# Patient Record
Sex: MALE | Race: WHITE | Employment: OTHER | URBAN - METROPOLITAN AREA
[De-identification: names, ages, dates, MRNs, and addresses within clinical notes are randomized per-mention and may not be internally consistent; named-entity substitution may affect disease eponyms.]

---

## 2017-01-13 RX ORDER — TEMAZEPAM 15 MG/1
CAPSULE ORAL
Qty: 60 CAPSULE | Refills: 0 | Status: SHIPPED | OUTPATIENT
Start: 2017-01-13 | End: 2017-02-07 | Stop reason: SDUPTHER

## 2017-01-31 ENCOUNTER — HOSPITAL ENCOUNTER (OUTPATIENT)
Dept: LAB | Age: 77
Discharge: HOME | End: 2017-01-31
Payer: MEDICARE

## 2017-01-31 DIAGNOSIS — R73.09 ELEVATED GLUCOSE: ICD-10-CM

## 2017-01-31 DIAGNOSIS — I10 ESSENTIAL HYPERTENSION: ICD-10-CM

## 2017-01-31 DIAGNOSIS — R73.03 BORDERLINE DIABETES MELLITUS: ICD-10-CM

## 2017-01-31 DIAGNOSIS — N40.0 BENIGN PROSTATIC HYPERPLASIA, PRESENCE OF LOWER URINARY TRACT SYMPTOMS UNSPECIFIED, UNSPECIFIED MORPHOLOGY: ICD-10-CM

## 2017-01-31 DIAGNOSIS — E78.2 MIXED HYPERLIPIDEMIA: ICD-10-CM

## 2017-01-31 LAB
ALBUMIN SERPL-MCNC: 4.2 G/DL (ref 3.9–4.9)
ALP BLD-CCNC: 69 U/L (ref 35–104)
ALT SERPL-CCNC: 19 U/L (ref 0–41)
ANION GAP SERPL CALCULATED.3IONS-SCNC: 6 MEQ/L (ref 7–13)
AST SERPL-CCNC: 18 U/L (ref 0–40)
BASOPHILS ABSOLUTE: 0 K/UL (ref 0–0.2)
BASOPHILS RELATIVE PERCENT: 0.3 %
BILIRUB SERPL-MCNC: 0.8 MG/DL (ref 0–1.2)
BUN BLDV-MCNC: 15 MG/DL (ref 8–23)
CALCIUM SERPL-MCNC: 9.2 MG/DL (ref 8.6–10.2)
CHLORIDE BLD-SCNC: 98 MEQ/L (ref 98–107)
CHOLESTEROL, TOTAL: 143 MG/DL (ref 0–199)
CO2: 35 MEQ/L (ref 22–29)
CREAT SERPL-MCNC: 0.76 MG/DL (ref 0.7–1.2)
EOSINOPHILS ABSOLUTE: 0.2 K/UL (ref 0–0.7)
EOSINOPHILS RELATIVE PERCENT: 1.9 %
GFR AFRICAN AMERICAN: >60
GFR NON-AFRICAN AMERICAN: >60
GLOBULIN: 2.3 G/DL (ref 2.3–3.5)
GLUCOSE BLD-MCNC: 146 MG/DL (ref 74–109)
HBA1C MFR BLD: 5.7 % (ref 4.8–5.9)
HCT VFR BLD CALC: 52 % (ref 42–52)
HDLC SERPL-MCNC: 40 MG/DL (ref 40–59)
HEMOGLOBIN: 16.1 G/DL (ref 14–18)
LDL CHOLESTEROL CALCULATED: 76 MG/DL (ref 0–129)
LYMPHOCYTES ABSOLUTE: 1.5 K/UL (ref 1–4.8)
LYMPHOCYTES RELATIVE PERCENT: 18.5 %
MCH RBC QN AUTO: 27.5 PG (ref 27–31.3)
MCHC RBC AUTO-ENTMCNC: 31 % (ref 33–37)
MCV RBC AUTO: 88.7 FL (ref 80–100)
MONOCYTES ABSOLUTE: 0.7 K/UL (ref 0.2–0.8)
MONOCYTES RELATIVE PERCENT: 7.9 %
NEUTROPHILS ABSOLUTE: 6 K/UL (ref 1.4–6.5)
NEUTROPHILS RELATIVE PERCENT: 71.4 %
PDW BLD-RTO: 14.1 % (ref 11.5–14.5)
PLATELET # BLD: 176 K/UL (ref 130–400)
POTASSIUM SERPL-SCNC: 4.3 MEQ/L (ref 3.5–5.1)
PROSTATE SPECIFIC ANTIGEN: 4.31 NG/ML (ref 0–6.22)
RBC # BLD: 5.86 M/UL (ref 4.7–6.1)
SODIUM BLD-SCNC: 139 MEQ/L (ref 132–144)
TOTAL PROTEIN: 6.5 G/DL (ref 6.4–8.1)
TRIGL SERPL-MCNC: 134 MG/DL (ref 0–200)
WBC # BLD: 8.3 K/UL (ref 4.8–10.8)

## 2017-01-31 PROCEDURE — 36415 COLL VENOUS BLD VENIPUNCTURE: CPT

## 2017-01-31 PROCEDURE — 85025 COMPLETE CBC W/AUTO DIFF WBC: CPT

## 2017-01-31 PROCEDURE — 84153 ASSAY OF PSA TOTAL: CPT

## 2017-01-31 PROCEDURE — 83036 HEMOGLOBIN GLYCOSYLATED A1C: CPT

## 2017-01-31 PROCEDURE — 80061 LIPID PANEL: CPT

## 2017-01-31 PROCEDURE — 80053 COMPREHEN METABOLIC PANEL: CPT

## 2017-02-07 ENCOUNTER — OFFICE VISIT (OUTPATIENT)
Dept: FAMILY MEDICINE CLINIC | Age: 77
End: 2017-02-07

## 2017-02-07 VITALS
HEART RATE: 72 BPM | RESPIRATION RATE: 22 BRPM | HEIGHT: 72 IN | WEIGHT: 208 LBS | BODY MASS INDEX: 28.17 KG/M2 | OXYGEN SATURATION: 95 % | TEMPERATURE: 97.8 F | DIASTOLIC BLOOD PRESSURE: 80 MMHG | SYSTOLIC BLOOD PRESSURE: 138 MMHG

## 2017-02-07 DIAGNOSIS — G47.00 INSOMNIA, UNSPECIFIED TYPE: ICD-10-CM

## 2017-02-07 DIAGNOSIS — R97.20 ELEVATED PSA: ICD-10-CM

## 2017-02-07 DIAGNOSIS — I48.0 PAROXYSMAL ATRIAL FIBRILLATION (HCC): ICD-10-CM

## 2017-02-07 DIAGNOSIS — I10 ESSENTIAL HYPERTENSION: Primary | ICD-10-CM

## 2017-02-07 DIAGNOSIS — E78.2 MIXED HYPERLIPIDEMIA: ICD-10-CM

## 2017-02-07 DIAGNOSIS — R73.03 BORDERLINE DIABETES MELLITUS: ICD-10-CM

## 2017-02-07 DIAGNOSIS — N40.0 BENIGN PROSTATIC HYPERPLASIA, PRESENCE OF LOWER URINARY TRACT SYMPTOMS UNSPECIFIED, UNSPECIFIED MORPHOLOGY: ICD-10-CM

## 2017-02-07 DIAGNOSIS — I25.10 CORONARY ARTERY DISEASE INVOLVING NATIVE CORONARY ARTERY OF NATIVE HEART WITHOUT ANGINA PECTORIS: ICD-10-CM

## 2017-02-07 DIAGNOSIS — J44.9 CHRONIC OBSTRUCTIVE PULMONARY DISEASE, UNSPECIFIED COPD TYPE (HCC): ICD-10-CM

## 2017-02-07 DIAGNOSIS — J43.9 PULMONARY EMPHYSEMA, UNSPECIFIED EMPHYSEMA TYPE (HCC): ICD-10-CM

## 2017-02-07 DIAGNOSIS — R73.09 ELEVATED GLUCOSE: ICD-10-CM

## 2017-02-07 PROCEDURE — G8420 CALC BMI NORM PARAMETERS: HCPCS | Performed by: NURSE PRACTITIONER

## 2017-02-07 PROCEDURE — 99214 OFFICE O/P EST MOD 30 MIN: CPT | Performed by: NURSE PRACTITIONER

## 2017-02-07 PROCEDURE — 4040F PNEUMOC VAC/ADMIN/RCVD: CPT | Performed by: NURSE PRACTITIONER

## 2017-02-07 PROCEDURE — 1036F TOBACCO NON-USER: CPT | Performed by: NURSE PRACTITIONER

## 2017-02-07 PROCEDURE — G8427 DOCREV CUR MEDS BY ELIG CLIN: HCPCS | Performed by: NURSE PRACTITIONER

## 2017-02-07 PROCEDURE — G8484 FLU IMMUNIZE NO ADMIN: HCPCS | Performed by: NURSE PRACTITIONER

## 2017-02-07 PROCEDURE — G8926 SPIRO NO PERF OR DOC: HCPCS | Performed by: NURSE PRACTITIONER

## 2017-02-07 PROCEDURE — 1123F ACP DISCUSS/DSCN MKR DOCD: CPT | Performed by: NURSE PRACTITIONER

## 2017-02-07 PROCEDURE — 3023F SPIROM DOC REV: CPT | Performed by: NURSE PRACTITIONER

## 2017-02-07 PROCEDURE — G8598 ASA/ANTIPLAT THER USED: HCPCS | Performed by: NURSE PRACTITIONER

## 2017-02-07 RX ORDER — TEMAZEPAM 15 MG/1
CAPSULE ORAL
Qty: 60 CAPSULE | Refills: 2 | Status: SHIPPED | OUTPATIENT
Start: 2017-02-07 | End: 2017-05-09 | Stop reason: SDUPTHER

## 2017-02-07 ASSESSMENT — PATIENT HEALTH QUESTIONNAIRE - PHQ9
SUM OF ALL RESPONSES TO PHQ QUESTIONS 1-9: 0
1. LITTLE INTEREST OR PLEASURE IN DOING THINGS: 0
2. FEELING DOWN, DEPRESSED OR HOPELESS: 0
SUM OF ALL RESPONSES TO PHQ9 QUESTIONS 1 & 2: 0

## 2017-02-12 RX ORDER — ROSUVASTATIN CALCIUM 40 MG/1
TABLET, COATED ORAL
Qty: 90 TABLET | Refills: 0 | OUTPATIENT
Start: 2017-02-12

## 2017-02-13 ENCOUNTER — TELEPHONE (OUTPATIENT)
Dept: FAMILY MEDICINE CLINIC | Age: 77
End: 2017-02-13

## 2017-02-13 RX ORDER — ROSUVASTATIN CALCIUM 40 MG/1
TABLET, COATED ORAL
Qty: 90 TABLET | Refills: 1 | Status: SHIPPED | OUTPATIENT
Start: 2017-02-13 | End: 2017-08-13 | Stop reason: SDUPTHER

## 2017-02-14 ENCOUNTER — OFFICE VISIT (OUTPATIENT)
Dept: FAMILY MEDICINE CLINIC | Age: 77
End: 2017-02-14

## 2017-02-14 VITALS
HEIGHT: 72 IN | HEART RATE: 87 BPM | RESPIRATION RATE: 22 BRPM | DIASTOLIC BLOOD PRESSURE: 80 MMHG | OXYGEN SATURATION: 95 % | TEMPERATURE: 97.7 F | WEIGHT: 206 LBS | BODY MASS INDEX: 27.9 KG/M2 | SYSTOLIC BLOOD PRESSURE: 150 MMHG

## 2017-02-14 DIAGNOSIS — J44.1 COPD EXACERBATION (HCC): Primary | ICD-10-CM

## 2017-02-14 PROCEDURE — 1036F TOBACCO NON-USER: CPT | Performed by: NURSE PRACTITIONER

## 2017-02-14 PROCEDURE — 4040F PNEUMOC VAC/ADMIN/RCVD: CPT | Performed by: NURSE PRACTITIONER

## 2017-02-14 PROCEDURE — 3023F SPIROM DOC REV: CPT | Performed by: NURSE PRACTITIONER

## 2017-02-14 PROCEDURE — G8428 CUR MEDS NOT DOCUMENT: HCPCS | Performed by: NURSE PRACTITIONER

## 2017-02-14 PROCEDURE — 1123F ACP DISCUSS/DSCN MKR DOCD: CPT | Performed by: NURSE PRACTITIONER

## 2017-02-14 PROCEDURE — G8926 SPIRO NO PERF OR DOC: HCPCS | Performed by: NURSE PRACTITIONER

## 2017-02-14 PROCEDURE — G8598 ASA/ANTIPLAT THER USED: HCPCS | Performed by: NURSE PRACTITIONER

## 2017-02-14 PROCEDURE — 99213 OFFICE O/P EST LOW 20 MIN: CPT | Performed by: NURSE PRACTITIONER

## 2017-02-14 PROCEDURE — G8484 FLU IMMUNIZE NO ADMIN: HCPCS | Performed by: NURSE PRACTITIONER

## 2017-02-14 PROCEDURE — G8420 CALC BMI NORM PARAMETERS: HCPCS | Performed by: NURSE PRACTITIONER

## 2017-02-14 RX ORDER — DOXYCYCLINE HYCLATE 100 MG/1
100 CAPSULE ORAL 2 TIMES DAILY
COMMUNITY
End: 2017-07-24

## 2017-02-14 RX ORDER — DOXYCYCLINE HYCLATE 100 MG
100 TABLET ORAL 2 TIMES DAILY
Qty: 20 TABLET | Refills: 0 | Status: SHIPPED | OUTPATIENT
Start: 2017-02-14 | End: 2017-05-09 | Stop reason: CLARIF

## 2017-04-06 ENCOUNTER — HOSPITAL ENCOUNTER (OUTPATIENT)
Age: 77
Discharge: HOME | End: 2017-04-06
Payer: MEDICARE

## 2017-04-06 ENCOUNTER — HOSPITAL ENCOUNTER (OUTPATIENT)
Dept: GENERAL RADIOLOGY | Age: 77
Discharge: HOME | End: 2017-04-06
Payer: MEDICARE

## 2017-04-06 DIAGNOSIS — J44.1 COPD EXACERBATION (HCC): ICD-10-CM

## 2017-04-06 PROCEDURE — 71020 XR CHEST STANDARD TWO VW: CPT

## 2017-05-08 ENCOUNTER — HOSPITAL ENCOUNTER (OUTPATIENT)
Dept: LAB | Age: 77
Discharge: HOME | End: 2017-05-08
Payer: MEDICARE

## 2017-05-08 DIAGNOSIS — R73.03 BORDERLINE DIABETES MELLITUS: ICD-10-CM

## 2017-05-08 DIAGNOSIS — R73.09 ELEVATED GLUCOSE: ICD-10-CM

## 2017-05-08 DIAGNOSIS — I10 ESSENTIAL HYPERTENSION: ICD-10-CM

## 2017-05-08 DIAGNOSIS — E78.2 MIXED HYPERLIPIDEMIA: ICD-10-CM

## 2017-05-08 LAB
ALBUMIN SERPL-MCNC: 4.2 G/DL (ref 3.9–4.9)
ALP BLD-CCNC: 64 U/L (ref 35–104)
ALT SERPL-CCNC: 18 U/L (ref 0–41)
ANION GAP SERPL CALCULATED.3IONS-SCNC: 6 MEQ/L (ref 7–13)
AST SERPL-CCNC: 19 U/L (ref 0–40)
BASOPHILS ABSOLUTE: 0 K/UL (ref 0–0.2)
BASOPHILS RELATIVE PERCENT: 0.4 %
BILIRUB SERPL-MCNC: 0.8 MG/DL (ref 0–1.2)
BUN BLDV-MCNC: 20 MG/DL (ref 8–23)
CALCIUM SERPL-MCNC: 9.2 MG/DL (ref 8.6–10.2)
CHLORIDE BLD-SCNC: 97 MEQ/L (ref 98–107)
CHOLESTEROL, TOTAL: 146 MG/DL (ref 0–199)
CO2: 38 MEQ/L (ref 22–29)
CREAT SERPL-MCNC: 0.62 MG/DL (ref 0.7–1.2)
EOSINOPHILS ABSOLUTE: 0.1 K/UL (ref 0–0.7)
EOSINOPHILS RELATIVE PERCENT: 1.9 %
GFR AFRICAN AMERICAN: >60
GFR NON-AFRICAN AMERICAN: >60
GLOBULIN: 2.2 G/DL (ref 2.3–3.5)
GLUCOSE BLD-MCNC: 131 MG/DL (ref 74–109)
HBA1C MFR BLD: 5.5 % (ref 4.8–5.9)
HCT VFR BLD CALC: 49.5 % (ref 42–52)
HDLC SERPL-MCNC: 43 MG/DL (ref 40–59)
HEMOGLOBIN: 16.3 G/DL (ref 14–18)
LDL CHOLESTEROL CALCULATED: 75 MG/DL (ref 0–129)
LYMPHOCYTES ABSOLUTE: 1.4 K/UL (ref 1–4.8)
LYMPHOCYTES RELATIVE PERCENT: 20.1 %
MCH RBC QN AUTO: 29.8 PG (ref 27–31.3)
MCHC RBC AUTO-ENTMCNC: 33 % (ref 33–37)
MCV RBC AUTO: 90.2 FL (ref 80–100)
MONOCYTES ABSOLUTE: 0.6 K/UL (ref 0.2–0.8)
MONOCYTES RELATIVE PERCENT: 9.1 %
NEUTROPHILS ABSOLUTE: 4.7 K/UL (ref 1.4–6.5)
NEUTROPHILS RELATIVE PERCENT: 68.5 %
PDW BLD-RTO: 14.1 % (ref 11.5–14.5)
PLATELET # BLD: 168 K/UL (ref 130–400)
POTASSIUM SERPL-SCNC: 4.6 MEQ/L (ref 3.5–5.1)
RBC # BLD: 5.49 M/UL (ref 4.7–6.1)
SODIUM BLD-SCNC: 141 MEQ/L (ref 132–144)
TOTAL PROTEIN: 6.4 G/DL (ref 6.4–8.1)
TRIGL SERPL-MCNC: 142 MG/DL (ref 0–200)
WBC # BLD: 6.9 K/UL (ref 4.8–10.8)

## 2017-05-08 PROCEDURE — 85025 COMPLETE CBC W/AUTO DIFF WBC: CPT

## 2017-05-08 PROCEDURE — 83036 HEMOGLOBIN GLYCOSYLATED A1C: CPT

## 2017-05-08 PROCEDURE — 36415 COLL VENOUS BLD VENIPUNCTURE: CPT

## 2017-05-08 PROCEDURE — 80061 LIPID PANEL: CPT

## 2017-05-08 PROCEDURE — 80053 COMPREHEN METABOLIC PANEL: CPT

## 2017-05-09 ENCOUNTER — OFFICE VISIT (OUTPATIENT)
Dept: FAMILY MEDICINE CLINIC | Age: 77
End: 2017-05-09

## 2017-05-09 VITALS
BODY MASS INDEX: 28.85 KG/M2 | OXYGEN SATURATION: 93 % | SYSTOLIC BLOOD PRESSURE: 128 MMHG | HEIGHT: 72 IN | WEIGHT: 213 LBS | DIASTOLIC BLOOD PRESSURE: 70 MMHG | RESPIRATION RATE: 18 BRPM | HEART RATE: 67 BPM | TEMPERATURE: 97.7 F

## 2017-05-09 DIAGNOSIS — G47.00 INSOMNIA, UNSPECIFIED TYPE: ICD-10-CM

## 2017-05-09 DIAGNOSIS — I25.10 CORONARY ARTERY DISEASE INVOLVING NATIVE CORONARY ARTERY OF NATIVE HEART WITHOUT ANGINA PECTORIS: ICD-10-CM

## 2017-05-09 DIAGNOSIS — R97.20 ELEVATED PSA: ICD-10-CM

## 2017-05-09 DIAGNOSIS — C44.91 SKIN CANCER, BASAL CELL: ICD-10-CM

## 2017-05-09 DIAGNOSIS — I10 ESSENTIAL HYPERTENSION: Primary | ICD-10-CM

## 2017-05-09 DIAGNOSIS — R73.09 ELEVATED GLUCOSE: ICD-10-CM

## 2017-05-09 DIAGNOSIS — R73.03 BORDERLINE DIABETES MELLITUS: ICD-10-CM

## 2017-05-09 DIAGNOSIS — J44.9 CHRONIC OBSTRUCTIVE PULMONARY DISEASE, UNSPECIFIED COPD TYPE (HCC): ICD-10-CM

## 2017-05-09 DIAGNOSIS — E78.2 MIXED HYPERLIPIDEMIA: ICD-10-CM

## 2017-05-09 DIAGNOSIS — I48.0 PAROXYSMAL ATRIAL FIBRILLATION (HCC): ICD-10-CM

## 2017-05-09 PROCEDURE — G8420 CALC BMI NORM PARAMETERS: HCPCS | Performed by: NURSE PRACTITIONER

## 2017-05-09 PROCEDURE — 99214 OFFICE O/P EST MOD 30 MIN: CPT | Performed by: NURSE PRACTITIONER

## 2017-05-09 PROCEDURE — 1123F ACP DISCUSS/DSCN MKR DOCD: CPT | Performed by: NURSE PRACTITIONER

## 2017-05-09 PROCEDURE — G8926 SPIRO NO PERF OR DOC: HCPCS | Performed by: NURSE PRACTITIONER

## 2017-05-09 PROCEDURE — G8427 DOCREV CUR MEDS BY ELIG CLIN: HCPCS | Performed by: NURSE PRACTITIONER

## 2017-05-09 PROCEDURE — 1036F TOBACCO NON-USER: CPT | Performed by: NURSE PRACTITIONER

## 2017-05-09 PROCEDURE — G8598 ASA/ANTIPLAT THER USED: HCPCS | Performed by: NURSE PRACTITIONER

## 2017-05-09 PROCEDURE — 3023F SPIROM DOC REV: CPT | Performed by: NURSE PRACTITIONER

## 2017-05-09 PROCEDURE — 4040F PNEUMOC VAC/ADMIN/RCVD: CPT | Performed by: NURSE PRACTITIONER

## 2017-05-09 RX ORDER — TEMAZEPAM 15 MG/1
CAPSULE ORAL
Qty: 60 CAPSULE | Refills: 2 | Status: SHIPPED | OUTPATIENT
Start: 2017-05-09 | End: 2017-08-07 | Stop reason: SDUPTHER

## 2017-05-09 RX ORDER — THIAMINE HCL 100 MG
TABLET ORAL 2 TIMES DAILY
COMMUNITY

## 2017-05-09 ASSESSMENT — PATIENT HEALTH QUESTIONNAIRE - PHQ9
1. LITTLE INTEREST OR PLEASURE IN DOING THINGS: 0
SUM OF ALL RESPONSES TO PHQ9 QUESTIONS 1 & 2: 0
SUM OF ALL RESPONSES TO PHQ QUESTIONS 1-9: 0
2. FEELING DOWN, DEPRESSED OR HOPELESS: 0

## 2017-07-24 ENCOUNTER — OFFICE VISIT (OUTPATIENT)
Dept: PULMONOLOGY | Age: 77
End: 2017-07-24

## 2017-07-24 VITALS
HEIGHT: 72 IN | SYSTOLIC BLOOD PRESSURE: 138 MMHG | BODY MASS INDEX: 28.44 KG/M2 | DIASTOLIC BLOOD PRESSURE: 88 MMHG | RESPIRATION RATE: 16 BRPM | OXYGEN SATURATION: 93 % | HEART RATE: 83 BPM | TEMPERATURE: 96.7 F | WEIGHT: 210 LBS

## 2017-07-24 DIAGNOSIS — J44.9 CHRONIC OBSTRUCTIVE PULMONARY DISEASE, UNSPECIFIED COPD TYPE (HCC): Primary | ICD-10-CM

## 2017-07-24 DIAGNOSIS — R06.02 SHORTNESS OF BREATH: ICD-10-CM

## 2017-07-24 DIAGNOSIS — I48.91 ATRIAL FIBRILLATION, UNSPECIFIED TYPE (HCC): ICD-10-CM

## 2017-07-24 DIAGNOSIS — I27.20 PULMONARY HTN (HCC): ICD-10-CM

## 2017-07-24 PROCEDURE — G8926 SPIRO NO PERF OR DOC: HCPCS | Performed by: INTERNAL MEDICINE

## 2017-07-24 PROCEDURE — 1123F ACP DISCUSS/DSCN MKR DOCD: CPT | Performed by: INTERNAL MEDICINE

## 2017-07-24 PROCEDURE — 1036F TOBACCO NON-USER: CPT | Performed by: INTERNAL MEDICINE

## 2017-07-24 PROCEDURE — 99204 OFFICE O/P NEW MOD 45 MIN: CPT | Performed by: INTERNAL MEDICINE

## 2017-07-24 PROCEDURE — 4040F PNEUMOC VAC/ADMIN/RCVD: CPT | Performed by: INTERNAL MEDICINE

## 2017-07-24 PROCEDURE — 3023F SPIROM DOC REV: CPT | Performed by: INTERNAL MEDICINE

## 2017-07-24 PROCEDURE — G8598 ASA/ANTIPLAT THER USED: HCPCS | Performed by: INTERNAL MEDICINE

## 2017-07-24 PROCEDURE — G8419 CALC BMI OUT NRM PARAM NOF/U: HCPCS | Performed by: INTERNAL MEDICINE

## 2017-07-24 PROCEDURE — G8427 DOCREV CUR MEDS BY ELIG CLIN: HCPCS | Performed by: INTERNAL MEDICINE

## 2017-07-24 ASSESSMENT — ENCOUNTER SYMPTOMS
WHEEZING: 1
SORE THROAT: 0
ABDOMINAL PAIN: 1
CHEST TIGHTNESS: 1
NAUSEA: 0
SINUS PRESSURE: 0
COUGH: 1
SHORTNESS OF BREATH: 1
RHINORRHEA: 0
VOMITING: 0
DIARRHEA: 0

## 2017-08-01 ENCOUNTER — TELEPHONE (OUTPATIENT)
Dept: GASTROENTEROLOGY | Age: 77
End: 2017-08-01

## 2017-08-07 ENCOUNTER — HOSPITAL ENCOUNTER (OUTPATIENT)
Dept: NON INVASIVE DIAGNOSTICS | Age: 77
Discharge: HOME | End: 2017-08-07
Payer: MEDICARE

## 2017-08-07 ENCOUNTER — HOSPITAL ENCOUNTER (OUTPATIENT)
Dept: PULMONOLOGY | Age: 77
Discharge: HOME | End: 2017-08-07
Payer: MEDICARE

## 2017-08-07 ENCOUNTER — HOSPITAL ENCOUNTER (OUTPATIENT)
Dept: CT IMAGING | Age: 77
Discharge: HOME | End: 2017-08-07
Payer: MEDICARE

## 2017-08-07 VITALS
BODY MASS INDEX: 27.77 KG/M2 | HEART RATE: 66 BPM | HEIGHT: 72 IN | DIASTOLIC BLOOD PRESSURE: 71 MMHG | SYSTOLIC BLOOD PRESSURE: 128 MMHG | WEIGHT: 205 LBS | RESPIRATION RATE: 18 BRPM

## 2017-08-07 DIAGNOSIS — I27.20 PULMONARY HTN (HCC): ICD-10-CM

## 2017-08-07 DIAGNOSIS — R06.02 SHORTNESS OF BREATH: ICD-10-CM

## 2017-08-07 DIAGNOSIS — J44.9 CHRONIC OBSTRUCTIVE PULMONARY DISEASE, UNSPECIFIED COPD TYPE (HCC): ICD-10-CM

## 2017-08-07 DIAGNOSIS — G47.00 INSOMNIA, UNSPECIFIED TYPE: ICD-10-CM

## 2017-08-07 LAB
BASE EXCESS ARTERIAL: 10 (ref -3–3)
HCO3 ARTERIAL: 36.2 MMOL/L (ref 21–29)
LACTATE: 0.88 MMOL/L (ref 0.4–2)
LV EF: 68 %
LVEF MODALITY: NORMAL
O2 SAT, ARTERIAL: 92 % (ref 93–100)
PCO2 ARTERIAL: 69 MM HG (ref 35–45)
PERFORMED ON: ABNORMAL
PH ARTERIAL: 7.33 (ref 7.35–7.45)
PO2 ARTERIAL: 70 MM HG (ref 75–108)
POC FIO2: 3
POC SAMPLE TYPE: ABNORMAL
TCO2 ARTERIAL: 38 (ref 22–29)

## 2017-08-07 PROCEDURE — 93306 TTE W/DOPPLER COMPLETE: CPT

## 2017-08-07 PROCEDURE — 94060 EVALUATION OF WHEEZING: CPT

## 2017-08-07 PROCEDURE — 82803 BLOOD GASES ANY COMBINATION: CPT

## 2017-08-07 PROCEDURE — 94726 PLETHYSMOGRAPHY LUNG VOLUMES: CPT

## 2017-08-07 PROCEDURE — 94729 DIFFUSING CAPACITY: CPT

## 2017-08-07 PROCEDURE — 83605 ASSAY OF LACTIC ACID: CPT

## 2017-08-07 PROCEDURE — 71250 CT THORAX DX C-: CPT

## 2017-08-07 RX ORDER — ALBUTEROL SULFATE 2.5 MG/3ML
2.5 SOLUTION RESPIRATORY (INHALATION) ONCE
Status: DISCONTINUED | OUTPATIENT
Start: 2017-08-07 | End: 2017-08-08 | Stop reason: HOSPADM

## 2017-08-08 ENCOUNTER — HOSPITAL ENCOUNTER (OUTPATIENT)
Dept: LAB | Age: 77
Discharge: HOME | End: 2017-08-08
Payer: MEDICARE

## 2017-08-08 DIAGNOSIS — I10 ESSENTIAL HYPERTENSION: ICD-10-CM

## 2017-08-08 DIAGNOSIS — R97.20 ELEVATED PSA: ICD-10-CM

## 2017-08-08 DIAGNOSIS — E78.2 MIXED HYPERLIPIDEMIA: ICD-10-CM

## 2017-08-08 DIAGNOSIS — R73.09 ELEVATED GLUCOSE: ICD-10-CM

## 2017-08-08 LAB
ALBUMIN SERPL-MCNC: 4.1 G/DL (ref 3.9–4.9)
ALP BLD-CCNC: 72 U/L (ref 35–104)
ALT SERPL-CCNC: 19 U/L (ref 0–41)
ANION GAP SERPL CALCULATED.3IONS-SCNC: 9 MEQ/L (ref 7–13)
AST SERPL-CCNC: 20 U/L (ref 0–40)
BASOPHILS ABSOLUTE: 0 K/UL (ref 0–0.2)
BASOPHILS RELATIVE PERCENT: 0.6 %
BILIRUB SERPL-MCNC: 0.8 MG/DL (ref 0–1.2)
BUN BLDV-MCNC: 16 MG/DL (ref 8–23)
CALCIUM SERPL-MCNC: 9.1 MG/DL (ref 8.6–10.2)
CHLORIDE BLD-SCNC: 100 MEQ/L (ref 98–107)
CHOLESTEROL, TOTAL: 128 MG/DL (ref 0–199)
CO2: 35 MEQ/L (ref 22–29)
CREAT SERPL-MCNC: 0.66 MG/DL (ref 0.7–1.2)
EOSINOPHILS ABSOLUTE: 0.2 K/UL (ref 0–0.7)
EOSINOPHILS RELATIVE PERCENT: 2.8 %
GFR AFRICAN AMERICAN: >60
GFR NON-AFRICAN AMERICAN: >60
GLOBULIN: 2.4 G/DL (ref 2.3–3.5)
GLUCOSE BLD-MCNC: 126 MG/DL (ref 74–109)
HBA1C MFR BLD: 5.7 % (ref 4.8–5.9)
HCT VFR BLD CALC: 49.2 % (ref 42–52)
HDLC SERPL-MCNC: 40 MG/DL (ref 40–59)
HEMOGLOBIN: 16.1 G/DL (ref 14–18)
LDL CHOLESTEROL CALCULATED: 63 MG/DL (ref 0–129)
LYMPHOCYTES ABSOLUTE: 1.7 K/UL (ref 1–4.8)
LYMPHOCYTES RELATIVE PERCENT: 21.6 %
MCH RBC QN AUTO: 29.1 PG (ref 27–31.3)
MCHC RBC AUTO-ENTMCNC: 32.7 % (ref 33–37)
MCV RBC AUTO: 89 FL (ref 80–100)
MONOCYTES ABSOLUTE: 0.7 K/UL (ref 0.2–0.8)
MONOCYTES RELATIVE PERCENT: 8.9 %
NEUTROPHILS ABSOLUTE: 5.1 K/UL (ref 1.4–6.5)
NEUTROPHILS RELATIVE PERCENT: 66.1 %
PDW BLD-RTO: 13.8 % (ref 11.5–14.5)
PLATELET # BLD: 160 K/UL (ref 130–400)
POTASSIUM SERPL-SCNC: 4.8 MEQ/L (ref 3.5–5.1)
PROSTATE SPECIFIC ANTIGEN: 5.23 NG/ML (ref 0–6.22)
RBC # BLD: 5.53 M/UL (ref 4.7–6.1)
SODIUM BLD-SCNC: 144 MEQ/L (ref 132–144)
TOTAL PROTEIN: 6.5 G/DL (ref 6.4–8.1)
TRIGL SERPL-MCNC: 125 MG/DL (ref 0–200)
WBC # BLD: 7.6 K/UL (ref 4.8–10.8)

## 2017-08-08 PROCEDURE — 84153 ASSAY OF PSA TOTAL: CPT

## 2017-08-08 PROCEDURE — 85025 COMPLETE CBC W/AUTO DIFF WBC: CPT

## 2017-08-08 PROCEDURE — 80061 LIPID PANEL: CPT

## 2017-08-08 PROCEDURE — 83036 HEMOGLOBIN GLYCOSYLATED A1C: CPT

## 2017-08-08 PROCEDURE — 80053 COMPREHEN METABOLIC PANEL: CPT

## 2017-08-08 PROCEDURE — 36415 COLL VENOUS BLD VENIPUNCTURE: CPT

## 2017-08-10 ENCOUNTER — OFFICE VISIT (OUTPATIENT)
Dept: FAMILY MEDICINE CLINIC | Age: 77
End: 2017-08-10

## 2017-08-10 VITALS
TEMPERATURE: 97.3 F | RESPIRATION RATE: 18 BRPM | HEART RATE: 67 BPM | DIASTOLIC BLOOD PRESSURE: 80 MMHG | BODY MASS INDEX: 28.53 KG/M2 | SYSTOLIC BLOOD PRESSURE: 130 MMHG | WEIGHT: 210.6 LBS | OXYGEN SATURATION: 90 % | HEIGHT: 72 IN

## 2017-08-10 DIAGNOSIS — E78.2 MIXED HYPERLIPIDEMIA: ICD-10-CM

## 2017-08-10 DIAGNOSIS — I48.0 PAROXYSMAL ATRIAL FIBRILLATION (HCC): ICD-10-CM

## 2017-08-10 DIAGNOSIS — I10 ESSENTIAL HYPERTENSION: Primary | ICD-10-CM

## 2017-08-10 DIAGNOSIS — R97.20 ELEVATED PSA: ICD-10-CM

## 2017-08-10 DIAGNOSIS — I25.10 CORONARY ARTERY DISEASE INVOLVING NATIVE CORONARY ARTERY OF NATIVE HEART WITHOUT ANGINA PECTORIS: ICD-10-CM

## 2017-08-10 DIAGNOSIS — J44.9 CHRONIC OBSTRUCTIVE PULMONARY DISEASE, UNSPECIFIED COPD TYPE (HCC): ICD-10-CM

## 2017-08-10 DIAGNOSIS — R73.03 BORDERLINE DIABETES MELLITUS: ICD-10-CM

## 2017-08-10 PROCEDURE — 3023F SPIROM DOC REV: CPT | Performed by: NURSE PRACTITIONER

## 2017-08-10 PROCEDURE — G8926 SPIRO NO PERF OR DOC: HCPCS | Performed by: NURSE PRACTITIONER

## 2017-08-10 PROCEDURE — 1036F TOBACCO NON-USER: CPT | Performed by: NURSE PRACTITIONER

## 2017-08-10 PROCEDURE — 1123F ACP DISCUSS/DSCN MKR DOCD: CPT | Performed by: NURSE PRACTITIONER

## 2017-08-10 PROCEDURE — 99214 OFFICE O/P EST MOD 30 MIN: CPT | Performed by: NURSE PRACTITIONER

## 2017-08-10 PROCEDURE — 4040F PNEUMOC VAC/ADMIN/RCVD: CPT | Performed by: NURSE PRACTITIONER

## 2017-08-10 PROCEDURE — G8427 DOCREV CUR MEDS BY ELIG CLIN: HCPCS | Performed by: NURSE PRACTITIONER

## 2017-08-10 PROCEDURE — G8419 CALC BMI OUT NRM PARAM NOF/U: HCPCS | Performed by: NURSE PRACTITIONER

## 2017-08-10 PROCEDURE — G8598 ASA/ANTIPLAT THER USED: HCPCS | Performed by: NURSE PRACTITIONER

## 2017-08-10 RX ORDER — TEMAZEPAM 15 MG/1
CAPSULE ORAL
Qty: 60 CAPSULE | Refills: 2 | Status: SHIPPED | OUTPATIENT
Start: 2017-08-10 | End: 2017-11-08 | Stop reason: SDUPTHER

## 2017-08-14 RX ORDER — ROSUVASTATIN CALCIUM 40 MG/1
TABLET, COATED ORAL
Qty: 90 TABLET | Refills: 0 | Status: SHIPPED | OUTPATIENT
Start: 2017-08-14 | End: 2017-11-12 | Stop reason: SDUPTHER

## 2017-08-31 ENCOUNTER — HOSPITAL ENCOUNTER (OUTPATIENT)
Dept: PULMONOLOGY | Age: 77
Setting detail: THERAPIES SERIES
Discharge: HOME | End: 2017-08-31
Payer: MEDICARE

## 2017-08-31 PROCEDURE — G0424 PULMONARY REHAB W EXER: HCPCS

## 2017-09-08 ENCOUNTER — HOSPITAL ENCOUNTER (OUTPATIENT)
Dept: PULMONOLOGY | Age: 77
Setting detail: THERAPIES SERIES
Discharge: HOME | End: 2017-09-08
Payer: MEDICARE

## 2017-09-08 PROCEDURE — G0424 PULMONARY REHAB W EXER: HCPCS

## 2017-09-12 ENCOUNTER — HOSPITAL ENCOUNTER (OUTPATIENT)
Dept: PULMONOLOGY | Age: 77
Setting detail: THERAPIES SERIES
Discharge: HOME | End: 2017-09-12
Payer: MEDICARE

## 2017-09-12 PROCEDURE — G0424 PULMONARY REHAB W EXER: HCPCS

## 2017-09-14 ENCOUNTER — HOSPITAL ENCOUNTER (OUTPATIENT)
Dept: PULMONOLOGY | Age: 77
Setting detail: THERAPIES SERIES
Discharge: HOME | End: 2017-09-14
Payer: MEDICARE

## 2017-09-14 PROCEDURE — G0424 PULMONARY REHAB W EXER: HCPCS

## 2017-09-19 ENCOUNTER — HOSPITAL ENCOUNTER (OUTPATIENT)
Dept: PULMONOLOGY | Age: 77
Setting detail: THERAPIES SERIES
Discharge: HOME | End: 2017-09-19
Payer: MEDICARE

## 2017-09-19 PROCEDURE — G0424 PULMONARY REHAB W EXER: HCPCS

## 2017-09-21 ENCOUNTER — HOSPITAL ENCOUNTER (OUTPATIENT)
Dept: PULMONOLOGY | Age: 77
Setting detail: THERAPIES SERIES
Discharge: HOME | End: 2017-09-21
Payer: MEDICARE

## 2017-09-21 PROCEDURE — G0424 PULMONARY REHAB W EXER: HCPCS

## 2017-09-25 ENCOUNTER — OFFICE VISIT (OUTPATIENT)
Dept: PULMONOLOGY | Age: 77
End: 2017-09-25

## 2017-09-25 VITALS
TEMPERATURE: 96.9 F | BODY MASS INDEX: 28.99 KG/M2 | WEIGHT: 214 LBS | HEART RATE: 67 BPM | OXYGEN SATURATION: 91 % | HEIGHT: 72 IN | DIASTOLIC BLOOD PRESSURE: 80 MMHG | SYSTOLIC BLOOD PRESSURE: 140 MMHG

## 2017-09-25 DIAGNOSIS — G47.33 OBSTRUCTIVE SLEEP APNEA: ICD-10-CM

## 2017-09-25 DIAGNOSIS — J96.11 CHRONIC RESPIRATORY FAILURE WITH HYPOXIA AND HYPERCAPNIA (HCC): ICD-10-CM

## 2017-09-25 DIAGNOSIS — J96.12 CHRONIC RESPIRATORY FAILURE WITH HYPOXIA AND HYPERCAPNIA (HCC): ICD-10-CM

## 2017-09-25 DIAGNOSIS — J44.9 CHRONIC OBSTRUCTIVE PULMONARY DISEASE, UNSPECIFIED COPD TYPE (HCC): Primary | ICD-10-CM

## 2017-09-25 DIAGNOSIS — I27.20 PULMONARY HTN (HCC): ICD-10-CM

## 2017-09-25 PROCEDURE — G8427 DOCREV CUR MEDS BY ELIG CLIN: HCPCS | Performed by: INTERNAL MEDICINE

## 2017-09-25 PROCEDURE — 99214 OFFICE O/P EST MOD 30 MIN: CPT | Performed by: INTERNAL MEDICINE

## 2017-09-25 PROCEDURE — 1036F TOBACCO NON-USER: CPT | Performed by: INTERNAL MEDICINE

## 2017-09-25 PROCEDURE — 3023F SPIROM DOC REV: CPT | Performed by: INTERNAL MEDICINE

## 2017-09-25 PROCEDURE — 4040F PNEUMOC VAC/ADMIN/RCVD: CPT | Performed by: INTERNAL MEDICINE

## 2017-09-25 PROCEDURE — G8926 SPIRO NO PERF OR DOC: HCPCS | Performed by: INTERNAL MEDICINE

## 2017-09-25 PROCEDURE — G8598 ASA/ANTIPLAT THER USED: HCPCS | Performed by: INTERNAL MEDICINE

## 2017-09-25 PROCEDURE — 1123F ACP DISCUSS/DSCN MKR DOCD: CPT | Performed by: INTERNAL MEDICINE

## 2017-09-25 PROCEDURE — G8417 CALC BMI ABV UP PARAM F/U: HCPCS | Performed by: INTERNAL MEDICINE

## 2017-09-25 RX ORDER — INFLUENZA A VIRUS A/MICHIGAN/45/2015 X-275 (H1N1) ANTIGEN (FORMALDEHYDE INACTIVATED), INFLUENZA A VIRUS A/SINGAPORE/INFIMH-16-0019/2016 IVR-186 (H3N2) ANTIGEN (FORMALDEHYDE INACTIVATED), AND INFLUENZA B VIRUS B/MARYLAND/15/2016 BX-69A (A B/COLORADO/6/2017-LIKE VIRUS) ANTIGEN (FORMALDEHYDE INACTIVATED) 60; 60; 60 UG/.5ML; UG/.5ML; UG/.5ML
INJECTION, SUSPENSION INTRAMUSCULAR
Refills: 0 | COMMUNITY
Start: 2017-09-15 | End: 2017-11-13 | Stop reason: ALTCHOICE

## 2017-09-25 ASSESSMENT — ENCOUNTER SYMPTOMS
RHINORRHEA: 1
DIARRHEA: 0
COUGH: 1
WHEEZING: 1
CHEST TIGHTNESS: 1
SORE THROAT: 0
ABDOMINAL PAIN: 0
SHORTNESS OF BREATH: 1
NAUSEA: 0
SINUS PRESSURE: 0
VOMITING: 0

## 2017-09-27 ENCOUNTER — HOSPITAL ENCOUNTER (OUTPATIENT)
Dept: SLEEP CENTER | Age: 77
Discharge: HOME | End: 2017-09-27
Payer: MEDICARE

## 2017-09-27 PROCEDURE — 95810 POLYSOM 6/> YRS 4/> PARAM: CPT

## 2017-09-28 ENCOUNTER — HOSPITAL ENCOUNTER (OUTPATIENT)
Dept: PULMONOLOGY | Age: 77
Setting detail: THERAPIES SERIES
Discharge: HOME | End: 2017-09-28
Payer: MEDICARE

## 2017-09-28 PROCEDURE — G0424 PULMONARY REHAB W EXER: HCPCS

## 2017-10-03 ENCOUNTER — HOSPITAL ENCOUNTER (OUTPATIENT)
Dept: PULMONOLOGY | Age: 77
Setting detail: THERAPIES SERIES
Discharge: HOME | End: 2017-10-03
Payer: MEDICARE

## 2017-10-03 PROCEDURE — G0424 PULMONARY REHAB W EXER: HCPCS

## 2017-10-05 ENCOUNTER — HOSPITAL ENCOUNTER (OUTPATIENT)
Dept: PULMONOLOGY | Age: 77
Setting detail: THERAPIES SERIES
Discharge: HOME | End: 2017-10-05
Payer: MEDICARE

## 2017-10-05 ENCOUNTER — TELEPHONE (OUTPATIENT)
Dept: PULMONOLOGY | Age: 77
End: 2017-10-05

## 2017-10-05 PROCEDURE — G0424 PULMONARY REHAB W EXER: HCPCS

## 2017-10-10 ENCOUNTER — HOSPITAL ENCOUNTER (OUTPATIENT)
Dept: PULMONOLOGY | Age: 77
Setting detail: THERAPIES SERIES
Discharge: HOME | End: 2017-10-10
Payer: MEDICARE

## 2017-10-10 PROCEDURE — G0424 PULMONARY REHAB W EXER: HCPCS

## 2017-10-12 ENCOUNTER — HOSPITAL ENCOUNTER (OUTPATIENT)
Dept: SLEEP CENTER | Age: 77
Discharge: HOME | End: 2017-10-12
Payer: MEDICARE

## 2017-10-12 ENCOUNTER — HOSPITAL ENCOUNTER (OUTPATIENT)
Dept: PULMONOLOGY | Age: 77
Setting detail: THERAPIES SERIES
Discharge: HOME | End: 2017-10-12
Payer: MEDICARE

## 2017-10-12 PROCEDURE — 95811 POLYSOM 6/>YRS CPAP 4/> PARM: CPT

## 2017-10-12 PROCEDURE — G0424 PULMONARY REHAB W EXER: HCPCS

## 2017-10-17 ENCOUNTER — HOSPITAL ENCOUNTER (OUTPATIENT)
Dept: PULMONOLOGY | Age: 77
Setting detail: THERAPIES SERIES
Discharge: HOME | End: 2017-10-17
Payer: MEDICARE

## 2017-10-17 PROCEDURE — G0424 PULMONARY REHAB W EXER: HCPCS

## 2017-10-19 ENCOUNTER — HOSPITAL ENCOUNTER (OUTPATIENT)
Dept: PULMONOLOGY | Age: 77
Setting detail: THERAPIES SERIES
Discharge: HOME | End: 2017-10-19
Payer: MEDICARE

## 2017-10-19 PROCEDURE — G0424 PULMONARY REHAB W EXER: HCPCS

## 2017-10-23 ENCOUNTER — TELEPHONE (OUTPATIENT)
Dept: PULMONOLOGY | Age: 77
End: 2017-10-23

## 2017-10-23 RX ORDER — BUDESONIDE AND FORMOTEROL FUMARATE DIHYDRATE 80; 4.5 UG/1; UG/1
2 AEROSOL RESPIRATORY (INHALATION) 2 TIMES DAILY
Qty: 3 INHALER | Refills: 2 | Status: SHIPPED | OUTPATIENT
Start: 2017-10-23 | End: 2017-11-07 | Stop reason: SDUPTHER

## 2017-10-23 NOTE — TELEPHONE ENCOUNTER
Patient calling to find out if Dr. Romaine Kim is going to send the Symbicort Rx to [facility]. He was here on 09/25/2017 and was given 2 samples of the Symbicort. Next appt is on 11/27/2017. Does he needs to wait until his appointment in November or can he get the Rx before then. Please advice.  Thanks

## 2017-10-24 ENCOUNTER — HOSPITAL ENCOUNTER (OUTPATIENT)
Dept: PULMONOLOGY | Age: 77
Setting detail: THERAPIES SERIES
Discharge: HOME | End: 2017-10-24
Payer: MEDICARE

## 2017-10-24 PROCEDURE — G0424 PULMONARY REHAB W EXER: HCPCS

## 2017-10-26 ENCOUNTER — HOSPITAL ENCOUNTER (OUTPATIENT)
Dept: PULMONOLOGY | Age: 77
Setting detail: THERAPIES SERIES
Discharge: HOME | End: 2017-10-26
Payer: MEDICARE

## 2017-10-26 PROCEDURE — G0424 PULMONARY REHAB W EXER: HCPCS

## 2017-10-31 ENCOUNTER — HOSPITAL ENCOUNTER (OUTPATIENT)
Dept: PULMONOLOGY | Age: 77
Setting detail: THERAPIES SERIES
Discharge: HOME | End: 2017-10-31
Payer: MEDICARE

## 2017-10-31 PROCEDURE — G0424 PULMONARY REHAB W EXER: HCPCS

## 2017-11-02 ENCOUNTER — HOSPITAL ENCOUNTER (OUTPATIENT)
Dept: PULMONOLOGY | Age: 77
Setting detail: THERAPIES SERIES
Discharge: HOME | End: 2017-11-02
Payer: MEDICARE

## 2017-11-02 PROCEDURE — G0424 PULMONARY REHAB W EXER: HCPCS

## 2017-11-07 ENCOUNTER — HOSPITAL ENCOUNTER (OUTPATIENT)
Dept: PULMONOLOGY | Age: 77
Setting detail: THERAPIES SERIES
Discharge: HOME | End: 2017-11-07
Payer: MEDICARE

## 2017-11-07 PROCEDURE — G0424 PULMONARY REHAB W EXER: HCPCS

## 2017-11-07 NOTE — TELEPHONE ENCOUNTER
Patient still has not received Rx from [facility] is asking for a Rx for 30 days to be sent to [facility]. Patient ran out last night and asking for this to be called in today.  Call patient once sent [phone]

## 2017-11-08 DIAGNOSIS — G47.00 INSOMNIA, UNSPECIFIED TYPE: ICD-10-CM

## 2017-11-08 RX ORDER — BUDESONIDE AND FORMOTEROL FUMARATE DIHYDRATE 80; 4.5 UG/1; UG/1
2 AEROSOL RESPIRATORY (INHALATION) 2 TIMES DAILY
Qty: 1 INHALER | Refills: 1 | Status: SHIPPED | OUTPATIENT
Start: 2017-11-08 | End: 2018-08-31 | Stop reason: SDUPTHER

## 2017-11-09 ENCOUNTER — HOSPITAL ENCOUNTER (OUTPATIENT)
Dept: LAB | Age: 77
Discharge: HOME | End: 2017-11-09
Payer: MEDICARE

## 2017-11-09 ENCOUNTER — HOSPITAL ENCOUNTER (OUTPATIENT)
Dept: PULMONOLOGY | Age: 77
Setting detail: THERAPIES SERIES
Discharge: HOME | End: 2017-11-09
Payer: MEDICARE

## 2017-11-09 DIAGNOSIS — E78.2 MIXED HYPERLIPIDEMIA: ICD-10-CM

## 2017-11-09 DIAGNOSIS — R97.20 ELEVATED PSA: ICD-10-CM

## 2017-11-09 DIAGNOSIS — R73.03 BORDERLINE DIABETES MELLITUS: ICD-10-CM

## 2017-11-09 DIAGNOSIS — G47.00 INSOMNIA, UNSPECIFIED TYPE: ICD-10-CM

## 2017-11-09 LAB
ALBUMIN SERPL-MCNC: 4.2 G/DL (ref 3.9–4.9)
ALP BLD-CCNC: 71 U/L (ref 35–104)
ALT SERPL-CCNC: 20 U/L (ref 0–41)
ANION GAP SERPL CALCULATED.3IONS-SCNC: 11 MEQ/L (ref 7–13)
AST SERPL-CCNC: 20 U/L (ref 0–40)
BASOPHILS ABSOLUTE: 0 K/UL (ref 0–0.2)
BASOPHILS RELATIVE PERCENT: 0.5 %
BILIRUB SERPL-MCNC: 0.6 MG/DL (ref 0–1.2)
BUN BLDV-MCNC: 18 MG/DL (ref 8–23)
CALCIUM SERPL-MCNC: 9.5 MG/DL (ref 8.6–10.2)
CHLORIDE BLD-SCNC: 100 MEQ/L (ref 98–107)
CHOLESTEROL, TOTAL: 144 MG/DL (ref 0–199)
CO2: 33 MEQ/L (ref 22–29)
CREAT SERPL-MCNC: 0.66 MG/DL (ref 0.7–1.2)
EOSINOPHILS ABSOLUTE: 0.1 K/UL (ref 0–0.7)
EOSINOPHILS RELATIVE PERCENT: 2 %
GFR AFRICAN AMERICAN: >60
GFR NON-AFRICAN AMERICAN: >60
GLOBULIN: 2.5 G/DL (ref 2.3–3.5)
GLUCOSE BLD-MCNC: 144 MG/DL (ref 74–109)
HBA1C MFR BLD: 5.9 % (ref 4.8–5.9)
HCT VFR BLD CALC: 48.2 % (ref 42–52)
HDLC SERPL-MCNC: 44 MG/DL (ref 40–59)
HEMOGLOBIN: 15.4 G/DL (ref 14–18)
LDL CHOLESTEROL CALCULATED: 73 MG/DL (ref 0–129)
LYMPHOCYTES ABSOLUTE: 1.6 K/UL (ref 1–4.8)
LYMPHOCYTES RELATIVE PERCENT: 22.7 %
MCH RBC QN AUTO: 28.9 PG (ref 27–31.3)
MCHC RBC AUTO-ENTMCNC: 31.9 % (ref 33–37)
MCV RBC AUTO: 90.6 FL (ref 80–100)
MONOCYTES ABSOLUTE: 0.5 K/UL (ref 0.2–0.8)
MONOCYTES RELATIVE PERCENT: 7.9 %
NEUTROPHILS ABSOLUTE: 4.6 K/UL (ref 1.4–6.5)
NEUTROPHILS RELATIVE PERCENT: 66.9 %
PDW BLD-RTO: 14.3 % (ref 11.5–14.5)
PLATELET # BLD: 188 K/UL (ref 130–400)
POTASSIUM SERPL-SCNC: 4.9 MEQ/L (ref 3.5–5.1)
PROSTATE SPECIFIC ANTIGEN: 4.85 NG/ML (ref 0–6.22)
RBC # BLD: 5.32 M/UL (ref 4.7–6.1)
SODIUM BLD-SCNC: 144 MEQ/L (ref 132–144)
TOTAL PROTEIN: 6.7 G/DL (ref 6.4–8.1)
TRIGL SERPL-MCNC: 133 MG/DL (ref 0–200)
WBC # BLD: 6.9 K/UL (ref 4.8–10.8)

## 2017-11-09 PROCEDURE — 80053 COMPREHEN METABOLIC PANEL: CPT

## 2017-11-09 PROCEDURE — 84153 ASSAY OF PSA TOTAL: CPT

## 2017-11-09 PROCEDURE — 36415 COLL VENOUS BLD VENIPUNCTURE: CPT

## 2017-11-09 PROCEDURE — 85025 COMPLETE CBC W/AUTO DIFF WBC: CPT

## 2017-11-09 PROCEDURE — G0424 PULMONARY REHAB W EXER: HCPCS

## 2017-11-09 PROCEDURE — 83036 HEMOGLOBIN GLYCOSYLATED A1C: CPT

## 2017-11-09 PROCEDURE — 80061 LIPID PANEL: CPT

## 2017-11-09 RX ORDER — TEMAZEPAM 15 MG/1
CAPSULE ORAL
Qty: 60 CAPSULE | Refills: 1 | Status: CANCELLED | OUTPATIENT
Start: 2017-11-09

## 2017-11-09 RX ORDER — TEMAZEPAM 15 MG/1
CAPSULE ORAL
Qty: 60 CAPSULE | Refills: 1 | Status: SHIPPED | OUTPATIENT
Start: 2017-11-09 | End: 2018-01-03 | Stop reason: SDUPTHER

## 2017-11-13 ENCOUNTER — OFFICE VISIT (OUTPATIENT)
Dept: FAMILY MEDICINE CLINIC | Age: 77
End: 2017-11-13

## 2017-11-13 VITALS
OXYGEN SATURATION: 94 % | HEIGHT: 72 IN | SYSTOLIC BLOOD PRESSURE: 120 MMHG | TEMPERATURE: 97.8 F | BODY MASS INDEX: 28.71 KG/M2 | WEIGHT: 212 LBS | DIASTOLIC BLOOD PRESSURE: 62 MMHG | RESPIRATION RATE: 14 BRPM | HEART RATE: 73 BPM

## 2017-11-13 DIAGNOSIS — J44.9 CHRONIC OBSTRUCTIVE PULMONARY DISEASE, UNSPECIFIED COPD TYPE (HCC): ICD-10-CM

## 2017-11-13 DIAGNOSIS — R73.03 BORDERLINE DIABETES MELLITUS: ICD-10-CM

## 2017-11-13 DIAGNOSIS — R97.20 ELEVATED PSA: ICD-10-CM

## 2017-11-13 DIAGNOSIS — I48.0 PAROXYSMAL ATRIAL FIBRILLATION (HCC): ICD-10-CM

## 2017-11-13 DIAGNOSIS — G47.00 INSOMNIA, UNSPECIFIED TYPE: ICD-10-CM

## 2017-11-13 DIAGNOSIS — Z99.89 OBSTRUCTIVE SLEEP APNEA ON CPAP: ICD-10-CM

## 2017-11-13 DIAGNOSIS — G47.33 OBSTRUCTIVE SLEEP APNEA ON CPAP: ICD-10-CM

## 2017-11-13 DIAGNOSIS — I25.10 CORONARY ARTERY DISEASE INVOLVING NATIVE CORONARY ARTERY OF NATIVE HEART WITHOUT ANGINA PECTORIS: ICD-10-CM

## 2017-11-13 DIAGNOSIS — Z86.19 HISTORY OF SHINGLES: ICD-10-CM

## 2017-11-13 DIAGNOSIS — E78.2 MIXED HYPERLIPIDEMIA: ICD-10-CM

## 2017-11-13 DIAGNOSIS — I10 ESSENTIAL HYPERTENSION: Primary | ICD-10-CM

## 2017-11-13 PROCEDURE — 1036F TOBACCO NON-USER: CPT | Performed by: NURSE PRACTITIONER

## 2017-11-13 PROCEDURE — G8598 ASA/ANTIPLAT THER USED: HCPCS | Performed by: NURSE PRACTITIONER

## 2017-11-13 PROCEDURE — 99214 OFFICE O/P EST MOD 30 MIN: CPT | Performed by: NURSE PRACTITIONER

## 2017-11-13 PROCEDURE — 3023F SPIROM DOC REV: CPT | Performed by: NURSE PRACTITIONER

## 2017-11-13 PROCEDURE — G8484 FLU IMMUNIZE NO ADMIN: HCPCS | Performed by: NURSE PRACTITIONER

## 2017-11-13 PROCEDURE — G8427 DOCREV CUR MEDS BY ELIG CLIN: HCPCS | Performed by: NURSE PRACTITIONER

## 2017-11-13 PROCEDURE — 1123F ACP DISCUSS/DSCN MKR DOCD: CPT | Performed by: NURSE PRACTITIONER

## 2017-11-13 PROCEDURE — 4040F PNEUMOC VAC/ADMIN/RCVD: CPT | Performed by: NURSE PRACTITIONER

## 2017-11-13 PROCEDURE — G8926 SPIRO NO PERF OR DOC: HCPCS | Performed by: NURSE PRACTITIONER

## 2017-11-13 PROCEDURE — G8417 CALC BMI ABV UP PARAM F/U: HCPCS | Performed by: NURSE PRACTITIONER

## 2017-11-13 RX ORDER — ROSUVASTATIN CALCIUM 40 MG/1
TABLET, COATED ORAL
Qty: 90 TABLET | Refills: 1 | Status: SHIPPED | OUTPATIENT
Start: 2017-11-13 | End: 2018-02-05 | Stop reason: SDUPTHER

## 2017-11-13 ASSESSMENT — PATIENT HEALTH QUESTIONNAIRE - PHQ9
1. LITTLE INTEREST OR PLEASURE IN DOING THINGS: 0
SUM OF ALL RESPONSES TO PHQ9 QUESTIONS 1 & 2: 0
2. FEELING DOWN, DEPRESSED OR HOPELESS: 0
SUM OF ALL RESPONSES TO PHQ QUESTIONS 1-9: 0

## 2017-11-13 NOTE — PROGRESS NOTES
Dyslipidemia and Hypertension: Anam Black presents for evaluation of lipids. Compliance with treatment thus far has been fair. The patient exercises rarely. Patient here for follow-up of elevated blood pressure. He is exercising and is adherent to low salt diet. Blood pressure is well controlled at home Antihypertensive medication side effects: no medication side effects noted. Use of agents associated with hypertension: none. No new myalgias or GI upset on rosuvastatin (Crestor). COPD: he states that his breathing has been better lately. A little bit anyway. He is going through pulmonary rehab and thinks that he is able to tolerate activity a bit more now. This is better during therapy but he cannot afford oxygen continuously at home. He is on demand oxygen currently. Atrial fibrillation/ CAD: he continues to follow with Dr. Brandon gutierrez every 3 months. No recent cardiac symptoms. He used to see his cardiologist once per year but since he has been \"getting sicker\" he is seen much more often. BPH: he continues to have frequent urination at night but no other symptoms reported. He does have a hard time falling back asleep after he gets up but his restoril has been helpful with that. Sleep apnea: Anam Black has obstructive sleep apnea that is treated with CPAP. The CPAP is used  7 hours 7 nights per week. The CPAP use helps the daytime sleepiness and low energy levels. He continues to follow with Dr. Romaine Kim. Lab Results   Component Value Date    ALT 20 11/09/2017    AST 20 11/09/2017     Lab Results   Component Value Date    CHOL 144 11/09/2017    TRIG 133 11/09/2017    HDL 44 11/09/2017    LDLCALC 73 11/09/2017        PMH: The patient is known to have coexisting coronary artery disease. ROS: This patient reports no chest pain or pressure. The patient reports no nausea or vomiting. There is no heartburn or indigestion. There is no diarrhea or constipation.   No black, bloody, mucusy or 5. Fasting glucose is elevated but hemoglobin A1c is within normal range. Discussed recommendation to reduce starches and simple sugars in the diet. He cannot tolerate exercise due to severe COPD and pulmonary fibrosis. He is currently going through pulmonary rehab which he thinks is helping him be able to do more activities day-to-day. 4. PSA is stable. Will continue to check every 6 months. Very little symptoms reported (nocturia at times)    8. Shingles history added to his chart. He is advised to have appointment if symptoms begin. He waited 2 weeks last time he had an outbreak and it was around and in his eye. He was told that he might lose that eye at that time. He is thankful that he did not. 9. 10. Restoril Rx given. He is tolerating CPAP. Follows with . Controlled Substances Monitoring:     Attestation: The Prescription Monitoring Report for this patient was reviewed today. (Brian Major CNP)  Documentation: No signs of potential drug abuse or diversion identified., Existing medication contract.  Brian Major CNP)      Electronically signed by Felicity Sanford, 12:34 PM 11/13/17

## 2017-11-14 ENCOUNTER — HOSPITAL ENCOUNTER (OUTPATIENT)
Dept: PULMONOLOGY | Age: 77
Setting detail: THERAPIES SERIES
Discharge: HOME | End: 2017-11-14
Payer: MEDICARE

## 2017-11-14 PROCEDURE — G0424 PULMONARY REHAB W EXER: HCPCS

## 2017-11-16 ENCOUNTER — HOSPITAL ENCOUNTER (OUTPATIENT)
Dept: PULMONOLOGY | Age: 77
Setting detail: THERAPIES SERIES
Discharge: HOME | End: 2017-11-16
Payer: MEDICARE

## 2017-11-16 PROCEDURE — G0424 PULMONARY REHAB W EXER: HCPCS

## 2017-11-21 ENCOUNTER — HOSPITAL ENCOUNTER (OUTPATIENT)
Dept: PULMONOLOGY | Age: 77
Setting detail: THERAPIES SERIES
Discharge: HOME | End: 2017-11-21
Payer: MEDICARE

## 2017-11-21 PROCEDURE — G0424 PULMONARY REHAB W EXER: HCPCS

## 2017-11-24 ENCOUNTER — HOSPITAL ENCOUNTER (OUTPATIENT)
Dept: PULMONOLOGY | Age: 77
Setting detail: THERAPIES SERIES
Discharge: HOME | End: 2017-11-24
Payer: MEDICARE

## 2017-11-24 PROCEDURE — G0424 PULMONARY REHAB W EXER: HCPCS

## 2017-11-27 ENCOUNTER — OFFICE VISIT (OUTPATIENT)
Dept: PULMONOLOGY | Age: 77
End: 2017-11-27

## 2017-11-27 VITALS
OXYGEN SATURATION: 92 % | TEMPERATURE: 96.9 F | SYSTOLIC BLOOD PRESSURE: 120 MMHG | HEIGHT: 72 IN | HEART RATE: 130 BPM | BODY MASS INDEX: 29.26 KG/M2 | WEIGHT: 216 LBS | DIASTOLIC BLOOD PRESSURE: 70 MMHG

## 2017-11-27 DIAGNOSIS — I27.20 PULMONARY HTN (HCC): ICD-10-CM

## 2017-11-27 DIAGNOSIS — G47.33 OBSTRUCTIVE SLEEP APNEA: ICD-10-CM

## 2017-11-27 DIAGNOSIS — J96.12 CHRONIC RESPIRATORY FAILURE WITH HYPOXIA AND HYPERCAPNIA (HCC): ICD-10-CM

## 2017-11-27 DIAGNOSIS — J44.9 CHRONIC OBSTRUCTIVE PULMONARY DISEASE, UNSPECIFIED COPD TYPE (HCC): Primary | ICD-10-CM

## 2017-11-27 DIAGNOSIS — J96.11 CHRONIC RESPIRATORY FAILURE WITH HYPOXIA AND HYPERCAPNIA (HCC): ICD-10-CM

## 2017-11-27 PROCEDURE — G8427 DOCREV CUR MEDS BY ELIG CLIN: HCPCS | Performed by: INTERNAL MEDICINE

## 2017-11-27 PROCEDURE — G8598 ASA/ANTIPLAT THER USED: HCPCS | Performed by: INTERNAL MEDICINE

## 2017-11-27 PROCEDURE — 3023F SPIROM DOC REV: CPT | Performed by: INTERNAL MEDICINE

## 2017-11-27 PROCEDURE — G8926 SPIRO NO PERF OR DOC: HCPCS | Performed by: INTERNAL MEDICINE

## 2017-11-27 PROCEDURE — 99214 OFFICE O/P EST MOD 30 MIN: CPT | Performed by: INTERNAL MEDICINE

## 2017-11-27 PROCEDURE — G8417 CALC BMI ABV UP PARAM F/U: HCPCS | Performed by: INTERNAL MEDICINE

## 2017-11-27 PROCEDURE — 1036F TOBACCO NON-USER: CPT | Performed by: INTERNAL MEDICINE

## 2017-11-27 PROCEDURE — 1123F ACP DISCUSS/DSCN MKR DOCD: CPT | Performed by: INTERNAL MEDICINE

## 2017-11-27 PROCEDURE — 4040F PNEUMOC VAC/ADMIN/RCVD: CPT | Performed by: INTERNAL MEDICINE

## 2017-11-27 PROCEDURE — G8484 FLU IMMUNIZE NO ADMIN: HCPCS | Performed by: INTERNAL MEDICINE

## 2017-11-27 ASSESSMENT — ENCOUNTER SYMPTOMS
SINUS PRESSURE: 0
SHORTNESS OF BREATH: 1
COUGH: 1
DIARRHEA: 0
CHEST TIGHTNESS: 1
ABDOMINAL PAIN: 0
NAUSEA: 0
VOMITING: 0
WHEEZING: 1
RHINORRHEA: 1
SORE THROAT: 0

## 2017-11-27 NOTE — PROGRESS NOTES
Subjective:     Morgan Perkins is a 65 y.o. male who complains today of:     Chief Complaint   Patient presents with    Follow-up     received cpap       HPI  Patient is here for a follow-up visit regarding severe COPD and sleep apnea as well as chronic respiratory failure. Patient has known very severe COPD with a baseline FEV1 following bronchodilator therapy of 20% of predicted. He also has confirmed severe hypoxia and hypercapnia recently diagnosed with sleep apnea and was started on auto CPAP with oxygen at night. She also goes to pulmonary rehab and has been doing well with it. He reports gradual improvement in his exertional dyspnea with regular exercises and use of all of his treatments including CPAP recently. He is tolerating CPAP well and sleeping well all night. He wakes up 2-3 times a day most to go to the bathroom comes back and puts his CPAP back on goes to sleep. Allergies:  Sildenafil citrate;  Advair [fluticasone-salmeterol]; and Fluticasone propionate (inhal)  Past Medical History:   Diagnosis Date    Adrenal adenoma     CT 6/2011 stable    Anxiety     Asbestosis(501)     CAD (coronary artery disease)     status post stent 2001    Cancer ([facility]     basil cell carnoma    COPD (chronic obstructive pulmonary disease) (HCC)     Elbow injury     left    Erectile dysfunction     Granulomatous lung disease (HCC)     Herpes zoster     Hyperlipidemia     Hypertension     Insomnia     Interstitial fibrosis (HCC)     Obstructive sleep apnea on CPAP 11/13/2017    Prostate nodule     Scarlet fever     Urine frequency      Past Surgical History:   Procedure Laterality Date    CARDIAC CATHETERIZATION      CARDIOVASCULAR STRESS TEST      1/2010 normal per patient    COLONOSCOPY      5/2009 tubular adenomas    COLONOSCOPY  08/13/15    David Mcnamara MD    EYE SURGERY  02/10/14    DR Temitope Taylor  [facility]    HERNIA REPAIR      LEG SURGERY       Family History   Problem Relation Age of Onset

## 2017-11-28 ENCOUNTER — HOSPITAL ENCOUNTER (OUTPATIENT)
Dept: PULMONOLOGY | Age: 77
Setting detail: THERAPIES SERIES
Discharge: HOME | End: 2017-11-28
Payer: MEDICARE

## 2017-11-28 PROCEDURE — G0424 PULMONARY REHAB W EXER: HCPCS

## 2017-11-30 ENCOUNTER — HOSPITAL ENCOUNTER (OUTPATIENT)
Dept: PULMONOLOGY | Age: 77
Setting detail: THERAPIES SERIES
Discharge: HOME | End: 2017-11-30
Payer: MEDICARE

## 2017-11-30 PROCEDURE — G0424 PULMONARY REHAB W EXER: HCPCS

## 2017-12-05 ENCOUNTER — HOSPITAL ENCOUNTER (OUTPATIENT)
Dept: PULMONOLOGY | Age: 77
Setting detail: THERAPIES SERIES
Discharge: HOME | End: 2017-12-05
Payer: MEDICARE

## 2017-12-05 PROCEDURE — G0424 PULMONARY REHAB W EXER: HCPCS

## 2017-12-07 ENCOUNTER — HOSPITAL ENCOUNTER (OUTPATIENT)
Dept: PULMONOLOGY | Age: 77
Setting detail: THERAPIES SERIES
Discharge: HOME | End: 2017-12-07
Payer: MEDICARE

## 2017-12-07 PROCEDURE — G0424 PULMONARY REHAB W EXER: HCPCS

## 2017-12-12 ENCOUNTER — TELEPHONE (OUTPATIENT)
Dept: FAMILY MEDICINE CLINIC | Age: 77
End: 2017-12-12

## 2017-12-19 ENCOUNTER — HOSPITAL ENCOUNTER (OUTPATIENT)
Dept: PULMONOLOGY | Age: 77
Setting detail: THERAPIES SERIES
Discharge: HOME | End: 2017-12-19
Payer: MEDICARE

## 2017-12-19 PROCEDURE — G0424 PULMONARY REHAB W EXER: HCPCS

## 2017-12-21 ENCOUNTER — HOSPITAL ENCOUNTER (OUTPATIENT)
Dept: PULMONOLOGY | Age: 77
Setting detail: THERAPIES SERIES
Discharge: HOME | End: 2017-12-21
Payer: MEDICARE

## 2017-12-21 PROCEDURE — G0424 PULMONARY REHAB W EXER: HCPCS

## 2017-12-26 ENCOUNTER — HOSPITAL ENCOUNTER (OUTPATIENT)
Dept: PULMONOLOGY | Age: 77
Setting detail: THERAPIES SERIES
Discharge: HOME | End: 2017-12-26
Payer: MEDICARE

## 2017-12-26 PROCEDURE — G0424 PULMONARY REHAB W EXER: HCPCS

## 2017-12-28 ENCOUNTER — HOSPITAL ENCOUNTER (OUTPATIENT)
Dept: PULMONOLOGY | Age: 77
Setting detail: THERAPIES SERIES
Discharge: HOME | End: 2017-12-28
Payer: MEDICARE

## 2017-12-28 PROCEDURE — G0424 PULMONARY REHAB W EXER: HCPCS

## 2018-01-02 ENCOUNTER — HOSPITAL ENCOUNTER (OUTPATIENT)
Dept: PULMONOLOGY | Age: 78
Setting detail: THERAPIES SERIES
Discharge: HOME | End: 2018-01-02
Payer: MEDICARE

## 2018-01-02 PROCEDURE — G0424 PULMONARY REHAB W EXER: HCPCS

## 2018-01-03 DIAGNOSIS — G47.00 INSOMNIA, UNSPECIFIED TYPE: ICD-10-CM

## 2018-01-04 RX ORDER — TEMAZEPAM 15 MG/1
CAPSULE ORAL
Qty: 60 CAPSULE | Refills: 1 | Status: SHIPPED | OUTPATIENT
Start: 2018-01-04 | End: 2018-02-13 | Stop reason: SDUPTHER

## 2018-01-29 ENCOUNTER — HOSPITAL ENCOUNTER (OUTPATIENT)
Dept: CARDIAC REHAB | Age: 78
Setting detail: THERAPIES SERIES
Discharge: HOME | End: 2018-01-29
Payer: MEDICARE

## 2018-01-29 PROCEDURE — 93798 PHYS/QHP OP CAR RHAB W/ECG: CPT

## 2018-02-05 RX ORDER — ROSUVASTATIN CALCIUM 40 MG/1
40 TABLET, COATED ORAL DAILY
Qty: 90 TABLET | Refills: 0 | Status: SHIPPED | OUTPATIENT
Start: 2018-02-05 | End: 2019-02-15 | Stop reason: SDUPTHER

## 2018-02-05 NOTE — TELEPHONE ENCOUNTER
From: Nicki Ventura  Sent: 2/4/2018 8:20 PM EST  Subject: Medication Renewal Request    Jose Cruz Ness would like a refill of the following medications:  rosuvastatin (CRESTOR) 40 MG tablet Bebeto Shultz CNP]    Preferred pharmacy: [address] [place] [address] , [address] [facility] [phone] [place] [phone]    Comment:

## 2018-02-06 ENCOUNTER — HOSPITAL ENCOUNTER (OUTPATIENT)
Dept: CARDIAC REHAB | Age: 78
Setting detail: THERAPIES SERIES
Discharge: HOME | End: 2018-02-06
Payer: MEDICARE

## 2018-02-06 PROCEDURE — 93798 PHYS/QHP OP CAR RHAB W/ECG: CPT

## 2018-02-07 ENCOUNTER — HOSPITAL ENCOUNTER (OUTPATIENT)
Dept: LAB | Age: 78
Discharge: HOME | End: 2018-02-07
Payer: MEDICARE

## 2018-02-07 DIAGNOSIS — R97.20 ELEVATED PSA: ICD-10-CM

## 2018-02-07 DIAGNOSIS — R73.03 BORDERLINE DIABETES MELLITUS: ICD-10-CM

## 2018-02-07 DIAGNOSIS — E78.2 MIXED HYPERLIPIDEMIA: ICD-10-CM

## 2018-02-07 DIAGNOSIS — I10 ESSENTIAL HYPERTENSION: ICD-10-CM

## 2018-02-07 LAB
ALBUMIN SERPL-MCNC: 4.5 G/DL (ref 3.9–4.9)
ALP BLD-CCNC: 78 U/L (ref 35–104)
ALT SERPL-CCNC: 24 U/L (ref 0–41)
ANION GAP SERPL CALCULATED.3IONS-SCNC: 13 MEQ/L (ref 7–13)
AST SERPL-CCNC: 22 U/L (ref 0–40)
BASOPHILS ABSOLUTE: 0 K/UL (ref 0–0.2)
BASOPHILS RELATIVE PERCENT: 0.5 %
BILIRUB SERPL-MCNC: 1 MG/DL (ref 0–1.2)
BUN BLDV-MCNC: 20 MG/DL (ref 8–23)
CALCIUM SERPL-MCNC: 9.5 MG/DL (ref 8.6–10.2)
CHLORIDE BLD-SCNC: 96 MEQ/L (ref 98–107)
CHOLESTEROL, TOTAL: 148 MG/DL (ref 0–199)
CO2: 32 MEQ/L (ref 22–29)
CREAT SERPL-MCNC: 0.67 MG/DL (ref 0.7–1.2)
EOSINOPHILS ABSOLUTE: 0.2 K/UL (ref 0–0.7)
EOSINOPHILS RELATIVE PERCENT: 2.2 %
GFR AFRICAN AMERICAN: >60
GFR NON-AFRICAN AMERICAN: >60
GLOBULIN: 2.5 G/DL (ref 2.3–3.5)
GLUCOSE BLD-MCNC: 150 MG/DL (ref 74–109)
HBA1C MFR BLD: 6.1 % (ref 4.8–5.9)
HCT VFR BLD CALC: 46.9 % (ref 42–52)
HDLC SERPL-MCNC: 41 MG/DL (ref 40–59)
HEMOGLOBIN: 15.3 G/DL (ref 14–18)
LDL CHOLESTEROL CALCULATED: 69 MG/DL (ref 0–129)
LYMPHOCYTES ABSOLUTE: 1.4 K/UL (ref 1–4.8)
LYMPHOCYTES RELATIVE PERCENT: 19.2 %
MCH RBC QN AUTO: 29.7 PG (ref 27–31.3)
MCHC RBC AUTO-ENTMCNC: 32.6 % (ref 33–37)
MCV RBC AUTO: 91.1 FL (ref 80–100)
MONOCYTES ABSOLUTE: 0.5 K/UL (ref 0.2–0.8)
MONOCYTES RELATIVE PERCENT: 7.3 %
NEUTROPHILS ABSOLUTE: 5.3 K/UL (ref 1.4–6.5)
NEUTROPHILS RELATIVE PERCENT: 70.8 %
PDW BLD-RTO: 14.2 % (ref 11.5–14.5)
PLATELET # BLD: 174 K/UL (ref 130–400)
POTASSIUM SERPL-SCNC: 4.6 MEQ/L (ref 3.5–5.1)
PROSTATE SPECIFIC ANTIGEN: 4.88 NG/ML (ref 0–6.22)
RBC # BLD: 5.15 M/UL (ref 4.7–6.1)
SODIUM BLD-SCNC: 141 MEQ/L (ref 132–144)
TOTAL PROTEIN: 7 G/DL (ref 6.4–8.1)
TRIGL SERPL-MCNC: 190 MG/DL (ref 0–200)
WBC # BLD: 7.4 K/UL (ref 4.8–10.8)

## 2018-02-07 PROCEDURE — 85025 COMPLETE CBC W/AUTO DIFF WBC: CPT

## 2018-02-07 PROCEDURE — 80053 COMPREHEN METABOLIC PANEL: CPT

## 2018-02-07 PROCEDURE — 84153 ASSAY OF PSA TOTAL: CPT

## 2018-02-07 PROCEDURE — 80061 LIPID PANEL: CPT

## 2018-02-07 PROCEDURE — 36415 COLL VENOUS BLD VENIPUNCTURE: CPT

## 2018-02-07 PROCEDURE — 83036 HEMOGLOBIN GLYCOSYLATED A1C: CPT

## 2018-02-08 ENCOUNTER — HOSPITAL ENCOUNTER (OUTPATIENT)
Dept: CARDIAC REHAB | Age: 78
Setting detail: THERAPIES SERIES
Discharge: HOME | End: 2018-02-08
Payer: MEDICARE

## 2018-02-08 PROCEDURE — 93798 PHYS/QHP OP CAR RHAB W/ECG: CPT

## 2018-02-13 ENCOUNTER — OFFICE VISIT (OUTPATIENT)
Dept: FAMILY MEDICINE CLINIC | Age: 78
End: 2018-02-13
Payer: MEDICARE

## 2018-02-13 VITALS
WEIGHT: 222 LBS | BODY MASS INDEX: 30.07 KG/M2 | SYSTOLIC BLOOD PRESSURE: 128 MMHG | OXYGEN SATURATION: 95 % | HEIGHT: 72 IN | DIASTOLIC BLOOD PRESSURE: 68 MMHG | HEART RATE: 73 BPM | TEMPERATURE: 98.6 F | RESPIRATION RATE: 16 BRPM

## 2018-02-13 DIAGNOSIS — R97.20 ELEVATED PSA: ICD-10-CM

## 2018-02-13 DIAGNOSIS — E78.2 MIXED HYPERLIPIDEMIA: ICD-10-CM

## 2018-02-13 DIAGNOSIS — I10 ESSENTIAL HYPERTENSION: Primary | ICD-10-CM

## 2018-02-13 DIAGNOSIS — G47.33 OBSTRUCTIVE SLEEP APNEA ON CPAP: ICD-10-CM

## 2018-02-13 DIAGNOSIS — G47.00 INSOMNIA, UNSPECIFIED TYPE: ICD-10-CM

## 2018-02-13 DIAGNOSIS — J44.9 CHRONIC OBSTRUCTIVE PULMONARY DISEASE, UNSPECIFIED COPD TYPE (HCC): ICD-10-CM

## 2018-02-13 DIAGNOSIS — I25.10 CORONARY ARTERY DISEASE INVOLVING NATIVE CORONARY ARTERY OF NATIVE HEART WITHOUT ANGINA PECTORIS: ICD-10-CM

## 2018-02-13 DIAGNOSIS — Z99.89 OBSTRUCTIVE SLEEP APNEA ON CPAP: ICD-10-CM

## 2018-02-13 DIAGNOSIS — I48.0 PAROXYSMAL ATRIAL FIBRILLATION (HCC): ICD-10-CM

## 2018-02-13 PROCEDURE — 3023F SPIROM DOC REV: CPT | Performed by: NURSE PRACTITIONER

## 2018-02-13 PROCEDURE — G8598 ASA/ANTIPLAT THER USED: HCPCS | Performed by: NURSE PRACTITIONER

## 2018-02-13 PROCEDURE — G8427 DOCREV CUR MEDS BY ELIG CLIN: HCPCS | Performed by: NURSE PRACTITIONER

## 2018-02-13 PROCEDURE — 4040F PNEUMOC VAC/ADMIN/RCVD: CPT | Performed by: NURSE PRACTITIONER

## 2018-02-13 PROCEDURE — G8417 CALC BMI ABV UP PARAM F/U: HCPCS | Performed by: NURSE PRACTITIONER

## 2018-02-13 PROCEDURE — 99214 OFFICE O/P EST MOD 30 MIN: CPT | Performed by: NURSE PRACTITIONER

## 2018-02-13 PROCEDURE — 1036F TOBACCO NON-USER: CPT | Performed by: NURSE PRACTITIONER

## 2018-02-13 PROCEDURE — G8484 FLU IMMUNIZE NO ADMIN: HCPCS | Performed by: NURSE PRACTITIONER

## 2018-02-13 PROCEDURE — 1123F ACP DISCUSS/DSCN MKR DOCD: CPT | Performed by: NURSE PRACTITIONER

## 2018-02-13 PROCEDURE — G8926 SPIRO NO PERF OR DOC: HCPCS | Performed by: NURSE PRACTITIONER

## 2018-02-13 RX ORDER — TEMAZEPAM 15 MG/1
CAPSULE ORAL
Qty: 60 CAPSULE | Refills: 2 | Status: SHIPPED | OUTPATIENT
Start: 2018-02-13 | End: 2018-05-15 | Stop reason: SDUPTHER

## 2018-02-13 RX ORDER — DOXYCYCLINE HYCLATE 100 MG/1
100 CAPSULE ORAL 2 TIMES DAILY
Qty: 20 CAPSULE | Refills: 0 | Status: SHIPPED | OUTPATIENT
Start: 2018-02-13 | End: 2020-02-25 | Stop reason: ALTCHOICE

## 2018-02-13 RX ORDER — CLOPIDOGREL BISULFATE 75 MG/1
TABLET ORAL
Refills: 3 | COMMUNITY
Start: 2018-01-26 | End: 2018-08-10 | Stop reason: ALTCHOICE

## 2018-02-13 ASSESSMENT — PATIENT HEALTH QUESTIONNAIRE - PHQ9
SUM OF ALL RESPONSES TO PHQ QUESTIONS 1-9: 0
2. FEELING DOWN, DEPRESSED OR HOPELESS: 0
SUM OF ALL RESPONSES TO PHQ9 QUESTIONS 1 & 2: 0
1. LITTLE INTEREST OR PLEASURE IN DOING THINGS: 0

## 2018-02-13 NOTE — PROGRESS NOTES
The patient reports no bloating and no change in appetite. There is no numbness, tingling or swelling in the extremities. EXAM:  Constitutional Blood pressure 128/68, pulse 73, temperature 98.6 °F (37 °C), temperature source Temporal, resp. rate 16, height 6' (1.829 m), weight 222 lb (100.7 kg), SpO2 95 %. .  He has a normal affect, no acute distress, appears well developed and well nourished. Neck:  neck- supple, no mass, non-tender and no bruits  Lungs:  Breathing Pattern: regular, no distress, Breath sounds: diminished breath sounds- throughout and scattered  Heart:  Heart sounds are normal.  Regular rate and rhythm without murmur, gallop or rub. Abdomen:  Soft, non-tender, normal bowel sounds. No bruits, organomegaly or masses. Extremities: Extremities warm to touch, pink, with no edema. DIAGNOSIS:   1. Essential hypertension  CBC Auto Differential    Comprehensive Metabolic Panel   2. Insomnia, unspecified type  temazepam (RESTORIL) 15 MG capsule   3. Mixed hyperlipidemia  Lipid Panel   4. Coronary artery disease involving native coronary artery of native heart without angina pectoris     5. Obstructive sleep apnea on CPAP     6. Elevated PSA  PSA, Diagnostic   7. Paroxysmal atrial fibrillation (HCC)         Plan:  Continue current medicines and dosages. Continue diet and exercise programs, improving where possible. Follow up in 4 months with lab work one week prior. For further details see orders placed. 1. Blood pressure is well controlled on current medication. Continue the same. 2. Lipid panel is stable on statin. No side effects reported. Continue the same. 3. 5. Insomnia has been well controlled with PRN Restoril. This has helped him better tolerate CPAP. 4. 7.Recently had 2 stents placed. Tolerated procedure well. Questionable PAF. May be referred to EP later this week depending on how follow up goes this Thursday. 6. PSA has been stable.        Electronically signed by

## 2018-02-27 ENCOUNTER — OFFICE VISIT (OUTPATIENT)
Dept: PULMONOLOGY | Age: 78
End: 2018-02-27
Payer: MEDICARE

## 2018-02-27 VITALS
TEMPERATURE: 96.7 F | OXYGEN SATURATION: 93 % | HEIGHT: 72 IN | BODY MASS INDEX: 29.45 KG/M2 | DIASTOLIC BLOOD PRESSURE: 60 MMHG | SYSTOLIC BLOOD PRESSURE: 126 MMHG | WEIGHT: 217.4 LBS | HEART RATE: 80 BPM

## 2018-02-27 DIAGNOSIS — J44.9 CHRONIC OBSTRUCTIVE PULMONARY DISEASE, UNSPECIFIED COPD TYPE (HCC): Primary | ICD-10-CM

## 2018-02-27 DIAGNOSIS — J96.11 CHRONIC RESPIRATORY FAILURE WITH HYPOXIA AND HYPERCAPNIA (HCC): ICD-10-CM

## 2018-02-27 DIAGNOSIS — I48.91 ATRIAL FIBRILLATION, UNSPECIFIED TYPE (HCC): ICD-10-CM

## 2018-02-27 DIAGNOSIS — J96.12 CHRONIC RESPIRATORY FAILURE WITH HYPOXIA AND HYPERCAPNIA (HCC): ICD-10-CM

## 2018-02-27 DIAGNOSIS — R06.02 SHORTNESS OF BREATH: ICD-10-CM

## 2018-02-27 DIAGNOSIS — I27.20 PULMONARY HTN (HCC): ICD-10-CM

## 2018-02-27 RX ORDER — DOFETILIDE 0.25 MG/1
250 CAPSULE ORAL 2 TIMES DAILY
COMMUNITY

## 2018-02-28 PROCEDURE — G8484 FLU IMMUNIZE NO ADMIN: HCPCS | Performed by: PHYSICIAN ASSISTANT

## 2018-02-28 PROCEDURE — 1123F ACP DISCUSS/DSCN MKR DOCD: CPT | Performed by: PHYSICIAN ASSISTANT

## 2018-02-28 PROCEDURE — 99214 OFFICE O/P EST MOD 30 MIN: CPT | Performed by: PHYSICIAN ASSISTANT

## 2018-02-28 PROCEDURE — G9598 AOR ANE 5.5-5.9 CM MAX DIAM: HCPCS | Performed by: PHYSICIAN ASSISTANT

## 2018-02-28 PROCEDURE — G8427 DOCREV CUR MEDS BY ELIG CLIN: HCPCS | Performed by: PHYSICIAN ASSISTANT

## 2018-02-28 PROCEDURE — G8926 SPIRO NO PERF OR DOC: HCPCS | Performed by: PHYSICIAN ASSISTANT

## 2018-02-28 PROCEDURE — G8417 CALC BMI ABV UP PARAM F/U: HCPCS | Performed by: PHYSICIAN ASSISTANT

## 2018-02-28 PROCEDURE — 4040F PNEUMOC VAC/ADMIN/RCVD: CPT | Performed by: PHYSICIAN ASSISTANT

## 2018-02-28 PROCEDURE — 1036F TOBACCO NON-USER: CPT | Performed by: PHYSICIAN ASSISTANT

## 2018-02-28 ASSESSMENT — ENCOUNTER SYMPTOMS
SINUS PAIN: 0
STRIDOR: 0
VOICE CHANGE: 0
RHINORRHEA: 0
WHEEZING: 0
ABDOMINAL PAIN: 0
TROUBLE SWALLOWING: 0
SORE THROAT: 0
COLOR CHANGE: 0
COUGH: 0
BACK PAIN: 0
SINUS PRESSURE: 0
SHORTNESS OF BREATH: 0
CHEST TIGHTNESS: 0

## 2018-03-01 NOTE — PROGRESS NOTES
Subjective     Camillia Reasoner 65 y.o. male presents 3/2/18 with   Chief Complaint   Patient presents with    COPD    Follow-up       HPI  This is a 69-year-old male patient who sees Dr. Benny Michael regarding severe COPD, sleep apnea as well as chronic respiratory failure. Patient is known very severe COPD with a baseline FEV1 following bronchodilator therapy only 20% of predicted. He has confirmed hypoxia and hypercapnia and has been using CPAP as directed. Patient reports that he has not had time to go to pulmonary rehab as he is been having some issues with tachycardia and was recently admitted in the hospital per his cardiology team for medication adjustments. Patient reports she's been tolerating the CPAP machine well and has no issues at this time. He is on 4 L of oxygen chronically. He is requesting a portable oxygen concentrator to help him improve his quality of life. Since last evaluation patient denies any significant change in his respiration status.     Reviewed the following history:    Past Medical History:   Diagnosis Date    Adrenal adenoma     CT 6/2011 stable    Anxiety     Asbestosis(501)     CAD (coronary artery disease)     status post stent 2001    Cancer ([facility] [address]     basil cell carnoma    COPD (chronic obstructive pulmonary disease) (HCC)     Elbow injury     left    Erectile dysfunction     Granulomatous lung disease (HCC)     Herpes zoster     Hyperlipidemia     Hypertension     Insomnia     Interstitial fibrosis (HCC)     Obstructive sleep apnea on CPAP 11/13/2017    Prostate nodule     Scarlet fever     Urine frequency      Past Surgical History:   Procedure Laterality Date    CARDIAC CATHETERIZATION      CARDIOVASCULAR STRESS TEST      1/2010 normal per patient    COLONOSCOPY      5/2009 tubular adenomas    COLONOSCOPY  08/13/15    Berny Rogers MD    EYE SURGERY  02/10/14    DR Manriquez Friday  RT EYE    HERNIA REPAIR      LEG SURGERY       Family History   Problem by nebulization every 6 hours as needed for Shortness of Breath. 100 vial 2    Glucosamine-Chondroitin 500-400 MG CAPS Take 1 capsule by mouth daily.  Ascorbic Acid (VITAMIN C) 500 MG tablet Take 1,000 mg by mouth daily. 1/2 tab in AM and 1/2 tab in PM       vitamin D (CHOLECALCIFEROL) 400 UNIT TABS tablet Take 800 Units by mouth daily.  vitamin B-12 (CYANOCOBALAMIN) 1000 MCG tablet Take 1,000 mcg by mouth daily.  Selenium 200 MCG TABS Take 200 mcg by mouth daily.  Thiamine HCl (VITAMIN B-1) 100 MG tablet Take 100 mg by mouth daily.  folic acid (FOLVITE) 601 MCG tablet Take 800 mcg by mouth daily.  Zinc 50 MG CAPS Take 50 mg by mouth daily.  beta carotene 41615 UNIT capsule Take 25,000 Units by mouth daily.  DHEA 25 MG TABS Take 25 mg by mouth daily.  Pyridoxine HCl (VITAMIN B-6) 50 MG tablet Take 100 mg by mouth daily.  Calcium Carb-Cholecalciferol (CALCIUM 1000 + D) 1000-800 MG-UNIT TABS Take 1 tablet by mouth 2 times daily.  Methylsulfonylmethane (MSM) 1000 MG TABS Take 1,000 mg by mouth daily.  vitamin E 400 UNIT capsule Take 400 Units by mouth daily.  OXYGEN Please provide patient with a portable oxygen concentrator 1 Units 0     No current facility-administered medications for this visit. Review of Systems   Constitutional: Negative for activity change, appetite change, fever and unexpected weight change. HENT: Negative for postnasal drip, rhinorrhea, sinus pain, sinus pressure, sneezing, sore throat, tinnitus, trouble swallowing and voice change. Eyes: Negative for visual disturbance. Respiratory: Negative for cough, chest tightness, shortness of breath, wheezing and stridor. Cardiovascular: Negative for chest pain and leg swelling. Gastrointestinal: Negative for abdominal pain. Musculoskeletal: Negative for arthralgias, back pain and myalgias. Skin: Negative for color change, rash and wound. time. No cranial nerve deficit. Skin: Skin is warm and dry. No rash noted. He is not diaphoretic. No erythema. Psychiatric: He has a normal mood and affect. His behavior is normal.     Assessment and Plan      ICD-10-CM ICD-9-CM    1. Chronic obstructive pulmonary disease, unspecified COPD type ([facility] J44.9 496 DISCONTINUED: OXYGEN   2. Pulmonary HTN I27.20 416.8 DISCONTINUED: OXYGEN   3. Chronic respiratory failure with hypoxia and hypercapnia (HCC) J96.11 518.83 DISCONTINUED: OXYGEN    J96.12 799.02      786.09    4. Atrial fibrillation, unspecified type ([facility] I48.91 427.31    5. Shortness of breath R06.02 786.05 DISCONTINUED: OXYGEN     Discussed case with Dr. Bianca Aguilar and a portable oxygen concentrator would  improve his quality of life and increase his ability for ambulation. Patient is currently having limitations with the portable oxygen tanks that is restricting his ability to do this. .  Otherwise patient was advised to continue all current treatment plan and was encouraged to continue pulmonary rehab when his cardiac issues get straightened out. Otherwise we will see him for follow-up in about 3 months. Patient is advised to continue pulmonary rehab as soon as possible. Otherwise patient is advised to continue all current treatment plan and we will continue to follow in 3 months. No red flags found on exam today. Discussed with patient red flag symptoms and advised ER evaluation if symptoms were to worsen. Reviewed with the patient: current clinical status, medications, activities and diet. Side effects, adverse effects of the medication prescribed today, as well as treatment plan and result expectations have been discussed with the patient who expresses understanding and desires to proceed. Close follow up to evaluate treatment results and for coordination of care. I have reviewed the patient's medical history in detail and updated the computerized patient record.     Return in about 3

## 2018-03-02 DIAGNOSIS — I27.20 PULMONARY HTN (HCC): ICD-10-CM

## 2018-03-02 DIAGNOSIS — J96.12 CHRONIC RESPIRATORY FAILURE WITH HYPOXIA AND HYPERCAPNIA (HCC): ICD-10-CM

## 2018-03-02 DIAGNOSIS — J44.9 CHRONIC OBSTRUCTIVE PULMONARY DISEASE, UNSPECIFIED COPD TYPE (HCC): ICD-10-CM

## 2018-03-02 DIAGNOSIS — J96.11 CHRONIC RESPIRATORY FAILURE WITH HYPOXIA AND HYPERCAPNIA (HCC): ICD-10-CM

## 2018-03-02 DIAGNOSIS — R06.02 SHORTNESS OF BREATH: ICD-10-CM

## 2018-03-13 ENCOUNTER — HOSPITAL ENCOUNTER (OUTPATIENT)
Dept: CARDIAC REHAB | Age: 78
Setting detail: THERAPIES SERIES
Discharge: HOME | End: 2018-03-13
Payer: MEDICARE

## 2018-03-13 PROCEDURE — 93798 PHYS/QHP OP CAR RHAB W/ECG: CPT

## 2018-03-15 ENCOUNTER — HOSPITAL ENCOUNTER (OUTPATIENT)
Dept: CARDIAC REHAB | Age: 78
Setting detail: THERAPIES SERIES
Discharge: HOME | End: 2018-03-15
Payer: MEDICARE

## 2018-03-15 PROCEDURE — 93798 PHYS/QHP OP CAR RHAB W/ECG: CPT

## 2018-03-20 ENCOUNTER — HOSPITAL ENCOUNTER (OUTPATIENT)
Dept: CARDIAC REHAB | Age: 78
Setting detail: THERAPIES SERIES
Discharge: HOME | End: 2018-03-20
Payer: MEDICARE

## 2018-03-20 PROCEDURE — 93798 PHYS/QHP OP CAR RHAB W/ECG: CPT

## 2018-03-22 ENCOUNTER — HOSPITAL ENCOUNTER (OUTPATIENT)
Dept: CARDIAC REHAB | Age: 78
Setting detail: THERAPIES SERIES
Discharge: HOME | End: 2018-03-22
Payer: MEDICARE

## 2018-03-22 PROCEDURE — 93798 PHYS/QHP OP CAR RHAB W/ECG: CPT

## 2018-03-26 ENCOUNTER — TELEPHONE (OUTPATIENT)
Dept: PULMONOLOGY | Age: 78
End: 2018-03-26

## 2018-03-26 DIAGNOSIS — J44.9 CHRONIC OBSTRUCTIVE PULMONARY DISEASE, UNSPECIFIED COPD TYPE (HCC): Primary | ICD-10-CM

## 2018-03-27 ENCOUNTER — TELEPHONE (OUTPATIENT)
Dept: PULMONOLOGY | Age: 78
End: 2018-03-27

## 2018-03-27 ENCOUNTER — HOSPITAL ENCOUNTER (OUTPATIENT)
Dept: CARDIAC REHAB | Age: 78
Setting detail: THERAPIES SERIES
Discharge: HOME | End: 2018-03-27
Payer: MEDICARE

## 2018-03-27 PROCEDURE — 93798 PHYS/QHP OP CAR RHAB W/ECG: CPT

## 2018-03-29 ENCOUNTER — HOSPITAL ENCOUNTER (OUTPATIENT)
Dept: CARDIAC REHAB | Age: 78
Setting detail: THERAPIES SERIES
Discharge: HOME | End: 2018-03-29
Payer: MEDICARE

## 2018-03-29 PROCEDURE — 93798 PHYS/QHP OP CAR RHAB W/ECG: CPT

## 2018-04-05 ENCOUNTER — HOSPITAL ENCOUNTER (OUTPATIENT)
Dept: CARDIAC REHAB | Age: 78
Setting detail: THERAPIES SERIES
Discharge: HOME | End: 2018-04-05
Payer: MEDICARE

## 2018-04-05 PROCEDURE — 93798 PHYS/QHP OP CAR RHAB W/ECG: CPT

## 2018-04-10 ENCOUNTER — TELEPHONE (OUTPATIENT)
Dept: PULMONOLOGY | Age: 78
End: 2018-04-10

## 2018-04-10 ENCOUNTER — HOSPITAL ENCOUNTER (OUTPATIENT)
Dept: CARDIAC REHAB | Age: 78
Setting detail: THERAPIES SERIES
Discharge: HOME | End: 2018-04-10
Payer: MEDICARE

## 2018-04-10 PROCEDURE — 93798 PHYS/QHP OP CAR RHAB W/ECG: CPT

## 2018-04-11 ENCOUNTER — OFFICE VISIT (OUTPATIENT)
Dept: PULMONOLOGY | Age: 78
End: 2018-04-11
Payer: MEDICARE

## 2018-04-11 ENCOUNTER — HOSPITAL ENCOUNTER (OUTPATIENT)
Dept: RESPIRATORY THERAPY | Age: 78
Discharge: HOME | End: 2018-04-11
Payer: MEDICARE

## 2018-04-11 VITALS
OXYGEN SATURATION: 92 % | HEART RATE: 71 BPM | HEIGHT: 72 IN | SYSTOLIC BLOOD PRESSURE: 140 MMHG | WEIGHT: 221.2 LBS | BODY MASS INDEX: 29.96 KG/M2 | TEMPERATURE: 96.7 F | DIASTOLIC BLOOD PRESSURE: 60 MMHG

## 2018-04-11 DIAGNOSIS — R06.02 SHORTNESS OF BREATH: ICD-10-CM

## 2018-04-11 DIAGNOSIS — J96.12 CHRONIC RESPIRATORY FAILURE WITH HYPOXIA AND HYPERCAPNIA (HCC): ICD-10-CM

## 2018-04-11 DIAGNOSIS — G47.33 OBSTRUCTIVE SLEEP APNEA: ICD-10-CM

## 2018-04-11 DIAGNOSIS — J44.9 CHRONIC OBSTRUCTIVE PULMONARY DISEASE, UNSPECIFIED COPD TYPE (HCC): Primary | ICD-10-CM

## 2018-04-11 DIAGNOSIS — J96.11 CHRONIC RESPIRATORY FAILURE WITH HYPOXIA AND HYPERCAPNIA (HCC): ICD-10-CM

## 2018-04-11 LAB
BASE EXCESS ARTERIAL: 9 (ref -3–3)
HCO3 ARTERIAL: 33.7 MMOL/L (ref 21–29)
LACTATE: 0.75 MMOL/L (ref 0.4–2)
O2 SAT, ARTERIAL: 92 % (ref 93–100)
PCO2 ARTERIAL: 53 MM HG (ref 35–45)
PERFORMED ON: ABNORMAL
PH ARTERIAL: 7.41 (ref 7.35–7.45)
PO2 ARTERIAL: 65 MM HG (ref 75–108)
POC FIO2: 4
POC SAMPLE TYPE: ABNORMAL
TCO2 ARTERIAL: 35 (ref 22–29)

## 2018-04-11 PROCEDURE — G8427 DOCREV CUR MEDS BY ELIG CLIN: HCPCS | Performed by: PHYSICIAN ASSISTANT

## 2018-04-11 PROCEDURE — 4040F PNEUMOC VAC/ADMIN/RCVD: CPT | Performed by: PHYSICIAN ASSISTANT

## 2018-04-11 PROCEDURE — 1036F TOBACCO NON-USER: CPT | Performed by: PHYSICIAN ASSISTANT

## 2018-04-11 PROCEDURE — G8926 SPIRO NO PERF OR DOC: HCPCS | Performed by: PHYSICIAN ASSISTANT

## 2018-04-11 PROCEDURE — 82803 BLOOD GASES ANY COMBINATION: CPT

## 2018-04-11 PROCEDURE — 3023F SPIROM DOC REV: CPT | Performed by: PHYSICIAN ASSISTANT

## 2018-04-11 PROCEDURE — G8417 CALC BMI ABV UP PARAM F/U: HCPCS | Performed by: PHYSICIAN ASSISTANT

## 2018-04-11 PROCEDURE — 1123F ACP DISCUSS/DSCN MKR DOCD: CPT | Performed by: PHYSICIAN ASSISTANT

## 2018-04-11 PROCEDURE — 99214 OFFICE O/P EST MOD 30 MIN: CPT | Performed by: PHYSICIAN ASSISTANT

## 2018-04-11 PROCEDURE — 83605 ASSAY OF LACTIC ACID: CPT

## 2018-04-11 PROCEDURE — G8598 ASA/ANTIPLAT THER USED: HCPCS | Performed by: PHYSICIAN ASSISTANT

## 2018-04-11 RX ORDER — METOPROLOL SUCCINATE 25 MG/1
25 TABLET, EXTENDED RELEASE ORAL
Refills: 5 | COMMUNITY
Start: 2018-02-22 | End: 2019-02-15 | Stop reason: ALTCHOICE

## 2018-04-11 RX ORDER — DILTIAZEM HYDROCHLORIDE 120 MG/1
120 CAPSULE, EXTENDED RELEASE ORAL EVERY EVENING
Refills: 3 | COMMUNITY
Start: 2018-03-29

## 2018-04-11 ASSESSMENT — ENCOUNTER SYMPTOMS
SORE THROAT: 0
CHEST TIGHTNESS: 0
COLOR CHANGE: 0
BACK PAIN: 0
ABDOMINAL PAIN: 0
COUGH: 1
RHINORRHEA: 0
SINUS PAIN: 0
SHORTNESS OF BREATH: 1
WHEEZING: 0
TROUBLE SWALLOWING: 0
STRIDOR: 0
SINUS PRESSURE: 0
VOICE CHANGE: 0

## 2018-04-12 ENCOUNTER — HOSPITAL ENCOUNTER (OUTPATIENT)
Dept: CARDIAC REHAB | Age: 78
Setting detail: THERAPIES SERIES
Discharge: HOME | End: 2018-04-12
Payer: MEDICARE

## 2018-04-12 PROCEDURE — 93798 PHYS/QHP OP CAR RHAB W/ECG: CPT

## 2018-04-17 ENCOUNTER — HOSPITAL ENCOUNTER (OUTPATIENT)
Dept: CARDIAC REHAB | Age: 78
Setting detail: THERAPIES SERIES
Discharge: HOME | End: 2018-04-17
Payer: MEDICARE

## 2018-04-17 PROCEDURE — 93798 PHYS/QHP OP CAR RHAB W/ECG: CPT

## 2018-04-19 ENCOUNTER — HOSPITAL ENCOUNTER (OUTPATIENT)
Dept: CARDIAC REHAB | Age: 78
Setting detail: THERAPIES SERIES
Discharge: HOME | End: 2018-04-19
Payer: MEDICARE

## 2018-04-19 PROCEDURE — 93798 PHYS/QHP OP CAR RHAB W/ECG: CPT

## 2018-04-23 ENCOUNTER — APPOINTMENT (OUTPATIENT)
Dept: CARDIAC REHAB | Age: 78
End: 2018-04-23
Payer: MEDICARE

## 2018-04-24 ENCOUNTER — HOSPITAL ENCOUNTER (OUTPATIENT)
Dept: CARDIAC REHAB | Age: 78
Setting detail: THERAPIES SERIES
Discharge: HOME | End: 2018-04-24
Payer: MEDICARE

## 2018-04-24 PROCEDURE — 93798 PHYS/QHP OP CAR RHAB W/ECG: CPT

## 2018-05-01 ENCOUNTER — HOSPITAL ENCOUNTER (OUTPATIENT)
Dept: CARDIAC REHAB | Age: 78
Setting detail: THERAPIES SERIES
Discharge: HOME | End: 2018-05-01
Payer: MEDICARE

## 2018-05-01 PROCEDURE — 93798 PHYS/QHP OP CAR RHAB W/ECG: CPT

## 2018-05-03 ENCOUNTER — HOSPITAL ENCOUNTER (OUTPATIENT)
Dept: CARDIAC REHAB | Age: 78
Setting detail: THERAPIES SERIES
Discharge: HOME | End: 2018-05-03
Payer: MEDICARE

## 2018-05-03 PROCEDURE — 93798 PHYS/QHP OP CAR RHAB W/ECG: CPT

## 2018-05-08 ENCOUNTER — HOSPITAL ENCOUNTER (OUTPATIENT)
Dept: CARDIAC REHAB | Age: 78
Setting detail: THERAPIES SERIES
Discharge: HOME | End: 2018-05-08
Payer: MEDICARE

## 2018-05-08 PROCEDURE — 93798 PHYS/QHP OP CAR RHAB W/ECG: CPT

## 2018-05-11 ENCOUNTER — HOSPITAL ENCOUNTER (OUTPATIENT)
Dept: LAB | Age: 78
Discharge: HOME | End: 2018-05-11
Payer: MEDICARE

## 2018-05-11 DIAGNOSIS — I10 ESSENTIAL HYPERTENSION: ICD-10-CM

## 2018-05-11 DIAGNOSIS — R97.20 ELEVATED PSA: ICD-10-CM

## 2018-05-11 DIAGNOSIS — E78.2 MIXED HYPERLIPIDEMIA: ICD-10-CM

## 2018-05-11 LAB
ALBUMIN SERPL-MCNC: 4.3 G/DL (ref 3.9–4.9)
ALP BLD-CCNC: 79 U/L (ref 35–104)
ALT SERPL-CCNC: 22 U/L (ref 0–41)
ANION GAP SERPL CALCULATED.3IONS-SCNC: 11 MEQ/L (ref 7–13)
AST SERPL-CCNC: 21 U/L (ref 0–40)
BASOPHILS ABSOLUTE: 0 K/UL (ref 0–0.2)
BASOPHILS RELATIVE PERCENT: 0.5 %
BILIRUB SERPL-MCNC: 0.6 MG/DL (ref 0–1.2)
BUN BLDV-MCNC: 16 MG/DL (ref 8–23)
CALCIUM SERPL-MCNC: 9.6 MG/DL (ref 8.6–10.2)
CHLORIDE BLD-SCNC: 101 MEQ/L (ref 98–107)
CHOLESTEROL, TOTAL: 120 MG/DL (ref 0–199)
CO2: 33 MEQ/L (ref 22–29)
CREAT SERPL-MCNC: 0.74 MG/DL (ref 0.7–1.2)
EOSINOPHILS ABSOLUTE: 0.2 K/UL (ref 0–0.7)
EOSINOPHILS RELATIVE PERCENT: 2.5 %
GFR AFRICAN AMERICAN: >60
GFR NON-AFRICAN AMERICAN: >60
GLOBULIN: 2.1 G/DL (ref 2.3–3.5)
GLUCOSE BLD-MCNC: 154 MG/DL (ref 74–109)
HCT VFR BLD CALC: 45.4 % (ref 42–52)
HDLC SERPL-MCNC: 39 MG/DL (ref 40–59)
HEMOGLOBIN: 14.9 G/DL (ref 14–18)
LDL CHOLESTEROL CALCULATED: 58 MG/DL (ref 0–129)
LYMPHOCYTES ABSOLUTE: 1.5 K/UL (ref 1–4.8)
LYMPHOCYTES RELATIVE PERCENT: 23.8 %
MCH RBC QN AUTO: 29.8 PG (ref 27–31.3)
MCHC RBC AUTO-ENTMCNC: 32.8 % (ref 33–37)
MCV RBC AUTO: 90.7 FL (ref 80–100)
MONOCYTES ABSOLUTE: 0.5 K/UL (ref 0.2–0.8)
MONOCYTES RELATIVE PERCENT: 8.5 %
NEUTROPHILS ABSOLUTE: 4 K/UL (ref 1.4–6.5)
NEUTROPHILS RELATIVE PERCENT: 64.7 %
PDW BLD-RTO: 13.8 % (ref 11.5–14.5)
PLATELET # BLD: 187 K/UL (ref 130–400)
POTASSIUM SERPL-SCNC: 4.5 MEQ/L (ref 3.5–5.1)
PROSTATE SPECIFIC ANTIGEN: 5.36 NG/ML (ref 0–6.22)
RBC # BLD: 5.01 M/UL (ref 4.7–6.1)
SODIUM BLD-SCNC: 145 MEQ/L (ref 132–144)
TOTAL PROTEIN: 6.4 G/DL (ref 6.4–8.1)
TRIGL SERPL-MCNC: 116 MG/DL (ref 0–200)
WBC # BLD: 6.2 K/UL (ref 4.8–10.8)

## 2018-05-11 PROCEDURE — 84153 ASSAY OF PSA TOTAL: CPT

## 2018-05-11 PROCEDURE — 36415 COLL VENOUS BLD VENIPUNCTURE: CPT

## 2018-05-11 PROCEDURE — 85025 COMPLETE CBC W/AUTO DIFF WBC: CPT

## 2018-05-11 PROCEDURE — 80061 LIPID PANEL: CPT

## 2018-05-11 PROCEDURE — 80053 COMPREHEN METABOLIC PANEL: CPT

## 2018-05-15 ENCOUNTER — HOSPITAL ENCOUNTER (OUTPATIENT)
Dept: CARDIAC REHAB | Age: 78
Setting detail: THERAPIES SERIES
Discharge: HOME | End: 2018-05-15
Payer: MEDICARE

## 2018-05-15 ENCOUNTER — OFFICE VISIT (OUTPATIENT)
Dept: FAMILY MEDICINE CLINIC | Age: 78
End: 2018-05-15
Payer: MEDICARE

## 2018-05-15 VITALS
HEART RATE: 74 BPM | HEIGHT: 72 IN | SYSTOLIC BLOOD PRESSURE: 138 MMHG | WEIGHT: 224 LBS | RESPIRATION RATE: 18 BRPM | DIASTOLIC BLOOD PRESSURE: 70 MMHG | TEMPERATURE: 97.9 F | OXYGEN SATURATION: 92 % | BODY MASS INDEX: 30.34 KG/M2

## 2018-05-15 DIAGNOSIS — R73.03 BORDERLINE DIABETES MELLITUS: ICD-10-CM

## 2018-05-15 DIAGNOSIS — I10 ESSENTIAL HYPERTENSION: Primary | ICD-10-CM

## 2018-05-15 DIAGNOSIS — I48.0 PAROXYSMAL ATRIAL FIBRILLATION (HCC): ICD-10-CM

## 2018-05-15 DIAGNOSIS — R97.20 ELEVATED PSA: ICD-10-CM

## 2018-05-15 DIAGNOSIS — J44.9 CHRONIC OBSTRUCTIVE PULMONARY DISEASE, UNSPECIFIED COPD TYPE (HCC): ICD-10-CM

## 2018-05-15 DIAGNOSIS — G47.00 INSOMNIA, UNSPECIFIED TYPE: ICD-10-CM

## 2018-05-15 DIAGNOSIS — E78.2 MIXED HYPERLIPIDEMIA: ICD-10-CM

## 2018-05-15 DIAGNOSIS — I25.10 CORONARY ARTERY DISEASE INVOLVING NATIVE CORONARY ARTERY OF NATIVE HEART WITHOUT ANGINA PECTORIS: ICD-10-CM

## 2018-05-15 PROBLEM — Z99.89 OBSTRUCTIVE SLEEP APNEA ON CPAP: Status: RESOLVED | Noted: 2017-11-13 | Resolved: 2018-05-15

## 2018-05-15 PROBLEM — G47.33 OBSTRUCTIVE SLEEP APNEA ON CPAP: Status: RESOLVED | Noted: 2017-11-13 | Resolved: 2018-05-15

## 2018-05-15 PROCEDURE — 4040F PNEUMOC VAC/ADMIN/RCVD: CPT | Performed by: NURSE PRACTITIONER

## 2018-05-15 PROCEDURE — G8926 SPIRO NO PERF OR DOC: HCPCS | Performed by: NURSE PRACTITIONER

## 2018-05-15 PROCEDURE — G8598 ASA/ANTIPLAT THER USED: HCPCS | Performed by: NURSE PRACTITIONER

## 2018-05-15 PROCEDURE — 93798 PHYS/QHP OP CAR RHAB W/ECG: CPT

## 2018-05-15 PROCEDURE — 99214 OFFICE O/P EST MOD 30 MIN: CPT | Performed by: NURSE PRACTITIONER

## 2018-05-15 PROCEDURE — 1036F TOBACCO NON-USER: CPT | Performed by: NURSE PRACTITIONER

## 2018-05-15 PROCEDURE — 3023F SPIROM DOC REV: CPT | Performed by: NURSE PRACTITIONER

## 2018-05-15 PROCEDURE — 1123F ACP DISCUSS/DSCN MKR DOCD: CPT | Performed by: NURSE PRACTITIONER

## 2018-05-15 PROCEDURE — G8427 DOCREV CUR MEDS BY ELIG CLIN: HCPCS | Performed by: NURSE PRACTITIONER

## 2018-05-15 PROCEDURE — G8417 CALC BMI ABV UP PARAM F/U: HCPCS | Performed by: NURSE PRACTITIONER

## 2018-05-15 RX ORDER — DILTIAZEM HYDROCHLORIDE 240 MG/1
CAPSULE, EXTENDED RELEASE ORAL
Refills: 3 | COMMUNITY
Start: 2018-04-17

## 2018-05-15 RX ORDER — TEMAZEPAM 15 MG/1
CAPSULE ORAL
Qty: 60 CAPSULE | Refills: 2 | Status: SHIPPED | OUTPATIENT
Start: 2018-05-15 | End: 2018-08-10 | Stop reason: SDUPTHER

## 2018-05-17 ENCOUNTER — HOSPITAL ENCOUNTER (OUTPATIENT)
Dept: CARDIAC REHAB | Age: 78
Setting detail: THERAPIES SERIES
Discharge: HOME | End: 2018-05-17
Payer: MEDICARE

## 2018-05-17 PROCEDURE — 93798 PHYS/QHP OP CAR RHAB W/ECG: CPT

## 2018-05-22 ENCOUNTER — HOSPITAL ENCOUNTER (OUTPATIENT)
Dept: CARDIAC REHAB | Age: 78
Setting detail: THERAPIES SERIES
Discharge: HOME | End: 2018-05-22
Payer: MEDICARE

## 2018-05-22 PROCEDURE — 93798 PHYS/QHP OP CAR RHAB W/ECG: CPT

## 2018-05-31 ENCOUNTER — OFFICE VISIT (OUTPATIENT)
Dept: PULMONOLOGY | Age: 78
End: 2018-05-31
Payer: MEDICARE

## 2018-05-31 ENCOUNTER — HOSPITAL ENCOUNTER (OUTPATIENT)
Dept: CARDIAC REHAB | Age: 78
Setting detail: THERAPIES SERIES
Discharge: HOME | End: 2018-05-31
Payer: MEDICARE

## 2018-05-31 VITALS
HEART RATE: 70 BPM | WEIGHT: 213 LBS | SYSTOLIC BLOOD PRESSURE: 124 MMHG | OXYGEN SATURATION: 90 % | TEMPERATURE: 97.3 F | DIASTOLIC BLOOD PRESSURE: 58 MMHG | HEIGHT: 72 IN | BODY MASS INDEX: 28.85 KG/M2

## 2018-05-31 DIAGNOSIS — J96.11 CHRONIC RESPIRATORY FAILURE WITH HYPOXIA AND HYPERCAPNIA (HCC): ICD-10-CM

## 2018-05-31 DIAGNOSIS — I27.20 PULMONARY HTN (HCC): ICD-10-CM

## 2018-05-31 DIAGNOSIS — I48.91 ATRIAL FIBRILLATION, UNSPECIFIED TYPE (HCC): ICD-10-CM

## 2018-05-31 DIAGNOSIS — J44.9 CHRONIC OBSTRUCTIVE PULMONARY DISEASE, UNSPECIFIED COPD TYPE (HCC): Primary | ICD-10-CM

## 2018-05-31 DIAGNOSIS — J96.12 CHRONIC RESPIRATORY FAILURE WITH HYPOXIA AND HYPERCAPNIA (HCC): ICD-10-CM

## 2018-05-31 DIAGNOSIS — G47.33 OBSTRUCTIVE SLEEP APNEA: ICD-10-CM

## 2018-05-31 PROCEDURE — 1036F TOBACCO NON-USER: CPT | Performed by: PHYSICIAN ASSISTANT

## 2018-05-31 PROCEDURE — 3023F SPIROM DOC REV: CPT | Performed by: PHYSICIAN ASSISTANT

## 2018-05-31 PROCEDURE — G8417 CALC BMI ABV UP PARAM F/U: HCPCS | Performed by: PHYSICIAN ASSISTANT

## 2018-05-31 PROCEDURE — G8926 SPIRO NO PERF OR DOC: HCPCS | Performed by: PHYSICIAN ASSISTANT

## 2018-05-31 PROCEDURE — G8598 ASA/ANTIPLAT THER USED: HCPCS | Performed by: PHYSICIAN ASSISTANT

## 2018-05-31 PROCEDURE — 4040F PNEUMOC VAC/ADMIN/RCVD: CPT | Performed by: PHYSICIAN ASSISTANT

## 2018-05-31 PROCEDURE — G8427 DOCREV CUR MEDS BY ELIG CLIN: HCPCS | Performed by: PHYSICIAN ASSISTANT

## 2018-05-31 PROCEDURE — 1123F ACP DISCUSS/DSCN MKR DOCD: CPT | Performed by: PHYSICIAN ASSISTANT

## 2018-05-31 PROCEDURE — 93798 PHYS/QHP OP CAR RHAB W/ECG: CPT

## 2018-05-31 PROCEDURE — 99214 OFFICE O/P EST MOD 30 MIN: CPT | Performed by: PHYSICIAN ASSISTANT

## 2018-06-06 ASSESSMENT — ENCOUNTER SYMPTOMS
SINUS PAIN: 0
SINUS PRESSURE: 0
WHEEZING: 0
SHORTNESS OF BREATH: 1
VOICE CHANGE: 0
SORE THROAT: 0
TROUBLE SWALLOWING: 0
ABDOMINAL PAIN: 0
RHINORRHEA: 0
COUGH: 0
CHEST TIGHTNESS: 0
COLOR CHANGE: 0
BACK PAIN: 0
STRIDOR: 0

## 2018-06-07 ENCOUNTER — HOSPITAL ENCOUNTER (OUTPATIENT)
Dept: CARDIAC REHAB | Age: 78
Setting detail: THERAPIES SERIES
Discharge: HOME | End: 2018-06-07
Payer: MEDICARE

## 2018-06-07 PROCEDURE — 93798 PHYS/QHP OP CAR RHAB W/ECG: CPT

## 2018-06-11 ENCOUNTER — APPOINTMENT (OUTPATIENT)
Dept: CARDIAC REHAB | Age: 78
End: 2018-06-11
Payer: MEDICARE

## 2018-06-14 ENCOUNTER — HOSPITAL ENCOUNTER (OUTPATIENT)
Dept: CARDIAC REHAB | Age: 78
Setting detail: THERAPIES SERIES
Discharge: HOME | End: 2018-06-14
Payer: MEDICARE

## 2018-06-14 PROCEDURE — 93798 PHYS/QHP OP CAR RHAB W/ECG: CPT

## 2018-06-19 ENCOUNTER — HOSPITAL ENCOUNTER (OUTPATIENT)
Dept: CARDIAC REHAB | Age: 78
Setting detail: THERAPIES SERIES
Discharge: HOME | End: 2018-06-19
Payer: MEDICARE

## 2018-06-19 PROCEDURE — 93798 PHYS/QHP OP CAR RHAB W/ECG: CPT

## 2018-06-21 ENCOUNTER — HOSPITAL ENCOUNTER (OUTPATIENT)
Dept: PULMONOLOGY | Age: 78
Setting detail: THERAPIES SERIES
Discharge: HOME | End: 2018-06-21
Payer: MEDICARE

## 2018-06-21 ENCOUNTER — HOSPITAL ENCOUNTER (OUTPATIENT)
Dept: CARDIAC REHAB | Age: 78
Setting detail: THERAPIES SERIES
Discharge: HOME | End: 2018-06-21
Payer: MEDICARE

## 2018-06-21 PROCEDURE — G0424 PULMONARY REHAB W EXER: HCPCS

## 2018-06-21 PROCEDURE — 93798 PHYS/QHP OP CAR RHAB W/ECG: CPT

## 2018-06-27 ENCOUNTER — HOSPITAL ENCOUNTER (OUTPATIENT)
Dept: CARDIAC REHAB | Age: 78
Setting detail: THERAPIES SERIES
Discharge: HOME | End: 2018-06-27
Payer: MEDICARE

## 2018-06-27 PROCEDURE — 93798 PHYS/QHP OP CAR RHAB W/ECG: CPT

## 2018-06-28 ENCOUNTER — TELEPHONE (OUTPATIENT)
Dept: PULMONOLOGY | Age: 78
End: 2018-06-28

## 2018-06-28 DIAGNOSIS — J44.9 CHRONIC OBSTRUCTIVE PULMONARY DISEASE, UNSPECIFIED COPD TYPE (HCC): Primary | ICD-10-CM

## 2018-07-18 NOTE — TELEPHONE ENCOUNTER
Last seen 5/15/2018. Has a follow up appointment scheduled for Future Appointments  Date Time Provider Valentín Perkins   8/17/2018 7:50 AM TRINA Haryd - CNP [address]   10/4/2018 10:00 AM Adrien Dominguez, [address]   . Medication is pending if okay.  Please advise

## 2018-07-19 RX ORDER — ROSUVASTATIN CALCIUM 40 MG/1
TABLET, COATED ORAL
Qty: 90 TABLET | Refills: 1 | Status: SHIPPED | OUTPATIENT
Start: 2018-07-19 | End: 2018-10-04 | Stop reason: SDUPTHER

## 2018-08-07 ENCOUNTER — HOSPITAL ENCOUNTER (OUTPATIENT)
Dept: LAB | Age: 78
Discharge: HOME | End: 2018-08-07
Payer: MEDICARE

## 2018-08-07 DIAGNOSIS — R73.03 BORDERLINE DIABETES MELLITUS: ICD-10-CM

## 2018-08-07 DIAGNOSIS — I10 ESSENTIAL HYPERTENSION: ICD-10-CM

## 2018-08-07 DIAGNOSIS — R97.20 ELEVATED PSA: ICD-10-CM

## 2018-08-07 DIAGNOSIS — E78.2 MIXED HYPERLIPIDEMIA: ICD-10-CM

## 2018-08-07 LAB
ALBUMIN SERPL-MCNC: 3.8 G/DL (ref 3.9–4.9)
ALP BLD-CCNC: 81 U/L (ref 35–104)
ALT SERPL-CCNC: 23 U/L (ref 0–41)
ANION GAP SERPL CALCULATED.3IONS-SCNC: 13 MEQ/L (ref 7–13)
AST SERPL-CCNC: 21 U/L (ref 0–40)
BASOPHILS ABSOLUTE: 0 K/UL (ref 0–0.2)
BASOPHILS RELATIVE PERCENT: 0.4 %
BILIRUB SERPL-MCNC: 0.8 MG/DL (ref 0–1.2)
BUN BLDV-MCNC: 14 MG/DL (ref 8–23)
CALCIUM SERPL-MCNC: 9.4 MG/DL (ref 8.6–10.2)
CHLORIDE BLD-SCNC: 99 MEQ/L (ref 98–107)
CHOLESTEROL, TOTAL: 142 MG/DL (ref 0–199)
CO2: 28 MEQ/L (ref 22–29)
CREAT SERPL-MCNC: 0.63 MG/DL (ref 0.7–1.2)
EOSINOPHILS ABSOLUTE: 0.2 K/UL (ref 0–0.7)
EOSINOPHILS RELATIVE PERCENT: 2.2 %
GFR AFRICAN AMERICAN: >60
GFR NON-AFRICAN AMERICAN: >60
GLOBULIN: 3.2 G/DL (ref 2.3–3.5)
GLUCOSE BLD-MCNC: 157 MG/DL (ref 74–109)
HBA1C MFR BLD: 8.5 % (ref 4.8–5.9)
HCT VFR BLD CALC: 46.7 % (ref 42–52)
HDLC SERPL-MCNC: 38 MG/DL (ref 40–59)
HEMOGLOBIN: 15.6 G/DL (ref 14–18)
LDL CHOLESTEROL CALCULATED: 63 MG/DL (ref 0–129)
LYMPHOCYTES ABSOLUTE: 1.9 K/UL (ref 1–4.8)
LYMPHOCYTES RELATIVE PERCENT: 22 %
MCH RBC QN AUTO: 30.2 PG (ref 27–31.3)
MCHC RBC AUTO-ENTMCNC: 33.3 % (ref 33–37)
MCV RBC AUTO: 90.5 FL (ref 80–100)
MONOCYTES ABSOLUTE: 0.7 K/UL (ref 0.2–0.8)
MONOCYTES RELATIVE PERCENT: 8.4 %
NEUTROPHILS ABSOLUTE: 5.7 K/UL (ref 1.4–6.5)
NEUTROPHILS RELATIVE PERCENT: 67 %
PDW BLD-RTO: 13.8 % (ref 11.5–14.5)
PLATELET # BLD: 208 K/UL (ref 130–400)
POTASSIUM SERPL-SCNC: 4.6 MEQ/L (ref 3.5–5.1)
PROSTATE SPECIFIC ANTIGEN: 4.98 NG/ML (ref 0–6.22)
RBC # BLD: 5.16 M/UL (ref 4.7–6.1)
SODIUM BLD-SCNC: 140 MEQ/L (ref 132–144)
TOTAL PROTEIN: 7 G/DL (ref 6.4–8.1)
TRIGL SERPL-MCNC: 206 MG/DL (ref 0–200)
WBC # BLD: 8.4 K/UL (ref 4.8–10.8)

## 2018-08-07 PROCEDURE — 36415 COLL VENOUS BLD VENIPUNCTURE: CPT

## 2018-08-07 PROCEDURE — 80053 COMPREHEN METABOLIC PANEL: CPT

## 2018-08-07 PROCEDURE — 83036 HEMOGLOBIN GLYCOSYLATED A1C: CPT

## 2018-08-07 PROCEDURE — 84153 ASSAY OF PSA TOTAL: CPT

## 2018-08-07 PROCEDURE — 85025 COMPLETE CBC W/AUTO DIFF WBC: CPT

## 2018-08-07 PROCEDURE — 80061 LIPID PANEL: CPT

## 2018-08-10 ENCOUNTER — OFFICE VISIT (OUTPATIENT)
Dept: FAMILY MEDICINE CLINIC | Age: 78
End: 2018-08-10
Payer: MEDICARE

## 2018-08-10 ENCOUNTER — HOSPITAL ENCOUNTER (OUTPATIENT)
Age: 78
Setting detail: SPECIMEN
Discharge: HOME | End: 2018-08-10
Payer: MEDICARE

## 2018-08-10 VITALS
WEIGHT: 213 LBS | HEIGHT: 72 IN | DIASTOLIC BLOOD PRESSURE: 60 MMHG | OXYGEN SATURATION: 94 % | SYSTOLIC BLOOD PRESSURE: 114 MMHG | TEMPERATURE: 97.9 F | HEART RATE: 63 BPM | BODY MASS INDEX: 28.85 KG/M2 | RESPIRATION RATE: 16 BRPM

## 2018-08-10 DIAGNOSIS — I10 ESSENTIAL HYPERTENSION: Primary | ICD-10-CM

## 2018-08-10 DIAGNOSIS — J44.9 CHRONIC OBSTRUCTIVE PULMONARY DISEASE, UNSPECIFIED COPD TYPE (HCC): ICD-10-CM

## 2018-08-10 DIAGNOSIS — R73.03 BORDERLINE DIABETES MELLITUS: ICD-10-CM

## 2018-08-10 DIAGNOSIS — S41.112A ARM LACERATION, LEFT, INITIAL ENCOUNTER: ICD-10-CM

## 2018-08-10 DIAGNOSIS — I25.10 CORONARY ARTERY DISEASE INVOLVING NATIVE CORONARY ARTERY OF NATIVE HEART WITHOUT ANGINA PECTORIS: ICD-10-CM

## 2018-08-10 DIAGNOSIS — G47.33 OSA (OBSTRUCTIVE SLEEP APNEA): ICD-10-CM

## 2018-08-10 DIAGNOSIS — I48.0 PAROXYSMAL ATRIAL FIBRILLATION (HCC): ICD-10-CM

## 2018-08-10 DIAGNOSIS — G47.00 INSOMNIA, UNSPECIFIED TYPE: ICD-10-CM

## 2018-08-10 DIAGNOSIS — Z79.899 HIGH RISK MEDICATION USE: ICD-10-CM

## 2018-08-10 DIAGNOSIS — E11.59 TYPE 2 DIABETES MELLITUS WITH OTHER CIRCULATORY COMPLICATION, WITHOUT LONG-TERM CURRENT USE OF INSULIN (HCC): ICD-10-CM

## 2018-08-10 DIAGNOSIS — E78.2 MIXED HYPERLIPIDEMIA: ICD-10-CM

## 2018-08-10 PROBLEM — E11.9 CONTROLLED TYPE 2 DIABETES MELLITUS WITHOUT COMPLICATION, WITHOUT LONG-TERM CURRENT USE OF INSULIN (HCC): Status: RESOLVED | Noted: 2018-08-10 | Resolved: 2018-08-10

## 2018-08-10 PROBLEM — E11.9 DIABETES MELLITUS (HCC): Status: ACTIVE | Noted: 2018-08-10

## 2018-08-10 PROBLEM — E11.9 CONTROLLED TYPE 2 DIABETES MELLITUS WITHOUT COMPLICATION, WITHOUT LONG-TERM CURRENT USE OF INSULIN (HCC): Status: ACTIVE | Noted: 2018-08-10

## 2018-08-10 PROCEDURE — 1101F PT FALLS ASSESS-DOCD LE1/YR: CPT | Performed by: NURSE PRACTITIONER

## 2018-08-10 PROCEDURE — 90714 TD VACC NO PRESV 7 YRS+ IM: CPT | Performed by: NURSE PRACTITIONER

## 2018-08-10 PROCEDURE — G8417 CALC BMI ABV UP PARAM F/U: HCPCS | Performed by: NURSE PRACTITIONER

## 2018-08-10 PROCEDURE — 99214 OFFICE O/P EST MOD 30 MIN: CPT | Performed by: NURSE PRACTITIONER

## 2018-08-10 PROCEDURE — 1036F TOBACCO NON-USER: CPT | Performed by: NURSE PRACTITIONER

## 2018-08-10 PROCEDURE — G8598 ASA/ANTIPLAT THER USED: HCPCS | Performed by: NURSE PRACTITIONER

## 2018-08-10 PROCEDURE — G8926 SPIRO NO PERF OR DOC: HCPCS | Performed by: NURSE PRACTITIONER

## 2018-08-10 PROCEDURE — 4040F PNEUMOC VAC/ADMIN/RCVD: CPT | Performed by: NURSE PRACTITIONER

## 2018-08-10 PROCEDURE — 90471 IMMUNIZATION ADMIN: CPT | Performed by: NURSE PRACTITIONER

## 2018-08-10 PROCEDURE — 80307 DRUG TEST PRSMV CHEM ANLYZR: CPT

## 2018-08-10 PROCEDURE — G8427 DOCREV CUR MEDS BY ELIG CLIN: HCPCS | Performed by: NURSE PRACTITIONER

## 2018-08-10 PROCEDURE — 1123F ACP DISCUSS/DSCN MKR DOCD: CPT | Performed by: NURSE PRACTITIONER

## 2018-08-10 PROCEDURE — 3023F SPIROM DOC REV: CPT | Performed by: NURSE PRACTITIONER

## 2018-08-10 RX ORDER — TEMAZEPAM 15 MG/1
CAPSULE ORAL
Qty: 60 CAPSULE | Refills: 2 | Status: SHIPPED | OUTPATIENT
Start: 2018-08-10 | End: 2018-11-15 | Stop reason: SDUPTHER

## 2018-08-10 NOTE — PROGRESS NOTES
coexisting coronary artery disease. ROS: .  This patient reports no chest pain or pressure. There is no increasing shortness of breath or cough. The patient reports no nausea or vomiting. There is no heartburn or indigestion. There is no diarrhea or constipation. No black, bloody, mucusy or tarry stool noticed. The patient reports no bloating and no change in appetite. There is no numbness, tingling or swelling in the extremities. EXAM:  Constitutional Blood pressure 114/60, pulse 63, temperature 97.9 °F (36.6 °C), temperature source Temporal, resp. rate 16, height 6' (1.829 m), weight 213 lb (96.6 kg), SpO2 94 %. He has a normal affect, no acute distress, appears well developed and well nourished. Skin:  Laceration to left elbow lateral area. Measures about 2 cm long and is about 0. 1 cm wide. No open area. Neck:  neck- supple, no mass, non-tender and no bruits  Lungs:  Normal expansion. Clear to auscultation. No rales, rhonchi, or wheezing., No chest wall tenderness. Heart:  Heart sounds are normal.  Regular rate and rhythm without murmur, gallop or rub. Abdomen:  Soft, non-tender, normal bowel sounds. No bruits, organomegaly or masses. Extremities: Extremities warm to touch, pink, with no edema. DIAGNOSIS:    Diagnosis Orders   1. Essential hypertension  CBC Auto Differential    Comprehensive Metabolic Panel   2. Mixed hyperlipidemia  Lipid Panel   3. Coronary artery disease involving native coronary artery of native heart without angina pectoris     4. Insomnia, unspecified type  temazepam (RESTORIL) 15 MG capsule   5. Paroxysmal atrial fibrillation (HCC) - Dr. Yanelis Torre     6. Borderline diabetes mellitus     7. YUDELKA (obstructive sleep apnea)- trilogy at night     8. Type 2 diabetes mellitus with other circulatory complication, without long-term current use of insulin (HCC)  Hemoglobin A1C    Microalbumin / Creatinine Urine Ratio   9. High risk medication use  Pain Management Drug Screen   10. Tetanus vaccine updated secondary to laceration left arm.      Electronically signed by Brittany Sanford, 8:36 AM 8/10/18

## 2018-08-15 LAB
6-ACETYLMORPHINE: NOT DETECTED
7-AMINOCLONAZEPAM: NOT DETECTED
ALPHA-OH-ALPRAZOLAM: NOT DETECTED
ALPRAZOLAM: NOT DETECTED
AMPHETAMINE: NOT DETECTED
BARBITURATES: NOT DETECTED
BENZOYLECGONINE: NOT DETECTED
BUPRENORPHINE: NOT DETECTED
CARISOPRODOL: NOT DETECTED
CLONAZEPAM: NOT DETECTED
CODEINE: NOT DETECTED
CREATININE URINE: 117.7 MG/DL (ref 20–400)
DIAZEPAM: NOT DETECTED
EER PAIN MGT DRUG PANEL, HIGH RES/EMIT U: NORMAL
ETHYL GLUCURONIDE: NOT DETECTED
FENTANYL: NOT DETECTED
HYDROCODONE: NOT DETECTED
HYDROMORPHONE: NOT DETECTED
LORAZEPAM: NOT DETECTED
MARIJUANA METABOLITE: NOT DETECTED
MDA: NOT DETECTED
MDEA: NOT DETECTED
MDMA URINE: NOT DETECTED
MEPERIDINE: NOT DETECTED
METHADONE: NOT DETECTED
METHAMPHETAMINE: NOT DETECTED
METHYLPHENIDATE: NOT DETECTED
MIDAZOLAM: NOT DETECTED
MORPHINE: NOT DETECTED
NORBUPRENORPHINE, FREE: NOT DETECTED
NORDIAZEPAM: NOT DETECTED
NORFENTANYL: NOT DETECTED
NORHYDROCODONE, URINE: NOT DETECTED
NOROXYCODONE: NOT DETECTED
NOROXYMORPHONE, URINE: NOT DETECTED
OXAZEPAM: PRESENT
OXYCODONE: NOT DETECTED
OXYMORPHONE: NOT DETECTED
PAIN MANAGEMENT DRUG PANEL: NORMAL
PCP: NOT DETECTED
PHENTERMINE: NOT DETECTED
PROPOXYPHENE: NOT DETECTED
TAPENTADOL, URINE: NOT DETECTED
TAPENTADOL-O-SULFATE, URINE: NOT DETECTED
TEMAZEPAM: PRESENT
TRAMADOL: NOT DETECTED
ZOLPIDEM: NOT DETECTED

## 2018-08-31 RX ORDER — DILTIAZEM HYDROCHLORIDE 60 MG/1
TABLET, FILM COATED ORAL
Qty: 30.6 G | Refills: 2 | Status: SHIPPED | OUTPATIENT
Start: 2018-08-31 | End: 2019-06-14 | Stop reason: SDUPTHER

## 2018-10-04 ENCOUNTER — OFFICE VISIT (OUTPATIENT)
Dept: PULMONOLOGY | Age: 78
End: 2018-10-04
Payer: MEDICARE

## 2018-10-04 VITALS
DIASTOLIC BLOOD PRESSURE: 60 MMHG | SYSTOLIC BLOOD PRESSURE: 114 MMHG | WEIGHT: 211 LBS | OXYGEN SATURATION: 95 % | BODY MASS INDEX: 28.58 KG/M2 | RESPIRATION RATE: 16 BRPM | HEIGHT: 72 IN | TEMPERATURE: 97 F | HEART RATE: 68 BPM

## 2018-10-04 DIAGNOSIS — G47.33 OBSTRUCTIVE SLEEP APNEA: ICD-10-CM

## 2018-10-04 DIAGNOSIS — I27.20 PULMONARY HTN (HCC): Primary | ICD-10-CM

## 2018-10-04 DIAGNOSIS — J84.10 PULMONARY FIBROSIS (HCC): ICD-10-CM

## 2018-10-04 DIAGNOSIS — J96.11 CHRONIC RESPIRATORY FAILURE WITH HYPOXIA AND HYPERCAPNIA (HCC): ICD-10-CM

## 2018-10-04 DIAGNOSIS — J96.12 CHRONIC RESPIRATORY FAILURE WITH HYPOXIA AND HYPERCAPNIA (HCC): ICD-10-CM

## 2018-10-04 PROCEDURE — 99214 OFFICE O/P EST MOD 30 MIN: CPT | Performed by: PHYSICIAN ASSISTANT

## 2018-10-04 PROCEDURE — G8427 DOCREV CUR MEDS BY ELIG CLIN: HCPCS | Performed by: PHYSICIAN ASSISTANT

## 2018-10-04 PROCEDURE — 4040F PNEUMOC VAC/ADMIN/RCVD: CPT | Performed by: PHYSICIAN ASSISTANT

## 2018-10-04 PROCEDURE — 1036F TOBACCO NON-USER: CPT | Performed by: PHYSICIAN ASSISTANT

## 2018-10-04 PROCEDURE — 1101F PT FALLS ASSESS-DOCD LE1/YR: CPT | Performed by: PHYSICIAN ASSISTANT

## 2018-10-04 PROCEDURE — 1123F ACP DISCUSS/DSCN MKR DOCD: CPT | Performed by: PHYSICIAN ASSISTANT

## 2018-10-04 PROCEDURE — G8598 ASA/ANTIPLAT THER USED: HCPCS | Performed by: PHYSICIAN ASSISTANT

## 2018-10-04 PROCEDURE — G8484 FLU IMMUNIZE NO ADMIN: HCPCS | Performed by: PHYSICIAN ASSISTANT

## 2018-10-04 PROCEDURE — G8417 CALC BMI ABV UP PARAM F/U: HCPCS | Performed by: PHYSICIAN ASSISTANT

## 2018-10-04 ASSESSMENT — ENCOUNTER SYMPTOMS
ABDOMINAL PAIN: 0
VOICE CHANGE: 0
STRIDOR: 0
COLOR CHANGE: 0
TROUBLE SWALLOWING: 0
CHEST TIGHTNESS: 0
WHEEZING: 0
SHORTNESS OF BREATH: 1
COUGH: 0
BACK PAIN: 0
SINUS PRESSURE: 0
SORE THROAT: 0
SINUS PAIN: 0
RHINORRHEA: 0

## 2018-10-23 ENCOUNTER — HOSPITAL ENCOUNTER (OUTPATIENT)
Dept: GENERAL RADIOLOGY | Age: 78
Discharge: HOME | End: 2018-10-25
Payer: MEDICARE

## 2018-10-23 ENCOUNTER — HOSPITAL ENCOUNTER (OUTPATIENT)
Age: 78
Discharge: HOME | End: 2018-10-25
Payer: MEDICARE

## 2018-10-23 DIAGNOSIS — J96.11 CHRONIC RESPIRATORY FAILURE WITH HYPOXIA AND HYPERCAPNIA (HCC): ICD-10-CM

## 2018-10-23 DIAGNOSIS — J96.12 CHRONIC RESPIRATORY FAILURE WITH HYPOXIA AND HYPERCAPNIA (HCC): ICD-10-CM

## 2018-10-23 DIAGNOSIS — J84.10 PULMONARY FIBROSIS (HCC): ICD-10-CM

## 2018-10-23 PROCEDURE — 71046 X-RAY EXAM CHEST 2 VIEWS: CPT

## 2018-10-24 ENCOUNTER — TELEPHONE (OUTPATIENT)
Dept: PULMONOLOGY | Age: 78
End: 2018-10-24

## 2018-11-12 ENCOUNTER — HOSPITAL ENCOUNTER (OUTPATIENT)
Dept: LAB | Age: 78
Discharge: HOME | End: 2018-11-12
Payer: MEDICARE

## 2018-11-12 DIAGNOSIS — E78.2 MIXED HYPERLIPIDEMIA: ICD-10-CM

## 2018-11-12 DIAGNOSIS — E11.59 TYPE 2 DIABETES MELLITUS WITH OTHER CIRCULATORY COMPLICATION, WITHOUT LONG-TERM CURRENT USE OF INSULIN (HCC): ICD-10-CM

## 2018-11-12 DIAGNOSIS — I10 ESSENTIAL HYPERTENSION: ICD-10-CM

## 2018-11-12 LAB
ALBUMIN SERPL-MCNC: 4.2 G/DL (ref 3.9–4.9)
ALP BLD-CCNC: 78 U/L (ref 35–104)
ALT SERPL-CCNC: 26 U/L (ref 0–41)
ANION GAP SERPL CALCULATED.3IONS-SCNC: 10 MEQ/L (ref 7–13)
AST SERPL-CCNC: 24 U/L (ref 0–40)
BASOPHILS ABSOLUTE: 0 K/UL (ref 0–0.2)
BASOPHILS RELATIVE PERCENT: 0.5 %
BILIRUB SERPL-MCNC: 0.7 MG/DL (ref 0–1.2)
BUN BLDV-MCNC: 16 MG/DL (ref 8–23)
CALCIUM SERPL-MCNC: 9.7 MG/DL (ref 8.6–10.2)
CHLORIDE BLD-SCNC: 99 MEQ/L (ref 98–107)
CHOLESTEROL, TOTAL: 132 MG/DL (ref 0–199)
CO2: 32 MEQ/L (ref 22–29)
CREAT SERPL-MCNC: 0.75 MG/DL (ref 0.7–1.2)
CREATININE URINE: 106.9 MG/DL
EOSINOPHILS ABSOLUTE: 0.2 K/UL (ref 0–0.7)
EOSINOPHILS RELATIVE PERCENT: 2 %
GFR AFRICAN AMERICAN: >60
GFR NON-AFRICAN AMERICAN: >60
GLOBULIN: 2.7 G/DL (ref 2.3–3.5)
GLUCOSE BLD-MCNC: 136 MG/DL (ref 74–109)
HBA1C MFR BLD: 5.7 % (ref 4.8–5.9)
HCT VFR BLD CALC: 46.1 % (ref 42–52)
HDLC SERPL-MCNC: 41 MG/DL (ref 40–59)
HEMOGLOBIN: 15.4 G/DL (ref 14–18)
LDL CHOLESTEROL CALCULATED: 58 MG/DL (ref 0–129)
LYMPHOCYTES ABSOLUTE: 1.6 K/UL (ref 1–4.8)
LYMPHOCYTES RELATIVE PERCENT: 20.5 %
MCH RBC QN AUTO: 29.8 PG (ref 27–31.3)
MCHC RBC AUTO-ENTMCNC: 33.4 % (ref 33–37)
MCV RBC AUTO: 89.1 FL (ref 80–100)
MICROALBUMIN UR-MCNC: <1.2 MG/DL
MICROALBUMIN/CREAT UR-RTO: NORMAL MG/G (ref 0–30)
MONOCYTES ABSOLUTE: 0.6 K/UL (ref 0.2–0.8)
MONOCYTES RELATIVE PERCENT: 7.4 %
NEUTROPHILS ABSOLUTE: 5.4 K/UL (ref 1.4–6.5)
NEUTROPHILS RELATIVE PERCENT: 69.6 %
PDW BLD-RTO: 14 % (ref 11.5–14.5)
PLATELET # BLD: 195 K/UL (ref 130–400)
POTASSIUM SERPL-SCNC: 4.1 MEQ/L (ref 3.5–5.1)
RBC # BLD: 5.18 M/UL (ref 4.7–6.1)
SODIUM BLD-SCNC: 141 MEQ/L (ref 132–144)
TOTAL PROTEIN: 6.9 G/DL (ref 6.4–8.1)
TRIGL SERPL-MCNC: 167 MG/DL (ref 0–200)
WBC # BLD: 7.8 K/UL (ref 4.8–10.8)

## 2018-11-12 PROCEDURE — 83036 HEMOGLOBIN GLYCOSYLATED A1C: CPT

## 2018-11-12 PROCEDURE — 80053 COMPREHEN METABOLIC PANEL: CPT

## 2018-11-12 PROCEDURE — 80061 LIPID PANEL: CPT

## 2018-11-12 PROCEDURE — 85025 COMPLETE CBC W/AUTO DIFF WBC: CPT

## 2018-11-12 PROCEDURE — 36415 COLL VENOUS BLD VENIPUNCTURE: CPT

## 2018-11-12 PROCEDURE — 82570 ASSAY OF URINE CREATININE: CPT

## 2018-11-12 PROCEDURE — 82043 UR ALBUMIN QUANTITATIVE: CPT

## 2018-11-15 ENCOUNTER — OFFICE VISIT (OUTPATIENT)
Dept: FAMILY MEDICINE CLINIC | Age: 78
End: 2018-11-15
Payer: MEDICARE

## 2018-11-15 VITALS
WEIGHT: 213 LBS | HEART RATE: 68 BPM | SYSTOLIC BLOOD PRESSURE: 110 MMHG | BODY MASS INDEX: 28.85 KG/M2 | RESPIRATION RATE: 16 BRPM | TEMPERATURE: 97.3 F | DIASTOLIC BLOOD PRESSURE: 68 MMHG | OXYGEN SATURATION: 94 % | HEIGHT: 72 IN

## 2018-11-15 DIAGNOSIS — E78.2 MIXED HYPERLIPIDEMIA: ICD-10-CM

## 2018-11-15 DIAGNOSIS — N40.0 BENIGN PROSTATIC HYPERPLASIA, UNSPECIFIED WHETHER LOWER URINARY TRACT SYMPTOMS PRESENT: ICD-10-CM

## 2018-11-15 DIAGNOSIS — G47.00 INSOMNIA, UNSPECIFIED TYPE: ICD-10-CM

## 2018-11-15 DIAGNOSIS — I10 ESSENTIAL HYPERTENSION: ICD-10-CM

## 2018-11-15 DIAGNOSIS — G47.33 OSA (OBSTRUCTIVE SLEEP APNEA): ICD-10-CM

## 2018-11-15 DIAGNOSIS — I48.0 PAROXYSMAL ATRIAL FIBRILLATION (HCC): ICD-10-CM

## 2018-11-15 DIAGNOSIS — I25.10 CORONARY ARTERY DISEASE INVOLVING NATIVE CORONARY ARTERY OF NATIVE HEART WITHOUT ANGINA PECTORIS: ICD-10-CM

## 2018-11-15 DIAGNOSIS — E11.59 TYPE 2 DIABETES MELLITUS WITH OTHER CIRCULATORY COMPLICATION, WITHOUT LONG-TERM CURRENT USE OF INSULIN (HCC): Primary | ICD-10-CM

## 2018-11-15 PROCEDURE — 1036F TOBACCO NON-USER: CPT | Performed by: NURSE PRACTITIONER

## 2018-11-15 PROCEDURE — G8417 CALC BMI ABV UP PARAM F/U: HCPCS | Performed by: NURSE PRACTITIONER

## 2018-11-15 PROCEDURE — G8598 ASA/ANTIPLAT THER USED: HCPCS | Performed by: NURSE PRACTITIONER

## 2018-11-15 PROCEDURE — 99214 OFFICE O/P EST MOD 30 MIN: CPT | Performed by: NURSE PRACTITIONER

## 2018-11-15 PROCEDURE — 1123F ACP DISCUSS/DSCN MKR DOCD: CPT | Performed by: NURSE PRACTITIONER

## 2018-11-15 PROCEDURE — G8482 FLU IMMUNIZE ORDER/ADMIN: HCPCS | Performed by: NURSE PRACTITIONER

## 2018-11-15 PROCEDURE — G8427 DOCREV CUR MEDS BY ELIG CLIN: HCPCS | Performed by: NURSE PRACTITIONER

## 2018-11-15 PROCEDURE — 1101F PT FALLS ASSESS-DOCD LE1/YR: CPT | Performed by: NURSE PRACTITIONER

## 2018-11-15 PROCEDURE — 4040F PNEUMOC VAC/ADMIN/RCVD: CPT | Performed by: NURSE PRACTITIONER

## 2018-11-15 RX ORDER — INFLUENZA A VIRUS A/MICHIGAN/45/2015 X-275 (H1N1) ANTIGEN (FORMALDEHYDE INACTIVATED), INFLUENZA A VIRUS A/SINGAPORE/INFIMH-16-0019/2016 IVR-186 (H3N2) ANTIGEN (FORMALDEHYDE INACTIVATED), AND INFLUENZA B VIRUS B/MARYLAND/15/2016 BX-69A (A B/COLORADO/6/2017-LIKE VIRUS) ANTIGEN (FORMALDEHYDE INACTIVATED) 60; 60; 60 UG/.5ML; UG/.5ML; UG/.5ML
INJECTION, SUSPENSION INTRAMUSCULAR
Refills: 0 | COMMUNITY
Start: 2018-11-07 | End: 2018-11-15 | Stop reason: ALTCHOICE

## 2018-11-15 RX ORDER — TEMAZEPAM 15 MG/1
CAPSULE ORAL
Qty: 60 CAPSULE | Refills: 2 | Status: SHIPPED | OUTPATIENT
Start: 2018-11-15 | End: 2019-02-15 | Stop reason: SDUPTHER

## 2018-11-15 NOTE — PROGRESS NOTES
This 77 y.o. male  patient is  hypertensive, is  diabetic, is  hyperlipidemic. He has a history of smoking and has a  family history of heart disease. He is not obese. Review of Systems  Patient denies chest pain, headache, lightheadedness, blurred vision, peripheral edema, palpitations and dry cough. Eyes: no rapid change in vision  Ears, nose, mouth, throat, and face: no changes in hearing or sore throat  Respiratory: No shortness of breath or cough. Cardiovascular: no chest pain or pressure. This patient reports no polyuria, polydipsia or episodes of hypoglycemia. Treatment Adherence:   Medication compliance:  compliant most of the time  Diet compliance:  compliant most of the time  Weight trend: stable  Current exercise: no regular exercise  What might prevent you from meeting your goal?: impairment:  physical: COPD on oxygen- some physical activity but not strenuous. Patient plan for overcoming barriers: N/A     Patient Confidence: 8/10      Objective:   EXAM:  Constitutional Blood pressure 110/68, pulse 68, temperature 97.3 °F (36.3 °C), temperature source Temporal, resp. rate 16, height 6' (1.829 m), weight 213 lb (96.6 kg), SpO2 94 %. .  He has a normal affect, no acute distress, appears well developed and well nourished. Neck:  neck- supple, no mass, non-tender and no bruits  Lungs:  Normal expansion. Clear to auscultation. No rales, rhonchi, or wheezing., No chest wall tenderness. Heart:  Heart sounds are normal.  Regular rate and rhythm without murmur, gallop or rub. Abdomen:  Soft, non-tender, normal bowel sounds. No bruits, organomegaly or masses. Extremities: Extremities warm to touch, pink, with no edema. Assessment:      Diagnosis Orders   1. Type 2 diabetes mellitus with other circulatory complication, without long-term current use of insulin (HCC)  Hemoglobin A1C    Microalbumin / Creatinine Urine Ratio   2.  Essential hypertension  CBC Auto Differential    Comprehensive Attestation The Prescription Monitoring Report for this patient was reviewed today. Documentation Possible medication side effects, risk of tolerance/dependence & alternative treatments discussed. ;No signs of potential drug abuse or diversion identified. ;Random urine drug screen sent today. Medication Contracts Medication contract signed today. Please note this report has been partially produced using speech recognition software and may cause contain errors related to that system including grammar, punctuation and spelling as well as words and phrases that may seem inappropriate. If there are questions or concerns please feel free to contact me to clarify.         Electronically signed by Francia Sanford, 7:34 AM 11/16/18

## 2018-12-02 ENCOUNTER — CARE COORDINATION (OUTPATIENT)
Dept: CARE COORDINATION | Age: 78
End: 2018-12-02

## 2018-12-04 ENCOUNTER — CARE COORDINATION (OUTPATIENT)
Dept: CARE COORDINATION | Age: 78
End: 2018-12-04

## 2018-12-12 ENCOUNTER — CARE COORDINATION (OUTPATIENT)
Dept: CARE COORDINATION | Age: 78
End: 2018-12-12

## 2019-01-02 ENCOUNTER — CARE COORDINATION (OUTPATIENT)
Dept: CASE MANAGEMENT | Age: 79
End: 2019-01-02

## 2019-01-04 ENCOUNTER — CARE COORDINATION (OUTPATIENT)
Dept: CASE MANAGEMENT | Age: 79
End: 2019-01-04

## 2019-01-05 ENCOUNTER — CARE COORDINATION (OUTPATIENT)
Dept: CASE MANAGEMENT | Age: 79
End: 2019-01-05

## 2019-01-15 RX ORDER — ROSUVASTATIN CALCIUM 40 MG/1
TABLET, COATED ORAL
Qty: 90 TABLET | Refills: 1 | Status: SHIPPED | OUTPATIENT
Start: 2019-01-15 | End: 2019-07-14 | Stop reason: SDUPTHER

## 2019-01-23 ENCOUNTER — CARE COORDINATION (OUTPATIENT)
Dept: CARE COORDINATION | Age: 79
End: 2019-01-23

## 2019-02-12 ENCOUNTER — HOSPITAL ENCOUNTER (OUTPATIENT)
Dept: LAB | Age: 79
Discharge: HOME | End: 2019-02-12
Payer: MEDICARE

## 2019-02-12 DIAGNOSIS — N40.0 BENIGN PROSTATIC HYPERPLASIA, UNSPECIFIED WHETHER LOWER URINARY TRACT SYMPTOMS PRESENT: ICD-10-CM

## 2019-02-12 DIAGNOSIS — E78.2 MIXED HYPERLIPIDEMIA: ICD-10-CM

## 2019-02-12 DIAGNOSIS — I10 ESSENTIAL HYPERTENSION: ICD-10-CM

## 2019-02-12 DIAGNOSIS — E11.59 TYPE 2 DIABETES MELLITUS WITH OTHER CIRCULATORY COMPLICATION, WITHOUT LONG-TERM CURRENT USE OF INSULIN (HCC): ICD-10-CM

## 2019-02-12 LAB
ALBUMIN SERPL-MCNC: 4.2 G/DL (ref 3.5–4.6)
ALP BLD-CCNC: 69 U/L (ref 35–104)
ALT SERPL-CCNC: 30 U/L (ref 0–41)
ANION GAP SERPL CALCULATED.3IONS-SCNC: 12 MEQ/L (ref 9–15)
AST SERPL-CCNC: 30 U/L (ref 0–40)
ATYPICAL LYMPHOCYTE RELATIVE PERCENT: 7 %
BASOPHILS ABSOLUTE: 0 K/UL (ref 0–0.2)
BASOPHILS RELATIVE PERCENT: 0.5 %
BILIRUB SERPL-MCNC: 1 MG/DL (ref 0.2–0.7)
BUN BLDV-MCNC: 20 MG/DL (ref 8–23)
CALCIUM SERPL-MCNC: 9.5 MG/DL (ref 8.5–9.9)
CHLORIDE BLD-SCNC: 99 MEQ/L (ref 95–107)
CHOLESTEROL, TOTAL: 143 MG/DL (ref 0–199)
CO2: 27 MEQ/L (ref 20–31)
CREAT SERPL-MCNC: 0.73 MG/DL (ref 0.7–1.2)
CREATININE URINE: 130 MG/DL
EOSINOPHILS ABSOLUTE: 0.3 K/UL (ref 0–0.7)
EOSINOPHILS RELATIVE PERCENT: 4 %
GFR AFRICAN AMERICAN: >60
GFR NON-AFRICAN AMERICAN: >60
GLOBULIN: 2.9 G/DL (ref 2.3–3.5)
GLUCOSE BLD-MCNC: 174 MG/DL (ref 70–99)
HBA1C MFR BLD: 6.5 % (ref 4.8–5.9)
HCT VFR BLD CALC: 48 % (ref 42–52)
HDLC SERPL-MCNC: 42 MG/DL (ref 40–59)
HEMOGLOBIN: 15.8 G/DL (ref 14–18)
LDL CHOLESTEROL CALCULATED: 64 MG/DL (ref 0–129)
LYMPHOCYTES ABSOLUTE: 1 K/UL (ref 1–4.8)
LYMPHOCYTES RELATIVE PERCENT: 6 %
MCH RBC QN AUTO: 29.7 PG (ref 27–31.3)
MCHC RBC AUTO-ENTMCNC: 32.9 % (ref 33–37)
MCV RBC AUTO: 90.1 FL (ref 80–100)
MICROALBUMIN UR-MCNC: 2.2 MG/DL
MICROALBUMIN/CREAT UR-RTO: 16.9 MG/G (ref 0–30)
MONOCYTES ABSOLUTE: 0.8 K/UL (ref 0.2–0.8)
MONOCYTES RELATIVE PERCENT: 9.6 %
MYELOCYTE PERCENT: 2 %
NEUTROPHILS ABSOLUTE: 5.6 K/UL (ref 1.4–6.5)
NEUTROPHILS RELATIVE PERCENT: 72 %
PDW BLD-RTO: 14.2 % (ref 11.5–14.5)
PLATELET # BLD: 163 K/UL (ref 130–400)
PLATELET SLIDE REVIEW: ADEQUATE
POTASSIUM SERPL-SCNC: 4.4 MEQ/L (ref 3.4–4.9)
PROSTATE SPECIFIC ANTIGEN: 5.53 NG/ML (ref 0–6.22)
RBC # BLD: 5.32 M/UL (ref 4.7–6.1)
SLIDE REVIEW: ABNORMAL
SODIUM BLD-SCNC: 138 MEQ/L (ref 135–144)
TOTAL PROTEIN: 7.1 G/DL (ref 6.3–8)
TRIGL SERPL-MCNC: 184 MG/DL (ref 0–150)
WBC # BLD: 7.6 K/UL (ref 4.8–10.8)

## 2019-02-12 PROCEDURE — 85025 COMPLETE CBC W/AUTO DIFF WBC: CPT

## 2019-02-12 PROCEDURE — 82043 UR ALBUMIN QUANTITATIVE: CPT

## 2019-02-12 PROCEDURE — 82570 ASSAY OF URINE CREATININE: CPT

## 2019-02-12 PROCEDURE — 83036 HEMOGLOBIN GLYCOSYLATED A1C: CPT

## 2019-02-12 PROCEDURE — 84153 ASSAY OF PSA TOTAL: CPT

## 2019-02-12 PROCEDURE — 80053 COMPREHEN METABOLIC PANEL: CPT

## 2019-02-12 PROCEDURE — 36415 COLL VENOUS BLD VENIPUNCTURE: CPT

## 2019-02-12 PROCEDURE — 80061 LIPID PANEL: CPT

## 2019-02-13 ENCOUNTER — OFFICE VISIT (OUTPATIENT)
Dept: PULMONOLOGY | Age: 79
End: 2019-02-13
Payer: MEDICARE

## 2019-02-13 VITALS
RESPIRATION RATE: 16 BRPM | TEMPERATURE: 98 F | OXYGEN SATURATION: 98 % | DIASTOLIC BLOOD PRESSURE: 80 MMHG | HEART RATE: 62 BPM | BODY MASS INDEX: 29.54 KG/M2 | HEIGHT: 71 IN | WEIGHT: 211 LBS | SYSTOLIC BLOOD PRESSURE: 142 MMHG

## 2019-02-13 DIAGNOSIS — G47.33 OBSTRUCTIVE SLEEP APNEA: ICD-10-CM

## 2019-02-13 DIAGNOSIS — J96.11 CHRONIC RESPIRATORY FAILURE WITH HYPOXIA AND HYPERCAPNIA (HCC): ICD-10-CM

## 2019-02-13 DIAGNOSIS — J96.12 CHRONIC RESPIRATORY FAILURE WITH HYPOXIA AND HYPERCAPNIA (HCC): ICD-10-CM

## 2019-02-13 DIAGNOSIS — I27.20 PULMONARY HTN (HCC): ICD-10-CM

## 2019-02-13 DIAGNOSIS — J44.9 CHRONIC OBSTRUCTIVE PULMONARY DISEASE, UNSPECIFIED COPD TYPE (HCC): Primary | ICD-10-CM

## 2019-02-13 PROCEDURE — G8417 CALC BMI ABV UP PARAM F/U: HCPCS | Performed by: INTERNAL MEDICINE

## 2019-02-13 PROCEDURE — 1101F PT FALLS ASSESS-DOCD LE1/YR: CPT | Performed by: INTERNAL MEDICINE

## 2019-02-13 PROCEDURE — 1123F ACP DISCUSS/DSCN MKR DOCD: CPT | Performed by: INTERNAL MEDICINE

## 2019-02-13 PROCEDURE — 99214 OFFICE O/P EST MOD 30 MIN: CPT | Performed by: INTERNAL MEDICINE

## 2019-02-13 PROCEDURE — 1036F TOBACCO NON-USER: CPT | Performed by: INTERNAL MEDICINE

## 2019-02-13 PROCEDURE — 4040F PNEUMOC VAC/ADMIN/RCVD: CPT | Performed by: INTERNAL MEDICINE

## 2019-02-13 PROCEDURE — G8598 ASA/ANTIPLAT THER USED: HCPCS | Performed by: INTERNAL MEDICINE

## 2019-02-13 PROCEDURE — G8926 SPIRO NO PERF OR DOC: HCPCS | Performed by: INTERNAL MEDICINE

## 2019-02-13 PROCEDURE — G8427 DOCREV CUR MEDS BY ELIG CLIN: HCPCS | Performed by: INTERNAL MEDICINE

## 2019-02-13 PROCEDURE — G8482 FLU IMMUNIZE ORDER/ADMIN: HCPCS | Performed by: INTERNAL MEDICINE

## 2019-02-13 PROCEDURE — 3023F SPIROM DOC REV: CPT | Performed by: INTERNAL MEDICINE

## 2019-02-13 ASSESSMENT — ENCOUNTER SYMPTOMS
SORE THROAT: 0
SHORTNESS OF BREATH: 1
NAUSEA: 0
WHEEZING: 1
DIARRHEA: 0
COUGH: 1
CHEST TIGHTNESS: 0
SINUS PRESSURE: 0
RHINORRHEA: 0
ABDOMINAL PAIN: 0
VOMITING: 0

## 2019-02-15 ENCOUNTER — OFFICE VISIT (OUTPATIENT)
Dept: FAMILY MEDICINE CLINIC | Age: 79
End: 2019-02-15
Payer: MEDICARE

## 2019-02-15 ENCOUNTER — HOSPITAL ENCOUNTER (OUTPATIENT)
Age: 79
Setting detail: SPECIMEN
Discharge: HOME | End: 2019-02-15
Payer: MEDICARE

## 2019-02-15 VITALS
BODY MASS INDEX: 29.82 KG/M2 | HEIGHT: 71 IN | WEIGHT: 213 LBS | OXYGEN SATURATION: 95 % | TEMPERATURE: 97.9 F | DIASTOLIC BLOOD PRESSURE: 70 MMHG | SYSTOLIC BLOOD PRESSURE: 130 MMHG | HEART RATE: 69 BPM | RESPIRATION RATE: 16 BRPM

## 2019-02-15 DIAGNOSIS — Z79.899 HIGH RISK MEDICATION USE: ICD-10-CM

## 2019-02-15 DIAGNOSIS — E78.2 MIXED HYPERLIPIDEMIA: ICD-10-CM

## 2019-02-15 DIAGNOSIS — F41.9 ANXIETY: ICD-10-CM

## 2019-02-15 DIAGNOSIS — R14.0 ABDOMINAL BLOATING: ICD-10-CM

## 2019-02-15 DIAGNOSIS — E11.59 TYPE 2 DIABETES MELLITUS WITH OTHER CIRCULATORY COMPLICATION, WITHOUT LONG-TERM CURRENT USE OF INSULIN (HCC): Primary | ICD-10-CM

## 2019-02-15 DIAGNOSIS — R97.20 ELEVATED PSA: ICD-10-CM

## 2019-02-15 DIAGNOSIS — I25.10 CORONARY ARTERY DISEASE INVOLVING NATIVE CORONARY ARTERY OF NATIVE HEART WITHOUT ANGINA PECTORIS: ICD-10-CM

## 2019-02-15 DIAGNOSIS — I10 ESSENTIAL HYPERTENSION: ICD-10-CM

## 2019-02-15 DIAGNOSIS — G47.00 INSOMNIA, UNSPECIFIED TYPE: ICD-10-CM

## 2019-02-15 DIAGNOSIS — J44.9 CHRONIC OBSTRUCTIVE PULMONARY DISEASE, UNSPECIFIED COPD TYPE (HCC): ICD-10-CM

## 2019-02-15 DIAGNOSIS — I48.0 PAROXYSMAL ATRIAL FIBRILLATION (HCC): ICD-10-CM

## 2019-02-15 DIAGNOSIS — R10.31 RIGHT LOWER QUADRANT ABDOMINAL PAIN: ICD-10-CM

## 2019-02-15 PROCEDURE — G8427 DOCREV CUR MEDS BY ELIG CLIN: HCPCS | Performed by: NURSE PRACTITIONER

## 2019-02-15 PROCEDURE — G8598 ASA/ANTIPLAT THER USED: HCPCS | Performed by: NURSE PRACTITIONER

## 2019-02-15 PROCEDURE — G8482 FLU IMMUNIZE ORDER/ADMIN: HCPCS | Performed by: NURSE PRACTITIONER

## 2019-02-15 PROCEDURE — 99214 OFFICE O/P EST MOD 30 MIN: CPT | Performed by: NURSE PRACTITIONER

## 2019-02-15 PROCEDURE — G8926 SPIRO NO PERF OR DOC: HCPCS | Performed by: NURSE PRACTITIONER

## 2019-02-15 PROCEDURE — 4040F PNEUMOC VAC/ADMIN/RCVD: CPT | Performed by: NURSE PRACTITIONER

## 2019-02-15 PROCEDURE — G8510 SCR DEP NEG, NO PLAN REQD: HCPCS | Performed by: NURSE PRACTITIONER

## 2019-02-15 PROCEDURE — 1101F PT FALLS ASSESS-DOCD LE1/YR: CPT | Performed by: NURSE PRACTITIONER

## 2019-02-15 PROCEDURE — G8417 CALC BMI ABV UP PARAM F/U: HCPCS | Performed by: NURSE PRACTITIONER

## 2019-02-15 PROCEDURE — 1036F TOBACCO NON-USER: CPT | Performed by: NURSE PRACTITIONER

## 2019-02-15 PROCEDURE — 3023F SPIROM DOC REV: CPT | Performed by: NURSE PRACTITIONER

## 2019-02-15 PROCEDURE — 80307 DRUG TEST PRSMV CHEM ANLYZR: CPT

## 2019-02-15 PROCEDURE — 1123F ACP DISCUSS/DSCN MKR DOCD: CPT | Performed by: NURSE PRACTITIONER

## 2019-02-15 RX ORDER — TEMAZEPAM 15 MG/1
CAPSULE ORAL
Qty: 60 CAPSULE | Refills: 2 | Status: CANCELLED | OUTPATIENT
Start: 2019-02-15 | End: 2019-05-15

## 2019-02-15 RX ORDER — DIAZEPAM 2 MG/1
2 TABLET ORAL SEE ADMIN INSTRUCTIONS
Qty: 2 TABLET | Refills: 0 | Status: SHIPPED | OUTPATIENT
Start: 2019-02-15 | End: 2019-02-15 | Stop reason: SDUPTHER

## 2019-02-15 RX ORDER — TEMAZEPAM 15 MG/1
CAPSULE ORAL
Qty: 60 CAPSULE | Refills: 2 | Status: SHIPPED | OUTPATIENT
Start: 2019-02-15 | End: 2019-05-15

## 2019-02-15 RX ORDER — DIAZEPAM 2 MG/1
2 TABLET ORAL SEE ADMIN INSTRUCTIONS
Qty: 2 TABLET | Refills: 0 | Status: SHIPPED | OUTPATIENT
Start: 2019-02-15 | End: 2019-02-16

## 2019-02-15 RX ORDER — METOPROLOL SUCCINATE 25 MG/1
25 TABLET, EXTENDED RELEASE ORAL EVERY MORNING
COMMUNITY

## 2019-02-15 RX ORDER — TEMAZEPAM 15 MG/1
CAPSULE ORAL
Qty: 60 CAPSULE | Refills: 2 | Status: SHIPPED | OUTPATIENT
Start: 2019-02-15 | End: 2019-02-15 | Stop reason: SDUPTHER

## 2019-02-15 RX ORDER — METOPROLOL SUCCINATE 50 MG/1
50 TABLET, EXTENDED RELEASE ORAL EVERY EVENING
COMMUNITY

## 2019-02-15 ASSESSMENT — PATIENT HEALTH QUESTIONNAIRE - PHQ9
SUM OF ALL RESPONSES TO PHQ QUESTIONS 1-9: 0
SUM OF ALL RESPONSES TO PHQ QUESTIONS 1-9: 0
1. LITTLE INTEREST OR PLEASURE IN DOING THINGS: 0
SUM OF ALL RESPONSES TO PHQ9 QUESTIONS 1 & 2: 0
2. FEELING DOWN, DEPRESSED OR HOPELESS: 0

## 2019-02-16 ENCOUNTER — CARE COORDINATION (OUTPATIENT)
Dept: CASE MANAGEMENT | Age: 79
End: 2019-02-16

## 2019-02-18 LAB
6-ACETYLMORPHINE: NOT DETECTED
7-AMINOCLONAZEPAM: NOT DETECTED
ALPHA-OH-ALPRAZOLAM: NOT DETECTED
ALPRAZOLAM: NOT DETECTED
AMPHETAMINE: NOT DETECTED
BARBITURATES: NOT DETECTED
BENZOYLECGONINE: NOT DETECTED
BUPRENORPHINE: NOT DETECTED
CARISOPRODOL: NOT DETECTED
CLONAZEPAM: NOT DETECTED
CODEINE: NOT DETECTED
CREATININE URINE: 110.8 MG/DL (ref 20–400)
DIAZEPAM: NOT DETECTED
EER PAIN MGT DRUG PANEL, HIGH RES/EMIT U: NORMAL
ETHYL GLUCURONIDE: NOT DETECTED
FENTANYL: NOT DETECTED
HYDROCODONE: NOT DETECTED
HYDROMORPHONE: NOT DETECTED
LORAZEPAM: NOT DETECTED
MARIJUANA METABOLITE: NOT DETECTED
MDA: NOT DETECTED
MDEA: NOT DETECTED
MDMA URINE: NOT DETECTED
MEPERIDINE: NOT DETECTED
METHADONE: NOT DETECTED
METHAMPHETAMINE: NOT DETECTED
METHYLPHENIDATE: NOT DETECTED
MIDAZOLAM: NOT DETECTED
MORPHINE: NOT DETECTED
NORBUPRENORPHINE, FREE: NOT DETECTED
NORDIAZEPAM: NOT DETECTED
NORFENTANYL: NOT DETECTED
NORHYDROCODONE, URINE: NOT DETECTED
NOROXYCODONE: NOT DETECTED
NOROXYMORPHONE, URINE: NOT DETECTED
OXAZEPAM: PRESENT
OXYCODONE: NOT DETECTED
OXYMORPHONE: NOT DETECTED
PAIN MANAGEMENT DRUG PANEL: NORMAL
PCP: NOT DETECTED
PHENTERMINE: NOT DETECTED
PROPOXYPHENE: NOT DETECTED
TAPENTADOL, URINE: NOT DETECTED
TAPENTADOL-O-SULFATE, URINE: NOT DETECTED
TEMAZEPAM: PRESENT
TRAMADOL: NOT DETECTED
ZOLPIDEM: NOT DETECTED

## 2019-02-20 ENCOUNTER — HOSPITAL ENCOUNTER (OUTPATIENT)
Dept: CT IMAGING | Age: 79
Discharge: HOME | End: 2019-02-22
Payer: MEDICARE

## 2019-02-20 DIAGNOSIS — R10.31 RIGHT LOWER QUADRANT ABDOMINAL PAIN: ICD-10-CM

## 2019-02-20 PROCEDURE — 6360000004 HC RX CONTRAST MEDICATION: Performed by: NURSE PRACTITIONER

## 2019-02-20 PROCEDURE — 74177 CT ABD & PELVIS W/CONTRAST: CPT

## 2019-02-20 PROCEDURE — 2500000003 HC RX 250 WO HCPCS: Performed by: NURSE PRACTITIONER

## 2019-02-20 RX ADMIN — BARIUM SULFATE 450 ML: 20 SUSPENSION ORAL at 07:11

## 2019-02-20 RX ADMIN — IOPAMIDOL 100 ML: 755 INJECTION, SOLUTION INTRAVENOUS at 08:20

## 2019-02-23 ENCOUNTER — CARE COORDINATION (OUTPATIENT)
Dept: CARE COORDINATION | Age: 79
End: 2019-02-23

## 2019-02-25 ENCOUNTER — TELEPHONE (OUTPATIENT)
Dept: FAMILY MEDICINE CLINIC | Age: 79
End: 2019-02-25

## 2019-02-25 DIAGNOSIS — N40.0 ENLARGED PROSTATE: Primary | ICD-10-CM

## 2019-02-28 ENCOUNTER — CARE COORDINATION (OUTPATIENT)
Dept: CASE MANAGEMENT | Age: 79
End: 2019-02-28

## 2019-03-01 ENCOUNTER — CARE COORDINATION (OUTPATIENT)
Dept: CASE MANAGEMENT | Age: 79
End: 2019-03-01

## 2019-03-04 ENCOUNTER — HOSPITAL ENCOUNTER (OUTPATIENT)
Dept: ULTRASOUND IMAGING | Age: 79
Discharge: HOME | End: 2019-03-06
Payer: MEDICARE

## 2019-03-04 DIAGNOSIS — N40.0 ENLARGED PROSTATE: ICD-10-CM

## 2019-03-04 PROCEDURE — 76872 US TRANSRECTAL: CPT

## 2019-03-15 ENCOUNTER — CARE COORDINATION (OUTPATIENT)
Dept: CASE MANAGEMENT | Age: 79
End: 2019-03-15

## 2019-04-04 ENCOUNTER — HOSPITAL ENCOUNTER (OUTPATIENT)
Dept: PULMONOLOGY | Age: 79
Setting detail: THERAPIES SERIES
Discharge: HOME | End: 2019-04-04
Payer: MEDICARE

## 2019-04-04 PROCEDURE — G0424 PULMONARY REHAB W EXER: HCPCS

## 2019-04-09 ENCOUNTER — HOSPITAL ENCOUNTER (OUTPATIENT)
Dept: PULMONOLOGY | Age: 79
Setting detail: THERAPIES SERIES
Discharge: HOME | End: 2019-04-09
Payer: MEDICARE

## 2019-04-09 PROCEDURE — G0424 PULMONARY REHAB W EXER: HCPCS

## 2019-04-11 ENCOUNTER — HOSPITAL ENCOUNTER (OUTPATIENT)
Dept: PULMONOLOGY | Age: 79
Setting detail: THERAPIES SERIES
Discharge: HOME | End: 2019-04-11
Payer: MEDICARE

## 2019-04-11 PROCEDURE — G0424 PULMONARY REHAB W EXER: HCPCS

## 2019-04-16 ENCOUNTER — HOSPITAL ENCOUNTER (OUTPATIENT)
Dept: PULMONOLOGY | Age: 79
Setting detail: THERAPIES SERIES
Discharge: HOME | End: 2019-04-16
Payer: MEDICARE

## 2019-04-16 PROCEDURE — G0424 PULMONARY REHAB W EXER: HCPCS

## 2019-04-17 ENCOUNTER — OFFICE VISIT (OUTPATIENT)
Dept: GASTROENTEROLOGY | Age: 79
End: 2019-04-17
Payer: MEDICARE

## 2019-04-17 VITALS — RESPIRATION RATE: 16 BRPM | WEIGHT: 215.6 LBS | BODY MASS INDEX: 30.18 KG/M2 | TEMPERATURE: 96.7 F | HEIGHT: 71 IN

## 2019-04-17 DIAGNOSIS — R10.31 RIGHT LOWER QUADRANT ABDOMINAL PAIN: Primary | ICD-10-CM

## 2019-04-17 PROCEDURE — G8598 ASA/ANTIPLAT THER USED: HCPCS | Performed by: SPECIALIST

## 2019-04-17 PROCEDURE — 1036F TOBACCO NON-USER: CPT | Performed by: SPECIALIST

## 2019-04-17 PROCEDURE — 1123F ACP DISCUSS/DSCN MKR DOCD: CPT | Performed by: SPECIALIST

## 2019-04-17 PROCEDURE — G8417 CALC BMI ABV UP PARAM F/U: HCPCS | Performed by: SPECIALIST

## 2019-04-17 PROCEDURE — G8427 DOCREV CUR MEDS BY ELIG CLIN: HCPCS | Performed by: SPECIALIST

## 2019-04-17 PROCEDURE — 4040F PNEUMOC VAC/ADMIN/RCVD: CPT | Performed by: SPECIALIST

## 2019-04-17 PROCEDURE — 99203 OFFICE O/P NEW LOW 30 MIN: CPT | Performed by: SPECIALIST

## 2019-04-17 ASSESSMENT — ENCOUNTER SYMPTOMS
VOMITING: 0
SHORTNESS OF BREATH: 1
ABDOMINAL PAIN: 1
RECTAL PAIN: 0
ANAL BLEEDING: 0
ABDOMINAL DISTENTION: 0
CONSTIPATION: 0
BLOOD IN STOOL: 0
NAUSEA: 0
EYES NEGATIVE: 1
DIARRHEA: 0

## 2019-04-17 NOTE — PROGRESS NOTES
Gastroenterology Clinic Visit    Jodee Roman  [id number]  Chief Complaint   Patient presents with    Follow-up       HPI: 77 y.o. male presents to the clinic with h/o vague discomfort in the RLQ area since last few months,at times intensity of pain varies, feels bloated,no nausea or emesis,pain has no relation to meals or BM, pt describes as persistent,vague discomfort. No h/o fever or chills. CT abdomen done in 2/19 showe stable adrenal   Nodule and enlarged Prostate. PT had large colon polyp removed in 8/2015 . No h/o lactose intolerance. Review of Systems   Constitutional: Negative. HENT: Negative. Eyes: Negative. Respiratory: Positive for shortness of breath. Gastrointestinal: Positive for abdominal pain. Negative for abdominal distention, anal bleeding, blood in stool, constipation, diarrhea, nausea, rectal pain and vomiting. Genitourinary: Positive for difficulty urinating and frequency. Musculoskeletal: Negative. Neurological: Negative. Psychiatric/Behavioral: Negative.          Past Medical History:   Diagnosis Date    Adrenal adenoma     CT 6/2011 stable    Anxiety     Asbestosis(501)     CAD (coronary artery disease)     status post stent 2001    Cancer ([facility] [address]     basil cell carnoma    COPD (chronic obstructive pulmonary disease) (HCC)     Elbow injury     left    Erectile dysfunction     Granulomatous lung disease (HCC)     Herpes zoster     Hyperlipidemia     Hypertension     Insomnia     Interstitial fibrosis     Obstructive sleep apnea on CPAP 11/13/2017    Prostate nodule     Scarlet fever     Urine frequency       Past Surgical History:   Procedure Laterality Date    CARDIAC CATHETERIZATION      CARDIOVASCULAR STRESS TEST      1/2010 normal per patient    COLONOSCOPY      5/2009 tubular adenomas    COLONOSCOPY  08/13/15    Oral Vaughn MD    EYE SURGERY  02/10/14    DR Jacobson Serum  RT [facility]    HERNIA REPAIR      LEG SURGERY       Current Outpatient Medications on File Prior to Visit   Medication Sig Dispense Refill    metoprolol succinate (TOPROL XL) 25 MG extended release tablet Take 25 mg by mouth every morning      metoprolol succinate (TOPROL XL) 50 MG extended release tablet Take 50 mg by mouth every evening      temazepam (RESTORIL) 15 MG capsule TAKE  2 CAPSULES BY MOUTH at night time AS NEEDED for sleep. 60 capsule 2    rosuvastatin (CRESTOR) 40 MG tablet TAKE 1 TABLET DAILY 90 tablet 1    SYMBICORT 80-4.5 MCG/ACT AERO USE 2 INHALATIONS TWICE A DAY 30.6 g 2    aspirin 81 MG tablet Take 81 mg by mouth daily      albuterol-ipratropium (COMBIVENT RESPIMAT)  MCG/ACT AERS inhaler Inhale 1 puff into the lungs every 6 hours 2 Inhaler 0    diltiazem (TIAZAC) 240 MG extended release capsule TAKE 1 CAPSULE BY MOUTH DAILY AS DIRECTED  3    CARTIA  MG extended release capsule Take 120 capsules by mouth every evening   3    OXYGEN Please provide patient with a portable oxygen concentrator 1 Units 0    dofetilide (TIKOSYN) 250 MCG capsule Take 250 mcg by mouth 2 times daily      doxycycline hyclate (VIBRAMYCIN) 100 MG capsule Take 1 capsule by mouth 2 times daily 20 capsule 0    Magnesium 500 MG TABS Take by mouth 2 times daily      Potassium 99 MG TABS Take by mouth daily      GLUTATHIONE PO Take by mouth      metoprolol tartrate (LOPRESSOR) 25 MG tablet Take 25 mg by mouth as needed      tadalafil (CIALIS) 5 MG tablet Take 1 tablet by mouth as needed for Erectile Dysfunction (maximum 5 mg per day) 30 tablet 3    Handicap Placard MISC by Does not apply route Good for 5 Years from 06/16/2016 1 each 0    digoxin (LANOXIN) 250 MCG tablet   11    ELIQUIS 5 MG TABS tablet   2    PROAIR  (90 BASE) MCG/ACT inhaler       CINNAMON PO Take  by mouth.  SAW PALMETTO by Does not apply route.  ipratropium-albuterol (DUONEB) 0.5-2.5 (3) MG/3ML SOLN nebulizer solution Take 3 mLs by nebulization every 6 hours as needed for Shortness of Breath. 100 vial 2    Glucosamine-Chondroitin 500-400 MG CAPS Take 1 capsule by mouth daily.  Ascorbic Acid (VITAMIN C) 500 MG tablet Take 1,000 mg by mouth daily. 1/2 tab in AM and 1/2 tab in PM       vitamin D (CHOLECALCIFEROL) 400 UNIT TABS tablet Take 800 Units by mouth daily.  vitamin B-12 (CYANOCOBALAMIN) 1000 MCG tablet Take 1,000 mcg by mouth daily.  Selenium 200 MCG TABS Take 200 mcg by mouth daily.  Thiamine HCl (VITAMIN B-1) 100 MG tablet Take 100 mg by mouth daily.  folic acid (FOLVITE) 780 MCG tablet Take 800 mcg by mouth daily.  Zinc 50 MG CAPS Take 50 mg by mouth daily.  beta carotene 34589 UNIT capsule Take 25,000 Units by mouth daily.  DHEA 25 MG TABS Take 25 mg by mouth daily.  Pyridoxine HCl (VITAMIN B-6) 50 MG tablet Take 100 mg by mouth daily.  Calcium Carb-Cholecalciferol (CALCIUM 1000 + D) 1000-800 MG-UNIT TABS Take 1 tablet by mouth 2 times daily.  Methylsulfonylmethane (MSM) 1000 MG TABS Take 1,000 mg by mouth daily.  vitamin E 400 UNIT capsule Take 400 Units by mouth daily. No current facility-administered medications on file prior to visit.       Family History   Problem Relation Age of Onset    Cancer Other         colon cancer in multiple family members and breast cancer    Heart Disease Other       Social History     Socioeconomic History    Marital status:      Spouse name: None    Number of children: None    Years of education: None    Highest education level: None   Occupational History    None   Social Needs    Financial resource strain: None    Food insecurity:     Worry: None     Inability: None    Transportation needs:     Medical: None     Non-medical: None   Tobacco Use    Smoking status: Former Smoker     Packs/day: 2.00     Years: 40.00     Pack years: 80.00     Last attempt to quit: 2002     Years since quittin.7    Smokeless tobacco: Never Used   Substance and Sexual Activity    Alcohol use: No    Drug use: No    Sexual activity: Yes     Partners: Female   Lifestyle    Physical activity:     Days per week: None     Minutes per session: None    Stress: None   Relationships    Social connections:     Talks on phone: None     Gets together: None     Attends Spiritism service: None     Active member of club or organization: None     Attends meetings of clubs or organizations: None     Relationship status: None    Intimate partner violence:     Fear of current or ex partner: None     Emotionally abused: None     Physically abused: None     Forced sexual activity: None   Other Topics Concern    None   Social History Narrative    None       Temperature 96.7 °F (35.9 °C), resp. rate 16, height 5' 11\" (1.803 m), weight 215 lb 9.6 oz (97.8 kg). Physical Exam   Constitutional: He appears well-developed and well-nourished. HENT:   Head: Normocephalic. Mouth/Throat: Oropharynx is clear and moist.   Eyes: Pupils are equal, round, and reactive to light. Cardiovascular: Normal rate, regular rhythm, normal heart sounds and intact distal pulses. Pulmonary/Chest: Breath sounds normal. Stridor: obeese abdomen. He is in respiratory distress. Abdominal: Soft. Bowel sounds are normal.   Neurological: He is alert. Skin: Skin is warm and dry. Psychiatric: He has a normal mood and affect. Laboratory, Pathology, Radiology reviewed indetail with relevant important investigations summarized below:  Lab Results   Component Value Date    WBC 7.6 02/12/2019    HGB 15.8 02/12/2019    HCT 48.0 02/12/2019    MCV 90.1 02/12/2019     02/12/2019     Lab Results   Component Value Date    ALT 30 02/12/2019    AST 30 02/12/2019    ALKPHOS 69 02/12/2019    BILITOT 1.0 (H) 02/12/2019       No results found.     Endoscopic investigations:     Assessmentand Plan:  77 y.o. male with vague abdominal pain CT showed no significant finding ,findings are not suggestive of anything organic

## 2019-04-18 ENCOUNTER — HOSPITAL ENCOUNTER (OUTPATIENT)
Dept: PULMONOLOGY | Age: 79
Setting detail: THERAPIES SERIES
Discharge: HOME | End: 2019-04-18
Payer: MEDICARE

## 2019-04-18 PROCEDURE — G0424 PULMONARY REHAB W EXER: HCPCS

## 2019-04-23 ENCOUNTER — HOSPITAL ENCOUNTER (OUTPATIENT)
Dept: PULMONOLOGY | Age: 79
Setting detail: THERAPIES SERIES
Discharge: HOME | End: 2019-04-23
Payer: MEDICARE

## 2019-04-23 PROCEDURE — G0424 PULMONARY REHAB W EXER: HCPCS

## 2019-04-25 ENCOUNTER — HOSPITAL ENCOUNTER (OUTPATIENT)
Dept: PULMONOLOGY | Age: 79
Setting detail: THERAPIES SERIES
Discharge: HOME | End: 2019-04-25
Payer: MEDICARE

## 2019-04-25 PROCEDURE — G0424 PULMONARY REHAB W EXER: HCPCS

## 2019-04-30 ENCOUNTER — HOSPITAL ENCOUNTER (OUTPATIENT)
Dept: PULMONOLOGY | Age: 79
Setting detail: THERAPIES SERIES
Discharge: HOME | End: 2019-04-30
Payer: MEDICARE

## 2019-04-30 PROCEDURE — G0424 PULMONARY REHAB W EXER: HCPCS

## 2019-05-02 ENCOUNTER — HOSPITAL ENCOUNTER (OUTPATIENT)
Dept: PULMONOLOGY | Age: 79
Setting detail: THERAPIES SERIES
Discharge: HOME | End: 2019-05-02
Payer: MEDICARE

## 2019-05-02 PROCEDURE — G0424 PULMONARY REHAB W EXER: HCPCS

## 2019-05-13 ENCOUNTER — HOSPITAL ENCOUNTER (OUTPATIENT)
Dept: LAB | Age: 79
Discharge: HOME | End: 2019-05-13
Payer: MEDICARE

## 2019-05-13 DIAGNOSIS — E11.59 TYPE 2 DIABETES MELLITUS WITH OTHER CIRCULATORY COMPLICATION, WITHOUT LONG-TERM CURRENT USE OF INSULIN (HCC): ICD-10-CM

## 2019-05-13 LAB
ALBUMIN SERPL-MCNC: 4 G/DL (ref 3.5–4.6)
ALP BLD-CCNC: 72 U/L (ref 35–104)
ALT SERPL-CCNC: 24 U/L (ref 0–41)
ANION GAP SERPL CALCULATED.3IONS-SCNC: 14 MEQ/L (ref 9–15)
AST SERPL-CCNC: 22 U/L (ref 0–40)
BASOPHILS ABSOLUTE: 0 K/UL (ref 0–0.2)
BASOPHILS RELATIVE PERCENT: 0.5 %
BILIRUB SERPL-MCNC: 0.7 MG/DL (ref 0.2–0.7)
BUN BLDV-MCNC: 22 MG/DL (ref 8–23)
CALCIUM SERPL-MCNC: 9.4 MG/DL (ref 8.5–9.9)
CHLORIDE BLD-SCNC: 102 MEQ/L (ref 95–107)
CHOLESTEROL, TOTAL: 129 MG/DL (ref 0–199)
CO2: 28 MEQ/L (ref 20–31)
CREAT SERPL-MCNC: 0.65 MG/DL (ref 0.7–1.2)
CREATININE URINE: 88.8 MG/DL
EOSINOPHILS ABSOLUTE: 0.2 K/UL (ref 0–0.7)
EOSINOPHILS RELATIVE PERCENT: 2.2 %
GFR AFRICAN AMERICAN: >60
GFR NON-AFRICAN AMERICAN: >60
GLOBULIN: 2.8 G/DL (ref 2.3–3.5)
GLUCOSE BLD-MCNC: 188 MG/DL (ref 70–99)
HBA1C MFR BLD: 6.7 % (ref 4.8–5.9)
HCT VFR BLD CALC: 44.7 % (ref 42–52)
HDLC SERPL-MCNC: 38 MG/DL (ref 40–59)
HEMOGLOBIN: 15 G/DL (ref 14–18)
LDL CHOLESTEROL CALCULATED: 60 MG/DL (ref 0–129)
LYMPHOCYTES ABSOLUTE: 1.4 K/UL (ref 1–4.8)
LYMPHOCYTES RELATIVE PERCENT: 18.9 %
MCH RBC QN AUTO: 30.5 PG (ref 27–31.3)
MCHC RBC AUTO-ENTMCNC: 33.5 % (ref 33–37)
MCV RBC AUTO: 91.1 FL (ref 80–100)
MICROALBUMIN UR-MCNC: 2.2 MG/DL
MICROALBUMIN/CREAT UR-RTO: 24.8 MG/G (ref 0–30)
MONOCYTES ABSOLUTE: 0.5 K/UL (ref 0.2–0.8)
MONOCYTES RELATIVE PERCENT: 7.6 %
NEUTROPHILS ABSOLUTE: 5.1 K/UL (ref 1.4–6.5)
NEUTROPHILS RELATIVE PERCENT: 70.8 %
PDW BLD-RTO: 14 % (ref 11.5–14.5)
PLATELET # BLD: 192 K/UL (ref 130–400)
POTASSIUM SERPL-SCNC: 4.8 MEQ/L (ref 3.4–4.9)
RBC # BLD: 4.91 M/UL (ref 4.7–6.1)
SODIUM BLD-SCNC: 144 MEQ/L (ref 135–144)
TOTAL PROTEIN: 6.8 G/DL (ref 6.3–8)
TRIGL SERPL-MCNC: 154 MG/DL (ref 0–150)
WBC # BLD: 7.2 K/UL (ref 4.8–10.8)

## 2019-05-13 PROCEDURE — 80061 LIPID PANEL: CPT

## 2019-05-13 PROCEDURE — 82570 ASSAY OF URINE CREATININE: CPT

## 2019-05-13 PROCEDURE — 83036 HEMOGLOBIN GLYCOSYLATED A1C: CPT

## 2019-05-13 PROCEDURE — 82043 UR ALBUMIN QUANTITATIVE: CPT

## 2019-05-13 PROCEDURE — 85025 COMPLETE CBC W/AUTO DIFF WBC: CPT

## 2019-05-13 PROCEDURE — 36415 COLL VENOUS BLD VENIPUNCTURE: CPT

## 2019-05-13 PROCEDURE — 80053 COMPREHEN METABOLIC PANEL: CPT

## 2019-05-16 ENCOUNTER — OFFICE VISIT (OUTPATIENT)
Dept: FAMILY MEDICINE CLINIC | Age: 79
End: 2019-05-16
Payer: MEDICARE

## 2019-05-16 VITALS
HEIGHT: 71 IN | RESPIRATION RATE: 14 BRPM | SYSTOLIC BLOOD PRESSURE: 114 MMHG | DIASTOLIC BLOOD PRESSURE: 60 MMHG | WEIGHT: 217 LBS | BODY MASS INDEX: 30.38 KG/M2 | HEART RATE: 71 BPM | OXYGEN SATURATION: 94 % | TEMPERATURE: 98.3 F

## 2019-05-16 DIAGNOSIS — I48.0 PAROXYSMAL ATRIAL FIBRILLATION (HCC): ICD-10-CM

## 2019-05-16 DIAGNOSIS — N40.0 BENIGN PROSTATIC HYPERPLASIA, UNSPECIFIED WHETHER LOWER URINARY TRACT SYMPTOMS PRESENT: ICD-10-CM

## 2019-05-16 DIAGNOSIS — J30.89 SEASONAL ALLERGIC RHINITIS DUE TO OTHER ALLERGIC TRIGGER: ICD-10-CM

## 2019-05-16 DIAGNOSIS — G47.33 OSA (OBSTRUCTIVE SLEEP APNEA): ICD-10-CM

## 2019-05-16 DIAGNOSIS — G47.00 INSOMNIA, UNSPECIFIED TYPE: ICD-10-CM

## 2019-05-16 DIAGNOSIS — I10 ESSENTIAL HYPERTENSION: ICD-10-CM

## 2019-05-16 DIAGNOSIS — E78.2 MIXED HYPERLIPIDEMIA: ICD-10-CM

## 2019-05-16 DIAGNOSIS — J44.9 CHRONIC OBSTRUCTIVE PULMONARY DISEASE, UNSPECIFIED COPD TYPE (HCC): ICD-10-CM

## 2019-05-16 DIAGNOSIS — I25.10 CORONARY ARTERY DISEASE INVOLVING NATIVE CORONARY ARTERY OF NATIVE HEART WITHOUT ANGINA PECTORIS: ICD-10-CM

## 2019-05-16 DIAGNOSIS — E11.59 TYPE 2 DIABETES MELLITUS WITH OTHER CIRCULATORY COMPLICATION, WITHOUT LONG-TERM CURRENT USE OF INSULIN (HCC): Primary | ICD-10-CM

## 2019-05-16 PROCEDURE — 99214 OFFICE O/P EST MOD 30 MIN: CPT | Performed by: NURSE PRACTITIONER

## 2019-05-16 PROCEDURE — 3023F SPIROM DOC REV: CPT | Performed by: NURSE PRACTITIONER

## 2019-05-16 PROCEDURE — G8427 DOCREV CUR MEDS BY ELIG CLIN: HCPCS | Performed by: NURSE PRACTITIONER

## 2019-05-16 PROCEDURE — G8417 CALC BMI ABV UP PARAM F/U: HCPCS | Performed by: NURSE PRACTITIONER

## 2019-05-16 PROCEDURE — G8598 ASA/ANTIPLAT THER USED: HCPCS | Performed by: NURSE PRACTITIONER

## 2019-05-16 PROCEDURE — G8510 SCR DEP NEG, NO PLAN REQD: HCPCS | Performed by: NURSE PRACTITIONER

## 2019-05-16 PROCEDURE — 4040F PNEUMOC VAC/ADMIN/RCVD: CPT | Performed by: NURSE PRACTITIONER

## 2019-05-16 PROCEDURE — 1036F TOBACCO NON-USER: CPT | Performed by: NURSE PRACTITIONER

## 2019-05-16 PROCEDURE — 3288F FALL RISK ASSESSMENT DOCD: CPT | Performed by: NURSE PRACTITIONER

## 2019-05-16 PROCEDURE — 1123F ACP DISCUSS/DSCN MKR DOCD: CPT | Performed by: NURSE PRACTITIONER

## 2019-05-16 PROCEDURE — G8926 SPIRO NO PERF OR DOC: HCPCS | Performed by: NURSE PRACTITIONER

## 2019-05-16 RX ORDER — TEMAZEPAM 15 MG/1
30 CAPSULE ORAL NIGHTLY
Qty: 60 CAPSULE | Refills: 2 | Status: SHIPPED | OUTPATIENT
Start: 2019-05-16 | End: 2019-08-13 | Stop reason: SDUPTHER

## 2019-05-16 RX ORDER — TEMAZEPAM 15 MG/1
15 CAPSULE ORAL
COMMUNITY
End: 2019-05-16 | Stop reason: SDUPTHER

## 2019-05-16 RX ORDER — FEXOFENADINE HCL 180 MG/1
180 TABLET ORAL DAILY
Qty: 90 TABLET | Refills: 2 | Status: SHIPPED | OUTPATIENT
Start: 2019-05-16 | End: 2020-10-01 | Stop reason: SDUPTHER

## 2019-05-16 ASSESSMENT — PATIENT HEALTH QUESTIONNAIRE - PHQ9
SUM OF ALL RESPONSES TO PHQ QUESTIONS 1-9: 0
1. LITTLE INTEREST OR PLEASURE IN DOING THINGS: 0
SUM OF ALL RESPONSES TO PHQ QUESTIONS 1-9: 0
2. FEELING DOWN, DEPRESSED OR HOPELESS: 0
SUM OF ALL RESPONSES TO PHQ9 QUESTIONS 1 & 2: 0

## 2019-05-16 NOTE — PROGRESS NOTES
Subjective:     Diabetes Mellitus Type 2: Current symptoms/problems include none. Home blood sugar records:  patient does not test  Any episodes of hypoglycemia? no  Tobacco history: He  reports that he quit smoking about 16 years ago. He has a 80.00 pack-year smoking history. He has never used smokeless tobacco.   Known diabetic complications: cardiovascular disease    Hypertension:  Home blood pressure monitoring: No.  He is adherent to a low sodium diet. Antihypertensive medication side effects: no medication side effects noted. Use of agents associated with hypertension: none. Hyperlipidemia:  No new myalgias or GI upset on rosuvastatin (Crestor). Atrial fibrillation/coronary artery disease: He continues to follow with cardiology and electrophysiology [facility]. Has an appointment scheduled this month. No recent changes to medication. No recent symptoms of unstable angina or of a rapid heart rate. COPD/sleep apnea/ insomnia/allergies: States that he continues to follow with pulmonology and has an appointment scheduled later this month. Has been sleeping well at night with the help of trilogy and Restoril taken at bedtime typically in 2 separate doses. He states that he has had some increase in sputum that is light yellow in color. He does not feel that he has an infection but he has had worsening allergy symptoms over recent weeks. His significant other has advised him to start taking antihistamine routinely but he has not done so yet wanted to talk to me first.  Has been participating in pulmonary rehab but has missed last 2 weeks secondary to increased sputum that he feels is from postnasal drip. He wears oxygen continuously.     The five diabetic measures for control:   Hemoglobin A1C (%)   Date Value   05/13/2019 6.7 (H)     LDL Calculated (mg/dL)   Date Value   05/13/2019 60         Blood pressure less than 131/81,   BP Readings from Last 1 Encounters:   05/16/19 114/60     Smoking, non smoker is goal. This pt is not a smoker. This pt does an aspirin a day. Last eye exam was 2018  Last diabetic foot exam was 2019  Last urine microalbumin creatinine ratio was 2019    PMH: This 77 y.o. male  patient is  hypertensive, is  diabetic, is  hyperlipidemic. He has a history of smoking and has a  family history of heart disease. He is  obese. Review of Systems  Patient denies chest pain, headache, lightheadedness, blurred vision, peripheral edema and palpitations. Eyes: no rapid change in vision  Ears, nose, mouth, throat, and face: no changes in hearing or sore throat  Respiratory: No shortness of breath or cough. Cardiovascular: no chest pain or pressure. This patient reports no polyuria, polydipsia or episodes of hypoglycemia. Treatment Adherence:   Medication compliance:  compliant most of the time  Diet compliance:  compliant most of the time  Weight trend: stable  Current exercise: pulmonary rehab. What might prevent you from meeting your goal?: impairment:  physical: shortness of breath  Patient plan for overcoming barriers: N/A     Patient Confidence: 8/10      Objective:   EXAM:  Constitutional Blood pressure 114/60, pulse 71, temperature 98.3 °F (36.8 °C), temperature source Temporal, resp. rate 14, height 5' 11\" (1.803 m), weight 217 lb (98.4 kg), SpO2 94 %. .  He has a normal affect, no acute distress, appears well developed and well nourished. Neck:  neck- supple, no mass, non-tender and no bruits  Lungs:  Normal expansion. Clear to auscultation. No rales, rhonchi, or wheezing., No chest wall tenderness. Heart:  Heart sounds are normal.  Regular rate and rhythm without murmur, gallop or rub. Abdomen:  Soft, non-tender, normal bowel sounds. No bruits, organomegaly or masses. Extremities: Extremities warm to touch, pink, with no edema. Assessment:      Diagnosis Orders   1.  Type 2 diabetes mellitus with other circulatory complication, without

## 2019-05-23 ENCOUNTER — OFFICE VISIT (OUTPATIENT)
Dept: PULMONOLOGY | Age: 79
End: 2019-05-23
Payer: MEDICARE

## 2019-05-23 VITALS
OXYGEN SATURATION: 94 % | WEIGHT: 219 LBS | DIASTOLIC BLOOD PRESSURE: 70 MMHG | HEART RATE: 73 BPM | BODY MASS INDEX: 29.66 KG/M2 | SYSTOLIC BLOOD PRESSURE: 160 MMHG | RESPIRATION RATE: 14 BRPM | TEMPERATURE: 97.5 F | HEIGHT: 72 IN

## 2019-05-23 DIAGNOSIS — J96.12 CHRONIC RESPIRATORY FAILURE WITH HYPOXIA AND HYPERCAPNIA (HCC): ICD-10-CM

## 2019-05-23 DIAGNOSIS — I27.20 PULMONARY HTN (HCC): ICD-10-CM

## 2019-05-23 DIAGNOSIS — J44.9 CHRONIC OBSTRUCTIVE PULMONARY DISEASE, UNSPECIFIED COPD TYPE (HCC): Primary | ICD-10-CM

## 2019-05-23 DIAGNOSIS — J96.11 CHRONIC RESPIRATORY FAILURE WITH HYPOXIA AND HYPERCAPNIA (HCC): ICD-10-CM

## 2019-05-23 PROCEDURE — G8427 DOCREV CUR MEDS BY ELIG CLIN: HCPCS | Performed by: INTERNAL MEDICINE

## 2019-05-23 PROCEDURE — 1036F TOBACCO NON-USER: CPT | Performed by: INTERNAL MEDICINE

## 2019-05-23 PROCEDURE — 4040F PNEUMOC VAC/ADMIN/RCVD: CPT | Performed by: INTERNAL MEDICINE

## 2019-05-23 PROCEDURE — G8926 SPIRO NO PERF OR DOC: HCPCS | Performed by: INTERNAL MEDICINE

## 2019-05-23 PROCEDURE — 99214 OFFICE O/P EST MOD 30 MIN: CPT | Performed by: INTERNAL MEDICINE

## 2019-05-23 PROCEDURE — G8417 CALC BMI ABV UP PARAM F/U: HCPCS | Performed by: INTERNAL MEDICINE

## 2019-05-23 PROCEDURE — 3023F SPIROM DOC REV: CPT | Performed by: INTERNAL MEDICINE

## 2019-05-23 PROCEDURE — 1123F ACP DISCUSS/DSCN MKR DOCD: CPT | Performed by: INTERNAL MEDICINE

## 2019-05-23 PROCEDURE — G8598 ASA/ANTIPLAT THER USED: HCPCS | Performed by: INTERNAL MEDICINE

## 2019-05-23 ASSESSMENT — ENCOUNTER SYMPTOMS
SINUS PRESSURE: 0
DIARRHEA: 0
SORE THROAT: 0
NAUSEA: 0
VOMITING: 0
SHORTNESS OF BREATH: 1
CHEST TIGHTNESS: 0
WHEEZING: 1
RHINORRHEA: 0
ABDOMINAL PAIN: 0
COUGH: 0

## 2019-05-23 NOTE — PROGRESS NOTES
Subjective:     Valentino Rode is a 77 y.o. male who complains today of:     Chief Complaint   Patient presents with    Follow-up     3 month f/u COPD       HPI  Patient is here for a follow-up visit regarding severe COPD with severe chronic hypercapnic and hypoxic respiratory failure. Since the last visit patient remains stable consistent in use of noninvasive ventilation at night, and continuous oxygen therapy during the day, he joined rehab and did well for a while but not in the last 2 weeks. He is still exercising on a treadmill himself at home though. He remains dyspneic on exertion but relatively stable with current treatment. Allergies:  Brilinta [ticagrelor]; Sildenafil citrate;  Advair [fluticasone-salmeterol]; and Fluticasone propionate (inhal)  Past Medical History:   Diagnosis Date    Adrenal adenoma     CT 6/2011 stable    Anxiety     Asbestosis(501)     CAD (coronary artery disease)     status post stent 2001    Cancer (HCC)     basil cell carnoma    COPD (chronic obstructive pulmonary disease) (HCC)     Elbow injury     left    Erectile dysfunction     Granulomatous lung disease (HCC)     Herpes zoster     Hyperlipidemia     Hypertension     Insomnia     Interstitial fibrosis     Obstructive sleep apnea on CPAP 11/13/2017    Prostate nodule     Scarlet fever     Urine frequency      Past Surgical History:   Procedure Laterality Date    CARDIAC CATHETERIZATION      CARDIOVASCULAR STRESS TEST      1/2010 normal per patient    COLONOSCOPY      5/2009 tubular adenomas    COLONOSCOPY  08/13/15    David Mcnamara MD    EYE SURGERY  02/10/14    DR Nancie Sneed  RT E[facility]    HERNIA REPAIR      LEG SURGERY       Family History   Problem Relation Age of Onset    Cancer Other         colon cancer in multiple family members and breast cancer    Heart Disease Other      Social History     Socioeconomic History    Marital status:      Spouse name: Not on file    Number of children: Not on file    Years of education: Not on file    Highest education level: Not on file   Occupational History    Not on file   Social Needs    Financial resource strain: Not on file    Food insecurity:     Worry: Not on file     Inability: Not on file    Transportation needs:     Medical: Not on file     Non-medical: Not on file   Tobacco Use    Smoking status: Former Smoker     Packs/day: 2.00     Years: 40.00     Pack years: 80.00     Last attempt to quit: 2002     Years since quittin.8    Smokeless tobacco: Never Used   Substance and Sexual Activity    Alcohol use: No    Drug use: No    Sexual activity: Yes     Partners: Female   Lifestyle    Physical activity:     Days per week: Not on file     Minutes per session: Not on file    Stress: Not on file   Relationships    Social connections:     Talks on phone: Not on file     Gets together: Not on file     Attends Hinduism service: Not on file     Active member of club or organization: Not on file     Attends meetings of clubs or organizations: Not on file     Relationship status: Not on file    Intimate partner violence:     Fear of current or ex partner: Not on file     Emotionally abused: Not on file     Physically abused: Not on file     Forced sexual activity: Not on file   Other Topics Concern    Not on file   Social History Narrative    Not on file         Review of Systems   Constitutional: Positive for fatigue. Negative for chills, diaphoresis and fever. HENT: Negative for congestion, postnasal drip, rhinorrhea, sinus pressure, sneezing and sore throat. Eyes: Negative for visual disturbance. Respiratory: Positive for shortness of breath and wheezing. Negative for cough and chest tightness. Cardiovascular: Positive for leg swelling. Negative for chest pain and palpitations. Gastrointestinal: Negative for abdominal pain, diarrhea, nausea and vomiting. Genitourinary: Negative for difficulty urinating and hematuria. Musculoskeletal: Negative for arthralgias, joint swelling and myalgias. Skin: Negative for rash. Allergic/Immunologic: Negative for environmental allergies. Neurological: Negative for dizziness, tremors, weakness and headaches. Psychiatric/Behavioral: Positive for sleep disturbance. Negative for behavioral problems. Objective:     Vitals:    05/23/19 1052 05/23/19 1055   BP: (!) 160/70 (!) 160/70   Site: Right Upper Arm Right Upper Arm   Position: Sitting Sitting   Cuff Size: Medium Adult Medium Adult   Pulse: 73    Resp: 14    Temp: 97.5 °F (36.4 °C)    TempSrc: Tympanic    SpO2: 94%    Weight: 219 lb (99.3 kg)    Height: 6' (1.829 m)          Physical Exam   Constitutional: He is oriented to person, place, and time. He appears well-developed and well-nourished. HENT:   Head: Normocephalic and atraumatic. Mouth/Throat: Oropharynx is clear and moist.   Eyes: Pupils are equal, round, and reactive to light. EOM are normal.   Neck: Normal range of motion. Neck supple. No JVD present. No tracheal deviation present. No thyromegaly present. Cardiovascular: Normal rate and regular rhythm. Exam reveals no gallop. No murmur heard. Pulmonary/Chest: Effort normal. He has no wheezes. He has no rales. He exhibits no tenderness. Markedly diminished breath sounds otherwise clear to auscultation   Abdominal: He exhibits no distension. Musculoskeletal: Normal range of motion. He exhibits no edema. Neurological: He is alert and oriented to person, place, and time. He has normal reflexes. Skin: Skin is warm and dry. No rash noted. Psychiatric: He has a normal mood and affect. Assessment:      Diagnosis Orders   1. Chronic obstructive pulmonary disease, unspecified COPD type ([address]     2.  Chronic respiratory failure with hypoxia and hypercapnia (HCC)     3. Pulmonary HTN ([address]       Patient is doing fairly well with current treatment, he was encouraged to continue regular exercise, regular use of oxygen all day, noninvasive ventilation at night and with naps as well as with worsening respiratory symptoms during the day, in addition to all aggressive treatment for COPD as before, otherwise I'll see him for follow-up in 3 months      Plan:     No orders of the defined types were placed in this encounter. No orders of the defined types were placed in this encounter. Return in about 3 months (around 8/23/2019) for re-evaluation.        Mitzi Gutiérrez MD

## 2019-05-30 ENCOUNTER — HOSPITAL ENCOUNTER (OUTPATIENT)
Dept: PULMONOLOGY | Age: 79
Setting detail: THERAPIES SERIES
Discharge: HOME | End: 2019-05-30
Payer: MEDICARE

## 2019-05-30 PROCEDURE — G0424 PULMONARY REHAB W EXER: HCPCS

## 2019-06-04 ENCOUNTER — HOSPITAL ENCOUNTER (OUTPATIENT)
Dept: PULMONOLOGY | Age: 79
Setting detail: THERAPIES SERIES
Discharge: HOME | End: 2019-06-04
Payer: MEDICARE

## 2019-06-04 PROCEDURE — G0424 PULMONARY REHAB W EXER: HCPCS

## 2019-06-06 ENCOUNTER — HOSPITAL ENCOUNTER (OUTPATIENT)
Dept: PULMONOLOGY | Age: 79
Setting detail: THERAPIES SERIES
Discharge: HOME | End: 2019-06-06
Payer: MEDICARE

## 2019-06-06 PROCEDURE — G0424 PULMONARY REHAB W EXER: HCPCS

## 2019-06-11 ENCOUNTER — HOSPITAL ENCOUNTER (OUTPATIENT)
Dept: PULMONOLOGY | Age: 79
Setting detail: THERAPIES SERIES
Discharge: HOME | End: 2019-06-11
Payer: MEDICARE

## 2019-06-11 PROCEDURE — G0424 PULMONARY REHAB W EXER: HCPCS

## 2019-06-18 ENCOUNTER — HOSPITAL ENCOUNTER (OUTPATIENT)
Dept: PULMONOLOGY | Age: 79
Setting detail: THERAPIES SERIES
Discharge: HOME | End: 2019-06-18
Payer: MEDICARE

## 2019-06-18 PROCEDURE — G0424 PULMONARY REHAB W EXER: HCPCS

## 2019-06-19 RX ORDER — DILTIAZEM HYDROCHLORIDE 60 MG/1
TABLET, FILM COATED ORAL
Qty: 30.6 G | Refills: 2 | Status: SHIPPED | OUTPATIENT
Start: 2019-06-19 | End: 2020-02-28

## 2019-07-15 RX ORDER — ROSUVASTATIN CALCIUM 40 MG/1
TABLET, COATED ORAL
Qty: 90 TABLET | Refills: 1 | Status: SHIPPED | OUTPATIENT
Start: 2019-07-15 | End: 2020-01-11

## 2019-08-12 ENCOUNTER — TELEPHONE (OUTPATIENT)
Dept: FAMILY MEDICINE CLINIC | Age: 79
End: 2019-08-12

## 2019-08-12 DIAGNOSIS — G47.00 INSOMNIA, UNSPECIFIED TYPE: ICD-10-CM

## 2019-08-13 RX ORDER — TEMAZEPAM 15 MG/1
30 CAPSULE ORAL NIGHTLY
Qty: 60 CAPSULE | Refills: 0 | Status: SHIPPED | OUTPATIENT
Start: 2019-08-13 | End: 2019-08-27 | Stop reason: SDUPTHER

## 2019-08-13 RX ORDER — TEMAZEPAM 15 MG/1
CAPSULE ORAL
Qty: 60 CAPSULE | OUTPATIENT
Start: 2019-08-13

## 2019-08-26 ENCOUNTER — HOSPITAL ENCOUNTER (OUTPATIENT)
Dept: LAB | Age: 79
Discharge: HOME | End: 2019-08-26
Payer: MEDICARE

## 2019-08-26 DIAGNOSIS — I10 ESSENTIAL HYPERTENSION: ICD-10-CM

## 2019-08-26 DIAGNOSIS — E78.2 MIXED HYPERLIPIDEMIA: ICD-10-CM

## 2019-08-26 DIAGNOSIS — E11.59 TYPE 2 DIABETES MELLITUS WITH OTHER CIRCULATORY COMPLICATION, WITHOUT LONG-TERM CURRENT USE OF INSULIN (HCC): ICD-10-CM

## 2019-08-26 DIAGNOSIS — N40.0 BENIGN PROSTATIC HYPERPLASIA, UNSPECIFIED WHETHER LOWER URINARY TRACT SYMPTOMS PRESENT: ICD-10-CM

## 2019-08-26 LAB
ALBUMIN SERPL-MCNC: 4.1 G/DL (ref 3.5–4.6)
ALP BLD-CCNC: 84 U/L (ref 35–104)
ALT SERPL-CCNC: 25 U/L (ref 0–41)
ANION GAP SERPL CALCULATED.3IONS-SCNC: 14 MEQ/L (ref 9–15)
AST SERPL-CCNC: 21 U/L (ref 0–40)
BASOPHILS ABSOLUTE: 0 K/UL (ref 0–0.2)
BASOPHILS RELATIVE PERCENT: 0.5 %
BILIRUB SERPL-MCNC: 0.7 MG/DL (ref 0.2–0.7)
BUN BLDV-MCNC: 20 MG/DL (ref 8–23)
CALCIUM SERPL-MCNC: 9.7 MG/DL (ref 8.5–9.9)
CHLORIDE BLD-SCNC: 99 MEQ/L (ref 95–107)
CHOLESTEROL, TOTAL: 130 MG/DL (ref 0–199)
CO2: 28 MEQ/L (ref 20–31)
CREAT SERPL-MCNC: 0.77 MG/DL (ref 0.7–1.2)
CREATININE URINE: 112.8 MG/DL
EOSINOPHILS ABSOLUTE: 0.2 K/UL (ref 0–0.7)
EOSINOPHILS RELATIVE PERCENT: 2.3 %
GFR AFRICAN AMERICAN: >60
GFR NON-AFRICAN AMERICAN: >60
GLOBULIN: 3.2 G/DL (ref 2.3–3.5)
GLUCOSE BLD-MCNC: 182 MG/DL (ref 70–99)
HBA1C MFR BLD: 6.9 % (ref 4.8–5.9)
HCT VFR BLD CALC: 47.6 % (ref 42–52)
HDLC SERPL-MCNC: 38 MG/DL (ref 40–59)
HEMOGLOBIN: 15.4 G/DL (ref 14–18)
LDL CHOLESTEROL CALCULATED: 56 MG/DL (ref 0–129)
LYMPHOCYTES ABSOLUTE: 1.7 K/UL (ref 1–4.8)
LYMPHOCYTES RELATIVE PERCENT: 21 %
MCH RBC QN AUTO: 29.2 PG (ref 27–31.3)
MCHC RBC AUTO-ENTMCNC: 32.3 % (ref 33–37)
MCV RBC AUTO: 90.3 FL (ref 80–100)
MICROALBUMIN UR-MCNC: 2.5 MG/DL
MICROALBUMIN/CREAT UR-RTO: 22.2 MG/G (ref 0–30)
MONOCYTES ABSOLUTE: 0.6 K/UL (ref 0.2–0.8)
MONOCYTES RELATIVE PERCENT: 7.6 %
NEUTROPHILS ABSOLUTE: 5.4 K/UL (ref 1.4–6.5)
NEUTROPHILS RELATIVE PERCENT: 68.6 %
PDW BLD-RTO: 14.2 % (ref 11.5–14.5)
PLATELET # BLD: 229 K/UL (ref 130–400)
POTASSIUM SERPL-SCNC: 4.7 MEQ/L (ref 3.4–4.9)
PROSTATE SPECIFIC ANTIGEN: 5.88 NG/ML (ref 0–6.22)
RBC # BLD: 5.28 M/UL (ref 4.7–6.1)
SODIUM BLD-SCNC: 141 MEQ/L (ref 135–144)
TOTAL PROTEIN: 7.3 G/DL (ref 6.3–8)
TRIGL SERPL-MCNC: 182 MG/DL (ref 0–150)
WBC # BLD: 7.9 K/UL (ref 4.8–10.8)

## 2019-08-26 PROCEDURE — 83036 HEMOGLOBIN GLYCOSYLATED A1C: CPT

## 2019-08-26 PROCEDURE — 85025 COMPLETE CBC W/AUTO DIFF WBC: CPT

## 2019-08-26 PROCEDURE — 36415 COLL VENOUS BLD VENIPUNCTURE: CPT

## 2019-08-26 PROCEDURE — 82043 UR ALBUMIN QUANTITATIVE: CPT

## 2019-08-26 PROCEDURE — 80061 LIPID PANEL: CPT

## 2019-08-26 PROCEDURE — 80053 COMPREHEN METABOLIC PANEL: CPT

## 2019-08-26 PROCEDURE — 84153 ASSAY OF PSA TOTAL: CPT

## 2019-08-26 PROCEDURE — 82570 ASSAY OF URINE CREATININE: CPT

## 2019-08-27 ENCOUNTER — OFFICE VISIT (OUTPATIENT)
Dept: FAMILY MEDICINE CLINIC | Age: 79
End: 2019-08-27
Payer: MEDICARE

## 2019-08-27 VITALS
SYSTOLIC BLOOD PRESSURE: 128 MMHG | HEART RATE: 68 BPM | WEIGHT: 210.2 LBS | HEIGHT: 72 IN | BODY MASS INDEX: 28.47 KG/M2 | TEMPERATURE: 97.4 F | RESPIRATION RATE: 14 BRPM | DIASTOLIC BLOOD PRESSURE: 68 MMHG | OXYGEN SATURATION: 97 %

## 2019-08-27 DIAGNOSIS — I48.0 PAROXYSMAL ATRIAL FIBRILLATION (HCC): ICD-10-CM

## 2019-08-27 DIAGNOSIS — H10.89 OTHER CONJUNCTIVITIS OF BOTH EYES: ICD-10-CM

## 2019-08-27 DIAGNOSIS — G47.00 INSOMNIA, UNSPECIFIED TYPE: ICD-10-CM

## 2019-08-27 DIAGNOSIS — R97.20 ELEVATED PSA: ICD-10-CM

## 2019-08-27 DIAGNOSIS — E11.59 TYPE 2 DIABETES MELLITUS WITH OTHER CIRCULATORY COMPLICATION, WITHOUT LONG-TERM CURRENT USE OF INSULIN (HCC): Primary | ICD-10-CM

## 2019-08-27 DIAGNOSIS — J44.9 CHRONIC OBSTRUCTIVE PULMONARY DISEASE, UNSPECIFIED COPD TYPE (HCC): ICD-10-CM

## 2019-08-27 DIAGNOSIS — I25.10 CORONARY ARTERY DISEASE INVOLVING NATIVE CORONARY ARTERY OF NATIVE HEART WITHOUT ANGINA PECTORIS: ICD-10-CM

## 2019-08-27 DIAGNOSIS — I10 ESSENTIAL HYPERTENSION: ICD-10-CM

## 2019-08-27 DIAGNOSIS — E78.2 MIXED HYPERLIPIDEMIA: ICD-10-CM

## 2019-08-27 PROCEDURE — 4040F PNEUMOC VAC/ADMIN/RCVD: CPT | Performed by: NURSE PRACTITIONER

## 2019-08-27 PROCEDURE — G8417 CALC BMI ABV UP PARAM F/U: HCPCS | Performed by: NURSE PRACTITIONER

## 2019-08-27 PROCEDURE — G8598 ASA/ANTIPLAT THER USED: HCPCS | Performed by: NURSE PRACTITIONER

## 2019-08-27 PROCEDURE — 1036F TOBACCO NON-USER: CPT | Performed by: NURSE PRACTITIONER

## 2019-08-27 PROCEDURE — 99214 OFFICE O/P EST MOD 30 MIN: CPT | Performed by: NURSE PRACTITIONER

## 2019-08-27 PROCEDURE — G8427 DOCREV CUR MEDS BY ELIG CLIN: HCPCS | Performed by: NURSE PRACTITIONER

## 2019-08-27 PROCEDURE — 1123F ACP DISCUSS/DSCN MKR DOCD: CPT | Performed by: NURSE PRACTITIONER

## 2019-08-27 PROCEDURE — 3023F SPIROM DOC REV: CPT | Performed by: NURSE PRACTITIONER

## 2019-08-27 PROCEDURE — G8926 SPIRO NO PERF OR DOC: HCPCS | Performed by: NURSE PRACTITIONER

## 2019-08-27 RX ORDER — TEMAZEPAM 15 MG/1
30 CAPSULE ORAL NIGHTLY
Qty: 60 CAPSULE | Refills: 2 | Status: SHIPPED | OUTPATIENT
Start: 2019-08-27 | End: 2019-09-26

## 2019-08-27 NOTE — PROGRESS NOTES
that he needs to improve in his diet. 2.  Blood pressure is well controlled on current medication. Continue the same. 3.  Lipid panel is well controlled on statin. No side effects reported. Continue the same. 4.  He recently followed with cardiology at [facility] (Dr. Vergia Nyhan). No recent changes in medication and no signs or symptoms of unstable angina. 5.  Insomnia symptoms have been well managed with Klonopin taken at bedtime with occasional second dose. No side effects reported. Refill given. 6.  Heart rate and rhythm have been well controlled with current medication. No abnormal bruising or bleeding on Eliquis. Continues to follow with Dr. Fiorella Burris. 7.  Breathing has been at baseline. Refill given of Combivent Respimat inhaler. He will be seeing his pulmonologist in 2 days. No signs or symptoms of bacterial infection. 8.  PSA level still in normal range. We will continue to check. No urinary symptoms reported. 9.  Eyedrop antibiotic given for current symptoms. He states that he tolerates prednisone fine with no side effects. Only had issues with a couple of inhalers in the past.  Discussed recommendation for eye drops and proper use; to avoid touching the eye with the dropper of the bottle and putting drops near the lateral side of the lower lid. Discussed need for good hand hygiene and avoidance of sharing washcloths, etc with others. Controlled Substance Monitoring:    Acute and Chronic Pain Monitoring:   RX Monitoring 8/27/2019   Attestation -   Periodic Controlled Substance Monitoring Possible medication side effects, risk of tolerance/dependence & alternative treatments discussed. ;No signs of potential drug abuse or diversion identified. ;Assessed functional status.        Please note this report has been partially produced using speech recognition software and may cause contain errors related to that system including grammar, punctuation and spelling as well as words and phrases that may seem inappropriate. If there are questions or concerns please feel free to contact me to clarify.         Electronically signed by Rishi Sanford, 5:34 PM 8/27/19

## 2019-08-29 ENCOUNTER — OFFICE VISIT (OUTPATIENT)
Dept: PULMONOLOGY | Age: 79
End: 2019-08-29
Payer: MEDICARE

## 2019-08-29 VITALS
BODY MASS INDEX: 28.48 KG/M2 | HEART RATE: 75 BPM | DIASTOLIC BLOOD PRESSURE: 74 MMHG | OXYGEN SATURATION: 96 % | TEMPERATURE: 96.8 F | SYSTOLIC BLOOD PRESSURE: 146 MMHG | HEIGHT: 72 IN | WEIGHT: 210.3 LBS

## 2019-08-29 DIAGNOSIS — J96.11 CHRONIC RESPIRATORY FAILURE WITH HYPOXIA AND HYPERCAPNIA (HCC): ICD-10-CM

## 2019-08-29 DIAGNOSIS — J44.9 CHRONIC OBSTRUCTIVE PULMONARY DISEASE, UNSPECIFIED COPD TYPE (HCC): Primary | ICD-10-CM

## 2019-08-29 DIAGNOSIS — I27.20 PULMONARY HTN (HCC): ICD-10-CM

## 2019-08-29 DIAGNOSIS — J96.12 CHRONIC RESPIRATORY FAILURE WITH HYPOXIA AND HYPERCAPNIA (HCC): ICD-10-CM

## 2019-08-29 PROCEDURE — G8417 CALC BMI ABV UP PARAM F/U: HCPCS | Performed by: INTERNAL MEDICINE

## 2019-08-29 PROCEDURE — 3023F SPIROM DOC REV: CPT | Performed by: INTERNAL MEDICINE

## 2019-08-29 PROCEDURE — 4040F PNEUMOC VAC/ADMIN/RCVD: CPT | Performed by: INTERNAL MEDICINE

## 2019-08-29 PROCEDURE — 99214 OFFICE O/P EST MOD 30 MIN: CPT | Performed by: INTERNAL MEDICINE

## 2019-08-29 PROCEDURE — G8926 SPIRO NO PERF OR DOC: HCPCS | Performed by: INTERNAL MEDICINE

## 2019-08-29 PROCEDURE — 1036F TOBACCO NON-USER: CPT | Performed by: INTERNAL MEDICINE

## 2019-08-29 PROCEDURE — G8427 DOCREV CUR MEDS BY ELIG CLIN: HCPCS | Performed by: INTERNAL MEDICINE

## 2019-08-29 PROCEDURE — G8598 ASA/ANTIPLAT THER USED: HCPCS | Performed by: INTERNAL MEDICINE

## 2019-08-29 PROCEDURE — 1123F ACP DISCUSS/DSCN MKR DOCD: CPT | Performed by: INTERNAL MEDICINE

## 2019-08-29 ASSESSMENT — ENCOUNTER SYMPTOMS
VOMITING: 0
SHORTNESS OF BREATH: 1
SORE THROAT: 0
NAUSEA: 0
SINUS PRESSURE: 0
CHEST TIGHTNESS: 0
DIARRHEA: 0
ABDOMINAL PAIN: 0
WHEEZING: 1
RHINORRHEA: 0

## 2019-08-29 NOTE — PROGRESS NOTES
Not on file    Number of children: Not on file    Years of education: Not on file    Highest education level: Not on file   Occupational History    Not on file   Social Needs    Financial resource strain: Not on file    Food insecurity:     Worry: Not on file     Inability: Not on file    Transportation needs:     Medical: Not on file     Non-medical: Not on file   Tobacco Use    Smoking status: Former Smoker     Packs/day: 2.00     Years: 40.00     Pack years: 80.00     Last attempt to quit: 2002     Years since quittin.0    Smokeless tobacco: Never Used   Substance and Sexual Activity    Alcohol use: No    Drug use: No    Sexual activity: Yes     Partners: Female   Lifestyle    Physical activity:     Days per week: Not on file     Minutes per session: Not on file    Stress: Not on file   Relationships    Social connections:     Talks on phone: Not on file     Gets together: Not on file     Attends Advent service: Not on file     Active member of club or organization: Not on file     Attends meetings of clubs or organizations: Not on file     Relationship status: Not on file    Intimate partner violence:     Fear of current or ex partner: Not on file     Emotionally abused: Not on file     Physically abused: Not on file     Forced sexual activity: Not on file   Other Topics Concern    Not on file   Social History Narrative    Not on file         Review of Systems   Constitutional: Positive for fatigue. Negative for chills, diaphoresis and fever. HENT: Positive for congestion. Negative for postnasal drip, rhinorrhea, sinus pressure, sneezing and sore throat. Eyes: Negative for visual disturbance. Respiratory: Positive for shortness of breath and wheezing. Negative for chest tightness. Cardiovascular: Positive for leg swelling. Negative for chest pain and palpitations. Gastrointestinal: Negative for abdominal pain, diarrhea, nausea and vomiting.    Genitourinary: Negative for any changes or problems to me otherwise I will see him for follow-up in 3 months      Plan:     No orders of the defined types were placed in this encounter. No orders of the defined types were placed in this encounter. Return in about 3 months (around 11/29/2019) for re-evaluation.        Litzy Stovall MD

## 2019-09-16 ENCOUNTER — OFFICE VISIT (OUTPATIENT)
Dept: FAMILY MEDICINE CLINIC | Age: 79
End: 2019-09-16
Payer: MEDICARE

## 2019-09-16 VITALS
OXYGEN SATURATION: 94 % | SYSTOLIC BLOOD PRESSURE: 130 MMHG | RESPIRATION RATE: 16 BRPM | TEMPERATURE: 97.8 F | HEART RATE: 56 BPM | BODY MASS INDEX: 29.39 KG/M2 | WEIGHT: 217 LBS | HEIGHT: 72 IN | DIASTOLIC BLOOD PRESSURE: 62 MMHG

## 2019-09-16 DIAGNOSIS — Z00.00 ROUTINE GENERAL MEDICAL EXAMINATION AT A HEALTH CARE FACILITY: Primary | ICD-10-CM

## 2019-09-16 PROCEDURE — 1123F ACP DISCUSS/DSCN MKR DOCD: CPT | Performed by: NURSE PRACTITIONER

## 2019-09-16 PROCEDURE — 4040F PNEUMOC VAC/ADMIN/RCVD: CPT | Performed by: NURSE PRACTITIONER

## 2019-09-16 PROCEDURE — G8598 ASA/ANTIPLAT THER USED: HCPCS | Performed by: NURSE PRACTITIONER

## 2019-09-16 PROCEDURE — G0438 PPPS, INITIAL VISIT: HCPCS | Performed by: NURSE PRACTITIONER

## 2019-09-16 ASSESSMENT — LIFESTYLE VARIABLES: HOW OFTEN DO YOU HAVE A DRINK CONTAINING ALCOHOL: 0

## 2019-09-16 ASSESSMENT — PATIENT HEALTH QUESTIONNAIRE - PHQ9
SUM OF ALL RESPONSES TO PHQ QUESTIONS 1-9: 0
SUM OF ALL RESPONSES TO PHQ QUESTIONS 1-9: 0

## 2019-09-16 NOTE — PROGRESS NOTES
CNP as PCP - General (Certified Nurse Practitioner)  TRINA Cruz - CNP as PCP - [facility]  Isael Aj MD (Interventional Cardiology)  Luis Sim MD (Urology)    Wt Readings from Last 3 Encounters:   09/16/19 217 lb (98.4 kg)   08/29/19 210 lb 4.8 oz (95.4 kg)   08/27/19 210 lb 3.2 oz (95.3 kg)     Vitals:    09/16/19 1110   BP: 130/62   Site: Right Upper Arm   Position: Sitting   Cuff Size: Large Adult   Pulse: 56   Resp: 16   Temp: 97.8 °F (36.6 °C)   TempSrc: Temporal   SpO2: 94%   Weight: 217 lb (98.4 kg)   Height: 6' (1.829 m)     Body mass index is 29.43 kg/m². Based upon direct observation of the patient, evaluation of cognition reveals recent and remote memory intact. Neck: neck supple and non tender without mass, no thyromegaly or thyroid nodules, no cervical lymphadenopathy   Pulmonary/Chest: clear to auscultation bilaterally- no wheezes, rales or rhonchi, normal air movement, no respiratory distress  Cardiovascular: normal rate, normal S1 and S2, no gallops, intact distal pulses and no carotid bruits    Patient's complete Health Risk Assessment and screening values have been reviewed and are found in Flowsheets. The following problems were reviewed today and where indicated follow up appointments were made and/or referrals ordered. Positive Risk Factor Screenings with Interventions:     General Health:  General  In general, how would you say your health is?: Good  In the past 7 days, have you experienced any of the following?  New or Increased Pain, New or Increased Fatigue, Loneliness, Social Isolation, Stress or Anger?: (!) New or Increased Pain  Do you get the social and emotional support that you need?: Yes  Do you have a Living Will?: Yes  General Health Risk Interventions:  · Stress: relaxation techniques discussed    Hearing/Vision:  No exam data present  Hearing/Vision  Do you or your family notice any trouble with your hearing?: (!) Yes  Do you have difficulty driving, watching TV, or doing any of your daily activities because of your eyesight?: (!) Yes  Have you had an eye exam within the past year?: (!) No  Hearing/Vision Interventions:  · Vision concerns:  patient encouraged to make appointment with his/her eye specialist   · Also encouraged to see ear specialist for hearing. Safety:  Safety  Do you have working smoke detectors?: (!) No  Have all throw rugs been removed or fastened?: Yes  Do you have non-slip mats or surfaces in all bathtubs/showers?: (!) No  Do all of your stairways have a railing or banister?: Yes  Are your doorways, halls and stairs free of clutter?: Yes  Do you always fasten your seatbelt when you are in a car?: Yes  Safety Interventions:  · Home safety tips provided-plans to reinstall smoke detectors in nonslip mats. ADL:  ADLs  In the past 7 days, did you need help from others to perform any of the following everyday activities? Eating, dressing, grooming, bathing, toileting, or walking/balance?: None  In the past 7 days, did you need help from others to take care of any of the following? Laundry, housekeeping, banking/finances, shopping, telephone use, food preparation, transportation, or taking medications?: (!) Laundry  ADL Interventions:  · Patient declines any further evaluation/treatment for this issue. · Significant other helps with this ADL.     Personalized Preventive Plan   Current Health Maintenance Status  Immunization History   Administered Date(s) Administered    Influenza Virus Vaccine 10/05/2014    Influenza Whole 10/01/2010    Influenza, High Dose (Fluzone 65 yrs and older) 10/20/2015, 09/23/2016, 09/15/2017, 11/07/2018    Pneumococcal Conjugate 13-valent ([id number]) 10/20/2015    Pneumococcal Conjugate 7-valent (Prevnar7) 02/01/2007    Pneumococcal Polysaccharide (Eqkixouxq05) 09/23/2016    Td, unspecified formulation 08/10/2018        Health Maintenance   Topic Date Due    Annual Wellness Visit

## 2019-11-07 ENCOUNTER — TELEPHONE (OUTPATIENT)
Dept: PULMONOLOGY | Age: 79
End: 2019-11-07

## 2019-11-07 ENCOUNTER — HOSPITAL ENCOUNTER (OUTPATIENT)
Dept: PULMONOLOGY | Age: 79
Setting detail: THERAPIES SERIES
Discharge: HOME | End: 2019-11-07
Payer: MEDICARE

## 2019-11-07 DIAGNOSIS — J44.9 CHRONIC OBSTRUCTIVE PULMONARY DISEASE, UNSPECIFIED COPD TYPE (HCC): Primary | ICD-10-CM

## 2019-11-07 PROCEDURE — G0424 PULMONARY REHAB W EXER: HCPCS

## 2019-11-19 ENCOUNTER — HOSPITAL ENCOUNTER (OUTPATIENT)
Dept: PULMONOLOGY | Age: 79
Setting detail: THERAPIES SERIES
Discharge: HOME | End: 2019-11-19
Payer: MEDICARE

## 2019-11-19 PROCEDURE — G0424 PULMONARY REHAB W EXER: HCPCS

## 2019-11-22 ENCOUNTER — HOSPITAL ENCOUNTER (OUTPATIENT)
Dept: LAB | Age: 79
Discharge: HOME | End: 2019-11-22
Payer: MEDICARE

## 2019-11-22 DIAGNOSIS — E11.59 TYPE 2 DIABETES MELLITUS WITH OTHER CIRCULATORY COMPLICATION, WITHOUT LONG-TERM CURRENT USE OF INSULIN (HCC): ICD-10-CM

## 2019-11-22 DIAGNOSIS — R97.20 ELEVATED PSA: ICD-10-CM

## 2019-11-22 LAB
ALBUMIN SERPL-MCNC: 4.4 G/DL (ref 3.5–4.6)
ALP BLD-CCNC: 79 U/L (ref 35–104)
ALT SERPL-CCNC: 28 U/L (ref 0–41)
ANION GAP SERPL CALCULATED.3IONS-SCNC: 12 MEQ/L (ref 9–15)
AST SERPL-CCNC: 26 U/L (ref 0–40)
BASOPHILS ABSOLUTE: 0 K/UL (ref 0–0.2)
BASOPHILS RELATIVE PERCENT: 0.4 %
BILIRUB SERPL-MCNC: 0.8 MG/DL (ref 0.2–0.7)
BUN BLDV-MCNC: 18 MG/DL (ref 8–23)
CALCIUM SERPL-MCNC: 9.8 MG/DL (ref 8.5–9.9)
CHLORIDE BLD-SCNC: 95 MEQ/L (ref 95–107)
CHOLESTEROL, TOTAL: 137 MG/DL (ref 0–199)
CO2: 31 MEQ/L (ref 20–31)
CREAT SERPL-MCNC: 0.81 MG/DL (ref 0.7–1.2)
CREATININE URINE: 94.1 MG/DL
EOSINOPHILS ABSOLUTE: 0.2 K/UL (ref 0–0.7)
EOSINOPHILS RELATIVE PERCENT: 2.4 %
GFR AFRICAN AMERICAN: >60
GFR NON-AFRICAN AMERICAN: >60
GLOBULIN: 2.6 G/DL (ref 2.3–3.5)
GLUCOSE BLD-MCNC: 219 MG/DL (ref 70–99)
HBA1C MFR BLD: 7.4 % (ref 4.8–5.9)
HCT VFR BLD CALC: 46.3 % (ref 42–52)
HDLC SERPL-MCNC: 41 MG/DL (ref 40–59)
HEMOGLOBIN: 14.9 G/DL (ref 14–18)
LDL CHOLESTEROL CALCULATED: 52 MG/DL (ref 0–129)
LYMPHOCYTES ABSOLUTE: 1.7 K/UL (ref 1–4.8)
LYMPHOCYTES RELATIVE PERCENT: 21.8 %
MCH RBC QN AUTO: 29.3 PG (ref 27–31.3)
MCHC RBC AUTO-ENTMCNC: 32.2 % (ref 33–37)
MCV RBC AUTO: 90.8 FL (ref 80–100)
MICROALBUMIN UR-MCNC: 2.9 MG/DL
MICROALBUMIN/CREAT UR-RTO: 30.8 MG/G (ref 0–30)
MONOCYTES ABSOLUTE: 0.6 K/UL (ref 0.2–0.8)
MONOCYTES RELATIVE PERCENT: 7.9 %
NEUTROPHILS ABSOLUTE: 5.4 K/UL (ref 1.4–6.5)
NEUTROPHILS RELATIVE PERCENT: 67.5 %
PDW BLD-RTO: 14 % (ref 11.5–14.5)
PLATELET # BLD: 231 K/UL (ref 130–400)
POTASSIUM SERPL-SCNC: 4.7 MEQ/L (ref 3.4–4.9)
PROSTATE SPECIFIC ANTIGEN: 5.31 NG/ML (ref 0–6.22)
RBC # BLD: 5.1 M/UL (ref 4.7–6.1)
SODIUM BLD-SCNC: 138 MEQ/L (ref 135–144)
TOTAL PROTEIN: 7 G/DL (ref 6.3–8)
TRIGL SERPL-MCNC: 220 MG/DL (ref 0–150)
WBC # BLD: 8 K/UL (ref 4.8–10.8)

## 2019-11-22 PROCEDURE — 82043 UR ALBUMIN QUANTITATIVE: CPT

## 2019-11-22 PROCEDURE — 83036 HEMOGLOBIN GLYCOSYLATED A1C: CPT

## 2019-11-22 PROCEDURE — 80053 COMPREHEN METABOLIC PANEL: CPT

## 2019-11-22 PROCEDURE — 36415 COLL VENOUS BLD VENIPUNCTURE: CPT

## 2019-11-22 PROCEDURE — 82570 ASSAY OF URINE CREATININE: CPT

## 2019-11-22 PROCEDURE — 80061 LIPID PANEL: CPT

## 2019-11-22 PROCEDURE — 85025 COMPLETE CBC W/AUTO DIFF WBC: CPT

## 2019-11-22 PROCEDURE — 84153 ASSAY OF PSA TOTAL: CPT

## 2019-11-26 ENCOUNTER — OFFICE VISIT (OUTPATIENT)
Dept: FAMILY MEDICINE CLINIC | Age: 79
End: 2019-11-26
Payer: MEDICARE

## 2019-11-26 VITALS
OXYGEN SATURATION: 96 % | TEMPERATURE: 97.7 F | HEART RATE: 69 BPM | DIASTOLIC BLOOD PRESSURE: 60 MMHG | BODY MASS INDEX: 29.39 KG/M2 | HEIGHT: 72 IN | SYSTOLIC BLOOD PRESSURE: 130 MMHG | RESPIRATION RATE: 18 BRPM | WEIGHT: 217 LBS

## 2019-11-26 DIAGNOSIS — H10.89 OTHER CONJUNCTIVITIS OF BOTH EYES: ICD-10-CM

## 2019-11-26 DIAGNOSIS — I25.10 CORONARY ARTERY DISEASE INVOLVING NATIVE CORONARY ARTERY OF NATIVE HEART WITHOUT ANGINA PECTORIS: ICD-10-CM

## 2019-11-26 DIAGNOSIS — I48.0 PAROXYSMAL ATRIAL FIBRILLATION (HCC): ICD-10-CM

## 2019-11-26 DIAGNOSIS — J44.9 CHRONIC OBSTRUCTIVE PULMONARY DISEASE, UNSPECIFIED COPD TYPE (HCC): ICD-10-CM

## 2019-11-26 DIAGNOSIS — E78.2 MIXED HYPERLIPIDEMIA: ICD-10-CM

## 2019-11-26 DIAGNOSIS — R97.20 ELEVATED PSA: ICD-10-CM

## 2019-11-26 DIAGNOSIS — G47.33 OSA (OBSTRUCTIVE SLEEP APNEA): ICD-10-CM

## 2019-11-26 DIAGNOSIS — E11.59 TYPE 2 DIABETES MELLITUS WITH OTHER CIRCULATORY COMPLICATION, WITHOUT LONG-TERM CURRENT USE OF INSULIN (HCC): Primary | ICD-10-CM

## 2019-11-26 DIAGNOSIS — I10 ESSENTIAL HYPERTENSION: ICD-10-CM

## 2019-11-26 DIAGNOSIS — G47.00 INSOMNIA, UNSPECIFIED TYPE: ICD-10-CM

## 2019-11-26 DIAGNOSIS — R59.0 LYMPHADENOPATHY, AXILLARY: ICD-10-CM

## 2019-11-26 PROCEDURE — G8427 DOCREV CUR MEDS BY ELIG CLIN: HCPCS | Performed by: NURSE PRACTITIONER

## 2019-11-26 PROCEDURE — 99214 OFFICE O/P EST MOD 30 MIN: CPT | Performed by: NURSE PRACTITIONER

## 2019-11-26 PROCEDURE — G8510 SCR DEP NEG, NO PLAN REQD: HCPCS | Performed by: NURSE PRACTITIONER

## 2019-11-26 PROCEDURE — 3288F FALL RISK ASSESSMENT DOCD: CPT | Performed by: NURSE PRACTITIONER

## 2019-11-26 PROCEDURE — 3023F SPIROM DOC REV: CPT | Performed by: NURSE PRACTITIONER

## 2019-11-26 PROCEDURE — 1036F TOBACCO NON-USER: CPT | Performed by: NURSE PRACTITIONER

## 2019-11-26 PROCEDURE — G8926 SPIRO NO PERF OR DOC: HCPCS | Performed by: NURSE PRACTITIONER

## 2019-11-26 PROCEDURE — G8417 CALC BMI ABV UP PARAM F/U: HCPCS | Performed by: NURSE PRACTITIONER

## 2019-11-26 PROCEDURE — G8598 ASA/ANTIPLAT THER USED: HCPCS | Performed by: NURSE PRACTITIONER

## 2019-11-26 PROCEDURE — 1123F ACP DISCUSS/DSCN MKR DOCD: CPT | Performed by: NURSE PRACTITIONER

## 2019-11-26 PROCEDURE — G8484 FLU IMMUNIZE NO ADMIN: HCPCS | Performed by: NURSE PRACTITIONER

## 2019-11-26 PROCEDURE — 4040F PNEUMOC VAC/ADMIN/RCVD: CPT | Performed by: NURSE PRACTITIONER

## 2019-11-26 RX ORDER — TEMAZEPAM 15 MG/1
CAPSULE ORAL
Refills: 2 | COMMUNITY
Start: 2019-11-08 | End: 2019-11-26 | Stop reason: SDUPTHER

## 2019-11-26 RX ORDER — CEPHALEXIN 250 MG/1
250 CAPSULE ORAL 3 TIMES DAILY
Qty: 30 CAPSULE | Refills: 0 | Status: SHIPPED | OUTPATIENT
Start: 2019-11-26 | End: 2020-02-25 | Stop reason: ALTCHOICE

## 2019-11-26 RX ORDER — TEMAZEPAM 15 MG/1
CAPSULE ORAL
Qty: 60 CAPSULE | Refills: 2 | Status: SHIPPED | OUTPATIENT
Start: 2019-11-26 | End: 2020-02-25 | Stop reason: SDUPTHER

## 2019-11-26 ASSESSMENT — PATIENT HEALTH QUESTIONNAIRE - PHQ9
2. FEELING DOWN, DEPRESSED OR HOPELESS: 0
SUM OF ALL RESPONSES TO PHQ QUESTIONS 1-9: 0
1. LITTLE INTEREST OR PLEASURE IN DOING THINGS: 0
SUM OF ALL RESPONSES TO PHQ QUESTIONS 1-9: 0
SUM OF ALL RESPONSES TO PHQ9 QUESTIONS 1 & 2: 0

## 2019-12-03 ENCOUNTER — HOSPITAL ENCOUNTER (OUTPATIENT)
Dept: PULMONOLOGY | Age: 79
Setting detail: THERAPIES SERIES
Discharge: HOME | End: 2019-12-03
Payer: MEDICARE

## 2019-12-03 ENCOUNTER — OFFICE VISIT (OUTPATIENT)
Dept: PULMONOLOGY | Age: 79
End: 2019-12-03
Payer: MEDICARE

## 2019-12-03 VITALS
SYSTOLIC BLOOD PRESSURE: 130 MMHG | HEIGHT: 72 IN | BODY MASS INDEX: 28.93 KG/M2 | TEMPERATURE: 97.5 F | DIASTOLIC BLOOD PRESSURE: 62 MMHG | RESPIRATION RATE: 16 BRPM | OXYGEN SATURATION: 98 % | HEART RATE: 65 BPM | WEIGHT: 213.6 LBS

## 2019-12-03 DIAGNOSIS — J44.9 CHRONIC OBSTRUCTIVE PULMONARY DISEASE, UNSPECIFIED COPD TYPE (HCC): Primary | ICD-10-CM

## 2019-12-03 DIAGNOSIS — I27.20 PULMONARY HTN (HCC): ICD-10-CM

## 2019-12-03 DIAGNOSIS — J96.11 CHRONIC RESPIRATORY FAILURE WITH HYPOXIA AND HYPERCAPNIA (HCC): ICD-10-CM

## 2019-12-03 DIAGNOSIS — J96.12 CHRONIC RESPIRATORY FAILURE WITH HYPOXIA AND HYPERCAPNIA (HCC): ICD-10-CM

## 2019-12-03 PROCEDURE — G8427 DOCREV CUR MEDS BY ELIG CLIN: HCPCS | Performed by: INTERNAL MEDICINE

## 2019-12-03 PROCEDURE — 4040F PNEUMOC VAC/ADMIN/RCVD: CPT | Performed by: INTERNAL MEDICINE

## 2019-12-03 PROCEDURE — 3023F SPIROM DOC REV: CPT | Performed by: INTERNAL MEDICINE

## 2019-12-03 PROCEDURE — G8926 SPIRO NO PERF OR DOC: HCPCS | Performed by: INTERNAL MEDICINE

## 2019-12-03 PROCEDURE — G8484 FLU IMMUNIZE NO ADMIN: HCPCS | Performed by: INTERNAL MEDICINE

## 2019-12-03 PROCEDURE — 99214 OFFICE O/P EST MOD 30 MIN: CPT | Performed by: INTERNAL MEDICINE

## 2019-12-03 PROCEDURE — G8598 ASA/ANTIPLAT THER USED: HCPCS | Performed by: INTERNAL MEDICINE

## 2019-12-03 PROCEDURE — 1036F TOBACCO NON-USER: CPT | Performed by: INTERNAL MEDICINE

## 2019-12-03 PROCEDURE — 1123F ACP DISCUSS/DSCN MKR DOCD: CPT | Performed by: INTERNAL MEDICINE

## 2019-12-03 PROCEDURE — G0424 PULMONARY REHAB W EXER: HCPCS

## 2019-12-03 PROCEDURE — G8417 CALC BMI ABV UP PARAM F/U: HCPCS | Performed by: INTERNAL MEDICINE

## 2019-12-03 ASSESSMENT — ENCOUNTER SYMPTOMS
SINUS PRESSURE: 0
DIARRHEA: 0
WHEEZING: 1
SHORTNESS OF BREATH: 1
CHEST TIGHTNESS: 0
SORE THROAT: 0
ABDOMINAL PAIN: 0
RHINORRHEA: 0
VOMITING: 0
NAUSEA: 0
COUGH: 1

## 2019-12-05 ENCOUNTER — HOSPITAL ENCOUNTER (OUTPATIENT)
Dept: PULMONOLOGY | Age: 79
Setting detail: THERAPIES SERIES
Discharge: HOME | End: 2019-12-05
Payer: MEDICARE

## 2019-12-05 PROCEDURE — G0424 PULMONARY REHAB W EXER: HCPCS

## 2019-12-11 PROCEDURE — G0008 ADMIN INFLUENZA VIRUS VAC: HCPCS | Performed by: NURSE PRACTITIONER

## 2019-12-11 PROCEDURE — 90653 IIV ADJUVANT VACCINE IM: CPT | Performed by: NURSE PRACTITIONER

## 2019-12-12 ENCOUNTER — NURSE ONLY (OUTPATIENT)
Dept: FAMILY MEDICINE CLINIC | Age: 79
End: 2019-12-12
Payer: MEDICARE

## 2019-12-12 DIAGNOSIS — Z23 NEED FOR VACCINATION: Primary | ICD-10-CM

## 2019-12-17 ENCOUNTER — HOSPITAL ENCOUNTER (OUTPATIENT)
Dept: PULMONOLOGY | Age: 79
Setting detail: THERAPIES SERIES
Discharge: HOME | End: 2019-12-17
Payer: MEDICARE

## 2019-12-17 PROCEDURE — G0424 PULMONARY REHAB W EXER: HCPCS

## 2019-12-19 ENCOUNTER — HOSPITAL ENCOUNTER (OUTPATIENT)
Dept: PULMONOLOGY | Age: 79
Setting detail: THERAPIES SERIES
Discharge: HOME | End: 2019-12-19
Payer: MEDICARE

## 2019-12-19 PROCEDURE — G0424 PULMONARY REHAB W EXER: HCPCS

## 2019-12-26 ENCOUNTER — HOSPITAL ENCOUNTER (OUTPATIENT)
Dept: PULMONOLOGY | Age: 79
Setting detail: THERAPIES SERIES
Discharge: HOME | End: 2019-12-26
Payer: MEDICARE

## 2019-12-26 PROCEDURE — G0424 PULMONARY REHAB W EXER: HCPCS

## 2020-01-13 RX ORDER — ROSUVASTATIN CALCIUM 40 MG/1
40 TABLET, COATED ORAL DAILY
Qty: 90 TABLET | Refills: 4 | Status: SHIPPED | OUTPATIENT
Start: 2020-01-13 | End: 2021-04-07

## 2020-01-21 ENCOUNTER — HOSPITAL ENCOUNTER (OUTPATIENT)
Dept: PULMONOLOGY | Age: 80
Setting detail: THERAPIES SERIES
Discharge: HOME | End: 2020-01-21
Payer: MEDICARE

## 2020-01-21 PROCEDURE — G0424 PULMONARY REHAB W EXER: HCPCS

## 2020-01-23 ENCOUNTER — TELEPHONE (OUTPATIENT)
Dept: FAMILY MEDICINE CLINIC | Age: 80
End: 2020-01-23

## 2020-01-23 NOTE — TELEPHONE ENCOUNTER
Per Shikha Maldonado, Patient's girlfriend, patient has an order of doxycycline at [facility] that he fills as needed for cough. Bernadine just calling to let you know that he picked it up yesterday and started it.

## 2020-01-24 ENCOUNTER — HOSPITAL ENCOUNTER (EMERGENCY)
Age: 80
Discharge: HOME | End: 2020-01-24
Attending: EMERGENCY MEDICINE
Payer: MEDICARE

## 2020-01-24 ENCOUNTER — TELEPHONE (OUTPATIENT)
Dept: FAMILY MEDICINE CLINIC | Age: 80
End: 2020-01-24

## 2020-01-24 ENCOUNTER — APPOINTMENT (OUTPATIENT)
Dept: GENERAL RADIOLOGY | Age: 80
End: 2020-01-24
Payer: MEDICARE

## 2020-01-24 VITALS
WEIGHT: 213 LBS | RESPIRATION RATE: 18 BRPM | SYSTOLIC BLOOD PRESSURE: 149 MMHG | TEMPERATURE: 98.7 F | HEART RATE: 84 BPM | OXYGEN SATURATION: 95 % | DIASTOLIC BLOOD PRESSURE: 74 MMHG | BODY MASS INDEX: 28.85 KG/M2 | HEIGHT: 72 IN

## 2020-01-24 DIAGNOSIS — J44.1 CHRONIC OBSTRUCTIVE PULMONARY DISEASE WITH ACUTE EXACERBATION (HCC): ICD-10-CM

## 2020-01-24 DIAGNOSIS — R05.9 COUGH: Primary | ICD-10-CM

## 2020-01-24 LAB
EKG ATRIAL RATE: 83 BPM
EKG P AXIS: 75 DEGREES
EKG P-R INTERVAL: 224 MS
EKG Q-T INTERVAL: 342 MS
EKG QRS DURATION: 108 MS
EKG QTC CALCULATION (BAZETT): 401 MS
EKG R AXIS: 44 DEGREES
EKG T AXIS: 2 DEGREES
EKG VENTRICULAR RATE: 83 BPM

## 2020-01-24 PROCEDURE — 71045 X-RAY EXAM CHEST 1 VIEW: CPT

## 2020-01-24 PROCEDURE — 96366 THER/PROPH/DIAG IV INF ADDON: CPT

## 2020-01-24 PROCEDURE — 99285 EMERGENCY DEPT VISIT HI MDM: CPT

## 2020-01-24 PROCEDURE — 94640 AIRWAY INHALATION TREATMENT: CPT

## 2020-01-24 PROCEDURE — 96365 THER/PROPH/DIAG IV INF INIT: CPT

## 2020-01-24 PROCEDURE — 96375 TX/PRO/DX INJ NEW DRUG ADDON: CPT

## 2020-01-24 PROCEDURE — 93005 ELECTROCARDIOGRAM TRACING: CPT

## 2020-01-24 PROCEDURE — 6360000002 HC RX W HCPCS: Performed by: EMERGENCY MEDICINE

## 2020-01-24 PROCEDURE — 6370000000 HC RX 637 (ALT 250 FOR IP): Performed by: EMERGENCY MEDICINE

## 2020-01-24 RX ORDER — LEVOFLOXACIN 750 MG/1
750 TABLET ORAL DAILY
Qty: 7 TABLET | Refills: 0 | Status: SHIPPED | OUTPATIENT
Start: 2020-01-24 | End: 2020-01-31

## 2020-01-24 RX ORDER — LEVOFLOXACIN 5 MG/ML
750 INJECTION, SOLUTION INTRAVENOUS ONCE
Status: COMPLETED | OUTPATIENT
Start: 2020-01-24 | End: 2020-01-24

## 2020-01-24 RX ORDER — GUAIFENESIN 600 MG/1
1200 TABLET, EXTENDED RELEASE ORAL 2 TIMES DAILY
Qty: 40 TABLET | Refills: 0 | Status: SHIPPED | OUTPATIENT
Start: 2020-01-24 | End: 2020-02-03

## 2020-01-24 RX ORDER — METHYLPREDNISOLONE SODIUM SUCCINATE 125 MG/2ML
125 INJECTION, POWDER, LYOPHILIZED, FOR SOLUTION INTRAMUSCULAR; INTRAVENOUS ONCE
Status: COMPLETED | OUTPATIENT
Start: 2020-01-24 | End: 2020-01-24

## 2020-01-24 RX ORDER — IPRATROPIUM BROMIDE AND ALBUTEROL SULFATE 2.5; .5 MG/3ML; MG/3ML
1 SOLUTION RESPIRATORY (INHALATION) ONCE
Status: COMPLETED | OUTPATIENT
Start: 2020-01-24 | End: 2020-01-24

## 2020-01-24 RX ORDER — PREDNISONE 10 MG/1
60 TABLET ORAL DAILY
Qty: 30 TABLET | Refills: 0 | Status: SHIPPED | OUTPATIENT
Start: 2020-01-24 | End: 2020-01-29

## 2020-01-24 RX ADMIN — METHYLPREDNISOLONE SODIUM SUCCINATE 125 MG: 125 INJECTION, POWDER, FOR SOLUTION INTRAMUSCULAR; INTRAVENOUS at 17:11

## 2020-01-24 RX ADMIN — IPRATROPIUM BROMIDE AND ALBUTEROL SULFATE 1 AMPULE: .5; 3 SOLUTION RESPIRATORY (INHALATION) at 17:02

## 2020-01-24 RX ADMIN — LEVOFLOXACIN 750 MG: 5 INJECTION, SOLUTION INTRAVENOUS at 18:50

## 2020-01-24 ASSESSMENT — PAIN DESCRIPTION - FREQUENCY
FREQUENCY: INTERMITTENT
FREQUENCY: INTERMITTENT

## 2020-01-24 ASSESSMENT — PAIN DESCRIPTION - PAIN TYPE: TYPE: ACUTE PAIN

## 2020-01-24 ASSESSMENT — ENCOUNTER SYMPTOMS
CHEST TIGHTNESS: 1
RHINORRHEA: 0
DIARRHEA: 0
SHORTNESS OF BREATH: 1
PHOTOPHOBIA: 0
NAUSEA: 0
BACK PAIN: 0
COLOR CHANGE: 0
ABDOMINAL DISTENTION: 0
SINUS PRESSURE: 0
VOMITING: 0
ABDOMINAL PAIN: 0
CONSTIPATION: 0
WHEEZING: 0
COUGH: 0
APNEA: 0
EYE PAIN: 0
SORE THROAT: 0

## 2020-01-24 ASSESSMENT — PAIN DESCRIPTION - ONSET: ONSET: SUDDEN

## 2020-01-24 ASSESSMENT — PAIN DESCRIPTION - DESCRIPTORS: DESCRIPTORS: TIGHTNESS

## 2020-01-24 ASSESSMENT — PAIN DESCRIPTION - LOCATION: LOCATION: CHEST

## 2020-01-24 ASSESSMENT — PAIN SCALES - GENERAL: PAINLEVEL_OUTOF10: 2

## 2020-01-24 NOTE — ED NOTES
[facility] and 1 set of blood cultures obtained.      Cruz Sylvester, EMT-P  01/24/20 1983

## 2020-01-24 NOTE — ED PROVIDER NOTES
Neurological: Negative for dizziness, tremors, syncope, weakness, light-headedness and headaches. Psychiatric/Behavioral: Negative for agitation, confusion and hallucinations. All other systems reviewed and are negative. Except as noted above the remainder of the review of systems was reviewed and negative.        PAST MEDICAL HISTORY     Past Medical History:   Diagnosis Date    Adrenal adenoma     CT 6/2011 stable    Anxiety     Asbestosis(501)     CAD (coronary artery disease)     status post stent 2001    Cancer ([facility] [address]     basil cell carnoma    COPD (chronic obstructive pulmonary disease) (HCC)     Elbow injury     left    Erectile dysfunction     Granulomatous lung disease (HCC)     Herpes zoster     Hyperlipidemia     Hypertension     Insomnia     Interstitial fibrosis     Obstructive sleep apnea on CPAP 11/13/2017    Prostate nodule     Scarlet fever     Urine frequency          SURGICAL HISTORY       Past Surgical History:   Procedure Laterality Date    CARDIAC CATHETERIZATION      CARDIOVASCULAR STRESS TEST      1/2010 normal per patient    COLONOSCOPY      5/2009 tubular adenomas    COLONOSCOPY  08/13/15    Lady Trey CORDERO    EYE SURGERY  02/10/14    DR [place]  RT EYE    HERNIA REPAIR      LEG SURGERY           CURRENT MEDICATIONS       Discharge Medication List as of 1/24/2020  7:58 PM      CONTINUE these medications which have NOT CHANGED    Details   rosuvastatin (CRESTOR) 40 MG tablet Take 1 tablet by mouth daily, Disp-90 tablet, R-4Normal      temazepam (RESTORIL) 15 MG capsule TAKE 2 CAPSULES BY MOUTH NIGHTLY AT BEDTIME AS NEEDED for sleep, Disp-60 capsule, R-2Normal      albuterol-ipratropium (COMBIVENT RESPIMAT)  MCG/ACT AERS inhaler Inhale 1 puff into the lungs every 6 hours, Disp-4 Inhaler, R-2Normal      SYMBICORT 80-4.5 MCG/ACT AERO USE 2 INHALATIONS TWICE A DAY, Disp-30.6 g, R-2Normal      fexofenadine (ALLEGRA) 180 MG tablet Take 1 tablet by mouth daily, Disp-90 tablet, R-2Normal      !! metoprolol succinate (TOPROL XL) 25 MG extended release tablet Take 25 mg by mouth every morningHistorical Med      !! metoprolol succinate (TOPROL XL) 50 MG extended release tablet Take 50 mg by mouth every eveningHistorical Med      aspirin 81 MG tablet Take 81 mg by mouth dailyHistorical Med      diltiazem (TIAZAC) 240 MG extended release capsule TAKE 1 CAPSULE BY MOUTH DAILY AS DIRECTED, R-3Historical Med      CARTIA  MG extended release capsule Take 120 capsules by mouth every evening , R-3, DAWHistorical Med      OXYGEN Please provide patient with a portable oxygen concentrator, Disp-1 Units, R-0Print      dofetilide (TIKOSYN) 250 MCG capsule Take 250 mcg by mouth 2 times dailyHistorical Med      doxycycline hyclate (VIBRAMYCIN) 100 MG capsule Take 1 capsule by mouth 2 times daily, Disp-20 capsule, R-0Normal      Magnesium 500 MG TABS Take by mouth 2 times dailyHistorical Med      Potassium 99 MG TABS Take by mouth dailyHistorical Med      GLUTATHIONE PO Take by mouthHistorical Med      metoprolol tartrate (LOPRESSOR) 25 MG tablet Take 25 mg by mouth as needed      tadalafil (CIALIS) 5 MG tablet Take 1 tablet by mouth as needed for Erectile Dysfunction (maximum 5 mg per day), Disp-30 tablet, R-3      Handicap Placard MISC Starting 6/16/2016, Until Discontinued, Disp-1 each, R-0, PrintGood for 5 Years from 06/16/2016      digoxin (LANOXIN) 250 MCG tablet R-11      ELIQUIS 5 MG TABS tablet R-2, JOAN      PROAIR  (90 BASE) MCG/ACT inhaler Historical Med      CINNAMON PO Take  by mouth. SAW PALMETTO by Does not apply route. ipratropium-albuterol (DUONEB) 0.5-2.5 (3) MG/3ML SOLN nebulizer solution Take 3 mLs by nebulization every 6 hours as needed for Shortness of Breath., Disp-100 vial, R-2      Glucosamine-Chondroitin 500-400 MG CAPS Take 1 capsule by mouth daily. Ascorbic Acid (VITAMIN C) 500 MG tablet Take 1,000 mg by mouth daily.  1/2 tab in AM noted below, none     Procedures    FINAL IMPRESSION      1. COPD with exacerbation [facility])          DISPOSITION/PLAN   DISPOSITION Decision To Discharge 01/24/2020 07:55:14 PM      PATIENT REFERRED TO:  Denver Kaska, APRN - CNP  [address] [facility]  [phone]    In 3 days        DISCHARGE MEDICATIONS:  Discharge Medication List as of 1/24/2020  7:58 PM      START taking these medications    Details   levofloxacin (LEVAQUIN) 750 MG tablet Take 1 tablet by mouth daily for 7 days, Disp-7 tablet, R-0Print      predniSONE (DELTASONE) 10 MG tablet Take 6 tablets by mouth daily for 5 doses, Disp-30 tablet, R-0Print      guaiFENesin (MUCINEX) 600 MG extended release tablet Take 2 tablets by mouth 2 times daily for 10 days, Disp-40 tablet, R-0Print                (Please note that portions of this note were completed with a voice recognitionprogram.  Efforts were made to edit the dictations but occasionally words are mis-transcribed.)    Marie Adhikari MD (electronically signed)  Attending Emergency Physician          Marie Adhikari MD  01/25/20 8163

## 2020-01-26 PROCEDURE — 93010 ELECTROCARDIOGRAM REPORT: CPT | Performed by: INTERNAL MEDICINE

## 2020-01-27 ENCOUNTER — TELEPHONE (OUTPATIENT)
Dept: FAMILY MEDICINE CLINIC | Age: 80
End: 2020-01-27

## 2020-01-28 ENCOUNTER — OFFICE VISIT (OUTPATIENT)
Dept: FAMILY MEDICINE CLINIC | Age: 80
End: 2020-01-28
Payer: MEDICARE

## 2020-01-28 ENCOUNTER — HOSPITAL ENCOUNTER (EMERGENCY)
Age: 80
Discharge: HOME | End: 2020-01-28
Attending: EMERGENCY MEDICINE
Payer: MEDICARE

## 2020-01-28 ENCOUNTER — HOSPITAL ENCOUNTER (OUTPATIENT)
Age: 80
Discharge: HOME | End: 2020-01-30
Payer: COMMERCIAL

## 2020-01-28 ENCOUNTER — HOSPITAL ENCOUNTER (OUTPATIENT)
Age: 80
Discharge: HOME | End: 2020-01-28
Payer: MEDICARE

## 2020-01-28 ENCOUNTER — HOSPITAL ENCOUNTER (OUTPATIENT)
Dept: LAB | Age: 80
Discharge: HOME | End: 2020-01-28
Payer: MEDICARE

## 2020-01-28 ENCOUNTER — HOSPITAL ENCOUNTER (OUTPATIENT)
Dept: GENERAL RADIOLOGY | Age: 80
Discharge: HOME | End: 2020-01-30
Payer: MEDICARE

## 2020-01-28 VITALS
SYSTOLIC BLOOD PRESSURE: 126 MMHG | BODY MASS INDEX: 28.85 KG/M2 | WEIGHT: 213 LBS | RESPIRATION RATE: 16 BRPM | TEMPERATURE: 97.1 F | HEART RATE: 76 BPM | OXYGEN SATURATION: 93 % | HEIGHT: 72 IN | DIASTOLIC BLOOD PRESSURE: 66 MMHG

## 2020-01-28 VITALS
RESPIRATION RATE: 16 BRPM | HEIGHT: 72 IN | HEART RATE: 70 BPM | BODY MASS INDEX: 28.44 KG/M2 | TEMPERATURE: 98.1 F | DIASTOLIC BLOOD PRESSURE: 72 MMHG | WEIGHT: 210 LBS | OXYGEN SATURATION: 95 % | SYSTOLIC BLOOD PRESSURE: 157 MMHG

## 2020-01-28 LAB
ALBUMIN SERPL-MCNC: 3.9 G/DL (ref 3.5–4.6)
ALP BLD-CCNC: 76 U/L (ref 35–104)
ALT SERPL-CCNC: 23 U/L (ref 0–41)
ANION GAP SERPL CALCULATED.3IONS-SCNC: 13 MEQ/L (ref 9–15)
AST SERPL-CCNC: 21 U/L (ref 0–40)
BASE EXCESS ARTERIAL: 9
BASOPHILS ABSOLUTE: 0 K/UL (ref 0–0.2)
BASOPHILS RELATIVE PERCENT: 0.1 %
BILIRUB SERPL-MCNC: 0.7 MG/DL (ref 0.2–0.7)
BUN BLDV-MCNC: 28 MG/DL (ref 8–23)
CALCIUM SERPL-MCNC: 9.9 MG/DL (ref 8.5–9.9)
CHLORIDE BLD-SCNC: 91 MEQ/L (ref 95–107)
CO2: 30 MEQ/L (ref 20–31)
CREAT SERPL-MCNC: 0.64 MG/DL (ref 0.7–1.2)
EOSINOPHILS ABSOLUTE: 0 K/UL (ref 0–0.7)
EOSINOPHILS RELATIVE PERCENT: 0.4 %
GFR AFRICAN AMERICAN: >60
GFR NON-AFRICAN AMERICAN: >60
GLOBULIN: 3.2 G/DL (ref 2.3–3.5)
GLUCOSE BLD-MCNC: 162 MG/DL (ref 70–99)
HCO3 ARTERIAL: 33.2 MMOL/L (ref 21–29)
HCT VFR BLD CALC: 46.7 % (ref 42–52)
HEMOGLOBIN: 15.1 G/DL (ref 14–18)
LYMPHOCYTES ABSOLUTE: 1.3 K/UL (ref 1–4.8)
LYMPHOCYTES RELATIVE PERCENT: 12 %
MCH RBC QN AUTO: 28.9 PG (ref 27–31.3)
MCHC RBC AUTO-ENTMCNC: 32.5 % (ref 33–37)
MCV RBC AUTO: 89.1 FL (ref 80–100)
MONOCYTES ABSOLUTE: 1.2 K/UL (ref 0.2–0.8)
MONOCYTES RELATIVE PERCENT: 11.2 %
NEUTROPHILS ABSOLUTE: 8.5 K/UL (ref 1.4–6.5)
NEUTROPHILS RELATIVE PERCENT: 76.3 %
O2 SAT, ARTERIAL: 90 % (ref 93–100)
PCO2 ARTERIAL: 51 MM HG (ref 35–45)
PDW BLD-RTO: 13.4 % (ref 11.5–14.5)
PERFORMED ON: ABNORMAL
PH ARTERIAL: 7.42 (ref 7.35–7.45)
PLATELET # BLD: 264 K/UL (ref 130–400)
PO2 ARTERIAL: 58 MM HG (ref 75–108)
POC SAMPLE TYPE: ABNORMAL
POTASSIUM SERPL-SCNC: 4.5 MEQ/L (ref 3.4–4.9)
RBC # BLD: 5.24 M/UL (ref 4.7–6.1)
SODIUM BLD-SCNC: 134 MEQ/L (ref 135–144)
TCO2 ARTERIAL: 35
TOTAL PROTEIN: 7.1 G/DL (ref 6.3–8)
TROPONIN: <0.01 NG/ML (ref 0–0.01)
WBC # BLD: 11.1 K/UL (ref 4.8–10.8)

## 2020-01-28 PROCEDURE — G8482 FLU IMMUNIZE ORDER/ADMIN: HCPCS | Performed by: NURSE PRACTITIONER

## 2020-01-28 PROCEDURE — 3023F SPIROM DOC REV: CPT | Performed by: NURSE PRACTITIONER

## 2020-01-28 PROCEDURE — 80053 COMPREHEN METABOLIC PANEL: CPT

## 2020-01-28 PROCEDURE — G8417 CALC BMI ABV UP PARAM F/U: HCPCS | Performed by: NURSE PRACTITIONER

## 2020-01-28 PROCEDURE — G8926 SPIRO NO PERF OR DOC: HCPCS | Performed by: NURSE PRACTITIONER

## 2020-01-28 PROCEDURE — 82803 BLOOD GASES ANY COMBINATION: CPT

## 2020-01-28 PROCEDURE — 1123F ACP DISCUSS/DSCN MKR DOCD: CPT | Performed by: NURSE PRACTITIONER

## 2020-01-28 PROCEDURE — 1036F TOBACCO NON-USER: CPT | Performed by: NURSE PRACTITIONER

## 2020-01-28 PROCEDURE — 84484 ASSAY OF TROPONIN QUANT: CPT

## 2020-01-28 PROCEDURE — 85025 COMPLETE CBC W/AUTO DIFF WBC: CPT

## 2020-01-28 PROCEDURE — 71046 X-RAY EXAM CHEST 2 VIEWS: CPT

## 2020-01-28 PROCEDURE — 4040F PNEUMOC VAC/ADMIN/RCVD: CPT | Performed by: NURSE PRACTITIONER

## 2020-01-28 PROCEDURE — 99282 EMERGENCY DEPT VISIT SF MDM: CPT

## 2020-01-28 PROCEDURE — 99213 OFFICE O/P EST LOW 20 MIN: CPT | Performed by: NURSE PRACTITIONER

## 2020-01-28 PROCEDURE — 36600 WITHDRAWAL OF ARTERIAL BLOOD: CPT

## 2020-01-28 PROCEDURE — 36415 COLL VENOUS BLD VENIPUNCTURE: CPT

## 2020-01-28 PROCEDURE — G8427 DOCREV CUR MEDS BY ELIG CLIN: HCPCS | Performed by: NURSE PRACTITIONER

## 2020-01-28 ASSESSMENT — PATIENT HEALTH QUESTIONNAIRE - PHQ9
SUM OF ALL RESPONSES TO PHQ QUESTIONS 1-9: 0
2. FEELING DOWN, DEPRESSED OR HOPELESS: 0
1. LITTLE INTEREST OR PLEASURE IN DOING THINGS: 0
SUM OF ALL RESPONSES TO PHQ QUESTIONS 1-9: 0
SUM OF ALL RESPONSES TO PHQ9 QUESTIONS 1 & 2: 0

## 2020-01-28 NOTE — PROGRESS NOTES
Extremities warm to touch, pink, with no edema. DIAGNOSIS:    Diagnosis Orders   1. COPD exacerbation (HCC)  XR CHEST STANDARD (2 VW)    CBC Auto Differential    Comprehensive Metabolic Panel    ABG draw   2. Shortness of breath  Troponin         PLAN: Include orders in the DX section. Follow up as scheduled and as needed. Emend stat chest x-ray today as well as blood test results and ABGs. The patient is in visible respiratory distress using several accessory muscles and is not able to talk more than 1 word to 2 words at a time. This is much more significant of a finding than is baseline. He is transported to registration to have stat testing done in a wheelchair. Office to call with results when available. Please note this report has been partially produced using speech recognition software and may cause contain errors related to that system including grammar, punctuation and spelling as well as words and phrases that may seem inappropriate. If there are questions or concerns please feel free to contact me to clarify.       Electronically signed by Matthieu Sanford, 5:32 PM 1/28/20

## 2020-01-28 NOTE — RESULT ENCOUNTER NOTE
Please notify Robyne Cushing that CXR does not show evidence of pneumonia. Chronic changes with COPD noted.

## 2020-01-28 NOTE — RESULT ENCOUNTER NOTE
Please notify Jose Valier that lab results are stable overall. Follow up as scheduled and as needed.

## 2020-01-29 NOTE — ED PROVIDER NOTES
Adrenal adenoma     CT 6/2011 stable    Anxiety     Asbestosis(501)     CAD (coronary artery disease)     status post stent 2001    Cancer (HCC)     basil cell carnoma    COPD (chronic obstructive pulmonary disease) (HCC)     Elbow injury     left    Erectile dysfunction     Granulomatous lung disease (HCC)     Herpes zoster     Hyperlipidemia     Hypertension     Insomnia     Interstitial fibrosis     Obstructive sleep apnea on CPAP 11/13/2017    Prostate nodule     Scarlet fever     Urine frequency          SURGICALHISTORY       Past Surgical History:   Procedure Laterality Date    CARDIAC CATHETERIZATION      CARDIOVASCULAR STRESS TEST      1/2010 normal per patient    COLONOSCOPY      5/2009 tubular adenomas    COLONOSCOPY  08/13/15    LUZMARIA HOWE MD    EYE SURGERY  02/10/14    DR WOLFE  RT EYE    HERNIA REPAIR      LEG SURGERY           CURRENT MEDICATIONS       Previous Medications    ALBUTEROL-IPRATROPIUM (COMBIVENT RESPIMAT)  MCG/ACT AERS INHALER    Inhale 1 puff into the lungs every 6 hours    ASCORBIC ACID (VITAMIN C) 500 MG TABLET    Take 1,000 mg by mouth daily. 1/2 tab in AM and 1/2 tab in PM     ASPIRIN 81 MG TABLET    Take 81 mg by mouth daily    BETA CAROTENE 22485 UNIT CAPSULE    Take 25,000 Units by mouth daily. CALCIUM CARB-CHOLECALCIFEROL (CALCIUM 1000 + D) 1000-800 MG-UNIT TABS    Take 1 tablet by mouth 2 times daily. CARTIA  MG EXTENDED RELEASE CAPSULE    Take 120 capsules by mouth every evening     CEPHALEXIN (KEFLEX) 250 MG CAPSULE    Take 1 capsule by mouth 3 times daily    CINNAMON PO    Take  by mouth. DHEA 25 MG TABS    Take 25 mg by mouth daily.       DIGOXIN (LANOXIN) 250 MCG TABLET        DILTIAZEM (TIAZAC) 240 MG EXTENDED RELEASE CAPSULE    TAKE 1 CAPSULE BY MOUTH DAILY AS DIRECTED    DOFETILIDE (TIKOSYN) 250 MCG CAPSULE    Take 250 mcg by mouth 2 times daily    DOXYCYCLINE HYCLATE (VIBRAMYCIN) 100 MG CAPSULE    Take 1 capsule by mouth 2 times daily    ELIQUIS 5 MG TABS TABLET        FEXOFENADINE (ALLEGRA) 180 MG TABLET    Take 1 tablet by mouth daily    FOLIC ACID (FOLVITE) 257 MCG TABLET    Take 800 mcg by mouth daily. GLUCOSAMINE-CHONDROITIN 500-400 MG CAPS    Take 1 capsule by mouth daily. GLUTATHIONE PO    Take by mouth    GUAIFENESIN (MUCINEX) 600 MG EXTENDED RELEASE TABLET    Take 2 tablets by mouth 2 times daily for 10 days    HANDICAP PLACARD MISC    by Does not apply route Good for 5 Years from 06/16/2016    IPRATROPIUM-ALBUTEROL (DUONEB) 0.5-2.5 (3) MG/3ML SOLN NEBULIZER SOLUTION    Take 3 mLs by nebulization every 6 hours as needed for Shortness of Breath. LEVOFLOXACIN (LEVAQUIN) 750 MG TABLET    Take 1 tablet by mouth daily for 7 days    MAGNESIUM 500 MG TABS    Take by mouth 2 times daily    METHYLSULFONYLMETHANE (MSM) 1000 MG TABS    Take 1,000 mg by mouth daily. METOPROLOL SUCCINATE (TOPROL XL) 25 MG EXTENDED RELEASE TABLET    Take 25 mg by mouth every morning    METOPROLOL SUCCINATE (TOPROL XL) 50 MG EXTENDED RELEASE TABLET    Take 50 mg by mouth every evening    METOPROLOL TARTRATE (LOPRESSOR) 25 MG TABLET    Take 25 mg by mouth as needed    OXYGEN    Please provide patient with a portable oxygen concentrator    POTASSIUM 99 MG TABS    Take by mouth daily    PREDNISONE (DELTASONE) 10 MG TABLET    Take 6 tablets by mouth daily for 5 doses    PROAIR  (90 BASE) MCG/ACT INHALER        PYRIDOXINE HCL (VITAMIN B-6) 50 MG TABLET    Take 100 mg by mouth daily. ROSUVASTATIN (CRESTOR) 40 MG TABLET    Take 1 tablet by mouth daily    SAW PALMETTO    by Does not apply route. SELENIUM 200 MCG TABS    Take 200 mcg by mouth daily.       SYMBICORT 80-4.5 MCG/ACT AERO    USE 2 INHALATIONS TWICE A DAY    TADALAFIL (CIALIS) 5 MG TABLET    Take 1 tablet by mouth as needed for Erectile Dysfunction (maximum 5 mg per day)    TEMAZEPAM (RESTORIL) 15 MG CAPSULE    TAKE 2 CAPSULES BY MOUTH NIGHTLY AT BEDTIME AS NEEDED for sleep THIAMINE HCL (VITAMIN B-1) 100 MG TABLET    Take 100 mg by mouth daily. VITAMIN B-12 (CYANOCOBALAMIN) 1000 MCG TABLET    Take 1,000 mcg by mouth daily. VITAMIN D (CHOLECALCIFEROL) 400 UNIT TABS TABLET    Take 800 Units by mouth daily. VITAMIN E 400 UNIT CAPSULE    Take 400 Units by mouth daily. ZINC 50 MG CAPS    Take 50 mg by mouth daily. ALLERGIES     Brilinta [ticagrelor]; Sildenafil citrate;  Advair [fluticasone-salmeterol]; and Fluticasone propionate (inhal)    FAMILY HISTORY       Family History   Problem Relation Age of Onset    Cancer Other         colon cancer in multiple family members and breast cancer    Heart Disease Other           SOCIAL HISTORY       Social History     Socioeconomic History    Marital status:      Spouse name: None    Number of children: None    Years of education: None    Highest education level: None   Occupational History    None   Social Needs    Financial resource strain: None    Food insecurity:     Worry: None     Inability: None    Transportation needs:     Medical: None     Non-medical: None   Tobacco Use    Smoking status: Former Smoker     Packs/day: 2.00     Years: 40.00     Pack years: 80.00     Last attempt to quit: 2002     Years since quittin.4    Smokeless tobacco: Never Used   Substance and Sexual Activity    Alcohol use: No    Drug use: No    Sexual activity: Yes     Partners: Female   Lifestyle    Physical activity:     Days per week: None     Minutes per session: None    Stress: None   Relationships    Social connections:     Talks on phone: None     Gets together: None     Attends Hindu service: None     Active member of club or organization: None     Attends meetings of clubs or organizations: None     Relationship status: None    Intimate partner violence:     Fear of current or ex partner: None     Emotionally abused: None     Physically abused: None     Forced sexual activity: None   Other Topics Concern    None   Social History Narrative    None       SCREENINGS    Evy Coma Scale  Eye Opening: Spontaneous  Best Verbal Response: Oriented  Best Motor Response: Obeys commands  [place] Coma Scale Score: 15 @FLOW([id number])@      PHYSICAL EXAM    (up to 7 for level 4, 8 or more for level 5)     ED Triage Vitals [01/28/20 1914]   BP Temp Temp src Pulse Resp SpO2 Height Weight   (!) 170/85 98.1 °F (36.7 °C) -- 75 16 93 % 6' (1.829 m) 210 lb (95.3 kg)       Physical Exam     CONST: -Well-developed well-nourished ;                -In no acute distress. -Vitals reviewed. EYES: -EOM intact, JACQUES:              -Sclera normal and conjunctiva: clear bilaterally. ENT: - Normal pharynx pink and moist.    NECK: -Supple (chin-to-chest). CARD: -Rate and rhythm: Regular              -Murmurs: No  RESP: -Respiratory effort and chest excursion with respirations: Normal             -Breath sounds equal bilaterally: Very slight wheeze             -Wheezes: Very slight             -Rales: No    BACK: -Flank pain: No              -Pain on palpation: No    ABD: -Distended: No           -Bruits: No           -Bowel sounds: Normal.           -Deep palpation: Non-tender           -Organomegaly palpable: No           -Abnormal masses: No    EXT: Gross appearance and use of all four extremities: Normal     SKIN: -Good turgor warm and dry. -Apparent lesions or rashes: No    NEURO: -Patient: alert                -Oriented to: person, place and time.                 -Appearance and judgment: appropriate.                -Cranial Nerves: Normal.                -Speech: Normal          DIAGNOSTIC RESULTS     EKG: All EKG's are interpreted by the Emergency Department Physician who either signs or Co-signsthis chart in the absence of a cardiologist.        RADIOLOGY:   Non-plain filmimages such as CT, Ultrasound and MRI are read by the radiologist. Plain radiographic images are visualized and preliminarily interpreted by the emergency physician with the below findings:        Interpretation per the Radiologist below, if available at the time ofthis note:    No orders to display         ED BEDSIDE ULTRASOUND:   Performed by ED Physician - none    LABS:  Labs Reviewed - No data to display    All other labs were within normal range or not returned as of this dictation. EMERGENCY DEPARTMENT COURSE and DIFFERENTIAL DIAGNOSIS/MDM:   Vitals:    Vitals:    01/28/20 1914   BP: (!) 170/85   Pulse: 75   Resp: 16   Temp: 98.1 °F (36.7 °C)   SpO2: 93%   Weight: 210 lb (95.3 kg)   Height: 6' (1.829 m)           MDM     Blood gas shows low PO2 today, 58 but his CO2 is better than it usually is. His oxygen saturation on 2 L is 96% and he normally has between 3 and 5 L at home. He wishes to go home. He states that he will return for any further problems. I suspect there may be an element of mixed venous blood blood gas during today, he declined another blood gas    CRITICAL CARE TIME   Total Critical Care time was  minutes, excluding separately reportableprocedures. There was a high probability of clinicallysignificant/life threatening deterioration in the patient's condition which required my urgent intervention. CONSULTS:  None    PROCEDURES:  Unless otherwise noted below, none     Procedures    FINAL IMPRESSION      1. Abnormal blood-gas level    2. Chronic obstructive pulmonary disease, unspecified COPD type ([facility] [address]    3.  Acute bronchitis, unspecified organism          DISPOSITION/PLAN   DISPOSITION Decision To Discharge 01/28/2020 08:42:33 PM      PATIENT REFERRED TO:  TRINA Wolf - CNP  [facility] [address]  [phone]    In 2 days  Return for weakening, worsening, chest pain, fever      DISCHARGE MEDICATIONS:  New Prescriptions    No medications on file          (Please note that portions of this note were completed with a voice recognition program.  Efforts were made to edit the dictations but occasionally words are mis-transcribed.)    Rufina Elizabeth MD (electronically signed)  Attending Emergency Physician          Rufina Elizabeth MD  01/28/20 0636

## 2020-01-30 ENCOUNTER — APPOINTMENT (OUTPATIENT)
Dept: PULMONOLOGY | Age: 80
End: 2020-01-30
Payer: MEDICARE

## 2020-01-30 ENCOUNTER — CARE COORDINATION (OUTPATIENT)
Dept: CARE COORDINATION | Age: 80
End: 2020-01-30

## 2020-01-30 NOTE — CARE COORDINATION
Historical Provider, MD       Future Appointments   Date Time Provider Valentín Pekrins   2/4/2020 11:30 AM Crow Gutiérrez [address] [id number] [facility]   2/6/2020 11:30  [address]   2/11/2020 11:30  [address]   2/13/2020 11:30  [address]   2/25/2020  2:00 PM Mendel Bloodgood Riedy, APRN - CNP [facility]   3/3/2020  2:45 PM Briseida Galeas MD [facility]

## 2020-01-30 NOTE — LETTER
1/30/2020       Dianne Loja  [id number] [address] [facility] [address]      Dear Dianne Loja,    My name is Dayton Mott and I am a registered nurse who partners with TRINA Ewing CNP to improve patients' health. TRINA Ewing CNP believes you would benefit from working with me. As a member of your health care team, I would work with other providers involved in your care, offer education for your specific health conditions, and connect you with additional resources as needed. I will collaborate with TRINA Ewing CNP to support you in following your treatment plan. The additional support I provide is no additional cost to you. My primary focus is to help you achieve specific goals and improve your health. We are committed to walk with you on this journey and look forward to working with you. Please call me to further discuss your healthcare needs. I am available by phone or for appointments at the office. You can reach me at [phone].         In good health,           Dayton Mott RN

## 2020-02-04 ENCOUNTER — HOSPITAL ENCOUNTER (OUTPATIENT)
Dept: PULMONOLOGY | Age: 80
Setting detail: THERAPIES SERIES
Discharge: HOME | End: 2020-02-04
Payer: MEDICARE

## 2020-02-04 PROCEDURE — G0424 PULMONARY REHAB W EXER: HCPCS

## 2020-02-06 ENCOUNTER — CARE COORDINATION (OUTPATIENT)
Dept: CARE COORDINATION | Age: 80
End: 2020-02-06

## 2020-02-06 ENCOUNTER — TELEPHONE (OUTPATIENT)
Dept: PHARMACY | Facility: CLINIC | Age: 80
End: 2020-02-06

## 2020-02-06 ENCOUNTER — APPOINTMENT (OUTPATIENT)
Dept: PULMONOLOGY | Age: 80
End: 2020-02-06
Payer: MEDICARE

## 2020-02-06 NOTE — LETTER
Suraj Alvarez   [id number]  [address]           02/07/20     Dear Jose Caicedo are eligible for a complete medication therapy review performed by a [facility] licensed clinical pharmacist. This review helps ensure that you are getting the most benefit from the medications you receive and includes the following:  ? Review of your medications, including over-the-counter and herbal medications. ? Answering questions about your medications and how to get the most benefit from them. ? Identifying and helping to prevent potential drug interactions or side effects. ? Possibly identifying less costly alternatives that are equally effective. Under this program, [facility] will work with you and your doctor to manage your drug therapy. Please contact the [address] [place] [address] office to set up a time for your medication review with one of our clinical pharmacists. To contact us call [phone] or [phone], and select Option 7. This will be a phone consult and therefore will not require a trip to the medical office. Please note: This is an optional program.  It is a free service provided to help ensure that your medicines are safe, necessary, and effective. Your participation is encouraged, but not required.     Sincerely,  Jeri R GENOVEVA Denney   Department, toll free: [phone], option 7

## 2020-02-06 NOTE — TELEPHONE ENCOUNTER
----- Message from Jackeline Gomez RN sent at 2/6/2020 11:31 AM EST -----  Regarding: Medication Reconcilliation  Please review medications and make any necessary recommendations.   Thank you so much,  Negro Irene

## 2020-02-11 ENCOUNTER — APPOINTMENT (OUTPATIENT)
Dept: PULMONOLOGY | Age: 80
End: 2020-02-11
Payer: MEDICARE

## 2020-02-11 ENCOUNTER — CARE COORDINATION (OUTPATIENT)
Dept: CARE COORDINATION | Age: 80
End: 2020-02-11

## 2020-02-11 ENCOUNTER — HOSPITAL ENCOUNTER (OUTPATIENT)
Dept: PULMONOLOGY | Age: 80
Setting detail: THERAPIES SERIES
Discharge: HOME | End: 2020-02-11
Payer: MEDICARE

## 2020-02-11 ENCOUNTER — HOSPITAL ENCOUNTER (OUTPATIENT)
Dept: CARDIAC REHAB | Age: 80
Setting detail: THERAPIES SERIES
Discharge: HOME | End: 2020-02-11
Payer: MEDICARE

## 2020-02-11 PROCEDURE — G0424 PULMONARY REHAB W EXER: HCPCS

## 2020-02-11 SDOH — HEALTH STABILITY: PHYSICAL HEALTH: ON AVERAGE, HOW MANY MINUTES DO YOU ENGAGE IN EXERCISE AT THIS LEVEL?: 0 MIN

## 2020-02-11 SDOH — ECONOMIC STABILITY: TRANSPORTATION INSECURITY
IN THE PAST 12 MONTHS, HAS THE LACK OF TRANSPORTATION KEPT YOU FROM MEDICAL APPOINTMENTS OR FROM GETTING MEDICATIONS?: NO

## 2020-02-11 SDOH — SOCIAL STABILITY: SOCIAL NETWORK: HOW OFTEN DO YOU ATTENT MEETINGS OF THE CLUB OR ORGANIZATION YOU BELONG TO?: NEVER

## 2020-02-11 SDOH — HEALTH STABILITY: PHYSICAL HEALTH: ON AVERAGE, HOW MANY DAYS PER WEEK DO YOU ENGAGE IN MODERATE TO STRENUOUS EXERCISE (LIKE A BRISK WALK)?: 0 DAYS

## 2020-02-11 SDOH — SOCIAL STABILITY: SOCIAL NETWORK: ARE YOU MARRIED, WIDOWED, DIVORCED, SEPARATED, NEVER MARRIED, OR LIVING WITH A PARTNER?: DIVORCED

## 2020-02-11 SDOH — SOCIAL STABILITY: SOCIAL NETWORK
DO YOU BELONG TO ANY CLUBS OR ORGANIZATIONS SUCH AS CHURCH GROUPS UNIONS, FRATERNAL OR ATHLETIC GROUPS, OR SCHOOL GROUPS?: NO

## 2020-02-11 SDOH — ECONOMIC STABILITY: FOOD INSECURITY: WITHIN THE PAST 12 MONTHS, YOU WORRIED THAT YOUR FOOD WOULD RUN OUT BEFORE YOU GOT MONEY TO BUY MORE.: SOMETIMES TRUE

## 2020-02-11 SDOH — ECONOMIC STABILITY: TRANSPORTATION INSECURITY
IN THE PAST 12 MONTHS, HAS LACK OF TRANSPORTATION KEPT YOU FROM MEETINGS, WORK, OR FROM GETTING THINGS NEEDED FOR DAILY LIVING?: NO

## 2020-02-11 SDOH — SOCIAL STABILITY: SOCIAL NETWORK
IN A TYPICAL WEEK, HOW MANY TIMES DO YOU TALK ON THE PHONE WITH FAMILY, FRIENDS, OR NEIGHBORS?: MORE THAN THREE TIMES A WEEK

## 2020-02-11 SDOH — SOCIAL STABILITY: SOCIAL NETWORK: HOW OFTEN DO YOU GET TOGETHER WITH FRIENDS OR RELATIVES?: MORE THAN THREE TIMES A WEEK

## 2020-02-11 SDOH — ECONOMIC STABILITY: FOOD INSECURITY: WITHIN THE PAST 12 MONTHS, THE FOOD YOU BOUGHT JUST DIDN'T LAST AND YOU DIDN'T HAVE MONEY TO GET MORE.: SOMETIMES TRUE

## 2020-02-11 SDOH — ECONOMIC STABILITY: INCOME INSECURITY: HOW HARD IS IT FOR YOU TO PAY FOR THE VERY BASICS LIKE FOOD, HOUSING, MEDICAL CARE, AND HEATING?: SOMEWHAT HARD

## 2020-02-11 SDOH — SOCIAL STABILITY: SOCIAL NETWORK: HOW OFTEN DO YOU ATTEND CHURCH OR RELIGIOUS SERVICES?: NEVER

## 2020-02-11 SDOH — HEALTH STABILITY: MENTAL HEALTH
STRESS IS WHEN SOMEONE FEELS TENSE, NERVOUS, ANXIOUS, OR CAN'T SLEEP AT NIGHT BECAUSE THEIR MIND IS TROUBLED. HOW STRESSED ARE YOU?: TO SOME EXTENT

## 2020-02-11 ASSESSMENT — PATIENT HEALTH QUESTIONNAIRE - PHQ9: SUM OF ALL RESPONSES TO PHQ QUESTIONS 1-9: 5

## 2020-02-11 NOTE — CARE COORDINATION
Breath. 5/17/12   Isabel Maloney MD   Glucosamine-Chondroitin 500-400 MG CAPS Take 1 capsule by mouth daily. Historical Provider, MD   Ascorbic Acid (VITAMIN C) 500 MG tablet Take 1,000 mg by mouth daily. 1/2 tab in AM and 1/2 tab in PM     Historical MD Rachel   vitamin D (CHOLECALCIFEROL) 400 UNIT TABS tablet Take 800 Units by mouth daily. Historical MD Rachel   vitamin B-12 (CYANOCOBALAMIN) 1000 MCG tablet Take 1,000 mcg by mouth daily. Historical Provider, MD   Selenium 200 MCG TABS Take 200 mcg by mouth daily. Historical Provider, MD   Thiamine HCl (VITAMIN B-1) 100 MG tablet Take 100 mg by mouth daily. Historical Provider, MD   folic acid (FOLVITE) 761 MCG tablet Take 800 mcg by mouth daily. Historical Provider, MD   Zinc 50 MG CAPS Take 50 mg by mouth daily. Historical Provider, MD   beta carotene 15301 UNIT capsule Take 25,000 Units by mouth daily. Historical Provider, MD   DHEA 25 MG TABS Take 25 mg by mouth daily. Historical Provider, MD   Pyridoxine HCl (VITAMIN B-6) 50 MG tablet Take 100 mg by mouth daily. Historical Provider, MD   Calcium Carb-Cholecalciferol (CALCIUM 1000 + D) 1000-800 MG-UNIT TABS Take 1 tablet by mouth 2 times daily. Historical Provider, MD   Methylsulfonylmethane (MSM) 1000 MG TABS Take 1,000 mg by mouth daily. Historical Provider, MD   vitamin E 400 UNIT capsule Take 400 Units by mouth daily.       Historical Provider, MD       Future Appointments   Date Time Provider Valentín Perkins   2/11/2020 11:30 AM [address]   2/13/2020 11:30 AM [address] [place] [address]   2/25/2020  2:00 PM Braden Ahumada Riedy, APRN - [facility]val[facility]   3/3/2020  2:45 PM Boo Rasmussen MD [address] [place] [address]

## 2020-02-18 ENCOUNTER — HOSPITAL ENCOUNTER (OUTPATIENT)
Dept: PULMONOLOGY | Age: 80
Setting detail: THERAPIES SERIES
Discharge: HOME | End: 2020-02-18
Payer: MEDICARE

## 2020-02-18 PROCEDURE — G0424 PULMONARY REHAB W EXER: HCPCS

## 2020-02-20 ENCOUNTER — HOSPITAL ENCOUNTER (OUTPATIENT)
Dept: PULMONOLOGY | Age: 80
Setting detail: THERAPIES SERIES
Discharge: HOME | End: 2020-02-20
Payer: MEDICARE

## 2020-02-20 ENCOUNTER — CARE COORDINATION (OUTPATIENT)
Dept: FAMILY MEDICINE CLINIC | Age: 80
End: 2020-02-20

## 2020-02-20 PROCEDURE — G0424 PULMONARY REHAB W EXER: HCPCS

## 2020-02-20 ASSESSMENT — ENCOUNTER SYMPTOMS: DYSPNEA ASSOCIATED WITH: EXERTION

## 2020-02-20 NOTE — CARE COORDINATION
Ambulatory Care Coordination Note  2/20/2020  CM Risk Score: 7  Charlson 10 Year Mortality Risk Score: 98%     ACC: Aniceto Hernandes RN    Summary Note:     Care Coordination assessment continues. He is currently on his way to pulmonary rehab. He does we have reviewed COPD, diabetes (which he states he is pre diabetic  Last A1C in November was 7.4%. Last blood sugar 162 mg/dl. He tells me that with regards to his \"prediabetes; he \"Just watch my diet. \"  We have discussed goals. He is unsure about how to use the zone tools and we will discuss in detail   He does get very winded with talking and he tells me that his normal for him. He does tell me that the pulmonary rehab is helping with his breathing and making sure that he is breathing correctly. He is using his inhalers and he feels that he is using them correctly. We should be able to complete the assessment during our next encounter. Care Coordination Interventions    Program Enrollment:  Complex Care  Referral from Primary Care Provider:  No  Suggested Interventions and Community Resources  Disease Association: In Process  Pharmacist:  In Process  Pulmonary Rehab: In Process  Zone Management Tools: In Process  Other Services or Interventions:  Pharmacy Referral intiated, COPD zone tool education initiated, Walk in Clinic usage and hours discussed         Goals Addressed                 This Visit's Progress     Conditions and Symptoms        I will schedule office visits, as directed by my provider. I will keep my appointment or reschedule if I have to cancel. I will notify my provider of any barriers to my plan of care. I will follow my Zone Management tool to seek urgent or emergent care. I will notify my provider of any symptoms that indicate a worsening of my condition.     Barriers: lack of support, overwhelmed by complexity of regimen and lack of education  Plan for overcoming my barriers: I will work with my ACM to increase my knowledge and self management skills for my COPD and overall health  Confidence: 8/10  Anticipated Goal Completion Date: 5/30/20            Prior to Admission medications    Medication Sig Start Date End Date Taking?  Authorizing Provider   OXYGEN Please provide patient with a portable oxygen concentrator 3/2/18  Yes Shannon Salmon PA-C   rosuvastatin (CRESTOR) 40 MG tablet Take 1 tablet by mouth daily 1/13/20   TRINA Rain CNP   temazepam (RESTORIL) 15 MG capsule TAKE 2 CAPSULES BY MOUTH NIGHTLY AT BEDTIME AS NEEDED for sleep 11/26/19 2/24/20  TRINA Rain CNP   cephALEXin (KEFLEX) 250 MG capsule Take 1 capsule by mouth 3 times daily 11/26/19   TRINA Rain CNP   albuterol-ipratropium (COMBIVENT RESPIMAT)  MCG/ACT AERS inhaler Inhale 1 puff into the lungs every 6 hours 8/27/19   TRINA Rain CNP   SYMBICORT 80-4.5 MCG/ACT AERO USE 2 INHALATIONS TWICE A DAY 6/19/19   Negrito Rousseau MD   fexofenadine (ALLEGRA) 180 MG tablet Take 1 tablet by mouth daily 5/16/19   TRINA Rain CNP   metoprolol succinate (TOPROL XL) 25 MG extended release tablet Take 25 mg by mouth every morning    Historical Provider, MD   metoprolol succinate (TOPROL XL) 50 MG extended release tablet Take 50 mg by mouth every evening    Historical Provider, MD   aspirin 81 MG tablet Take 81 mg by mouth daily    Historical Provider, MD   diltiazem (TIAZAC) 240 MG extended release capsule TAKE 1 CAPSULE BY MOUTH DAILY AS DIRECTED 4/17/18   Historical Provider, MD   CARTIA  MG extended release capsule Take 120 capsules by mouth every evening  3/29/18   Historical Provider, MD   dofetilide (TIKOSYN) 250 MCG capsule Take 250 mcg by mouth 2 times daily    Historical Provider, MD   doxycycline hyclate (VIBRAMYCIN) 100 MG capsule Take 1 capsule by mouth 2 times daily 2/13/18   TRINA Rain CNP   Magnesium 500 MG TABS Take by mouth 2 times daily    Historical Provider, MD   Potassium 99 MG

## 2020-02-24 ENCOUNTER — HOSPITAL ENCOUNTER (OUTPATIENT)
Dept: LAB | Age: 80
Discharge: HOME | End: 2020-02-24
Payer: MEDICARE

## 2020-02-24 LAB
ALBUMIN SERPL-MCNC: 3.8 G/DL (ref 3.5–4.6)
ALP BLD-CCNC: 87 U/L (ref 35–104)
ALT SERPL-CCNC: 21 U/L (ref 0–41)
ANION GAP SERPL CALCULATED.3IONS-SCNC: 11 MEQ/L (ref 9–15)
AST SERPL-CCNC: 21 U/L (ref 0–40)
ATYPICAL LYMPHOCYTE RELATIVE PERCENT: 2 %
BASOPHILS ABSOLUTE: 0 K/UL (ref 0–0.2)
BASOPHILS RELATIVE PERCENT: 0.3 %
BILIRUB SERPL-MCNC: 0.5 MG/DL (ref 0.2–0.7)
BUN BLDV-MCNC: 14 MG/DL (ref 8–23)
CALCIUM SERPL-MCNC: 9.5 MG/DL (ref 8.5–9.9)
CHLORIDE BLD-SCNC: 97 MEQ/L (ref 95–107)
CHOLESTEROL, TOTAL: 125 MG/DL (ref 0–199)
CO2: 33 MEQ/L (ref 20–31)
CREAT SERPL-MCNC: 0.67 MG/DL (ref 0.7–1.2)
CREATININE URINE: 85.2 MG/DL
EOSINOPHILS ABSOLUTE: 0.3 K/UL (ref 0–0.7)
EOSINOPHILS RELATIVE PERCENT: 5 %
GFR AFRICAN AMERICAN: >60
GFR NON-AFRICAN AMERICAN: >60
GLOBULIN: 3.5 G/DL (ref 2.3–3.5)
GLUCOSE BLD-MCNC: 177 MG/DL (ref 70–99)
HBA1C MFR BLD: 7.3 % (ref 4.8–5.9)
HCT VFR BLD CALC: 43.4 % (ref 42–52)
HDLC SERPL-MCNC: 37 MG/DL (ref 40–59)
HEMOGLOBIN: 14.2 G/DL (ref 14–18)
LDL CHOLESTEROL CALCULATED: 56 MG/DL (ref 0–129)
LYMPHOCYTES ABSOLUTE: 1.1 K/UL (ref 1–4.8)
LYMPHOCYTES RELATIVE PERCENT: 15 %
MCH RBC QN AUTO: 29.2 PG (ref 27–31.3)
MCHC RBC AUTO-ENTMCNC: 32.6 % (ref 33–37)
MCV RBC AUTO: 89.6 FL (ref 80–100)
MICROALBUMIN UR-MCNC: 2.6 MG/DL
MICROALBUMIN/CREAT UR-RTO: 30.5 MG/G (ref 0–30)
MONOCYTES ABSOLUTE: 0.7 K/UL (ref 0.2–0.8)
MONOCYTES RELATIVE PERCENT: 9.7 %
NEUTROPHILS ABSOLUTE: 4.6 K/UL (ref 1.4–6.5)
NEUTROPHILS RELATIVE PERCENT: 69 %
PDW BLD-RTO: 14.1 % (ref 11.5–14.5)
PLATELET # BLD: 180 K/UL (ref 130–400)
PLATELET SLIDE REVIEW: NORMAL
POTASSIUM SERPL-SCNC: 4.7 MEQ/L (ref 3.4–4.9)
PROSTATE SPECIFIC ANTIGEN: 7.21 NG/ML (ref 0–6.22)
RBC # BLD: 4.84 M/UL (ref 4.7–6.1)
RBC # BLD: NORMAL 10*6/UL
SLIDE REVIEW: ABNORMAL
SODIUM BLD-SCNC: 141 MEQ/L (ref 135–144)
TOTAL PROTEIN: 7.3 G/DL (ref 6.3–8)
TRIGL SERPL-MCNC: 161 MG/DL (ref 0–150)
WBC # BLD: 6.6 K/UL (ref 4.8–10.8)

## 2020-02-24 PROCEDURE — 85025 COMPLETE CBC W/AUTO DIFF WBC: CPT

## 2020-02-24 PROCEDURE — 80061 LIPID PANEL: CPT

## 2020-02-24 PROCEDURE — 82570 ASSAY OF URINE CREATININE: CPT

## 2020-02-24 PROCEDURE — 80053 COMPREHEN METABOLIC PANEL: CPT

## 2020-02-24 PROCEDURE — 84153 ASSAY OF PSA TOTAL: CPT

## 2020-02-24 PROCEDURE — 82043 UR ALBUMIN QUANTITATIVE: CPT

## 2020-02-24 PROCEDURE — 83036 HEMOGLOBIN GLYCOSYLATED A1C: CPT

## 2020-02-24 PROCEDURE — 36415 COLL VENOUS BLD VENIPUNCTURE: CPT

## 2020-02-25 ENCOUNTER — OFFICE VISIT (OUTPATIENT)
Dept: FAMILY MEDICINE CLINIC | Age: 80
End: 2020-02-25
Payer: MEDICARE

## 2020-02-25 ENCOUNTER — HOSPITAL ENCOUNTER (OUTPATIENT)
Age: 80
Setting detail: SPECIMEN
Discharge: HOME | End: 2020-02-25
Payer: MEDICARE

## 2020-02-25 VITALS
HEIGHT: 72 IN | SYSTOLIC BLOOD PRESSURE: 120 MMHG | TEMPERATURE: 98 F | DIASTOLIC BLOOD PRESSURE: 60 MMHG | WEIGHT: 202 LBS | BODY MASS INDEX: 27.36 KG/M2 | OXYGEN SATURATION: 94 % | HEART RATE: 73 BPM | RESPIRATION RATE: 18 BRPM

## 2020-02-25 PROCEDURE — 3023F SPIROM DOC REV: CPT | Performed by: NURSE PRACTITIONER

## 2020-02-25 PROCEDURE — G8427 DOCREV CUR MEDS BY ELIG CLIN: HCPCS | Performed by: NURSE PRACTITIONER

## 2020-02-25 PROCEDURE — 1123F ACP DISCUSS/DSCN MKR DOCD: CPT | Performed by: NURSE PRACTITIONER

## 2020-02-25 PROCEDURE — 3051F HG A1C>EQUAL 7.0%<8.0%: CPT | Performed by: NURSE PRACTITIONER

## 2020-02-25 PROCEDURE — G8926 SPIRO NO PERF OR DOC: HCPCS | Performed by: NURSE PRACTITIONER

## 2020-02-25 PROCEDURE — G8482 FLU IMMUNIZE ORDER/ADMIN: HCPCS | Performed by: NURSE PRACTITIONER

## 2020-02-25 PROCEDURE — 99214 OFFICE O/P EST MOD 30 MIN: CPT | Performed by: NURSE PRACTITIONER

## 2020-02-25 PROCEDURE — G8417 CALC BMI ABV UP PARAM F/U: HCPCS | Performed by: NURSE PRACTITIONER

## 2020-02-25 PROCEDURE — 80307 DRUG TEST PRSMV CHEM ANLYZR: CPT

## 2020-02-25 PROCEDURE — 1036F TOBACCO NON-USER: CPT | Performed by: NURSE PRACTITIONER

## 2020-02-25 PROCEDURE — 4040F PNEUMOC VAC/ADMIN/RCVD: CPT | Performed by: NURSE PRACTITIONER

## 2020-02-25 RX ORDER — BUSPIRONE HYDROCHLORIDE 30 MG/1
30 TABLET ORAL DAILY
Qty: 30 TABLET | Refills: 1 | Status: SHIPPED | OUTPATIENT
Start: 2020-02-25 | End: 2020-05-28 | Stop reason: SDUPTHER

## 2020-02-25 RX ORDER — DOXYCYCLINE HYCLATE 100 MG/1
CAPSULE ORAL
COMMUNITY
Start: 2018-02-13 | End: 2020-02-25 | Stop reason: SDUPTHER

## 2020-02-25 RX ORDER — TEMAZEPAM 15 MG/1
CAPSULE ORAL
Qty: 60 CAPSULE | Refills: 2 | Status: SHIPPED | OUTPATIENT
Start: 2020-02-25 | End: 2020-05-22 | Stop reason: SDUPTHER

## 2020-02-25 RX ORDER — DOXYCYCLINE HYCLATE 100 MG/1
100 CAPSULE ORAL 2 TIMES DAILY
Qty: 20 CAPSULE | Refills: 0 | Status: SHIPPED | OUTPATIENT
Start: 2020-02-25 | End: 2021-10-21 | Stop reason: SDUPTHER

## 2020-02-25 ASSESSMENT — PATIENT HEALTH QUESTIONNAIRE - PHQ9
SUM OF ALL RESPONSES TO PHQ QUESTIONS 1-9: 0
SUM OF ALL RESPONSES TO PHQ9 QUESTIONS 1 & 2: 0
2. FEELING DOWN, DEPRESSED OR HOPELESS: 0
1. LITTLE INTEREST OR PLEASURE IN DOING THINGS: 0
SUM OF ALL RESPONSES TO PHQ QUESTIONS 1-9: 0

## 2020-02-25 NOTE — PROGRESS NOTES
system including grammar, punctuation and spelling as well as words and phrases that may seem inappropriate. If there are questions or concerns please feel free to contact me to clarify.     Electronically signed by Mendel [facility], 3:03 PM 2/25/20

## 2020-02-28 LAB
6-ACETYLMORPHINE: NOT DETECTED
7-AMINOCLONAZEPAM: NOT DETECTED
ALPHA-OH-ALPRAZOLAM: NOT DETECTED
ALPRAZOLAM: NOT DETECTED
AMPHETAMINE: NOT DETECTED
BARBITURATES: NOT DETECTED
BENZOYLECGONINE: NOT DETECTED
BUPRENORPHINE: NOT DETECTED
CARISOPRODOL: NOT DETECTED
CLONAZEPAM: NOT DETECTED
CODEINE: NOT DETECTED
CREATININE URINE: 90.3 MG/DL (ref 20–400)
DIAZEPAM: NOT DETECTED
EER PAIN MGT DRUG PANEL, HIGH RES/EMIT U: NORMAL
ETHYL GLUCURONIDE: NOT DETECTED
FENTANYL: NOT DETECTED
HYDROCODONE: NOT DETECTED
HYDROMORPHONE: NOT DETECTED
LORAZEPAM: NOT DETECTED
MARIJUANA METABOLITE: NOT DETECTED
MDA: NOT DETECTED
MDEA: NOT DETECTED
MDMA URINE: NOT DETECTED
MEPERIDINE: NOT DETECTED
METHADONE: NOT DETECTED
METHAMPHETAMINE: NOT DETECTED
METHYLPHENIDATE: NOT DETECTED
MIDAZOLAM: NOT DETECTED
MORPHINE: NOT DETECTED
NORBUPRENORPHINE, FREE: NOT DETECTED
NORDIAZEPAM: NOT DETECTED
NORFENTANYL: NOT DETECTED
NORHYDROCODONE, URINE: NOT DETECTED
NOROXYCODONE: NOT DETECTED
NOROXYMORPHONE, URINE: NOT DETECTED
OXAZEPAM: PRESENT
OXYCODONE: NOT DETECTED
OXYMORPHONE: NOT DETECTED
PAIN MANAGEMENT DRUG PANEL: NORMAL
PCP: NOT DETECTED
PHENTERMINE: NOT DETECTED
PROPOXYPHENE: NOT DETECTED
TAPENTADOL, URINE: NOT DETECTED
TAPENTADOL-O-SULFATE, URINE: NOT DETECTED
TEMAZEPAM: PRESENT
TRAMADOL: NOT DETECTED
ZOLPIDEM: NOT DETECTED

## 2020-02-28 RX ORDER — DILTIAZEM HYDROCHLORIDE 60 MG/1
TABLET, FILM COATED ORAL
Qty: 30.6 G | Refills: 4 | Status: SHIPPED | OUTPATIENT
Start: 2020-02-28 | End: 2021-06-03 | Stop reason: SDUPTHER

## 2020-03-03 ENCOUNTER — OFFICE VISIT (OUTPATIENT)
Dept: PULMONOLOGY | Age: 80
End: 2020-03-03
Payer: MEDICARE

## 2020-03-03 VITALS
OXYGEN SATURATION: 98 % | BODY MASS INDEX: 28.66 KG/M2 | RESPIRATION RATE: 18 BRPM | HEART RATE: 67 BPM | WEIGHT: 211.6 LBS | TEMPERATURE: 96.9 F | HEIGHT: 72 IN | SYSTOLIC BLOOD PRESSURE: 136 MMHG | DIASTOLIC BLOOD PRESSURE: 62 MMHG

## 2020-03-03 PROCEDURE — 1036F TOBACCO NON-USER: CPT | Performed by: INTERNAL MEDICINE

## 2020-03-03 PROCEDURE — 3023F SPIROM DOC REV: CPT | Performed by: INTERNAL MEDICINE

## 2020-03-03 PROCEDURE — 99214 OFFICE O/P EST MOD 30 MIN: CPT | Performed by: INTERNAL MEDICINE

## 2020-03-03 PROCEDURE — 4040F PNEUMOC VAC/ADMIN/RCVD: CPT | Performed by: INTERNAL MEDICINE

## 2020-03-03 PROCEDURE — G8482 FLU IMMUNIZE ORDER/ADMIN: HCPCS | Performed by: INTERNAL MEDICINE

## 2020-03-03 PROCEDURE — G8926 SPIRO NO PERF OR DOC: HCPCS | Performed by: INTERNAL MEDICINE

## 2020-03-03 PROCEDURE — 1123F ACP DISCUSS/DSCN MKR DOCD: CPT | Performed by: INTERNAL MEDICINE

## 2020-03-03 PROCEDURE — G8427 DOCREV CUR MEDS BY ELIG CLIN: HCPCS | Performed by: INTERNAL MEDICINE

## 2020-03-03 PROCEDURE — G8417 CALC BMI ABV UP PARAM F/U: HCPCS | Performed by: INTERNAL MEDICINE

## 2020-03-03 RX ORDER — PHENOL 1.4 %
AEROSOL, SPRAY (ML) MUCOUS MEMBRANE
COMMUNITY

## 2020-03-03 RX ORDER — PREDNISONE 10 MG/1
TABLET ORAL
Qty: 40 TABLET | Refills: 1 | Status: SHIPPED | OUTPATIENT
Start: 2020-03-03 | End: 2020-03-03 | Stop reason: SDUPTHER

## 2020-03-03 RX ORDER — PREDNISONE 10 MG/1
TABLET ORAL
Qty: 40 TABLET | Refills: 1 | Status: SHIPPED | OUTPATIENT
Start: 2020-03-03 | End: 2021-11-17

## 2020-03-03 ASSESSMENT — ENCOUNTER SYMPTOMS
SORE THROAT: 0
NAUSEA: 0
WHEEZING: 1
DIARRHEA: 0
CHEST TIGHTNESS: 0
RHINORRHEA: 1
COUGH: 1
SINUS PRESSURE: 0
VOMITING: 0
ABDOMINAL PAIN: 0
SHORTNESS OF BREATH: 1

## 2020-03-03 NOTE — PROGRESS NOTES
Subjective:     Mehdi Small is a [de-identified] y.o. male who complains today of:     Chief Complaint   Patient presents with    Follow-up     3 Month F/U for COPD and Chronic Respiratory failure with Hypoxia and Hypercapnia on O2       HPI  Patient is here for a follow-up visit regarding advanced COPD and chronic hypercapnic respiratory failure. Since the last visit patient remained stable with current treatment using his trilogy at night and doing reasonably well during the day. He did have to use his prednisone taper and antibiotic for an exacerbation with improvement. He was not interested in changing from that Trelegy to life 2000 ambulatory noninvasive ventilator. He continues to go to rehab. Allergies:  Brilinta [ticagrelor]; Sildenafil citrate;  Advair [fluticasone-salmeterol]; and Fluticasone propionate (inhal)  Past Medical History:   Diagnosis Date    Adrenal adenoma     CT 6/2011 stable    Anxiety     Asbestosis(501)     CAD (coronary artery disease)     status post stent 2001    Cancer ([address]     basil cell carnoma    COPD (chronic obstructive pulmonary disease) (HCC)     Elbow injury     left    Erectile dysfunction     Granulomatous lung disease (HCC)     Herpes zoster     Hyperlipidemia     Hypertension     Insomnia     Interstitial fibrosis     Obstructive sleep apnea on CPAP 11/13/2017    Prostate nodule     Scarlet fever     Urine frequency      Past Surgical History:   Procedure Laterality Date    CARDIAC CATHETERIZATION      CARDIOVASCULAR STRESS TEST      1/2010 normal per patient    COLONOSCOPY      5/2009 tubular adenomas    COLONOSCOPY  08/13/15    David Mcnamara MD    EYE SURGERY  02/10/14    DR Marbella Acuña  RT EYE    HERNIA REPAIR      LEG SURGERY       Family History   Problem Relation Age of Onset    Cancer Other         colon cancer in multiple family members and breast cancer    Heart Disease Other      Social History     Socioeconomic History    Marital status: Genitourinary: Negative for difficulty urinating and hematuria. Musculoskeletal: Negative for arthralgias, joint swelling and myalgias. Skin: Negative for rash. Allergic/Immunologic: Negative for environmental allergies. Neurological: Negative for dizziness, tremors, weakness and headaches. Psychiatric/Behavioral: Positive for sleep disturbance. Negative for behavioral problems. Objective:     Vitals:    03/03/20 1441   BP: 136/62   Site: Right Upper Arm   Position: Sitting   Cuff Size: Medium Adult   Pulse: 67   Resp: 18   Temp: 96.9 °F (36.1 °C)   TempSrc: Tympanic   SpO2: 98%   Weight: 211 lb 9.6 oz (96 kg)   Height: 6' (1.829 m)         Physical Exam  Constitutional:       Appearance: He is well-developed. HENT:      Head: Normocephalic and atraumatic. Eyes:      Pupils: Pupils are equal, round, and reactive to light. Neck:      Musculoskeletal: Normal range of motion and neck supple. Thyroid: No thyromegaly. Vascular: No JVD. Trachea: No tracheal deviation. Cardiovascular:      Rate and Rhythm: Normal rate and regular rhythm. Heart sounds: No murmur. No gallop. Pulmonary:      Effort: Pulmonary effort is normal.      Breath sounds: Normal breath sounds. No wheezing or rales. Comments: Diminished breath sounds otherwise clear to auscultation  Chest:      Chest wall: No tenderness. Abdominal:      General: There is no distension. Musculoskeletal: Normal range of motion. Skin:     General: Skin is warm and dry. Findings: No rash. Neurological:      Mental Status: He is alert and oriented to person, place, and time. Deep Tendon Reflexes: Reflexes are normal and symmetric. Assessment:      Diagnosis Orders   1. Chronic obstructive pulmonary disease, unspecified COPD type ([address]     2. Chronic respiratory failure with hypoxia and hypercapnia (HCC)     3. Pulmonary HTN ([address]     4.  Obstructive sleep apnea       Discussed all treatment details again today. He was encouraged to continue regular exercise, noninvasive ventilator at night, I gave him additional courses of prednisone and Levaquin to keep on hand for any recurrent exacerbations, otherwise I will see him for follow-up in 3 months      Plan:     No orders of the defined types were placed in this encounter. Orders Placed This Encounter   Medications    DISCONTD: predniSONE (DELTASONE) 10 MG tablet     Si tablets daily for 4 days, 3 tablets daily for 4 days, 2 tablets daily for 4 days, then 1 tablet daily for 4 days     Dispense:  40 tablet     Refill:  1    predniSONE (DELTASONE) 10 MG tablet     Si tablets daily for 4 days, 3 tablets daily for 4 days, 2 tablets daily for 4 days, then 1 tablet daily for 4 days     Dispense:  40 tablet     Refill:  1           Return in about 3 months (around 6/3/2020) for re-evaluation.        Nallely Mead MD

## 2020-03-05 ENCOUNTER — CARE COORDINATION (OUTPATIENT)
Dept: CARE COORDINATION | Age: 80
End: 2020-03-05

## 2020-03-05 ASSESSMENT — ENCOUNTER SYMPTOMS: DYSPNEA ASSOCIATED WITH: EXERTION

## 2020-03-05 NOTE — CARE COORDINATION
Ambulatory Care Coordination Note  3/5/2020  CM Risk Score: 7  Charlson 10 Year Mortality Risk Score: 98%     ACC: Joao Cazares RN    Summary Note:     Garry Valverde tells me that he is doing well. He says that his breathing is better than it has been for a while. He denies any wheezing excessive mucus, fever, or chills. He states that he is \"always winded with activity. \"  He will rest for 3-5 minutes in order to catch his breath. He tells me that if he is standing and talking he is good. He adds that his SaO2 will drop into the low 80's when he is doing activities and he knows when he needs to take a break. He says that in general he can do something small for about 10 minutes before resting. He continues to participate in pulmonary rehab. He states that he is in the green zone for COPD. He is struggling with sleep and he was started on Restoril last week. He is still trying to figure out what the best method is to take this medication. He says that he has no trouble going to sleep but once he wakes up he is unable to go back to sleep. Currently, he is taking 15 mg before bed, and then once he wakes up in the middle of he night he will take the second sleeping pill. PLAN:  Garry Valverde will continue to monitor his respiratory symptoms using the COPD zone tool. If he has any worsening of symptoms he will call myself or the office for recommendations. Garry Valverde will monitor his sleep for the next week. If he continues to have interrupted sleep he will call Suma to make her aware. Garry Valverde will follow up with all scheduled appointments, testing, and procedures    Care Coordination Interventions    Program Enrollment:  Complex Care  Referral from Primary Care Provider:  No  Suggested Interventions and Community Resources  Disease Association: In Process  Pharmacist:  In Process  Pulmonary Rehab: In Process  Zone Management Tools:   In Process  Other Services or Interventions:  Pharmacy Referral intiated, COPD zone tool capsule by mouth daily. Historical Provider, MD   Ascorbic Acid (VITAMIN C) 500 MG tablet Take 1,000 mg by mouth daily. 1/2 tab in AM and 1/2 tab in PM     Historical Provider, MD   vitamin D (CHOLECALCIFEROL) 400 UNIT TABS tablet Take 800 Units by mouth daily. Historical Provider, MD   vitamin B-12 (CYANOCOBALAMIN) 1000 MCG tablet Take 1,000 mcg by mouth daily. Historical Provider, MD   Selenium 200 MCG TABS Take 200 mcg by mouth daily. Historical Provider, MD   Thiamine HCl (VITAMIN B-1) 100 MG tablet Take 100 mg by mouth daily. Historical Provider, MD   folic acid (FOLVITE) 487 MCG tablet Take 800 mcg by mouth daily. Historical Provider, MD   Zinc 50 MG CAPS Take 50 mg by mouth daily. Historical Provider, MD   beta carotene 39271 UNIT capsule Take 25,000 Units by mouth daily. Historical Provider, MD   DHEA 25 MG TABS Take 25 mg by mouth daily. Historical Provider, MD   Pyridoxine HCl (VITAMIN B-6) 50 MG tablet Take 100 mg by mouth daily. Historical Provider, MD   Calcium Carb-Cholecalciferol (CALCIUM 1000 + D) 1000-800 MG-UNIT TABS Take 1 tablet by mouth 2 times daily. Historical Provider, MD   Methylsulfonylmethane (MSM) 1000 MG TABS Take 1,000 mg by mouth daily. Historical Provider, MD   vitamin E 400 UNIT capsule Take 400 Units by mouth daily. Historical Provider, MD       Future Appointments   Date Time Provider Valentín Perkins   5/28/2020  2:40 PM TRINA Hudson - CNP [address]   6/11/2020  1:30 PM Devin Church MD [address]     ,   Diabetes Assessment    Medic Alert ID:  No  Meal Planning:  Plate Method   How often do you test your blood sugar?:  No Testing   Do you have barriers with adherence to non-pharmacologic self-management interventions?  (Nutrition/Exercise/Self-Monitoring):  No   Have you ever had to go to the ED for symptoms of low blood sugar?:  No       No patient-reported symptoms   Do you have

## 2020-04-02 ENCOUNTER — CARE COORDINATION (OUTPATIENT)
Dept: CARE COORDINATION | Age: 80
End: 2020-04-02

## 2020-04-02 ASSESSMENT — ENCOUNTER SYMPTOMS: DYSPNEA ASSOCIATED WITH: MINIMAL EXERTION

## 2020-04-02 NOTE — CARE COORDINATION
mouth daily. Historical Provider, MD   folic acid (FOLVITE) 677 MCG tablet Take 800 mcg by mouth daily. Historical Provider, MD   Zinc 50 MG CAPS Take 50 mg by mouth daily. Historical Provider, MD   beta carotene 27211 UNIT capsule Take 25,000 Units by mouth daily. Historical Provider, MD   DHEA 25 MG TABS Take 25 mg by mouth daily. Historical Provider, MD   Pyridoxine HCl (VITAMIN B-6) 50 MG tablet Take 100 mg by mouth daily. Historical Provider, MD   Calcium Carb-Cholecalciferol (CALCIUM 1000 + D) 1000-800 MG-UNIT TABS Take 1 tablet by mouth 2 times daily. Historical Provider, MD   Methylsulfonylmethane (MSM) 1000 MG TABS Take 1,000 mg by mouth daily. Historical Provider, MD   vitamin E 400 UNIT capsule Take 400 Units by mouth daily. Historical Provider, MD       Future Appointments   Date Time Provider Valentín Perkins   5/28/2020  2:40 PM Jonny Poag, APRN - CNP [address]   6/11/2020  1:30 PM Tricia Pulido MD [facility]     ,   Diabetes Assessment    Medic Alert ID:  No  Meal Planning:  Plate Method   How often do you test your blood sugar?:  No Testing   Do you have barriers with adherence to non-pharmacologic self-management interventions?  (Nutrition/Exercise/Self-Monitoring):  No   Have you ever had to go to the ED for symptoms of low blood sugar?:  No       No patient-reported symptoms   Do you have hyperglycemia symptoms?:  No   Do you have hypoglycemia symptoms?:  No   Blood Sugar Monitoring Regimen:  Not Testing       and   COPD Assessment    Does the patient understand envrionmental exposure?:  Yes  Is the patient able to verbalize Rescue vs. Long Acting medications?:  Yes  Does the patient have a nebulizer?:  Yes  Does the patient use a space with inhaled medications?:  Yes     No patient-reported symptoms         Symptoms:   COPD associated increased fatigue: Pos      Symptom course:  no change  Breathlessness:  minimal

## 2020-05-04 ENCOUNTER — CARE COORDINATION (OUTPATIENT)
Dept: CARE COORDINATION | Age: 80
End: 2020-05-04

## 2020-05-04 ASSESSMENT — ENCOUNTER SYMPTOMS: DYSPNEA ASSOCIATED WITH: EXERTION

## 2020-05-28 ENCOUNTER — VIRTUAL VISIT (OUTPATIENT)
Dept: FAMILY MEDICINE CLINIC | Age: 80
End: 2020-05-28
Payer: MEDICARE

## 2020-05-28 PROCEDURE — 1123F ACP DISCUSS/DSCN MKR DOCD: CPT | Performed by: NURSE PRACTITIONER

## 2020-05-28 PROCEDURE — 99213 OFFICE O/P EST LOW 20 MIN: CPT | Performed by: NURSE PRACTITIONER

## 2020-05-28 PROCEDURE — G8428 CUR MEDS NOT DOCUMENT: HCPCS | Performed by: NURSE PRACTITIONER

## 2020-05-28 PROCEDURE — 4040F PNEUMOC VAC/ADMIN/RCVD: CPT | Performed by: NURSE PRACTITIONER

## 2020-05-28 RX ORDER — TEMAZEPAM 15 MG/1
CAPSULE ORAL
Qty: 60 CAPSULE | Refills: 1 | Status: SHIPPED | OUTPATIENT
Start: 2020-06-24 | End: 2020-06-23 | Stop reason: SDUPTHER

## 2020-05-28 RX ORDER — BUSPIRONE HYDROCHLORIDE 30 MG/1
30 TABLET ORAL DAILY
Qty: 30 TABLET | Refills: 3 | Status: SHIPPED | OUTPATIENT
Start: 2020-05-28 | End: 2020-10-01 | Stop reason: SDUPTHER

## 2020-05-28 NOTE — PROGRESS NOTES
2020    TELEHEALTH EVALUATION -- Audio/Visual (During UZIGO-21 public health emergency)    Due to COVID 19 outbreak, patient's office visit was converted to a virtual visit. Patient was contacted and agreed to proceed with a virtual visit via Doxy. me  The risks and benefits of converting to a virtual visit were discussed in light of the current infectious disease epidemic. Patient also understood that insurance coverage and co-pays are up to their individual insurance plans. Arvin Carranza is a [de-identified] y.o. male being evaluated by a Virtual Visit (video visit) encounter to address concerns as mentioned above. A caregiver was present when appropriate. Due to this being a TeleHealth encounter (During HNMRB-54 public health emergency), evaluation of the following organ systems was limited: Vitals/Constitutional/EENT/Resp/CV/GI//MS/Neuro/Skin/Heme-Lymph-Imm. Pursuant to the emergency declaration under the [address] [place] [address] authority and the [employer] Act, this Virtual Visit was conducted with patient's (and/or legal guardian's) consent, to reduce the patient's risk of exposure to COVID-19 and provide necessary medical care. The patient (and/or legal guardian) has also been advised to contact this office for worsening conditions or problems, and seek emergency medical treatment and/or call 911 if deemed necessary. Patient identification was verified at the start of the visit: Yes    Total time spent for this encounter: Not billed by time    Services were provided through a video synchronous discussion virtually to substitute for in-person clinic visit. Patient and provider were located at their individual homes. The patient is talking with me virtually from his home and I am located at my office in [facility].      HPI:    Arvin Carranza (:  1940) has requested an audio/video evaluation for the XL) 25 MG extended release tablet Take 25 mg by mouth every morning  Historical Provider, MD   metoprolol succinate (TOPROL XL) 50 MG extended release tablet Take 50 mg by mouth every evening  Historical Provider, MD   aspirin 81 MG tablet Take 81 mg by mouth daily  Historical Provider, MD   diltiazem (TIAZAC) 240 MG extended release capsule TAKE 1 CAPSULE BY MOUTH DAILY AS DIRECTED  Historical Provider, MD   CARTIA  MG extended release capsule Take 120 capsules by mouth every evening   Historical Provider, MD   OXYGEN Please provide patient with a portable oxygen concentrator  Shannon Salmon PA-C   dofetilide (TIKOSYN) 250 MCG capsule Take 250 mcg by mouth 2 times daily  Historical Provider, MD   Magnesium 500 MG TABS Take by mouth 2 times daily  Historical Provider, MD   Potassium 99 MG TABS Take by mouth daily  Historical Provider, MD   GLUTATHIONE PO Take by mouth  Historical Provider, MD   metoprolol tartrate (LOPRESSOR) 25 MG tablet Take 25 mg by mouth as needed  Historical Provider, MD   tadalafil (CIALIS) 5 MG tablet Take 1 tablet by mouth as needed for Erectile Dysfunction (maximum 5 mg per day)  Sanjuanita Medel, APRN - CNP   Handicap Placard MISC by Does not apply route Good for 5 Years from 06/16/2016  Forrest Herrera MD   digoxin (LANOXIN) 250 MCG tablet   Historical Provider, MD   ELIQUIS 5 MG TABS tablet   Historical Provider, MD   PROAIR  (90 BASE) MCG/ACT inhaler   Historical Provider, MD   CINNAMON PO Take  by mouth. Historical Provider, MD CHI LOVELL by Does not apply route. Historical Provider, MD   ipratropium-albuterol (DUONEB) 0.5-2.5 (3) MG/3ML SOLN nebulizer solution Take 3 mLs by nebulization every 6 hours as needed for Shortness of Breath. Chinedu Boy, MD   Glucosamine-Chondroitin 500-400 MG CAPS Take 1 capsule by mouth daily. Historical Provider, MD   Ascorbic Acid (VITAMIN C) 500 MG tablet Take 1,000 mg by mouth daily.  1/2 tab in AM and 1/2 tab in PM

## 2020-06-08 ENCOUNTER — HOSPITAL ENCOUNTER (OUTPATIENT)
Dept: LAB | Age: 80
Discharge: HOME | End: 2020-06-08
Payer: MEDICARE

## 2020-06-08 LAB
ALBUMIN SERPL-MCNC: 4 G/DL (ref 3.5–4.6)
ALP BLD-CCNC: 80 U/L (ref 35–104)
ALT SERPL-CCNC: 26 U/L (ref 0–41)
ANION GAP SERPL CALCULATED.3IONS-SCNC: 12 MEQ/L (ref 9–15)
AST SERPL-CCNC: 23 U/L (ref 0–40)
BASOPHILS ABSOLUTE: 0 K/UL (ref 0–0.2)
BASOPHILS RELATIVE PERCENT: 0.3 %
BILIRUB SERPL-MCNC: 0.6 MG/DL (ref 0.2–0.7)
BUN BLDV-MCNC: 16 MG/DL (ref 8–23)
CALCIUM SERPL-MCNC: 9.5 MG/DL (ref 8.5–9.9)
CHLORIDE BLD-SCNC: 100 MEQ/L (ref 95–107)
CHOLESTEROL, TOTAL: 129 MG/DL (ref 0–199)
CO2: 28 MEQ/L (ref 20–31)
CREAT SERPL-MCNC: 0.71 MG/DL (ref 0.7–1.2)
CREATININE URINE: 76.4 MG/DL
EOSINOPHILS ABSOLUTE: 0.2 K/UL (ref 0–0.7)
EOSINOPHILS RELATIVE PERCENT: 2.5 %
GFR AFRICAN AMERICAN: >60
GFR NON-AFRICAN AMERICAN: >60
GLOBULIN: 2.8 G/DL (ref 2.3–3.5)
GLUCOSE BLD-MCNC: 161 MG/DL (ref 70–99)
HBA1C MFR BLD: 6.4 % (ref 4.8–5.9)
HCT VFR BLD CALC: 48.3 % (ref 42–52)
HDLC SERPL-MCNC: 42 MG/DL (ref 40–59)
HEMOGLOBIN: 15.6 G/DL (ref 14–18)
LDL CHOLESTEROL CALCULATED: 53 MG/DL (ref 0–129)
LYMPHOCYTES ABSOLUTE: 1.9 K/UL (ref 1–4.8)
LYMPHOCYTES RELATIVE PERCENT: 25.3 %
MCH RBC QN AUTO: 28.8 PG (ref 27–31.3)
MCHC RBC AUTO-ENTMCNC: 32.3 % (ref 33–37)
MCV RBC AUTO: 89.1 FL (ref 80–100)
MICROALBUMIN UR-MCNC: 1.7 MG/DL
MICROALBUMIN/CREAT UR-RTO: 22.3 MG/G (ref 0–30)
MONOCYTES ABSOLUTE: 0.6 K/UL (ref 0.2–0.8)
MONOCYTES RELATIVE PERCENT: 7.5 %
NEUTROPHILS ABSOLUTE: 4.9 K/UL (ref 1.4–6.5)
NEUTROPHILS RELATIVE PERCENT: 64.4 %
PDW BLD-RTO: 14.3 % (ref 11.5–14.5)
PLATELET # BLD: 200 K/UL (ref 130–400)
POTASSIUM SERPL-SCNC: 4.5 MEQ/L (ref 3.4–4.9)
PROSTATE SPECIFIC ANTIGEN: 6.98 NG/ML (ref 0–6.22)
RBC # BLD: 5.42 M/UL (ref 4.7–6.1)
SODIUM BLD-SCNC: 140 MEQ/L (ref 135–144)
TOTAL PROTEIN: 6.8 G/DL (ref 6.3–8)
TRIGL SERPL-MCNC: 168 MG/DL (ref 0–150)
WBC # BLD: 7.6 K/UL (ref 4.8–10.8)

## 2020-06-08 PROCEDURE — 80053 COMPREHEN METABOLIC PANEL: CPT

## 2020-06-08 PROCEDURE — 84153 ASSAY OF PSA TOTAL: CPT

## 2020-06-08 PROCEDURE — 36415 COLL VENOUS BLD VENIPUNCTURE: CPT

## 2020-06-08 PROCEDURE — 82043 UR ALBUMIN QUANTITATIVE: CPT

## 2020-06-08 PROCEDURE — 85025 COMPLETE CBC W/AUTO DIFF WBC: CPT

## 2020-06-08 PROCEDURE — 80061 LIPID PANEL: CPT

## 2020-06-08 PROCEDURE — 82570 ASSAY OF URINE CREATININE: CPT

## 2020-06-08 PROCEDURE — 83036 HEMOGLOBIN GLYCOSYLATED A1C: CPT

## 2020-06-08 PROCEDURE — 86769 SARS-COV-2 COVID-19 ANTIBODY: CPT

## 2020-06-09 LAB — SARS-COV-2, IGG: NEGATIVE

## 2020-06-23 RX ORDER — TEMAZEPAM 15 MG/1
CAPSULE ORAL
Qty: 60 CAPSULE | Refills: 1 | Status: CANCELLED | OUTPATIENT
Start: 2020-06-24 | End: 2020-08-26

## 2020-06-23 RX ORDER — IPRATROPIUM/ALBUTEROL SULFATE 20-100 MCG
MIST INHALER (GRAM) INHALATION
Qty: 16 G | Refills: 3 | Status: SHIPPED | OUTPATIENT
Start: 2020-06-23 | End: 2021-07-30

## 2020-06-25 ENCOUNTER — OFFICE VISIT (OUTPATIENT)
Dept: FAMILY MEDICINE CLINIC | Age: 80
End: 2020-06-25
Payer: MEDICARE

## 2020-06-25 VITALS
HEART RATE: 74 BPM | WEIGHT: 201 LBS | DIASTOLIC BLOOD PRESSURE: 60 MMHG | TEMPERATURE: 97 F | BODY MASS INDEX: 27.22 KG/M2 | HEIGHT: 72 IN | OXYGEN SATURATION: 97 % | SYSTOLIC BLOOD PRESSURE: 142 MMHG

## 2020-06-25 PROCEDURE — 1123F ACP DISCUSS/DSCN MKR DOCD: CPT | Performed by: NURSE PRACTITIONER

## 2020-06-25 PROCEDURE — 99214 OFFICE O/P EST MOD 30 MIN: CPT | Performed by: NURSE PRACTITIONER

## 2020-06-25 PROCEDURE — G8417 CALC BMI ABV UP PARAM F/U: HCPCS | Performed by: NURSE PRACTITIONER

## 2020-06-25 PROCEDURE — G8427 DOCREV CUR MEDS BY ELIG CLIN: HCPCS | Performed by: NURSE PRACTITIONER

## 2020-06-25 PROCEDURE — 1036F TOBACCO NON-USER: CPT | Performed by: NURSE PRACTITIONER

## 2020-06-25 PROCEDURE — 4040F PNEUMOC VAC/ADMIN/RCVD: CPT | Performed by: NURSE PRACTITIONER

## 2020-06-25 RX ORDER — LEVOFLOXACIN 500 MG/1
500 TABLET, FILM COATED ORAL DAILY
Qty: 10 TABLET | Refills: 0 | Status: SHIPPED | OUTPATIENT
Start: 2020-06-25 | End: 2021-10-22 | Stop reason: SDUPTHER

## 2020-06-25 ASSESSMENT — ENCOUNTER SYMPTOMS
SORE THROAT: 0
NAUSEA: 0
RHINORRHEA: 0
WHEEZING: 0
SHORTNESS OF BREATH: 0
COUGH: 0
ABDOMINAL PAIN: 0
VOMITING: 0
TROUBLE SWALLOWING: 0

## 2020-06-25 NOTE — PROGRESS NOTES
sleep apnea)- trilogy at night 2017    Paroxysmal atrial fibrillation (HCC) - Dr. Pickard Alvarado 2016    Borderline diabetes mellitus 2014    ED (erectile dysfunction) 2014    Elevated PSA 2013    BPH (benign prostatic hyperplasia) 2013    Prostate nodule 2013    Skin cancer, basal cell on the back- Dr. Lavon Santana 2012    Insomnia 2012    Colon polyps 2012    Hypertension     Hyperlipidemia     COPD (chronic obstructive pulmonary disease) ([facility] Dr. Danae Vazquez Interstitial fibrosis     Asbestosis ([facility]     CAD (coronary artery disease)- Dr. Lawence Boast' shaughnessy     Erectile dysfunction      Past Surgical History:   Procedure Laterality Date    CARDIAC CATHETERIZATION      CARDIOVASCULAR STRESS TEST      2010 normal per patient    COLONOSCOPY      2009 tubular adenomas    COLONOSCOPY  08/13/15    Aranza Domínguez MD    EYE SURGERY  02/10/14    DR Jg Beckwith  RT EYE    HERNIA REPAIR      LEG SURGERY       Social History     Socioeconomic History    Marital status:      Spouse name: None    Number of children: 3    Years of education: 15    Highest education level: High school graduate   Occupational History    Occupation:    Social Needs    Financial resource strain: Somewhat hard    Food insecurity     Worry: Sometimes true     Inability: Sometimes true    Transportation needs     Medical: No     Non-medical: No   Tobacco Use    Smoking status: Former Smoker     Packs/day: 2.00     Years: 40.00     Pack years: 80.00     Last attempt to quit: 2002     Years since quittin.9    Smokeless tobacco: Never Used   Substance and Sexual Activity    Alcohol use: No    Drug use: No    Sexual activity: Yes     Partners: Female   Lifestyle    Physical activity     Days per week: 0 days     Minutes per session: 0 min    Stress:  To some extent   Relationships    Social connections     Talks on phone: More than three times a week

## 2020-06-26 ENCOUNTER — TELEPHONE (OUTPATIENT)
Dept: FAMILY MEDICINE CLINIC | Age: 80
End: 2020-06-26

## 2020-07-06 ENCOUNTER — VIRTUAL VISIT (OUTPATIENT)
Dept: PULMONOLOGY | Age: 80
End: 2020-07-06
Payer: MEDICARE

## 2020-07-06 PROCEDURE — 4040F PNEUMOC VAC/ADMIN/RCVD: CPT | Performed by: INTERNAL MEDICINE

## 2020-07-06 PROCEDURE — G8428 CUR MEDS NOT DOCUMENT: HCPCS | Performed by: INTERNAL MEDICINE

## 2020-07-06 PROCEDURE — 1036F TOBACCO NON-USER: CPT | Performed by: INTERNAL MEDICINE

## 2020-07-06 PROCEDURE — 99214 OFFICE O/P EST MOD 30 MIN: CPT | Performed by: INTERNAL MEDICINE

## 2020-07-06 PROCEDURE — 3023F SPIROM DOC REV: CPT | Performed by: INTERNAL MEDICINE

## 2020-07-06 PROCEDURE — G8417 CALC BMI ABV UP PARAM F/U: HCPCS | Performed by: INTERNAL MEDICINE

## 2020-07-06 PROCEDURE — 1123F ACP DISCUSS/DSCN MKR DOCD: CPT | Performed by: INTERNAL MEDICINE

## 2020-07-06 PROCEDURE — G8926 SPIRO NO PERF OR DOC: HCPCS | Performed by: INTERNAL MEDICINE

## 2020-07-06 ASSESSMENT — ENCOUNTER SYMPTOMS
SHORTNESS OF BREATH: 1
VOMITING: 0
SINUS PRESSURE: 0
DIARRHEA: 0
CHEST TIGHTNESS: 0
WHEEZING: 1
SORE THROAT: 0
NAUSEA: 0
ABDOMINAL PAIN: 0
RHINORRHEA: 0
COUGH: 1

## 2020-07-06 NOTE — PROGRESS NOTES
Subjective:     Jean Sarah is a [de-identified] y.o. male who complains today of:     Chief Complaint   Patient presents with    COPD       HPI  TELEHEALTH EVALUATION -- Audio/Visual (During [facility]-62 public health emergency)  This is a follow-up telehealth visit regarding severe COPD and chronic respiratory failure. Over the past 3 months patient reports no significant change in his respiratory status, continues to have significant exertional dyspnea but remains at baseline. Has not been able to exercise much lately. He uses his noninvasive ventilator at night averaging 6 to 8 hours of sleep. Does not normally use it during the day or take naps with it. Allergies:  Brilinta [ticagrelor]; Sildenafil citrate;  Advair [fluticasone-salmeterol]; and Fluticasone propionate (inhal)  Past Medical History:   Diagnosis Date    Adrenal adenoma     CT 6/2011 stable    Anxiety     Asbestosis(501)     CAD (coronary artery disease)     status post stent 2001    Cancer (HCC)     basil cell carnoma    COPD (chronic obstructive pulmonary disease) (HCC)     Elbow injury     left    Erectile dysfunction     Granulomatous lung disease (HCC)     Herpes zoster     Hyperlipidemia     Hypertension     Insomnia     Interstitial fibrosis     Obstructive sleep apnea on CPAP 11/13/2017    Prostate nodule     Scarlet fever     Urine frequency      Past Surgical History:   Procedure Laterality Date    CARDIAC CATHETERIZATION      CARDIOVASCULAR STRESS TEST      1/2010 normal per patient    COLONOSCOPY      5/2009 tubular adenomas    COLONOSCOPY  08/13/15    David Mcnamara MD    EYE SURGERY  02/10/14    DR Jarred Rainey  [facility]    HERNIA REPAIR      LEG SURGERY       Family History   Problem Relation Age of Onset    Cancer Other         colon cancer in multiple family members and breast cancer    Heart Disease Other      Social History     Socioeconomic History    Marital status:      Spouse name: Not on file    Number of children: 4    Years of education: 15    Highest education level: High school graduate   Occupational History    Occupation:    Social Needs    Financial resource strain: Somewhat hard    Food insecurity     Worry: Sometimes true     Inability: Sometimes true    Transportation needs     Medical: No     Non-medical: No   Tobacco Use    Smoking status: Former Smoker     Packs/day: 2.00     Years: 40.00     Pack years: 80.00     Last attempt to quit: 2002     Years since quittin.9    Smokeless tobacco: Never Used   Substance and Sexual Activity    Alcohol use: No    Drug use: No    Sexual activity: Yes     Partners: Female   Lifestyle    Physical activity     Days per week: 0 days     Minutes per session: 0 min    Stress: To some extent   Relationships    Social connections     Talks on phone: More than three times a week     Gets together: More than three times a week     Attends Nondenominational service: Never     Active member of club or organization: No     Attends meetings of clubs or organizations: Never     Relationship status:     Intimate partner violence     Fear of current or ex partner: Not on file     Emotionally abused: Not on file     Physically abused: Not on file     Forced sexual activity: Not on file   Other Topics Concern    Not on file   Social History Narrative    Not on file         Review of Systems   Constitutional: Positive for fatigue. Negative for chills, diaphoresis and fever. HENT: Positive for congestion. Negative for postnasal drip, rhinorrhea, sinus pressure, sneezing and sore throat. Eyes: Negative for visual disturbance. Respiratory: Positive for cough, shortness of breath and wheezing. Negative for chest tightness. Cardiovascular: Positive for leg swelling. Negative for chest pain and palpitations. Gastrointestinal: Negative for abdominal pain, diarrhea, nausea and vomiting.    Genitourinary: Negative for difficulty urinating and hematuria. Musculoskeletal: Positive for arthralgias. Negative for joint swelling and myalgias. Skin: Negative for rash. Allergic/Immunologic: Negative for environmental allergies. Neurological: Negative for dizziness, tremors, weakness and headaches. Psychiatric/Behavioral: Positive for sleep disturbance. Negative for behavioral problems.         :   There were no vitals filed for this visit. Physical Exam        Assessment:      Diagnosis Orders   1. Chronic obstructive pulmonary disease, unspecified COPD type ([facility]     2. Chronic respiratory failure with hypoxia and hypercapnia (HCC)     3. [facility]     4. Obstructive sleep apnea       Patient remains relatively stable with current treatment, he was encouraged to continue all maintenance therapy, noninvasive ventilation and oxygen therapy as before and report problems or changes to me otherwise I will see him for follow-up in 3 months      Plan:     No orders of the defined types were placed in this encounter. No orders of the defined types were placed in this encounter. Pursuant to the emergency declaration under the [facility], [facility] waiver authority and the [employer] and [employer] Act, this Virtual Visit was conducted, with patient's consent, to reduce the patient's risk of exposure to COVID-19 and provide continuity of care for an established patient. Services were provided through a video synchronous discussion virtually to substitute for in-person clinic visit. Visit completed using Cylande. Patient was located at home and I was located in the clinic. Return in about 3 months (around 10/6/2020).       Tre Emery MD

## 2020-07-16 ENCOUNTER — CARE COORDINATION (OUTPATIENT)
Dept: CARE COORDINATION | Age: 80
End: 2020-07-16

## 2020-07-16 NOTE — CARE COORDINATION
Ambulatory Care Coordination Note  7/16/2020  CM Risk Score: 7  [place] 10 Year Mortality Risk Score: 98%     ACC: Varun Lindsay RN    Summary Note:     Terry Woods tells me that he is really doing well overall . He says that he is not having any issues with his breathing whether he is active or at rest.  He denies any cough, mucus, or fevers. He says that he is getting out and walking without issue. He tells me that his appetite is good and in general he is eating a healthy diet. He denies any issues with hyper or hypoglycemia. He says that he is going out to restaurants every now and then. We did talk briefly about making sure that he utilizes all protective measures as they relate to [place] and he says that he is definitely following through. He feels that he is in the green zone for both COPD and DM. PLAN:  Terry Woods will continue to monitor his symptoms utilizing the DM and COPD zone tools  If he has any worsening of symptoms he will call myself or the office for recommendations. Terry Woods will use all preventative protocols related to COVID as per the CDC. Care Coordination Interventions    Program Enrollment:  Complex Care  Referral from Primary Care Provider:  No  Suggested Interventions and Community Resources  Disease Association: In Process  Pharmacist:  In Process  Pulmonary Rehab: In Process  Zone Management Tools: In Process  Other Services or Interventions:  Pharmacy Referral intiated, COPD zone tool education initiated, Walk in Clinic usage and hours discussed         Goals Addressed    None         Prior to Admission medications    Medication Sig Start Date End Date Taking? Authorizing Provider   mupirocin (BACTROBAN) 2 % ointment Apply topically 3 times daily.  6/25/20   Verna Miranda APRN - CNP   COMBIVENT RESPIMAT  MCG/ACT AERS inhaler USE 1 INHALATION EVERY 6 HOURS 6/23/20   Skye Medel APRN - CNP   temazepam (RESTORIL) 15 MG capsule TAKE 2 CAPSULES BY MOUTH NIGHTLY AT BEDTIME AS NEEDED for sleep 6/24/20 8/26/20  TRINA Holt CNP   busPIRone (BUSPAR) 30 MG tablet Take 30 mg by mouth daily 5/28/20   TRINA Holt CNP   Melatonin 10 MG TABS Take by mouth    Historical Provider, MD   predniSONE (DELTASONE) 10 MG tablet 4 tablets daily for 4 days, 3 tablets daily for 4 days, 2 tablets daily for 4 days, then 1 tablet daily for 4 days 3/3/20   Cassandra Bauer MD   SYMBICORT 80-4.5 MCG/ACT AERO USE 2 INHALATIONS TWICE A DAY 2/28/20   Cassandra Bauer MD   doxycycline hyclate (VIBRAMYCIN) 100 MG capsule Take 1 capsule by mouth 2 times daily 2/25/20   TRINA Holt CNP   rosuvastatin (CRESTOR) 40 MG tablet Take 1 tablet by mouth daily 1/13/20   TRINA Holt CNP   fexofenadine (ALLEGRA) 180 MG tablet Take 1 tablet by mouth daily 5/16/19   TRINA Holt CNP   metoprolol succinate (TOPROL XL) 25 MG extended release tablet Take 25 mg by mouth every morning    Historical Provider, MD   metoprolol succinate (TOPROL XL) 50 MG extended release tablet Take 50 mg by mouth every evening    Historical Provider, MD   aspirin 81 MG tablet Take 81 mg by mouth daily    Historical Provider, MD   diltiazem (TIAZAC) 240 MG extended release capsule TAKE 1 CAPSULE BY MOUTH DAILY AS DIRECTED 4/17/18   Historical Provider, MD [place]  MG extended release capsule Take 120 capsules by mouth every evening  3/29/18   Historical Provider, MD   OXYGEN Please provide patient with a portable oxygen concentrator 3/2/18   Miracle Carey PA-C   dofetilide (TIKOSYN) 250 MCG capsule Take 250 mcg by mouth 2 times daily    Historical Provider, MD   Magnesium 500 MG TABS Take by mouth 2 times daily    Historical Provider, MD   Potassium 99 MG TABS Take by mouth daily    Historical Provider, MD   GLUTATHIONE PO Take by mouth    Historical Provider, MD   metoprolol tartrate (LOPRESSOR) 25 MG tablet Take 25 mg by mouth as needed    Historical Provider, MD   tadalafil (CIALIS) 5 MG tablet Take 1 tablet by mouth as needed for Erectile Dysfunction (maximum 5 mg per day) 9/23/16   TRINA Albrecht - CNP   Handicap Placard MISC by Does not apply route Good for 5 Years from 06/16/2016 6/16/16   Lottie Hassan MD   digoxin Jose Altman) 250 MCG tablet  5/4/16   Historical Provider, MD   ELIQUIS 5 MG TABS tablet  5/4/16   Historical Provider, MD   PROAIR  (90 BASE) MCG/ACT inhaler  6/11/15   Historical Provider, MD   CINNAMON PO Take  by mouth. Historical Provider, MD CHI LOVELL by Does not apply route. Historical Provider, MD   ipratropium-albuterol (DUONEB) 0.5-2.5 (3) MG/3ML SOLN nebulizer solution Take 3 mLs by nebulization every 6 hours as needed for Shortness of Breath. 5/17/12   Negrita Nicolas MD   Glucosamine-Chondroitin 500-400 MG CAPS Take 1 capsule by mouth daily. Historical Provider, MD   Ascorbic Acid (VITAMIN C) 500 MG tablet Take 1,000 mg by mouth daily. 1/2 tab in AM and 1/2 tab in PM     Historical Provider, MD   vitamin D (CHOLECALCIFEROL) 400 UNIT TABS tablet Take 800 Units by mouth daily. Historical Provider, MD   vitamin B-12 (CYANOCOBALAMIN) 1000 MCG tablet Take 1,000 mcg by mouth daily. Historical Provider, MD   Selenium 200 MCG TABS Take 200 mcg by mouth daily. Historical Provider, MD   Thiamine HCl (VITAMIN B-1) 100 MG tablet Take 100 mg by mouth daily. Historical Provider, MD   folic acid (FOLVITE) 437 MCG tablet Take 800 mcg by mouth daily. Historical Provider, MD   Zinc 50 MG CAPS Take 50 mg by mouth daily. Historical Provider, MD   beta carotene 76335 UNIT capsule Take 25,000 Units by mouth daily. Historical Provider, MD   DHEA 25 MG TABS Take 25 mg by mouth daily. Historical Provider, MD   Pyridoxine HCl (VITAMIN B-6) 50 MG tablet Take 100 mg by mouth daily. Historical Provider, MD   Calcium Carb-Cholecalciferol (CALCIUM 1000 + D) 1000-800 MG-UNIT TABS Take 1 tablet by mouth 2 times daily.     Historical Provider, MD   Methylsulfonylmethane (MSM) 1000 MG TABS Take 1,000 mg by mouth daily. Historical Provider, MD   vitamin E 400 UNIT capsule Take 400 Units by mouth daily. Historical Provider, MD       Future Appointments   Date Time Provider Valentín Maddie   9/3/2020  2:40 PM TRINA Martinez - CNP [address]   10/13/2020  1:45 PM Eladia Alamo MD [address]     ,   Diabetes Assessment    Medic Alert ID:  No  Meal Planning:  Plate Method   How often do you test your blood sugar?:  No Testing   Do you have barriers with adherence to non-pharmacologic self-management interventions?  (Nutrition/Exercise/Self-Monitoring):  No   Have you ever had to go to the ED for symptoms of low blood sugar?:  No       No patient-reported symptoms   Do you have hyperglycemia symptoms?:  No   Do you have hypoglycemia symptoms?:  No   Blood Sugar Monitoring Regimen:  Not Testing       and   COPD Assessment    Does the patient understand envrionmental exposure?:  Yes  Is the patient able to verbalize Rescue vs. Long Acting medications?:  Yes  Does the patient have a nebulizer?:  Yes  Does the patient use a space with inhaled medications?:  Yes     No patient-reported symptoms         Symptoms:   None:  Yes      Symptom course:  stable  Breathlessness:  none  Increase use of rapid acting/rescue inhaled medications?:  No  Change in chronic cough?:  No/At Baseline  Change in sputum?:  No/At Baseline  Self Monitoring - SaO2:  No

## 2020-08-17 ENCOUNTER — CARE COORDINATION (OUTPATIENT)
Dept: CARE COORDINATION | Age: 80
End: 2020-08-17

## 2020-08-17 ASSESSMENT — ENCOUNTER SYMPTOMS: DYSPNEA ASSOCIATED WITH: EXERTION

## 2020-08-17 NOTE — CARE COORDINATION
Provider, MD   vitamin B-12 (CYANOCOBALAMIN) 1000 MCG tablet Take 1,000 mcg by mouth daily. Historical Provider, MD   Selenium 200 MCG TABS Take 200 mcg by mouth daily. Historical Provider, MD   Thiamine HCl (VITAMIN B-1) 100 MG tablet Take 100 mg by mouth daily. Historical Provider, MD   folic acid (FOLVITE) 610 MCG tablet Take 800 mcg by mouth daily. Historical Provider, MD   Zinc 50 MG CAPS Take 50 mg by mouth daily. Historical Provider, MD   beta carotene 97882 UNIT capsule Take 25,000 Units by mouth daily. Historical Provider, MD   DHEA 25 MG TABS Take 25 mg by mouth daily. Historical Provider, MD   Pyridoxine HCl (VITAMIN B-6) 50 MG tablet Take 100 mg by mouth daily. Historical Provider, MD   Calcium Carb-Cholecalciferol (CALCIUM 1000 + D) 1000-800 MG-UNIT TABS Take 1 tablet by mouth 2 times daily. Historical Provider, MD   Methylsulfonylmethane (MSM) 1000 MG TABS Take 1,000 mg by mouth daily. Historical Provider, MD   vitamin E 400 UNIT capsule Take 400 Units by mouth daily. Historical Provider, MD       Future Appointments   Date Time Provider Valentín Perkins   9/3/2020  2:40 PM TRINA Douglas - CNP [address]   10/13/2020  1:45 PM Arnulfo Velasquez MD [address]     ,   Diabetes Assessment    Medic Alert ID:  No  Meal Planning:  Plate Method   How often do you test your blood sugar?:  No Testing   Do you have barriers with adherence to non-pharmacologic self-management interventions?  (Nutrition/Exercise/Self-Monitoring):  No   Have you ever had to go to the ED for symptoms of low blood sugar?:  No       No patient-reported symptoms   Do you have hyperglycemia symptoms?:  No   Do you have hypoglycemia symptoms?:  No   Blood Sugar Monitoring Regimen:  Not Testing       and   COPD Assessment    Does the patient understand envrionmental exposure?:  Yes  Is the patient able to verbalize Rescue vs. Long Acting medications?: Yes  Does the patient have a nebulizer?:  Yes  Does the patient use a space with inhaled medications?:  Yes     No patient-reported symptoms         Symptoms:      Symptom course:  stable  Breathlessness:  exertion  Change in chronic cough?:  No/At Baseline  Change in sputum?:  No/At Baseline  Sputum characteristics:  Clear  Self Monitoring - SaO2:  Yes  Baseline SaO2 Readin

## 2020-08-17 NOTE — LETTER
8/17/2020          Wilder Krissy  [id number]  Richard Maciel on the progress you have made improving and taking charge of your health! Your recent follow up with TRINA Zapata CNP finds you doing well and are no longer in need of Care Coordination services. I know you will continue to use the knowledge and tools you have gained to continue down a healthy path. As you have demonstrated that you are able to successfully manage your health and wellness, I will no longer contact you on a regular basis. Again, congratulations and please know if there are any changes or you have a need for my services in the future, you may always contact me for questions or concerns.         In good health,           Lee Reyes RN

## 2020-08-18 ENCOUNTER — CARE COORDINATION (OUTPATIENT)
Dept: CARE COORDINATION | Age: 80
End: 2020-08-18

## 2020-08-21 ENCOUNTER — VIRTUAL VISIT (OUTPATIENT)
Dept: FAMILY MEDICINE CLINIC | Age: 80
End: 2020-08-21
Payer: MEDICARE

## 2020-08-21 PROCEDURE — 99213 OFFICE O/P EST LOW 20 MIN: CPT | Performed by: NURSE PRACTITIONER

## 2020-08-21 PROCEDURE — G8428 CUR MEDS NOT DOCUMENT: HCPCS | Performed by: NURSE PRACTITIONER

## 2020-08-21 PROCEDURE — 4040F PNEUMOC VAC/ADMIN/RCVD: CPT | Performed by: NURSE PRACTITIONER

## 2020-08-21 PROCEDURE — 1123F ACP DISCUSS/DSCN MKR DOCD: CPT | Performed by: NURSE PRACTITIONER

## 2020-08-21 RX ORDER — TEMAZEPAM 15 MG/1
CAPSULE ORAL
Qty: 60 CAPSULE | Refills: 2 | Status: SHIPPED | OUTPATIENT
Start: 2020-08-21 | End: 2020-10-01 | Stop reason: SDUPTHER

## 2020-08-21 NOTE — PROGRESS NOTES
2020    TELEHEALTH EVALUATION -- Audio/Visual (During [facility]-90 public health emergency)    Due to COVID 19 outbreak, patient's office visit was converted to a virtual visit. Patient was contacted and agreed to proceed with a virtual visit via Doxy. me  The risks and benefits of converting to a virtual visit were discussed in light of the current infectious disease epidemic. Patient also understood that insurance coverage and co-pays are up to their individual insurance plans. Maxi Seymour is a [de-identified] y.o. male being evaluated by a Virtual Visit (video visit) encounter to address concerns as mentioned above. A caregiver was present when appropriate. Due to this being a TeleHealth encounter (During [facility]-27 public health emergency), evaluation of the following organ systems was limited: Vitals/Constitutional/EENT/Resp/CV/GI//MS/Neuro/Skin/Heme-Lymph-Imm. Pursuant to the emergency declaration under the [address] [place] [address] authority and the [employer] and [employer] Act, this Virtual Visit was conducted with patient's (and/or legal guardian's) consent, to reduce the patient's risk of exposure to COVID-19 and provide necessary medical care. The patient (and/or legal guardian) has also been advised to contact this office for worsening conditions or problems, and seek emergency medical treatment and/or call 911 if deemed necessary. Patient identification was verified at the start of the visit: Yes    Total time spent for this encounter: Not billed by time    Services were provided through a video synchronous discussion virtually to substitute for in-person clinic visit. Patient and provider were located at their individual homes. The patient is talking with me virtually from his home and I am located at my office in [facility].        HPI:    Maxi Seymour (:  1940) has requested an audio/video evaluation for the following concern(s): refill of controlled medication. Insomnia: He states that he continues to take Restoril at bedtime to help him sleep at night. He has been doing this for several years with no side effects reported and states that he is not able to sleep without it. He does have an appointment scheduled for early September but will be out of medication tomorrow. Needs to have blood work orders given as well. He states that he has been feeling good overall with no recent changes in his overall health. Prior to Visit Medications    Medication Sig Taking? Authorizing Provider   temazepam (RESTORIL) 15 MG capsule TAKE 2 CAPSULES BY MOUTH NIGHTLY AT BEDTIME AS NEEDED for sleep Yes TRINA Holt CNP   mupirocin (BACTROBAN) 2 % ointment Apply topically 3 times daily.   TRINA Leung CNP   COMBIVENT RESPIMAT  MCG/ACT AERS inhaler USE 1 INHALATION EVERY 6 HOURS  TRINA Lopez CNP   busPIRone (BUSPAR) 30 MG tablet Take 30 mg by mouth daily  TRINA Holt CNP   Melatonin 10 MG TABS Take by mouth  Historical Provider, MD   predniSONE (DELTASONE) 10 MG tablet 4 tablets daily for 4 days, 3 tablets daily for 4 days, 2 tablets daily for 4 days, then 1 tablet daily for 4 days  Cassandra Bauer MD   SYMBICORT 80-4.5 MCG/ACT AERO USE 2 [address] [place], MD   doxycycline hyclate (VIBRAMYCIN) 100 MG capsule Take 1 capsule by mouth 2 times daily  TRINA Holt CNP   rosuvastatin (CRESTOR) 40 MG tablet Take 1 tablet by mouth daily  TRINA Holt CNP   fexofenadine (ALLEGRA) 180 MG tablet Take 1 tablet by mouth daily  TRINA Holt CNP   metoprolol succinate (TOPROL XL) 25 MG extended release tablet Take 25 mg by mouth every morning  Historical Provider, MD   metoprolol succinate (TOPROL XL) 50 MG extended release tablet Take 50 mg by mouth every evening  Historical Provider, MD   aspirin 81 MG tablet Take 81 mg by mouth daily Historical Provider, MD   diltiazem (TIAZAC) 240 MG extended release capsule TAKE 1 CAPSULE BY MOUTH DAILY AS DIRECTED  Historical Provider, MD   CARTIA  MG extended release capsule Take 120 capsules by mouth every evening   Historical Provider, MD   OXYGEN Please provide patient with a portable oxygen concentrator  Shannon Salmon PA-C   dofetilide (TIKOSYN) 250 MCG capsule Take 250 mcg by mouth 2 times daily  Historical Provider, MD   Magnesium 500 MG TABS Take by mouth 2 times daily  Historical Provider, MD   Potassium 99 MG TABS Take by mouth daily  Historical Provider, MD   GLUTATHIONE PO Take by mouth  Historical Provider, MD   metoprolol tartrate (LOPRESSOR) 25 MG tablet Take 25 mg by mouth as needed  Historical Provider, MD   tadalafil (CIALIS) 5 MG tablet Take 1 tablet by mouth as needed for Erectile Dysfunction (maximum 5 mg per day)  Katlin Medel, APRN - [facility]   Handicap Placard MISC by Does not apply route Good for 5 Years from 06/16/2016  Trevor Gaxiola MD   digoxin (LANOXIN) 250 MCG tablet   Historical Provider, MD   ELIQUIS 5 MG TABS tablet   Historical Provider, MD   PROAIR  (90 BASE) MCG/ACT inhaler   Historical Provider, MD   CINNAMON PO Take  by mouth. Historical Provider, MD CHI LOVELL by Does not apply route. Historical Provider, MD   ipratropium-albuterol (DUONEB) 0.5-2.5 (3) MG/3ML SOLN nebulizer solution Take 3 mLs by nebulization every 6 hours as needed for Shortness of Breath. Carmen Judd MD   Glucosamine-Chondroitin 500-400 MG CAPS Take 1 capsule by mouth daily. Historical Provider, MD   Ascorbic Acid (VITAMIN C) 500 MG tablet Take 1,000 mg by mouth daily. 1/2 tab in AM and 1/2 tab in PM   Historical Provider, MD   vitamin D (CHOLECALCIFEROL) 400 UNIT TABS tablet Take 800 Units by mouth daily. Historical Provider, MD   vitamin B-12 (CYANOCOBALAMIN) 1000 MCG tablet Take 1,000 mcg by mouth daily.     Historical Provider, MD   Selenium 200 MCG TABS Take 200 evaluation of the following organ systems is limited: Vitals/Constitutional/EENT/Resp/CV/GI//MS/Neuro/Skin/Heme-Lymph-Imm. ASSESSMENT/PLAN:   Diagnosis Orders   1. Insomnia, unspecified type  temazepam (RESTORIL) 15 MG capsule   2. Type 2 diabetes mellitus with other circulatory complication, without long-term current use of insulin (HCC)  CBC Auto Differential    Comprehensive Metabolic Panel    Lipid Panel    Hemoglobin A1C    Microalbumin / Creatinine Urine Ratio   3. Elevated PSA  PSA, Diagnostic         Return for keep sept 3 appt. Refill of Restoril given for chronic insomnia symptoms. Medication has been effective in allowing for restful sleep and no side effects reported. Controlled Substance Monitoring:    Acute and Chronic Pain Monitoring:   RX Monitoring 8/21/2020   Attestation -   Periodic Controlled Substance Monitoring Possible medication side effects, risk of tolerance/dependence & alternative treatments discussed. ;No signs of potential drug abuse or diversion identified. ;Assessed functional status. Please note this report has been partially produced using speech recognition software and may cause contain errors related to that system including grammar, punctuation and spelling as well as words and phrases that may seem inappropriate. If there are questions or concerns please feel free to contact me to clarify. An  electronic signature was used to authenticate this note. --TRINA Clifford - CNP on 8/21/2020 at 2:57 PM        Pursuant to the emergency declaration under the [facility] waiver authority and the [employer] and [employer] Act, this Virtual  Visit was conducted, with patient's consent, to reduce the patient's risk of exposure to COVID-19 and provide continuity of care for an established patient.     Services were provided through a video synchronous discussion virtually to

## 2020-09-28 ENCOUNTER — HOSPITAL ENCOUNTER (OUTPATIENT)
Dept: LAB | Age: 80
Discharge: HOME | End: 2020-09-28
Payer: MEDICARE

## 2020-09-28 LAB
ALBUMIN SERPL-MCNC: 4.2 G/DL (ref 3.5–4.6)
ALP BLD-CCNC: 77 U/L (ref 35–104)
ALT SERPL-CCNC: 24 U/L (ref 0–41)
ANION GAP SERPL CALCULATED.3IONS-SCNC: 6 MEQ/L (ref 9–15)
AST SERPL-CCNC: 26 U/L (ref 0–40)
BASOPHILS ABSOLUTE: 0 K/UL (ref 0–0.2)
BASOPHILS RELATIVE PERCENT: 0.6 %
BILIRUB SERPL-MCNC: 1 MG/DL (ref 0.2–0.7)
BUN BLDV-MCNC: 14 MG/DL (ref 8–23)
CALCIUM SERPL-MCNC: 9 MG/DL (ref 8.5–9.9)
CHLORIDE BLD-SCNC: 99 MEQ/L (ref 95–107)
CHOLESTEROL, TOTAL: 114 MG/DL (ref 0–199)
CO2: 31 MEQ/L (ref 20–31)
CREAT SERPL-MCNC: 0.7 MG/DL (ref 0.7–1.2)
CREATININE URINE: 62.3 MG/DL
EOSINOPHILS ABSOLUTE: 0.2 K/UL (ref 0–0.7)
EOSINOPHILS RELATIVE PERCENT: 2.8 %
GFR AFRICAN AMERICAN: >60
GFR NON-AFRICAN AMERICAN: >60
GLOBULIN: 2.6 G/DL (ref 2.3–3.5)
GLUCOSE BLD-MCNC: 150 MG/DL (ref 70–99)
HBA1C MFR BLD: 6.4 % (ref 4.8–5.9)
HCT VFR BLD CALC: 45.5 % (ref 42–52)
HDLC SERPL-MCNC: 41 MG/DL (ref 40–59)
HEMOGLOBIN: 14.7 G/DL (ref 14–18)
LDL CHOLESTEROL CALCULATED: 44 MG/DL (ref 0–129)
LYMPHOCYTES ABSOLUTE: 1.7 K/UL (ref 1–4.8)
LYMPHOCYTES RELATIVE PERCENT: 25.4 %
MCH RBC QN AUTO: 28.8 PG (ref 27–31.3)
MCHC RBC AUTO-ENTMCNC: 32.3 % (ref 33–37)
MCV RBC AUTO: 89.2 FL (ref 80–100)
MICROALBUMIN UR-MCNC: 2.4 MG/DL
MICROALBUMIN/CREAT UR-RTO: 38.5 MG/G (ref 0–30)
MONOCYTES ABSOLUTE: 0.5 K/UL (ref 0.2–0.8)
MONOCYTES RELATIVE PERCENT: 8.2 %
NEUTROPHILS ABSOLUTE: 4.2 K/UL (ref 1.4–6.5)
NEUTROPHILS RELATIVE PERCENT: 63 %
PDW BLD-RTO: 14.6 % (ref 11.5–14.5)
PLATELET # BLD: 186 K/UL (ref 130–400)
POTASSIUM SERPL-SCNC: 4.5 MEQ/L (ref 3.4–4.9)
PROSTATE SPECIFIC ANTIGEN: 6.28 NG/ML (ref 0–6.22)
RBC # BLD: 5.1 M/UL (ref 4.7–6.1)
SODIUM BLD-SCNC: 136 MEQ/L (ref 135–144)
TOTAL PROTEIN: 6.8 G/DL (ref 6.3–8)
TRIGL SERPL-MCNC: 147 MG/DL (ref 0–150)
WBC # BLD: 6.7 K/UL (ref 4.8–10.8)

## 2020-09-28 PROCEDURE — 80053 COMPREHEN METABOLIC PANEL: CPT

## 2020-09-28 PROCEDURE — 84153 ASSAY OF PSA TOTAL: CPT

## 2020-09-28 PROCEDURE — 85025 COMPLETE CBC W/AUTO DIFF WBC: CPT

## 2020-09-28 PROCEDURE — 80061 LIPID PANEL: CPT

## 2020-09-28 PROCEDURE — 83036 HEMOGLOBIN GLYCOSYLATED A1C: CPT

## 2020-09-28 PROCEDURE — 36415 COLL VENOUS BLD VENIPUNCTURE: CPT

## 2020-09-28 PROCEDURE — 82570 ASSAY OF URINE CREATININE: CPT

## 2020-09-28 PROCEDURE — 82043 UR ALBUMIN QUANTITATIVE: CPT

## 2020-10-01 ENCOUNTER — VIRTUAL VISIT (OUTPATIENT)
Dept: FAMILY MEDICINE CLINIC | Age: 80
End: 2020-10-01
Payer: MEDICARE

## 2020-10-01 PROCEDURE — G8427 DOCREV CUR MEDS BY ELIG CLIN: HCPCS | Performed by: NURSE PRACTITIONER

## 2020-10-01 PROCEDURE — 1123F ACP DISCUSS/DSCN MKR DOCD: CPT | Performed by: NURSE PRACTITIONER

## 2020-10-01 PROCEDURE — 99214 OFFICE O/P EST MOD 30 MIN: CPT | Performed by: NURSE PRACTITIONER

## 2020-10-01 PROCEDURE — 4040F PNEUMOC VAC/ADMIN/RCVD: CPT | Performed by: NURSE PRACTITIONER

## 2020-10-01 RX ORDER — AZELASTINE HYDROCHLORIDE 0.5 MG/ML
1 SOLUTION/ DROPS OPHTHALMIC 2 TIMES DAILY
Qty: 1 BOTTLE | Refills: 2 | Status: SHIPPED | OUTPATIENT
Start: 2020-10-01 | End: 2020-10-31

## 2020-10-01 RX ORDER — FEXOFENADINE HCL 180 MG/1
180 TABLET ORAL DAILY
Qty: 90 TABLET | Refills: 2 | Status: SHIPPED | OUTPATIENT
Start: 2020-10-01 | End: 2021-05-24

## 2020-10-01 RX ORDER — TEMAZEPAM 15 MG/1
CAPSULE ORAL
Qty: 60 CAPSULE | Refills: 2 | Status: SHIPPED | OUTPATIENT
Start: 2020-10-19 | End: 2021-01-04 | Stop reason: SDUPTHER

## 2020-10-01 RX ORDER — BUSPIRONE HYDROCHLORIDE 30 MG/1
30 TABLET ORAL DAILY
Qty: 30 TABLET | Refills: 3 | Status: SHIPPED | OUTPATIENT
Start: 2020-10-01 | End: 2021-05-24

## 2020-10-01 ASSESSMENT — PATIENT HEALTH QUESTIONNAIRE - PHQ9
SUM OF ALL RESPONSES TO PHQ9 QUESTIONS 1 & 2: 0
1. LITTLE INTEREST OR PLEASURE IN DOING THINGS: 0
2. FEELING DOWN, DEPRESSED OR HOPELESS: 0
SUM OF ALL RESPONSES TO PHQ QUESTIONS 1-9: 0
SUM OF ALL RESPONSES TO PHQ QUESTIONS 1-9: 0

## 2020-10-01 NOTE — PROGRESS NOTES
10/1/2020    TELEHEALTH EVALUATION -- Audio/Visual (During VVZLR-09 public health emergency)    Due to COVID 19 outbreak, patient's office visit was converted to a virtual visit. Patient was contacted and agreed to proceed with a virtual visit via Doxy. me  The risks and benefits of converting to a virtual visit were discussed in light of the current infectious disease epidemic. Patient also understood that insurance coverage and co-pays are up to their individual insurance plans. Jair Ronquillo is a [de-identified] y.o. male being evaluated by a Virtual Visit (video visit) encounter to address concerns as mentioned above. A caregiver was present when appropriate. Due to this being a TeleHealth encounter (During QOACG-97 public health emergency), evaluation of the following organ systems was limited: Vitals/Constitutional/EENT/Resp/CV/GI//MS/Neuro/Skin/Heme-Lymph-Imm. Pursuant to the emergency declaration under the [address] [place] [id number] and the [employer] and [employer] Act, this Virtual Visit was conducted with patient's (and/or legal guardian's) consent, to reduce the patient's risk of exposure to COVID-19 and provide necessary medical care. The patient (and/or legal guardian) has also been advised to contact this office for worsening conditions or problems, and seek emergency medical treatment and/or call 911 if deemed necessary. Patient identification was verified at the start of the visit: Yes    Total time spent for this encounter: Not billed by time    Services were provided through a video synchronous discussion virtually to substitute for in-person clinic visit. Patient and provider were located at their individual homes. The patient is talking with me virtually from his home and I am located at my office in [facility].         HPI:    Jair Ronquillo (:  1940) has requested an audio/video evaluation for the following concern(s):    Diabetes/HTN/dyslipidemia: He states that he has not had any significant changes to his diet lately. Trying to reduce starches still. Getting as much physical activity as he can with current breathing condition. Continues low-salt diet overall. No low sugar episodes reported. Insomnia/anxiety: He states that he continues to take Klonopin at bedtime with typically 2 tablets needed to help him sleep through the night. Continues to use trilogy at night without issue and continuous oxygen during the day as well. Follows with pulmonology routinely. Elevated PSA: He states that he has not had any new onset urinary symptoms. States that urine does seem to be more concentrated or darker in color but he has not had any dysuria or flank pain. No hematuria. CAD/atrial fibrillation: He continues to follow routinely [facility]. Has been taking medication without side effects. No recent heart palpitations or lightheadedness or dizziness. No abnormal bruising or bleeding. Seasonal allergies: States that he has been taking Allegra but tends to have a lot of eye irritation and postnasal drip. This can sometimes make his breathing more difficult during the day depending on the weather as well. He denies any sinus pain or pressure. Review of Systems    Prior to Visit Medications    Medication Sig Taking? Authorizing Provider   temazepam (RESTORIL) 15 MG capsule TAKE 2 CAPSULES BY MOUTH NIGHTLY AT BEDTIME AS NEEDED for sleep Yes Dru Medel APRN - CNP   busPIRone (BUSPAR) 30 MG tablet Take 30 mg by mouth daily Yes Dru Medel APRN - CNP   fexofenadine (ALLEGRA) 180 MG tablet Take 1 tablet by mouth daily Yes Dru Medel APRN - CNP   azelastine (OPTIVAR) 0.05 % ophthalmic solution Place 1 drop into both eyes 2 times daily Yes Dru Medel APRN - CNP   mupirocin (BACTROBAN) 2 % ointment Apply topically 3 times daily.  Yes Vadim Kraft APRN - [employer]E [employer]PIMAT  MCG/ACT AERS inhaler USE 1 INHALATION EVERY 6 HOURS Yes TRINA Lopez CNP   Melatonin 10 MG TABS Take by mouth Yes Historical Provider, MD   SYMBICORT 80-4.5 MCG/ACT AERO USE 2 Real MD Kim   rosuvastatin (CRESTOR) 40 MG tablet Take 1 tablet by mouth daily Yes TRINA Matta CNP   metoprolol succinate (TOPROL XL) 25 MG extended release tablet Take 25 mg by mouth every morning Yes Historical Provider, MD   metoprolol succinate (TOPROL XL) 50 MG extended release tablet Take 50 mg by mouth every evening Yes Historical Provider, MD   aspirin 81 MG tablet Take 81 mg by mouth daily Yes Historical Provider, MD   diltiazem (TIAZAC) 240 MG extended release capsule TAKE 1 CAPSULE BY MOUTH DAILY AS DIRECTED Yes Historical Provider, MD   CARTIA  MG extended release capsule Take 120 capsules by mouth every evening  Yes Historical Provider, MD   OXYGEN Please provide patient with a portable oxygen concentrator Yes Shannon Salmon PA-C   dofetilide (TIKOSYN) 250 MCG capsule Take 250 mcg by mouth 2 times daily Yes Historical Provider, MD   Magnesium 500 MG TABS Take by mouth 2 times daily Yes Historical Provider, MD   Potassium 99 MG TABS Take by mouth daily Yes Historical Provider, MD   GLUTATHIONE PO Take by mouth Yes Historical Provider, MD   metoprolol tartrate (LOPRESSOR) 25 MG tablet Take 25 mg by mouth as needed Yes Historical Provider, MD   tadalafil (CIALIS) 5 MG tablet Take 1 tablet by mouth as needed for Erectile Dysfunction (maximum 5 mg per day) Yes TRINA Matta CNP   Handicap Placard MISC by Does not apply route Good for 5 Years from 06/16/2016 Yes Yariel Burden MD   digoxin (LANOXIN) 250 MCG tablet  Yes Historical Provider, MD   ELIQUIS 5 MG TABS tablet  Yes Historical Provider, MD   PROAIR  (90 BASE) MCG/ACT inhaler  Yes Historical Provider, MD   CINNAMON PO Take  by mouth.  Yes Historical Provider, MD   SAW PALMETTO by Does not apply route. Yes Historical Provider, MD   ipratropium-albuterol (DUONEB) 0.5-2.5 (3) MG/3ML SOLN nebulizer solution Take 3 mLs by nebulization every 6 hours as needed for Shortness of Breath. Yes Rica Edwards MD   Glucosamine-Chondroitin 500-400 MG CAPS Take 1 capsule by mouth daily. Yes Historical Provider, MD   Ascorbic Acid (VITAMIN C) 500 MG tablet Take 1,000 mg by mouth daily. 1/2 tab in AM and 1/2 tab in PM  Yes Historical Provider, MD   vitamin D (CHOLECALCIFEROL) 400 UNIT TABS tablet Take 800 Units by mouth daily. Yes Historical Provider, MD   vitamin B-12 (CYANOCOBALAMIN) 1000 MCG tablet Take 1,000 mcg by mouth daily. Yes Historical Provider, MD   Selenium 200 MCG TABS Take 200 mcg by mouth daily. Yes Historical Provider, MD   Thiamine HCl (VITAMIN B-1) 100 MG tablet Take 100 mg by mouth daily. Yes Historical Provider, MD   folic acid (FOLVITE) 318 MCG tablet Take 800 mcg by mouth daily. Yes Historical Provider, MD   Zinc 50 MG CAPS Take 50 mg by mouth daily. Yes Historical Provider, MD   beta carotene 77506 UNIT capsule Take 25,000 Units by mouth daily. Yes Historical Provider, MD   DHEA 25 MG TABS Take 25 mg by mouth daily. Yes Historical Provider, MD   Pyridoxine HCl (VITAMIN B-6) 50 MG tablet Take 100 mg by mouth daily. Yes Historical Provider, MD   Calcium Carb-Cholecalciferol (CALCIUM 1000 + D) 1000-800 MG-UNIT TABS Take 1 tablet by mouth 2 times daily. Yes Historical Provider, MD   Methylsulfonylmethane (MSM) 1000 MG TABS Take 1,000 mg by mouth daily. Yes Historical Provider, MD   vitamin E 400 UNIT capsule Take 400 Units by mouth daily.    Yes Historical Provider, MD   predniSONE (DELTASONE) 10 MG tablet 4 tablets daily for 4 days, 3 tablets daily for 4 days, 2 tablets daily for 4 days, then 1 tablet daily for 4 days  Patient not taking: Reported on 10/1/2020  Gustabo Herman MD   doxycycline hyclate (VIBRAMYCIN) 100 MG capsule Take 1 capsule by mouth 2 times daily  Patient not taking: Reported on 10/1/2020  TRINA Gay - CNP       Social History     Tobacco Use    Smoking status: Former Smoker     Packs/day: 2.00     Years: 40.00     Pack years: 80.00     Last attempt to quit: 2002     Years since quittin.1    Smokeless tobacco: Never Used   Substance Use Topics    Alcohol use: No    Drug use: No            PHYSICAL EXAMINATION:  [ INSTRUCTIONS:  \"[x]\" Indicates a positive item  \"[]\" Indicates a negative item  -- DELETE ALL ITEMS NOT EXAMINED]  [x] Alert  [x] Oriented to person/place/time    [x] No apparent distress  [] Toxic appearing    [] Face flushed appearing [] Sclera clear  [] Lips are cyanotic      [x] Breathing appears normal  [] Appears tachypneic      [] Rash on visible skin    [x] Cranial Nerves II-XII grossly intact    [x] Motor grossly intact in visible upper extremities    [] Motor grossly intact in visible lower extremities    [x] Normal Mood  [] Anxious appearing    [] Depressed appearing  [] Confused appearing      [] Poor short term memory  [] Poor long term memory    [] OTHER:      Due to this being a TeleHealth encounter, evaluation of the following organ systems is limited: Vitals/Constitutional/EENT/Resp/CV/GI//MS/Neuro/Skin/Heme-Lymph-Imm. ASSESSMENT/PLAN:   Diagnosis Orders   1. Type 2 diabetes mellitus with other circulatory complication, without long-term current use of insulin (HCC)  CBC Auto Differential    Comprehensive Metabolic Panel    Lipid Panel    Hemoglobin A1C    Microalbumin / Creatinine Urine Ratio   2. Essential hypertension     3. Mixed hyperlipidemia     4. Insomnia, unspecified type  temazepam (RESTORIL) 15 MG capsule   5. Anxiety  busPIRone (BUSPAR) 30 MG tablet   6. Elevated PSA     7. Chronic obstructive pulmonary disease, unspecified COPD type ([address]     8. YUDELKA (obstructive sleep apnea)- trilogy at night     9.  Coronary artery disease involving native coronary artery of native heart without angina pectoris     10. Paroxysmal atrial fibrillation (HCC) - Dr. Booker Diallo     11. Seasonal allergic rhinitis due to other allergic trigger  fexofenadine (ALLEGRA) 180 MG tablet    azelastine (OPTIVAR) 0.05 % ophthalmic solution         Return in about 3 months (around 1/5/2021) for diabetes- doxy or level 2 in office. 1. 2. 3. Hemoglobin A1c still stable at 6.4. fasting glucose better at 150. Lipid panel stable overall with improved triglycerides. 4. 5. Continue current medication for sleep/anxiety. Medication has been effective and no side effects reported. 6. PSA is elevated but improved from last visit. No new urinary symptoms reported. Recommend increase water intake if urine darker. 7. 8. 11.  Breathing has been at baseline. Sometimes more difficult when allergies bad. Recommend continuing allegra but add eye drop to help with excessive tearing/post nasal drip. 9. 10. Continues to follow with [facility]. No acute symptoms reported. Compliant with medication therapy. Controlled Substance Monitoring:    Acute and Chronic Pain Monitoring:   RX Monitoring 10/1/2020   Attestation -   Periodic Controlled Substance Monitoring Possible medication side effects, risk of tolerance/dependence & alternative treatments discussed. ;No signs of potential drug abuse or diversion identified. ;Assessed functional status. Please note this report has been partially produced using speech recognition software and may cause contain errors related to that system including grammar, punctuation and spelling as well as words and phrases that may seem inappropriate. If there are questions or concerns please feel free to contact me to clarify. An  electronic signature was used to authenticate this note.     --TRINA Burns - CNP on 10/1/2020 at 3:33 PM        Pursuant to the emergency declaration under the [facility] waiver authority and the [facility] and Response Supplemental Appropriations Act, this Virtual  Visit was conducted, with patient's consent, to reduce the patient's risk of exposure to COVID-19 and provide continuity of care for an established patient. Services were provided through a video synchronous discussion virtually to substitute for in-person clinic visit.

## 2020-10-06 ENCOUNTER — NURSE ONLY (OUTPATIENT)
Dept: FAMILY MEDICINE CLINIC | Age: 80
End: 2020-10-06
Payer: MEDICARE

## 2020-10-06 PROCEDURE — G0008 ADMIN INFLUENZA VIRUS VAC: HCPCS | Performed by: NURSE PRACTITIONER

## 2020-10-06 PROCEDURE — 90694 VACC AIIV4 NO PRSRV 0.5ML IM: CPT | Performed by: NURSE PRACTITIONER

## 2020-10-06 NOTE — PROGRESS NOTES
Vaccine Information Sheet, \"Influenza - Inactivated\"  given to Ginger Watkins, or parent/legal guardian of  Ginger Watkins and verbalized understanding. Patient responses:    Have you ever had a reaction to a flu vaccine? No  Are you able to eat eggs without adverse effects? Yes  Do you have any current illness? No  Have you ever had Guillian Chapman Syndrome? No    Flu vaccine given per order. Please see immunization tab.

## 2020-10-14 ENCOUNTER — VIRTUAL VISIT (OUTPATIENT)
Dept: PULMONOLOGY | Age: 80
End: 2020-10-14
Payer: MEDICARE

## 2020-10-14 PROCEDURE — G8484 FLU IMMUNIZE NO ADMIN: HCPCS | Performed by: INTERNAL MEDICINE

## 2020-10-14 PROCEDURE — 1036F TOBACCO NON-USER: CPT | Performed by: INTERNAL MEDICINE

## 2020-10-14 PROCEDURE — 99213 OFFICE O/P EST LOW 20 MIN: CPT | Performed by: INTERNAL MEDICINE

## 2020-10-14 PROCEDURE — 4040F PNEUMOC VAC/ADMIN/RCVD: CPT | Performed by: INTERNAL MEDICINE

## 2020-10-14 PROCEDURE — 3023F SPIROM DOC REV: CPT | Performed by: INTERNAL MEDICINE

## 2020-10-14 PROCEDURE — G8926 SPIRO NO PERF OR DOC: HCPCS | Performed by: INTERNAL MEDICINE

## 2020-10-14 PROCEDURE — G8417 CALC BMI ABV UP PARAM F/U: HCPCS | Performed by: INTERNAL MEDICINE

## 2020-10-14 PROCEDURE — 1123F ACP DISCUSS/DSCN MKR DOCD: CPT | Performed by: INTERNAL MEDICINE

## 2020-10-14 PROCEDURE — G8428 CUR MEDS NOT DOCUMENT: HCPCS | Performed by: INTERNAL MEDICINE

## 2020-10-14 ASSESSMENT — ENCOUNTER SYMPTOMS
WHEEZING: 1
SORE THROAT: 0
DIARRHEA: 0
RHINORRHEA: 0
COUGH: 1
SHORTNESS OF BREATH: 1
ABDOMINAL PAIN: 0
VOMITING: 0
CHEST TIGHTNESS: 1
SINUS PRESSURE: 0
NAUSEA: 0

## 2020-10-14 NOTE — PROGRESS NOTES
Subjective:     Jonny Poole is a [de-identified] y.o. male who complains today of:     Chief Complaint   Patient presents with    COPD       HPI  TELEHEALTH EVALUATION -- Audio/Visual (During TPCNO-56 public health emergency)  This is a follow-up telehealth evaluation on this patient with advanced COPD and chronic respiratory failure. Since the last visit patient reports similar symptoms of dyspnea, cough, wheezing episodes, he is doing very well with noninvasive ventilation at night. He uses that all night and occasionally he may use it during the day, usually feels rested in the morning. He does have continued chronic symptoms of cough, wheezing, and shortness of breath but not any worse than baseline. Allergies:  Brilinta [ticagrelor]; Sildenafil citrate;  Advair [fluticasone-salmeterol]; and Fluticasone propionate (inhal)  Past Medical History:   Diagnosis Date    Adrenal adenoma     CT 6/2011 stable    Anxiety     Asbestosis(501)     CAD (coronary artery disease)     status post stent 2001    Cancer [facility]     basil cell carnoma    COPD (chronic obstructive pulmonary disease) (HCC)     Elbow injury     left    Erectile dysfunction     Granulomatous lung disease (HCC)     Herpes zoster     Hyperlipidemia     Hypertension     Insomnia     Interstitial fibrosis     Obstructive sleep apnea on CPAP 11/13/2017    Prostate nodule     Scarlet fever     Urine frequency      Past Surgical History:   Procedure Laterality Date    CARDIAC CATHETERIZATION      CARDIOVASCULAR STRESS TEST      1/2010 normal per patient    COLONOSCOPY      5/2009 tubular adenomas    COLONOSCOPY  08/13/15    David Mcnamara MD    EYE SURGERY  02/10/14    DR Maninder John  [facility]    HERNIA REPAIR      LEG SURGERY       Family History   Problem Relation Age of Onset    Cancer Other         colon cancer in multiple family members and breast cancer    Heart Disease Other      Social History     Socioeconomic History    Marital status:      Spouse name: Not on file    Number of children: 3    Years of education: 15    Highest education level: High school graduate   Occupational History    Occupation:    Social Needs    Financial resource strain: Somewhat hard    Food insecurity     Worry: Sometimes true     Inability: Sometimes true    Transportation needs     Medical: No     Non-medical: No   Tobacco Use    Smoking status: Former Smoker     Packs/day: 2.00     Years: 40.00     Pack years: 80.00     Last attempt to quit: 2002     Years since quittin.2    Smokeless tobacco: Never Used   Substance and Sexual Activity    Alcohol use: No    Drug use: No    Sexual activity: Yes     Partners: Female   Lifestyle    Physical activity     Days per week: 0 days     Minutes per session: 0 min    Stress: To some extent   Relationships    Social connections     Talks on phone: More than three times a week     Gets together: More than three times a week     Attends [employer] service: Never     Active member of club or organization: No     Attends meetings of clubs or organizations: Never     Relationship status:     Intimate partner violence     Fear of current or ex partner: Not on file     Emotionally abused: Not on file     Physically abused: Not on file     Forced sexual activity: Not on file   Other Topics Concern    Not on file   Social History Narrative    Not on file         Review of Systems   Constitutional: Positive for fatigue. Negative for chills, diaphoresis and fever. HENT: Positive for congestion. Negative for postnasal drip, rhinorrhea, sinus pressure, sneezing and sore throat. Eyes: Negative for visual disturbance. Respiratory: Positive for cough, chest tightness, shortness of breath and wheezing. Cardiovascular: Positive for leg swelling. Negative for chest pain and palpitations. Gastrointestinal: Negative for abdominal pain, diarrhea, nausea and vomiting.    Genitourinary: Negative for difficulty urinating and hematuria. Musculoskeletal: Positive for arthralgias. Negative for joint swelling and myalgias. Skin: Negative for rash. Allergic/Immunologic: Negative for environmental allergies. Neurological: Positive for weakness. Negative for dizziness, tremors and headaches. Psychiatric/Behavioral: Positive for sleep disturbance. Negative for behavioral problems.         :   There were no vitals filed for this visit. Physical Exam        Assessment:      Diagnosis Orders   1. Chronic obstructive pulmonary disease, unspecified COPD type ([facility]     2. Chronic respiratory failure with hypoxia and hypercapnia (HCC)     3. Pulmonary HTN ([facility]       Patient is doing well with current treatment plans, he was asked to continue same report any changes or problems otherwise I will see him for follow-up in 3 months      Plan:     No orders of the defined types were placed in this encounter. No orders of the defined types were placed in this encounter. Pursuant to the emergency declaration under the [facility], [facility] waiver authority and the [employer] and [employer] Act, this Virtual Visit was conducted, with patient's consent, to reduce the patient's risk of exposure to COVID-19 and provide continuity of care for an established patient. Services were provided through a video synchronous discussion virtually to substitute for in-person clinic visit. Visit completed using iQiyi. Patient was located at home and I was located in the clinic. Return in about 3 months (around 1/14/2021) for re-evaluation.       Venus Fritz MD

## 2020-11-17 ENCOUNTER — TELEPHONE (OUTPATIENT)
Dept: PULMONOLOGY | Age: 80
End: 2020-11-17

## 2020-11-17 NOTE — TELEPHONE ENCOUNTER
Pt called stating he needs O2 order for a concentrator that goes up to 10 due to he uses it on higher settings when he is exerted.

## 2020-12-10 ENCOUNTER — OFFICE VISIT (OUTPATIENT)
Dept: PULMONOLOGY | Age: 80
End: 2020-12-10
Payer: MEDICARE

## 2020-12-10 VITALS
DIASTOLIC BLOOD PRESSURE: 70 MMHG | HEIGHT: 72 IN | WEIGHT: 220 LBS | BODY MASS INDEX: 29.8 KG/M2 | TEMPERATURE: 96.8 F | HEART RATE: 69 BPM | RESPIRATION RATE: 16 BRPM | SYSTOLIC BLOOD PRESSURE: 120 MMHG | OXYGEN SATURATION: 95 %

## 2020-12-10 PROCEDURE — G8417 CALC BMI ABV UP PARAM F/U: HCPCS | Performed by: INTERNAL MEDICINE

## 2020-12-10 PROCEDURE — 99215 OFFICE O/P EST HI 40 MIN: CPT | Performed by: INTERNAL MEDICINE

## 2020-12-10 PROCEDURE — 1036F TOBACCO NON-USER: CPT | Performed by: INTERNAL MEDICINE

## 2020-12-10 PROCEDURE — 4040F PNEUMOC VAC/ADMIN/RCVD: CPT | Performed by: INTERNAL MEDICINE

## 2020-12-10 PROCEDURE — G8428 CUR MEDS NOT DOCUMENT: HCPCS | Performed by: INTERNAL MEDICINE

## 2020-12-10 PROCEDURE — 3023F SPIROM DOC REV: CPT | Performed by: INTERNAL MEDICINE

## 2020-12-10 PROCEDURE — G8484 FLU IMMUNIZE NO ADMIN: HCPCS | Performed by: INTERNAL MEDICINE

## 2020-12-10 PROCEDURE — 1123F ACP DISCUSS/DSCN MKR DOCD: CPT | Performed by: INTERNAL MEDICINE

## 2020-12-10 PROCEDURE — G8926 SPIRO NO PERF OR DOC: HCPCS | Performed by: INTERNAL MEDICINE

## 2020-12-10 RX ORDER — ALBUTEROL SULFATE 90 UG/1
2 AEROSOL, METERED RESPIRATORY (INHALATION) EVERY 6 HOURS PRN
Qty: 3 INHALER | Refills: 3 | Status: SHIPPED | OUTPATIENT
Start: 2020-12-10

## 2020-12-10 ASSESSMENT — ENCOUNTER SYMPTOMS
ABDOMINAL PAIN: 0
RHINORRHEA: 1
SHORTNESS OF BREATH: 1
DIARRHEA: 0
EYE ITCHING: 0
COUGH: 1
CHEST TIGHTNESS: 0
NAUSEA: 0
SORE THROAT: 0
VOMITING: 0
WHEEZING: 1
VOICE CHANGE: 0

## 2020-12-10 NOTE — PROGRESS NOTES
Subjective:     Monet Orr is a 79 y.o. male who complains today of:     Chief Complaint   Patient presents with    Follow-up     former pt of Dr. Vinnie Arevalo. Pt has Hx. of COPD. F/u for O2 recertification. HPI    He was following with dr. Vinnie Arevalo and I am seeing him first time . He has advanced COPD and chronic hypercapnic respiratory failure. He is using trilogy since 2.5 years . He is using 3-4  Lit with rest up to 5 lit with activity  C/o shortness of breath with any exertion. Occasional Wheezing   Occasional Cough with  clearSputum  No Hemoptysis  No Chest tightness   No Chest pain with radiation  or pleuritic pain  No Fever or chills. C/o Rhinorrhea and postnasal drip, he is on allegra    He is using bronchodilator with symbicort 2 puff BID , combivent respimat 1 puff Q 4 hourly , nebulizer with duoneb neb prn  , proair hfa and prednisone prn     Allergies:  Brilinta [ticagrelor]; Sildenafil citrate;  Advair [fluticasone-salmeterol]; and Fluticasone propionate (inhal)  Past Medical History:   Diagnosis Date    Adrenal adenoma     CT 6/2011 stable    Anxiety     Asbestosis(501)     CAD (coronary artery disease)     status post stent 2001    Cancer ([facility] [address]     basil cell carnoma    COPD (chronic obstructive pulmonary disease) (HCC)     Elbow injury     left    Erectile dysfunction     Granulomatous lung disease (HCC)     Herpes zoster     Hyperlipidemia     Hypertension     Insomnia     Interstitial fibrosis     Obstructive sleep apnea on CPAP 11/13/2017    Prostate nodule     Scarlet fever     Urine frequency      Past Surgical History:   Procedure Laterality Date    CARDIAC CATHETERIZATION      CARDIOVASCULAR STRESS TEST      1/2010 normal per patient    COLONOSCOPY      5/2009 tubular adenomas    COLONOSCOPY  08/13/15    David Mcnamara MD    EYE SURGERY  02/10/14    DR Korin Fan  [facility]    HERNIA REPAIR      LEG SURGERY       Family History   Problem Relation Age of Onset  Cancer Other         colon cancer in multiple family members and breast cancer    Heart Disease Other      Social History     Socioeconomic History    Marital status:      Spouse name: Not on file    Number of children: 3    Years of education: 15    Highest education level: High school graduate   Occupational History    Occupation:    Social Needs    Financial resource strain: Somewhat hard    Food insecurity     Worry: Sometimes true     Inability: Sometimes true    Transportation needs     Medical: No     Non-medical: No   Tobacco Use    Smoking status: Former Smoker     Packs/day: 2.00     Years: 40.00     Pack years: 80.00     Last attempt to quit: 2002     Years since quittin.3    Smokeless tobacco: Never Used   Substance and Sexual Activity    Alcohol use: No    Drug use: No    Sexual activity: Yes     Partners: Female   Lifestyle    Physical activity     Days per week: 0 days     Minutes per session: 0 min    Stress: To some extent   Relationships    Social connections     Talks on phone: More than three times a week     Gets together: More than three times a week     Attends Mandaen service: Never     Active member of club or organization: No     Attends meetings of clubs or organizations: Never     Relationship status:     Intimate partner violence     Fear of current or ex partner: Not on file     Emotionally abused: Not on file     Physically abused: Not on file     Forced sexual activity: Not on file   Other Topics Concern    Not on file   Social History Narrative    Not on file         Review of Systems   Constitutional: Negative for chills, diaphoresis, fatigue and fever. HENT: Positive for postnasal drip and rhinorrhea. Negative for congestion, mouth sores, nosebleeds, sneezing, sore throat and voice change. Eyes: Negative for itching and visual disturbance. Mental Status: He is alert and oriented to person, place, and time. Cranial Nerves: No cranial nerve deficit. Psychiatric:         Behavior: Behavior normal.         Current Outpatient Medications   Medication Sig Dispense Refill    albuterol sulfate HFA (PROAIR HFA) 108 (90 Base) MCG/ACT inhaler Inhale 2 puffs into the lungs every 6 hours as needed for Wheezing 3 Inhaler 3    busPIRone (BUSPAR) 30 MG tablet Take 30 mg by mouth daily 30 tablet 3    fexofenadine (ALLEGRA) 180 MG tablet Take 1 tablet by mouth daily 90 tablet 2    mupirocin (BACTROBAN) 2 % ointment Apply topically 3 times daily.  30 g 1    COMBIVENT RESPIMAT  MCG/ACT AERS inhaler USE 1 INHALATION EVERY 6 HOURS 16 g 3    Melatonin 10 MG TABS Take by mouth      predniSONE (DELTASONE) 10 MG tablet 4 tablets daily for 4 days, 3 tablets daily for 4 days, 2 tablets daily for 4 days, then 1 tablet daily for 4 days 40 tablet 1    SYMBICORT 80-4.5 MCG/ACT AERO USE 2 INHALATIONS TWICE A DAY 30.6 g 4    doxycycline hyclate (VIBRAMYCIN) 100 MG capsule Take 1 capsule by mouth 2 times daily 20 capsule 0    rosuvastatin (CRESTOR) 40 MG tablet Take 1 tablet by mouth daily 90 tablet 4    metoprolol succinate (TOPROL XL) 25 MG extended release tablet Take 25 mg by mouth every morning      metoprolol succinate (TOPROL XL) 50 MG extended release tablet Take 50 mg by mouth every evening      aspirin 81 MG tablet Take 81 mg by mouth daily      diltiazem (TIAZAC) 240 MG extended release capsule TAKE 1 CAPSULE BY MOUTH DAILY AS DIRECTED  3    CARTIA  MG extended release capsule Take 120 capsules by mouth every evening   3    OXYGEN Please provide patient with a portable oxygen concentrator 1 Units 0    dofetilide (TIKOSYN) 250 MCG capsule Take 250 mcg by mouth 2 times daily      Magnesium 500 MG TABS Take by mouth 2 times daily      Potassium 99 MG TABS Take by mouth daily      GLUTATHIONE PO Take by mouth CLINICAL HISTORY: J44.1 COPD exacerbation ([address] ICD10    COMPARISONS: 1/24/2020    FINDINGS: Cardiac size is borderline. Pulmonary vascularity is normal. Lungs are hyperinflated with increase in the AP diameter of the chest and flattening the diaphragms, concordant with clinical history of COPD. There is scarring adjacent to the   cardiac silhouette in the left lower lung. There is coronary artery stenting. There are no acute infiltrates. Prominent nipple shadows, left greater than right are noted. There are mild degenerative changes in the spine. Impression COPD. NO ACUTE CARDIOPULMONARY DISEASE.   ]  Results for orders placed during the hospital encounter of 01/24/20   XR CHEST PORTABLE    Narrative EXAMINATION: PORTABLE CHEST X-RAY FROM 1/24/2020 17:27 HOURS    CLINICAL HISTORY: SMOKING HISTORY AND COPD, PATIENT WITH DYSPNEA    COMPARISONS: 10/23/2018    FINDINGS: The heart is not enlarged. There is an atherosclerotic tortuous aorta. There is hyperinflation of the lungs and coarse pulmonary markings compatible with COPD with pulmonary interstitial fibrosis. There are granulomata in both hilar regions   compatible with remote granulomatous disease. There are no acute pulmonary infiltrates or pleural effusions. There is no pneumothorax. There is mild degenerative bone spurring in the spine. Impression COPD WITHOUT AN ACUTE PULMONARY INFILTRATE OR PLEURAL EFFUSION. Assessment/Plan:     1. Chronic obstructive pulmonary disease, unspecified COPD type ([address]  He has advanced COPD and chronic hypercapnic respiratory failure. He is using trilogy since 2.5 years . He is using 3-4  Lit with rest up to 5 lit with activity . C/o shortness of breath with any exertion. Occasional Wheezing   Occasional Cough with  Clear Sputum. C/o Rhinorrhea and postnasal drip, he is on allegra. He is using bronchodilator with symbicort 2 puff BID , combivent respimat 1 puff Q 4 hourly , nebulizer with duoneb neb prn  , proair hfa and prednisone prn. Continue bronchodilator therapy as before    2. Chronic respiratory failure with hypoxia and hypercapnia (HCC)  He is on 3 to 4 L oxygen at rest and 5 L with activity and he is using noninvasive ventilator at nighttime. He is doing well with trilogy and O2. He said he will need home O2 evaluation for DME company. Home O2 evaluation done and he does qualify for oxygen. 3. Obstructive sleep apnea  Continue noninvasive ventilator and oxygen during sleep     time spend over 40 min    Return in about 3 months (around 3/10/2021) for COPD, hypoxia on O2, chronic respiratory failure, delores.       Eyad Black MD

## 2021-01-04 ENCOUNTER — HOSPITAL ENCOUNTER (OUTPATIENT)
Dept: LAB | Age: 81
Discharge: HOME | End: 2021-01-04
Payer: MEDICARE

## 2021-01-04 ENCOUNTER — VIRTUAL VISIT (OUTPATIENT)
Dept: FAMILY MEDICINE CLINIC | Age: 81
End: 2021-01-04
Payer: MEDICARE

## 2021-01-04 DIAGNOSIS — F41.9 ANXIETY: ICD-10-CM

## 2021-01-04 DIAGNOSIS — E78.2 MIXED HYPERLIPIDEMIA: ICD-10-CM

## 2021-01-04 DIAGNOSIS — E11.59 TYPE 2 DIABETES MELLITUS WITH OTHER CIRCULATORY COMPLICATION, WITHOUT LONG-TERM CURRENT USE OF INSULIN (HCC): ICD-10-CM

## 2021-01-04 DIAGNOSIS — I48.0 PAROXYSMAL ATRIAL FIBRILLATION (HCC): ICD-10-CM

## 2021-01-04 DIAGNOSIS — R97.20 ELEVATED PSA: ICD-10-CM

## 2021-01-04 DIAGNOSIS — J44.9 CHRONIC OBSTRUCTIVE PULMONARY DISEASE, UNSPECIFIED COPD TYPE (HCC): ICD-10-CM

## 2021-01-04 DIAGNOSIS — I25.10 CORONARY ARTERY DISEASE INVOLVING NATIVE CORONARY ARTERY OF NATIVE HEART WITHOUT ANGINA PECTORIS: ICD-10-CM

## 2021-01-04 DIAGNOSIS — G47.00 INSOMNIA, UNSPECIFIED TYPE: Primary | ICD-10-CM

## 2021-01-04 DIAGNOSIS — I10 ESSENTIAL HYPERTENSION: ICD-10-CM

## 2021-01-04 LAB
ALBUMIN SERPL-MCNC: 4.1 G/DL (ref 3.5–4.6)
ALP BLD-CCNC: 93 U/L (ref 35–104)
ALT SERPL-CCNC: 26 U/L (ref 0–41)
ANION GAP SERPL CALCULATED.3IONS-SCNC: 10 MEQ/L (ref 9–15)
AST SERPL-CCNC: 21 U/L (ref 0–40)
BASOPHILS ABSOLUTE: 0 K/UL (ref 0–0.2)
BASOPHILS RELATIVE PERCENT: 0.3 %
BILIRUB SERPL-MCNC: 1 MG/DL (ref 0.2–0.7)
BUN BLDV-MCNC: 15 MG/DL (ref 8–23)
CALCIUM SERPL-MCNC: 9.7 MG/DL (ref 8.5–9.9)
CHLORIDE BLD-SCNC: 100 MEQ/L (ref 95–107)
CHOLESTEROL, TOTAL: 146 MG/DL (ref 0–199)
CO2: 30 MEQ/L (ref 20–31)
CREAT SERPL-MCNC: 0.74 MG/DL (ref 0.7–1.2)
CREATININE URINE: 82.1 MG/DL
EOSINOPHILS ABSOLUTE: 0.2 K/UL (ref 0–0.7)
EOSINOPHILS RELATIVE PERCENT: 2.5 %
GFR AFRICAN AMERICAN: >60
GFR NON-AFRICAN AMERICAN: >60
GLOBULIN: 2.9 G/DL (ref 2.3–3.5)
GLUCOSE BLD-MCNC: 192 MG/DL (ref 70–99)
HBA1C MFR BLD: 7 % (ref 4.8–5.9)
HCT VFR BLD CALC: 46.2 % (ref 42–52)
HDLC SERPL-MCNC: 46 MG/DL (ref 40–59)
HEMOGLOBIN: 14.9 G/DL (ref 14–18)
LDL CHOLESTEROL CALCULATED: 62 MG/DL (ref 0–129)
LYMPHOCYTES ABSOLUTE: 1.8 K/UL (ref 1–4.8)
LYMPHOCYTES RELATIVE PERCENT: 20.8 %
MCH RBC QN AUTO: 28.8 PG (ref 27–31.3)
MCHC RBC AUTO-ENTMCNC: 32.1 % (ref 33–37)
MCV RBC AUTO: 89.6 FL (ref 80–100)
MICROALBUMIN UR-MCNC: 3.4 MG/DL
MICROALBUMIN/CREAT UR-RTO: 41.4 MG/G (ref 0–30)
MONOCYTES ABSOLUTE: 0.7 K/UL (ref 0.2–0.8)
MONOCYTES RELATIVE PERCENT: 8.2 %
NEUTROPHILS ABSOLUTE: 6 K/UL (ref 1.4–6.5)
NEUTROPHILS RELATIVE PERCENT: 68.2 %
PDW BLD-RTO: 13.7 % (ref 11.5–14.5)
PLATELET # BLD: 218 K/UL (ref 130–400)
POTASSIUM SERPL-SCNC: 4.7 MEQ/L (ref 3.4–4.9)
RBC # BLD: 5.16 M/UL (ref 4.7–6.1)
SODIUM BLD-SCNC: 140 MEQ/L (ref 135–144)
TOTAL PROTEIN: 7 G/DL (ref 6.3–8)
TRIGL SERPL-MCNC: 191 MG/DL (ref 0–150)
WBC # BLD: 8.8 K/UL (ref 4.8–10.8)

## 2021-01-04 PROCEDURE — 82043 UR ALBUMIN QUANTITATIVE: CPT

## 2021-01-04 PROCEDURE — 80061 LIPID PANEL: CPT

## 2021-01-04 PROCEDURE — 80053 COMPREHEN METABOLIC PANEL: CPT

## 2021-01-04 PROCEDURE — 36415 COLL VENOUS BLD VENIPUNCTURE: CPT

## 2021-01-04 PROCEDURE — 83036 HEMOGLOBIN GLYCOSYLATED A1C: CPT

## 2021-01-04 PROCEDURE — G8510 SCR DEP NEG, NO PLAN REQD: HCPCS | Performed by: NURSE PRACTITIONER

## 2021-01-04 PROCEDURE — 3288F FALL RISK ASSESSMENT DOCD: CPT | Performed by: NURSE PRACTITIONER

## 2021-01-04 PROCEDURE — 4040F PNEUMOC VAC/ADMIN/RCVD: CPT | Performed by: NURSE PRACTITIONER

## 2021-01-04 PROCEDURE — 3051F HG A1C>EQUAL 7.0%<8.0%: CPT | Performed by: NURSE PRACTITIONER

## 2021-01-04 PROCEDURE — 1123F ACP DISCUSS/DSCN MKR DOCD: CPT | Performed by: NURSE PRACTITIONER

## 2021-01-04 PROCEDURE — 85025 COMPLETE CBC W/AUTO DIFF WBC: CPT

## 2021-01-04 PROCEDURE — 99214 OFFICE O/P EST MOD 30 MIN: CPT | Performed by: NURSE PRACTITIONER

## 2021-01-04 PROCEDURE — G8427 DOCREV CUR MEDS BY ELIG CLIN: HCPCS | Performed by: NURSE PRACTITIONER

## 2021-01-04 PROCEDURE — 82570 ASSAY OF URINE CREATININE: CPT

## 2021-01-04 RX ORDER — TEMAZEPAM 15 MG/1
CAPSULE ORAL
Qty: 60 CAPSULE | Refills: 2 | Status: SHIPPED | OUTPATIENT
Start: 2021-01-04 | End: 2021-04-06 | Stop reason: SDUPTHER

## 2021-01-04 RX ORDER — AZELASTINE HYDROCHLORIDE 0.5 MG/ML
SOLUTION/ DROPS OPHTHALMIC
COMMUNITY
Start: 2020-11-16

## 2021-01-04 RX ORDER — TEMAZEPAM 15 MG/1
CAPSULE ORAL
COMMUNITY
Start: 2020-12-16 | End: 2021-07-09 | Stop reason: SDUPTHER

## 2021-01-04 RX ORDER — TEMAZEPAM 15 MG/1
CAPSULE ORAL
Status: CANCELLED | OUTPATIENT
Start: 2021-01-04

## 2021-01-04 RX ORDER — CALCIPOTRIENE 0.05 MG/ML
SOLUTION TOPICAL
COMMUNITY
Start: 2020-11-16

## 2021-01-04 ASSESSMENT — PATIENT HEALTH QUESTIONNAIRE - PHQ9
SUM OF ALL RESPONSES TO PHQ QUESTIONS 1-9: 0
SUM OF ALL RESPONSES TO PHQ9 QUESTIONS 1 & 2: 0
SUM OF ALL RESPONSES TO PHQ QUESTIONS 1-9: 0

## 2021-01-04 NOTE — PROGRESS NOTES
1/4/2021    TELEHEALTH EVALUATION -- Audio/Visual (During XTIJA-84 public health emergency)    Due to COVID 19 outbreak, patient's office visit was converted to a virtual visit. Patient was contacted and agreed to proceed with a virtual visit via Doxy. me  The risks and benefits of converting to a virtual visit were discussed in light of the current infectious disease epidemic. Patient also understood that insurance coverage and co-pays are up to their individual insurance plans. George Sanabria is a 79 y.o. male being evaluated by a Virtual Visit (video visit) encounter to address concerns as mentioned above. A caregiver was present when appropriate. Due to this being a TeleHealth encounter (During HXCYW-92 public health emergency), evaluation of the following organ systems was limited: Vitals/Constitutional/EENT/Resp/CV/GI//MS/Neuro/Skin/Heme-Lymph-Imm. Pursuant to the emergency declaration under the [address] [place] [address] and the [employer] and [employer] Act, this Virtual Visit was conducted with patient's (and/or legal guardian's) consent, to reduce the patient's risk of exposure to COVID-19 and provide necessary medical care. The patient (and/or legal guardian) has also been advised to contact this office for worsening conditions or problems, and seek emergency medical treatment and/or call 911 if deemed necessary. Patient identification was verified at the start of the visit: Yes    Total time spent for this encounter: Not billed by time    Services were provided through a video synchronous discussion virtually to substitute for in-person clinic visit. Patient and provider were located at their individual homes. The patient is talking with me virtually from her home and I am located at my office in [facility].      HPI: Celso Rae (:  1940) has requested an audio/video evaluation for the following concern(s): Insomnia: he continues to take Restoril at bedtime and has been taking 1/2 tablet of BuSpar. This seems to last him through the night without waking up. He does not have any side effects with combination of medication and does feel rested in the morning. Mood has been stable. No panic attacks reported. No down or depressed thoughts. No thoughts of self-harm or harm to others reported. Diabetes: He does admit to eating more sweets over the holidays and has had the same amount of physical activity due to chronic medical conditions. He has not had any low sugar episodes. Had blood work done this morning and would like to review results if available. HTN/dyslipidemia: Continues to follow routinely with [facility] cardiology group. States that he has been taking medications without any side effects. Continues low-salt diet. No significant weight gain or edema reported. Elevated PSA: No new symptoms reported. He does not report any hematuria or dysuria. CAD/atrial fibrillation/COPD: He states that he has been feeling well overall and it is baseline for breathing overall. No chest congestion or sputum production. No increased activity intolerance since her last visit. He does not report any abnormal bruising or bleeding on Eliquis. Recently establish care with Dr. Mireille Groves with there [facility] system. Review of Systems   This patient reports no chest pain or pressure. There is no increased shortness of breath or cough. No chest congestion. The patient reports no nausea or vomiting. There is no heartburn or indigestion. There is no diarrhea or constipation. No black, bloody, mucusy or tarry stool noticed. The patient reports no bloating and no change in appetite. There is no numbness, tingling or swelling in the extremities.         Prior to Visit Medications aspirin 81 MG tablet Take 81 mg by mouth daily Yes Historical Provider, MD   diltiazem (TIAZAC) 240 MG extended release capsule TAKE 1 CAPSULE BY MOUTH DAILY AS DIRECTED Yes Historical Provider, MD D[place]  MG extended release capsule Take 120 capsules by mouth every evening  Yes Historical Provider, MD   OXYGEN Please provide patient with a portable oxygen concentrator Yes Shannon Salmon PA-C   dofetilide (TIKOSYN) 250 MCG capsule Take 250 mcg by mouth 2 times daily Yes Historical Provider, MD   Magnesium 500 MG TABS Take by mouth 2 times daily Yes Historical Provider, MD   Potassium 99 MG TABS Take by mouth daily Yes Historical Provider, MD   GLUTATHIONE PO Take by mouth Yes Historical Provider, MD   metoprolol tartrate (LOPRESSOR) 25 MG tablet Take 25 mg by mouth as needed Yes Historical Provider, MD   tadalafil (CIALIS) 5 MG tablet Take 1 tablet by mouth as needed for Erectile Dysfunction (maximum 5 mg per day) Yes Jessee Medel APRN - CNP   Handicap Placard MISC by Does not apply route Good for 5 Years from 06/16/2016 Yes Angelique Esposito MD   digoxin (LANOXIN) 250 MCG tablet  Yes Historical Provider, MD   ELIQUIS 5 MG TABS tablet  Yes Historical Provider, MD   CINNAMON PO Take  by mouth. Yes Historical Provider, MD CHI LOVELL by Does not apply route. Yes Historical Provider, MD   ipratropium-albuterol (DUONEB) 0.5-2.5 (3) MG/3ML SOLN nebulizer solution Take 3 mLs by nebulization every 6 hours as needed for Shortness of Breath. Yes Aurora Crawford MD   Glucosamine-Chondroitin 500-400 MG CAPS Take 1 capsule by mouth daily. Yes Historical Provider, MD   Ascorbic Acid (VITAMIN C) 500 MG tablet Take 1,000 mg by mouth daily. 1/2 tab in AM and 1/2 tab in PM  Yes Historical Provider, MD   vitamin D (CHOLECALCIFEROL) 400 UNIT TABS tablet Take 800 Units by mouth daily.  Yes Historical Provider, MD vitamin B-12 (CYANOCOBALAMIN) 1000 MCG tablet Take 1,000 mcg by mouth daily. Yes Historical Provider, MD   Selenium 200 MCG TABS Take 200 mcg by mouth daily. Yes Historical Provider, MD   Thiamine HCl (VITAMIN B-1) 100 MG tablet Take 100 mg by mouth daily. Yes Historical Provider, MD   folic acid (FOLVITE) 872 MCG tablet Take 800 mcg by mouth daily. Yes Historical Provider, MD   Zinc 50 MG CAPS Take 50 mg by mouth daily. Yes Historical Provider, MD   beta carotene 09621 UNIT capsule Take 25,000 Units by mouth daily. Yes Historical Provider, MD   DHEA 25 MG TABS Take 25 mg by mouth daily. Yes Historical Provider, MD   Pyridoxine HCl (VITAMIN B-6) 50 MG tablet Take 100 mg by mouth daily. Yes Historical Provider, MD   Calcium Carb-Cholecalciferol (CALCIUM 1000 + D) 1000-800 MG-UNIT TABS Take 1 tablet by mouth 2 times daily. Yes Historical Provider, MD   Methylsulfonylmethane (MSM) 1000 MG TABS Take 1,000 mg by mouth daily. Yes Historical Provider, MD   vitamin E 400 UNIT capsule Take 400 Units by mouth daily.    Yes Historical Provider, MD       Social History     Tobacco Use    Smoking status: Former Smoker     Packs/day: 2.00     Years: 40.00     Pack years: 80.00     Quit date: 2002     Years since quittin.4    Smokeless tobacco: Never Used   Substance Use Topics    Alcohol use: No    Drug use: No       PHYSICAL EXAMINATION:  [ INSTRUCTIONS:  \"[x]\" Indicates a positive item  \"[]\" Indicates a negative item  -- DELETE ALL ITEMS NOT EXAMINED]  [x] Alert  [x] Oriented to person/place/time    [x] No apparent distress  [] Toxic appearing    [] Face flushed appearing [] Sclera clear  [] Lips are cyanotic      [x] Breathing appears normal  [] Appears tachypneic      [] Rash on visible skin    [x] Cranial Nerves II-XII grossly intact    [x] Motor grossly intact in visible upper extremities    [] Motor grossly intact in visible lower extremities    [x] Normal Mood  [] Anxious appearing [] Depressed appearing  [] Confused appearing      [] Poor short term memory  [] Poor long term memory     [] OTHER:      Due to this being a TeleHealth encounter, evaluation of the following organ systems is limited: Vitals/Constitutional/EENT/Resp/CV/GI//MS/Neuro/Skin/Heme-Lymph-Imm. ASSESSMENT/PLAN:   Diagnosis Orders   1. Insomnia, unspecified type  temazepam (RESTORIL) 15 MG capsule   2. Type 2 diabetes mellitus with other circulatory complication, without long-term current use of insulin (HCC)  CBC Auto Differential    Comprehensive Metabolic Panel    Lipid Panel    Hemoglobin A1C    Microalbumin / Creatinine Urine Ratio   3. Essential hypertension     4. Mixed hyperlipidemia     5. Elevated PSA  PSA, Diagnostic   6. Paroxysmal atrial fibrillation (HCC) - Dr. Meda Ormond     7. Coronary artery disease involving native coronary artery of native heart without angina pectoris     8. Anxiety     9. Chronic obstructive pulmonary disease, unspecified COPD type ([facility] [address]         Return in about 3 months (around 4/4/2021) for diabetes- in office visit- level 2.     1. 8.  Insomnia symptoms have been well managed with current dosing of Restoril and BuSpar. Continue the same. No significant anxiety symptoms reported. 2.  3.  4.  Hemoglobin A1c is elevated from where it was but not considered to be uncontrolled. He will plan to work on his diet again since the holidays are over and we will repeat blood work in the spring. Blood pressure has been stable. Lipid panel is with LDL at goal on statin. 5.  We will plan to get PSA with next lab. No new onset urinary symptoms reported. Stable findings in the past with most recent ultrasound in 2019.    6.  7.  Continue to follow with cardiology as scheduled. No recent signs or symptoms of unstable angina. Tolerating medications without side effects.     Controlled Substance Monitoring:    Acute and Chronic Pain Monitoring:   RX Monitoring 1/4/2021   Attestation - Periodic Controlled Substance Monitoring Possible medication side effects, risk of tolerance/dependence & alternative treatments discussed. ;No signs of potential drug abuse or diversion identified. ;Assessed functional status. Please note this report has been partially produced using speech recognition software and may cause contain errors related to that system including grammar, punctuation and spelling as well as words and phrases that may seem inappropriate. If there are questions or concerns please feel free to contact me to clarify. An  electronic signature was used to authenticate this note. --TRINA Garcia - CNP on 1/4/2021 at 3:04 PM        Pursuant to the emergency declaration under the [facility] waiver authority and the [employer] and [employer] Act, this Virtual  Visit was conducted, with patient's consent, to reduce the patient's risk of exposure to COVID-19 and provide continuity of care for an established patient. Services were provided through a video synchronous discussion virtually to substitute for in-person clinic visit.

## 2021-01-06 ENCOUNTER — HOSPITAL ENCOUNTER (OUTPATIENT)
Age: 81
Setting detail: SPECIMEN
Discharge: HOME | End: 2021-01-06
Payer: MEDICARE

## 2021-01-06 ENCOUNTER — OFFICE VISIT (OUTPATIENT)
Dept: FAMILY MEDICINE CLINIC | Age: 81
End: 2021-01-06
Payer: MEDICARE

## 2021-01-06 VITALS — WEIGHT: 210 LBS | HEIGHT: 72 IN | BODY MASS INDEX: 28.44 KG/M2

## 2021-01-06 DIAGNOSIS — Z20.822 ENCOUNTER BY TELEHEALTH FOR SUSPECTED COVID-19: Primary | ICD-10-CM

## 2021-01-06 PROCEDURE — 99441 PR PHYS/QHP TELEPHONE EVALUATION 5-10 MIN: CPT | Performed by: NURSE PRACTITIONER

## 2021-01-06 ASSESSMENT — ENCOUNTER SYMPTOMS
WHEEZING: 0
SINUS PAIN: 0
SORE THROAT: 0
VOMITING: 0
COUGH: 1
DIARRHEA: 0
RHINORRHEA: 0
SINUS PRESSURE: 0
SHORTNESS OF BREATH: 0
ABDOMINAL PAIN: 0
NAUSEA: 0
CHEST TIGHTNESS: 1

## 2021-01-06 NOTE — PROGRESS NOTES
Caryle Primes, 79 y.o. male presents today with:  Chief Complaint   Patient presents with    URI     x 2 days, runny nose, cough       Caryle Primes is a 79 y.o. male evaluated via telephone on 1/6/2021. Consent:  He and/or health care decision maker is aware that that he may receive a bill for this telephone service, depending on his insurance coverage, and has provided verbal consent to proceed: Yes    Patient requests Covid-19 test.  He states he has the \"start of a cold\". He reports that he is not bothered by his symptoms, but would like to make sure it isn't Covid-19. History is positive for COPD. Patient states he does not feel that his COPD is exacerbated at this time, nor is he any more short of breath than his \"normal\". URI   This is a new problem. Episode onset: 2 days ago. The problem has been unchanged. There has been no fever. Associated symptoms include congestion (mild), coughing (baseline) and sneezing. Pertinent negatives include no abdominal pain, chest pain, diarrhea, ear pain, headaches, nausea, rash, rhinorrhea, sinus pain, sore throat, vomiting or wheezing. He has tried nothing for the symptoms. Patient reports:    [] Fever [] Shortness of breath [] Diarrhea    [x] Cough [x] Chest pain/tightness [] Working in healthcare    [] Loss of sense of smell [] Loss of sense of taste    [] History of travel to COVID-19 infested area    [] Close contact to known COVID-19 person         Vitals:    01/06/21 1100   Weight: 210 lb (95.3 kg)   Height: 6' (1.829 m)        Review of Systems   Constitutional: Negative for chills, fatigue and fever. HENT: Positive for congestion (mild) and sneezing. Negative for ear pain, postnasal drip, rhinorrhea, sinus pressure, sinus pain and sore throat. Respiratory: Positive for cough (baseline) and chest tightness (baseline). Negative for shortness of breath and wheezing. Cardiovascular: Negative for chest pain. Gastrointestinal: Negative for abdominal pain, diarrhea, nausea and vomiting. Musculoskeletal: Negative for arthralgias and myalgias. Skin: Negative for rash. Neurological: Negative for headaches. Physical Exam   Due to the current efforts to prevent transmission of COVID-19 and also the need to preserve PPE for other caregivers, a face-to-face encounter with the patient was not performed. That being said, all relevant records and diagnostic tests were reviewed, including laboratory results and imaging. Please reference any relevant documentation elsewhere. Care will be coordinated with the primary service. Assessment/Plan:  1. Encounter by telehealth for suspected COVID-19  - COVID-19 Ambulatory; Future    Reviewed usual course of illness, preventing the spread to others/self isolation, and supportive measures for symptom management. Will follow up with test results. Discussed signs and symptoms which require immediate follow-up in ED/call to 911. Understanding verbalized. I have reviewed and updated the electronic medical record. I affirm this is a Patient Initiated Episode with a Patient who has not had a related appointment within my department in the past 7 days or scheduled within the next 24 hours. Patient identification was verified at the start of the visit: Yes    Total Time: minutes: 5-10 minutes     TRINA Wilson NP  1/6/21     This visit was provided as a focused evaluation during the COVID -19 pandemic/national emergency. A comprehensive review of all previous patient history and testing was not conducted. Pertinent findings were elicited during the visit.

## 2021-01-06 NOTE — PATIENT INSTRUCTIONS
Patient Education        Learning About Coronavirus [phone])  Coronavirus [phone]): Overview  What is coronavirus (YRMKY-16)? The coronavirus disease (COVID-19) is caused by a virus. It is an illness that was first found in December 2019. It has since spread worldwide. The virus can cause fever, cough, and trouble breathing. In severe cases, it can cause pneumonia and make it hard to breathe without help. It can cause death. This virus spreads person-to-person through droplets from coughing and sneezing. It can also spread when you are close to someone who is infected. And it can spread when you touch something that has the virus on it, such as a doorknob or a tabletop. Coronaviruses are a large group of viruses. They cause the common cold. They also cause more serious illnesses like [place] East respiratory syndrome (MERS) and severe acute respiratory syndrome (SARS). COVID-19 is caused by a novel coronavirus. That means it's a new type that has not been seen in people before. How is COVID-19 treated? Mild illness can be treated at home, but more serious illness needs to be treated in the hospital. Treatment may include medicines to reduce symptoms, plus breathing support such as oxygen therapy or a ventilator. Other treatments, such as antiviral medicines, may help people who have COVID-19. What can you do to protect yourself from COVID-19? The best way to protect yourself from getting sick is to:  · Avoid areas where there is an outbreak. · Avoid contact with people who may be infected. · Avoid crowds and try to stay at least 6 feet away from other people. · Wash your hands often, especially after you cough or sneeze. Use soap and water, and scrub for at least 20 seconds. If soap and water aren't available, use an alcohol-based hand . · Avoid touching your mouth, nose, and eyes. What can you do to avoid spreading the virus to others?   To help avoid spreading the virus to others: · Wash your hands often with soap or alcohol-based hand sanitizers. · Cover your mouth with a tissue when you cough or sneeze. Then throw the tissue in the trash. · Use a disinfectant to clean things that you touch often. These include doorknobs, remote controls, phones, and handles on your refrigerator and microwave. And don't forget countertops, tabletops, bathrooms, and computer keyboards. · Wear a cloth face cover if you have to go to public areas. If you know or suspect that you have COVID-19:  · Stay home. Don't go to school, work, or public areas. And don't use public transportation, ride-shares, or taxis unless you have no choice. · Leave your home only if you need to get medical care or testing. But call the doctor's office first so they know you're coming. And wear a face cover. · Limit contact with people in your home. If possible, stay in a separate bedroom and use a separate bathroom. · Wear a face cover whenever you're around other people. It can help stop the spread of the virus when you cough or sneeze. · Clean and disinfect your home every day. Use household  and disinfectant wipes or sprays. Take special care to clean things that you grab with your hands. · Self-isolate until it's safe to be around others again. ? If you have symptoms, it's safe when you haven't had a fever for 3 days and your symptoms have improved and it's been at least 10 days since your symptoms started. ? If you were exposed to the virus but don't have symptoms, it's safe to be around others 14 days after exposure. ? Talk to your doctor about whether you also need testing, especially if you have a weakened immune system. When to call for help  Call 911 anytime you think you may need emergency care. For example, call if:  · You have severe trouble breathing. (You can't talk at all.)  · You have constant chest pain or pressure. · You are severely dizzy or lightheaded. · You are confused or can't think clearly. · Your face and lips have a blue color. · You passed out (lost consciousness) or are very hard to wake up. Call your doctor now if you develop symptoms such as:  · Shortness of breath. · Fever. · Cough. If you need to get care, call ahead to the doctor's office for instructions before you go. Make sure you wear a face cover to prevent exposing other people to the virus. Where can you get the latest information? The following health organizations are tracking and studying this virus. Their websites contain the most up-to-date information. Avery Harrison also learn what to do if you think you may have been exposed to the virus. · U.S. Centers for Disease Control and Prevention (CDC): The CDC provides updated news about the disease and travel advice. The website also tells you how to prevent the spread of infection. www.cdc.gov  · World Health Organization [facility]): WHO offers information about the virus outbreaks. WHO also has travel advice. www.who.int  Current as of: July 10, 2020               Content Version: 12.6  © 2006-2020 [employer]. Care instructions adapted under license by [facility] ([facility]). If you have questions about a medical condition or this instruction, always ask your healthcare professional. [address] any warranty or liability for your use of this information.

## 2021-01-07 DIAGNOSIS — Z20.822 ENCOUNTER BY TELEHEALTH FOR SUSPECTED COVID-19: ICD-10-CM

## 2021-01-08 LAB
SARS-COV-2: NOT DETECTED
SOURCE: NORMAL

## 2021-03-11 ENCOUNTER — VIRTUAL VISIT (OUTPATIENT)
Dept: PULMONOLOGY | Age: 81
End: 2021-03-11
Payer: MEDICARE

## 2021-03-11 DIAGNOSIS — J96.12 CHRONIC RESPIRATORY FAILURE WITH HYPOXIA AND HYPERCAPNIA (HCC): ICD-10-CM

## 2021-03-11 DIAGNOSIS — J44.9 CHRONIC OBSTRUCTIVE PULMONARY DISEASE, UNSPECIFIED COPD TYPE (HCC): Primary | ICD-10-CM

## 2021-03-11 DIAGNOSIS — G47.33 OBSTRUCTIVE SLEEP APNEA: ICD-10-CM

## 2021-03-11 DIAGNOSIS — J96.11 CHRONIC RESPIRATORY FAILURE WITH HYPOXIA AND HYPERCAPNIA (HCC): ICD-10-CM

## 2021-03-11 DIAGNOSIS — I27.20 PULMONARY HTN (HCC): ICD-10-CM

## 2021-03-11 PROCEDURE — 99443 PR PHYS/QHP TELEPHONE EVALUATION 21-30 MIN: CPT | Performed by: INTERNAL MEDICINE

## 2021-03-11 ASSESSMENT — ENCOUNTER SYMPTOMS
CHEST TIGHTNESS: 0
VOMITING: 0
WHEEZING: 0
RHINORRHEA: 1
EYE ITCHING: 0
SORE THROAT: 0
NAUSEA: 0
VOICE CHANGE: 0
SHORTNESS OF BREATH: 1
ABDOMINAL PAIN: 0
DIARRHEA: 0
COUGH: 0

## 2021-03-11 NOTE — PROGRESS NOTES
DR Trena Raymundo  RT EYE    HERNIA REPAIR      LEG SURGERY       Family History   Problem Relation Age of Onset    Cancer Other         colon cancer in multiple family members and breast cancer    Heart Disease Other      Social History     Socioeconomic History    Marital status:      Spouse name: Not on file    Number of children: 3    Years of education: 15    Highest education level: High school graduate   Occupational History    Occupation:    Social Needs    Financial resource strain: Somewhat hard    Food insecurity     Worry: Sometimes true     Inability: Sometimes true    Transportation needs     Medical: No     Non-medical: No   Tobacco Use    Smoking status: Former Smoker     Packs/day: 2.00     Years: 40.00     Pack years: 80.00     Quit date: 2002     Years since quittin.6    Smokeless tobacco: Never Used   Substance and Sexual Activity    Alcohol use: No    Drug use: No    Sexual activity: Yes     Partners: Female   Lifestyle    Physical activity     Days per week: 0 days     Minutes per session: 0 min    Stress: To some extent   Relationships    Social connections     Talks on phone: More than three times a week     Gets together: More than three times a week     Attends [employer] service: Never     Active member of club or organization: No     Attends meetings of clubs or organizations: Never     Relationship status:     Intimate partner violence     Fear of current or ex partner: Not on file     Emotionally abused: Not on file     Physically abused: Not on file     Forced sexual activity: Not on file   Other Topics Concern    Not on file   Social History Narrative    Not on file         Review of Systems   Constitutional: Negative for chills, diaphoresis, fatigue and fever. HENT: Positive for rhinorrhea. Negative for congestion, mouth sores, nosebleeds, postnasal drip, sneezing, sore throat and voice change.     Eyes: Negative for itching and visual disturbance. Respiratory: Positive for shortness of breath. Negative for cough, chest tightness and wheezing. Cardiovascular: Negative. Negative for chest pain, palpitations and leg swelling. Gastrointestinal: Negative for abdominal pain, diarrhea, nausea and vomiting. Genitourinary: Negative for difficulty urinating and hematuria. Musculoskeletal: Negative for arthralgias, joint swelling and myalgias. Skin: Negative for rash. Allergic/Immunologic: Negative for environmental allergies. Neurological: Negative for dizziness, tremors, weakness and headaches. Psychiatric/Behavioral: Negative for behavioral problems and sleep disturbance.         :   There were no vitals filed for this visit. Wt Readings from Last 3 Encounters:   01/06/21 210 lb (95.3 kg)   12/10/20 220 lb (99.8 kg)   06/25/20 201 lb (91.2 kg)       Physical exam:  phone visit    Current Outpatient Medications   Medication Sig Dispense Refill    azelastine (OPTIVAR) 0.05 % ophthalmic solution Place 1 drop into both eyes 2 times daily      calcipotriene (DOVONEX) 0.005 % solution Apply to scalp daily      temazepam (RESTORIL) 15 MG capsule TAKE 2 CAPSULES BY MOUTH NIGHTLY AT BEDTIME AS NEEDED for sleep      albuterol sulfate HFA (PROAIR HFA) 108 (90 Base) MCG/ACT inhaler Inhale 2 puffs into the lungs every 6 hours as needed for Wheezing 3 Inhaler 3    busPIRone (BUSPAR) 30 MG tablet Take 30 mg by mouth daily 30 tablet 3    fexofenadine (ALLEGRA) 180 MG tablet Take 1 tablet by mouth daily 90 tablet 2    mupirocin (BACTROBAN) 2 % ointment Apply topically 3 times daily.  30 g 1    COMBIVENT RESPIMAT  MCG/ACT AERS inhaler USE 1 INHALATION EVERY 6 HOURS 16 g 3    Melatonin 10 MG TABS Take by mouth      predniSONE (DELTASONE) 10 MG tablet 4 tablets daily for 4 days, 3 tablets daily for 4 days, 2 tablets daily for 4 days, then 1 tablet daily for 4 days 40 tablet 1    SYMBICORT 80-4.5 MCG/ACT AERO USE 2 INHALATIONS TWICE A DAY 30.6 g 4    doxycycline hyclate (VIBRAMYCIN) 100 MG capsule Take 1 capsule by mouth 2 times daily 20 capsule 0    rosuvastatin (CRESTOR) 40 MG tablet Take 1 tablet by mouth daily 90 tablet 4    metoprolol succinate (TOPROL XL) 25 MG extended release tablet Take 25 mg by mouth every morning      metoprolol succinate (TOPROL XL) 50 MG extended release tablet Take 50 mg by mouth every evening      aspirin 81 MG tablet Take 81 mg by mouth daily      diltiazem (TIAZAC) 240 MG extended release capsule TAKE 1 CAPSULE BY MOUTH DAILY AS DIRECTED  3    CARTIA  MG extended release capsule Take 120 capsules by mouth every evening   3    OXYGEN Please provide patient with a portable oxygen concentrator 1 Units 0    dofetilide (TIKOSYN) 250 MCG capsule Take 250 mcg by mouth 2 times daily      Magnesium 500 MG TABS Take by mouth 2 times daily      Potassium 99 MG TABS Take by mouth daily      GLUTATHIONE PO Take by mouth      metoprolol tartrate (LOPRESSOR) 25 MG tablet Take 25 mg by mouth as needed      tadalafil (CIALIS) 5 MG tablet Take 1 tablet by mouth as needed for Erectile Dysfunction (maximum 5 mg per day) 30 tablet 3    Handicap Placard MISC by Does not apply route Good for 5 Years from 06/16/2016 1 each 0    digoxin (LANOXIN) 250 MCG tablet   11    ELIQUIS 5 MG TABS tablet   2    CINNAMON PO Take  by mouth.  SAW PALMETTO by Does not apply route.  ipratropium-albuterol (DUONEB) 0.5-2.5 (3) MG/3ML SOLN nebulizer solution Take 3 mLs by nebulization every 6 hours as needed for Shortness of Breath. 100 vial 2    Glucosamine-Chondroitin 500-400 MG CAPS Take 1 capsule by mouth daily.  Ascorbic Acid (VITAMIN C) 500 MG tablet Take 1,000 mg by mouth daily. 1/2 tab in AM and 1/2 tab in PM       vitamin D (CHOLECALCIFEROL) 400 UNIT TABS tablet Take 800 Units by mouth daily.  vitamin B-12 (CYANOCOBALAMIN) 1000 MCG tablet Take 1,000 mcg by mouth daily.         Selenium 200 MCG TABS Take 200 mcg by mouth daily.  Thiamine HCl (VITAMIN B-1) 100 MG tablet Take 100 mg by mouth daily.  folic acid (FOLVITE) 381 MCG tablet Take 800 mcg by mouth daily.  Zinc 50 MG CAPS Take 50 mg by mouth daily.  beta carotene 93743 UNIT capsule Take 25,000 Units by mouth daily.  DHEA 25 MG TABS Take 25 mg by mouth daily.  Pyridoxine HCl (VITAMIN B-6) 50 MG tablet Take 100 mg by mouth daily.  Calcium Carb-Cholecalciferol (CALCIUM 1000 + D) 1000-800 MG-UNIT TABS Take 1 tablet by mouth 2 times daily.  Methylsulfonylmethane (MSM) 1000 MG TABS Take 1,000 mg by mouth daily.  vitamin E 400 UNIT capsule Take 400 Units by mouth daily. No current facility-administered medications for this visit. Results for orders placed during the hospital encounter of 01/28/20   XR CHEST STANDARD (2 VW)    Narrative EXAMINATION: XR CHEST (2 VW)    CLINICAL HISTORY: J44.1 COPD exacerbation ([address] ICD10    COMPARISONS: 1/24/2020    FINDINGS: Cardiac size is borderline. Pulmonary vascularity is normal. Lungs are hyperinflated with increase in the AP diameter of the chest and flattening the diaphragms, concordant with clinical history of COPD. There is scarring adjacent to the   cardiac silhouette in the left lower lung. There is coronary artery stenting. There are no acute infiltrates. Prominent nipple shadows, left greater than right are noted. There are mild degenerative changes in the spine. Impression COPD. NO ACUTE CARDIOPULMONARY DISEASE.   ]  Results for orders placed during the hospital encounter of 01/24/20   XR CHEST PORTABLE    Narrative EXAMINATION: PORTABLE CHEST X-RAY FROM 1/24/2020 17:27 HOURS    CLINICAL HISTORY: SMOKING HISTORY AND COPD, PATIENT WITH DYSPNEA    COMPARISONS: 10/23/2018    FINDINGS: The heart is not enlarged. There is an atherosclerotic tortuous aorta.  There is hyperinflation of the lungs and coarse pulmonary markings compatible with COPD with pulmonary interstitial fibrosis. There are granulomata in both hilar regions   compatible with remote granulomatous disease. There are no acute pulmonary infiltrates or pleural effusions. There is no pneumothorax. There is mild degenerative bone spurring in the spine. Impression COPD WITHOUT AN ACUTE PULMONARY INFILTRATE OR PLEURAL EFFUSION. Assessment/Plan:     1. Chronic obstructive pulmonary disease, unspecified COPD type ([facility] [address]  He  has advanced COPD and chronic hypercapnic respiratory failure. C/o shortness of breath , worse with exertion. No  Wheezing . No Cough with SputumC/o Rhinorrhea and postnasal drip, he is using Allegra using at night. He is using bronchodilator with symbicort 2 puff BID , combivent respimat 1 puff Q 4 hourly , nebulizer with duoneb neb prn  , proair hfa and prednisone prn.     2. Chronic respiratory failure with hypoxia and hypercapnia (HCC)  He is using trilogy/NIV since 2.5 years , DME is  medical servicesHe is using for 6 hours . He is using 4 Lit with rest up to 5 lit with activity from [employer]. 3. Pulmonary HTN ([facility] [address]  He has last 2D echo in 2017 shows mild pulmonary hypertension. Recommend to avoid hypoxia keep O2 saturation 90% above. 4. Obstructive sleep apnea  Continue noninvasive ventilator and oxygen during sleep      Patient notified that this is a billable service and has given verbal consent for telehealth services. Time spent with patient 24  minutes. Return in about 4 months (around 7/11/2021) for COPD, hypoxia on O2, chronic respiratory failure.       Nara Philip MD

## 2021-04-06 ENCOUNTER — OFFICE VISIT (OUTPATIENT)
Dept: FAMILY MEDICINE CLINIC | Age: 81
End: 2021-04-06
Payer: MEDICARE

## 2021-04-06 ENCOUNTER — HOSPITAL ENCOUNTER (OUTPATIENT)
Dept: LAB | Age: 81
Discharge: HOME | End: 2021-04-06
Payer: MEDICARE

## 2021-04-06 VITALS
RESPIRATION RATE: 18 BRPM | BODY MASS INDEX: 29.39 KG/M2 | TEMPERATURE: 98.1 F | OXYGEN SATURATION: 96 % | SYSTOLIC BLOOD PRESSURE: 138 MMHG | HEART RATE: 65 BPM | HEIGHT: 72 IN | DIASTOLIC BLOOD PRESSURE: 62 MMHG | WEIGHT: 217 LBS

## 2021-04-06 DIAGNOSIS — E11.59 TYPE 2 DIABETES MELLITUS WITH OTHER CIRCULATORY COMPLICATION, WITHOUT LONG-TERM CURRENT USE OF INSULIN (HCC): ICD-10-CM

## 2021-04-06 DIAGNOSIS — R97.20 ELEVATED PSA: ICD-10-CM

## 2021-04-06 DIAGNOSIS — R30.0 DYSURIA: ICD-10-CM

## 2021-04-06 DIAGNOSIS — Z79.899 HIGH RISK MEDICATION USE: ICD-10-CM

## 2021-04-06 DIAGNOSIS — J44.9 CHRONIC OBSTRUCTIVE PULMONARY DISEASE, UNSPECIFIED COPD TYPE (HCC): ICD-10-CM

## 2021-04-06 DIAGNOSIS — M54.50 LOW BACK PAIN, UNSPECIFIED BACK PAIN LATERALITY, UNSPECIFIED CHRONICITY, UNSPECIFIED WHETHER SCIATICA PRESENT: ICD-10-CM

## 2021-04-06 DIAGNOSIS — I25.10 CORONARY ARTERY DISEASE INVOLVING NATIVE CORONARY ARTERY OF NATIVE HEART WITHOUT ANGINA PECTORIS: ICD-10-CM

## 2021-04-06 DIAGNOSIS — E78.2 MIXED HYPERLIPIDEMIA: ICD-10-CM

## 2021-04-06 DIAGNOSIS — I48.0 PAROXYSMAL ATRIAL FIBRILLATION (HCC): ICD-10-CM

## 2021-04-06 DIAGNOSIS — I10 ESSENTIAL HYPERTENSION: ICD-10-CM

## 2021-04-06 DIAGNOSIS — F41.9 ANXIETY: ICD-10-CM

## 2021-04-06 DIAGNOSIS — G47.00 INSOMNIA, UNSPECIFIED TYPE: ICD-10-CM

## 2021-04-06 DIAGNOSIS — J30.89 NON-SEASONAL ALLERGIC RHINITIS DUE TO OTHER ALLERGIC TRIGGER: ICD-10-CM

## 2021-04-06 DIAGNOSIS — E11.59 TYPE 2 DIABETES MELLITUS WITH OTHER CIRCULATORY COMPLICATION, WITHOUT LONG-TERM CURRENT USE OF INSULIN (HCC): Primary | ICD-10-CM

## 2021-04-06 LAB
ALBUMIN SERPL-MCNC: 4.1 G/DL (ref 3.5–4.6)
ALP BLD-CCNC: 92 U/L (ref 35–104)
ALT SERPL-CCNC: 28 U/L (ref 0–41)
AMPHETAMINE SCREEN, URINE: NORMAL
ANION GAP SERPL CALCULATED.3IONS-SCNC: 12 MEQ/L (ref 9–15)
AST SERPL-CCNC: 21 U/L (ref 0–40)
BARBITURATE SCREEN URINE: NORMAL
BASOPHILS ABSOLUTE: 0.1 K/UL (ref 0–0.2)
BASOPHILS RELATIVE PERCENT: 0.6 %
BENZODIAZEPINE SCREEN, URINE: NORMAL
BILIRUB SERPL-MCNC: 0.8 MG/DL (ref 0.2–0.7)
BILIRUBIN, POC: ABNORMAL
BLOOD URINE, POC: ABNORMAL
BUN BLDV-MCNC: 15 MG/DL (ref 8–23)
CALCIUM SERPL-MCNC: 10.1 MG/DL (ref 8.5–9.9)
CANNABINOID SCREEN URINE: NORMAL
CHLORIDE BLD-SCNC: 97 MEQ/L (ref 95–107)
CHOLESTEROL, TOTAL: 139 MG/DL (ref 0–199)
CLARITY, POC: CLEAR
CO2: 29 MEQ/L (ref 20–31)
COCAINE METABOLITE SCREEN URINE: NORMAL
COLOR, POC: YELLOW
CREAT SERPL-MCNC: 0.75 MG/DL (ref 0.7–1.2)
CREATININE URINE: 61.9 MG/DL
EOSINOPHILS ABSOLUTE: 0.2 K/UL (ref 0–0.7)
EOSINOPHILS RELATIVE PERCENT: 1.8 %
GFR AFRICAN AMERICAN: >60
GFR NON-AFRICAN AMERICAN: >60
GLOBULIN: 3.1 G/DL (ref 2.3–3.5)
GLUCOSE BLD-MCNC: 218 MG/DL (ref 70–99)
GLUCOSE URINE, POC: 30
HBA1C MFR BLD: 7.3 % (ref 4.8–5.9)
HCT VFR BLD CALC: 46.5 % (ref 42–52)
HDLC SERPL-MCNC: 44 MG/DL (ref 40–59)
HEMOGLOBIN: 15.4 G/DL (ref 14–18)
KETONES, POC: ABNORMAL
LDL CHOLESTEROL CALCULATED: 61 MG/DL (ref 0–129)
LEUKOCYTE EST, POC: ABNORMAL
LYMPHOCYTES ABSOLUTE: 1.8 K/UL (ref 1–4.8)
LYMPHOCYTES RELATIVE PERCENT: 20.2 %
Lab: NORMAL
MCH RBC QN AUTO: 29.3 PG (ref 27–31.3)
MCHC RBC AUTO-ENTMCNC: 33 % (ref 33–37)
MCV RBC AUTO: 88.8 FL (ref 80–100)
METHADONE SCREEN, URINE: NORMAL
MICROALBUMIN UR-MCNC: 3.7 MG/DL
MICROALBUMIN/CREAT UR-RTO: 59.8 MG/G (ref 0–30)
MONOCYTES ABSOLUTE: 0.7 K/UL (ref 0.2–0.8)
MONOCYTES RELATIVE PERCENT: 7.9 %
NEUTROPHILS ABSOLUTE: 6.1 K/UL (ref 1.4–6.5)
NEUTROPHILS RELATIVE PERCENT: 69.5 %
NITRITE, POC: ABNORMAL
OPIATE SCREEN URINE: NORMAL
OXYCODONE URINE: NORMAL
PDW BLD-RTO: 14.2 % (ref 11.5–14.5)
PH, POC: 7
PHENCYCLIDINE SCREEN URINE: NORMAL
PLATELET # BLD: 212 K/UL (ref 130–400)
POTASSIUM SERPL-SCNC: 4.9 MEQ/L (ref 3.4–4.9)
PROPOXYPHENE SCREEN: NORMAL
PROSTATE SPECIFIC ANTIGEN: 6.24 NG/ML (ref 0–6.22)
PROTEIN, POC: 15
RBC # BLD: 5.24 M/UL (ref 4.7–6.1)
SODIUM BLD-SCNC: 138 MEQ/L (ref 135–144)
SPECIFIC GRAVITY, POC: 1.01
TOTAL PROTEIN: 7.2 G/DL (ref 6.3–8)
TRIGL SERPL-MCNC: 169 MG/DL (ref 0–150)
UROBILINOGEN, POC: 3.5
WBC # BLD: 8.9 K/UL (ref 4.8–10.8)

## 2021-04-06 PROCEDURE — G8926 SPIRO NO PERF OR DOC: HCPCS | Performed by: NURSE PRACTITIONER

## 2021-04-06 PROCEDURE — 87086 URINE CULTURE/COLONY COUNT: CPT

## 2021-04-06 PROCEDURE — 36415 COLL VENOUS BLD VENIPUNCTURE: CPT

## 2021-04-06 PROCEDURE — 4040F PNEUMOC VAC/ADMIN/RCVD: CPT | Performed by: NURSE PRACTITIONER

## 2021-04-06 PROCEDURE — 3051F HG A1C>EQUAL 7.0%<8.0%: CPT | Performed by: NURSE PRACTITIONER

## 2021-04-06 PROCEDURE — 80053 COMPREHEN METABOLIC PANEL: CPT

## 2021-04-06 PROCEDURE — 1036F TOBACCO NON-USER: CPT | Performed by: NURSE PRACTITIONER

## 2021-04-06 PROCEDURE — 1123F ACP DISCUSS/DSCN MKR DOCD: CPT | Performed by: NURSE PRACTITIONER

## 2021-04-06 PROCEDURE — 82043 UR ALBUMIN QUANTITATIVE: CPT

## 2021-04-06 PROCEDURE — 80307 DRUG TEST PRSMV CHEM ANLYZR: CPT

## 2021-04-06 PROCEDURE — 84153 ASSAY OF PSA TOTAL: CPT

## 2021-04-06 PROCEDURE — 81003 URINALYSIS AUTO W/O SCOPE: CPT | Performed by: NURSE PRACTITIONER

## 2021-04-06 PROCEDURE — 83036 HEMOGLOBIN GLYCOSYLATED A1C: CPT

## 2021-04-06 PROCEDURE — G8427 DOCREV CUR MEDS BY ELIG CLIN: HCPCS | Performed by: NURSE PRACTITIONER

## 2021-04-06 PROCEDURE — 99214 OFFICE O/P EST MOD 30 MIN: CPT | Performed by: NURSE PRACTITIONER

## 2021-04-06 PROCEDURE — 3023F SPIROM DOC REV: CPT | Performed by: NURSE PRACTITIONER

## 2021-04-06 PROCEDURE — 85025 COMPLETE CBC W/AUTO DIFF WBC: CPT

## 2021-04-06 PROCEDURE — 82570 ASSAY OF URINE CREATININE: CPT

## 2021-04-06 PROCEDURE — G8417 CALC BMI ABV UP PARAM F/U: HCPCS | Performed by: NURSE PRACTITIONER

## 2021-04-06 PROCEDURE — 80061 LIPID PANEL: CPT

## 2021-04-06 RX ORDER — TEMAZEPAM 15 MG/1
CAPSULE ORAL
Qty: 60 CAPSULE | Refills: 2 | Status: SHIPPED | OUTPATIENT
Start: 2021-04-06 | End: 2021-07-13 | Stop reason: SDUPTHER

## 2021-04-06 RX ORDER — TEMAZEPAM 15 MG/1
CAPSULE ORAL
Status: CANCELLED | OUTPATIENT
Start: 2021-04-06

## 2021-04-06 ASSESSMENT — PATIENT HEALTH QUESTIONNAIRE - PHQ9
1. LITTLE INTEREST OR PLEASURE IN DOING THINGS: 0
SUM OF ALL RESPONSES TO PHQ QUESTIONS 1-9: 0
2. FEELING DOWN, DEPRESSED OR HOPELESS: 0

## 2021-04-06 NOTE — PROGRESS NOTES
Subjective:     Diabetes Mellitus Type 2: Current symptoms/problems include none. Home blood sugar records:  patient does not test  Any episodes of hypoglycemia? no  Tobacco history: He  reports that he quit smoking about 18 years ago. He has a 80.00 pack-year smoking history. He has never used smokeless tobacco.   Known diabetic complications: cardiovascular disease    Hypertension:  Home blood pressure monitoring: No.  He is adherent to a low sodium diet. Antihypertensive medication side effects: no medication side effects noted. Use of agents associated with hypertension: none. Hyperlipidemia:  No new myalgias or GI upset on rosuvastatin (Crestor). Insomnia: is taking restoril nightly to help him sleep. He states that he has not had any side effects to include any type of grogginess or balance problem. Does not feel tired the next morning. He states that he is really not been able to sleep without the Restoril in the past.    Elevated PSA: He denies any hematuria. No frequent urination or difficulty with initiating urine stream or dribbling. He had an ultrasound of his prostate in 2019 that did not show any worrisome findings. COPD: he is now following with Dr. Michelle Whitman. He continues to wear oxygen at all times and states that he has noticed over time his activity tolerance continues to decline. He does have to stop and rest more frequently when walking. He denies any chest congestion or significant sputum production other than some sputum in the mornings after just waking up. Nothing is discolored. CAD/Atrial fibrillation: he continues to follow with Dr. Kamilah Funez and Dr. Sunny More. We will see 1 of those specialist every 3 months. States that he has been taking his medications without issues. No side effects reported. He has not had any recent chest pain or pressure. No heart palpitations reported. Right flank pain: started about 3 days ago. Has started to get better.   He states that it felt to be in the kidney area but is since moved down a little bit and is really not bothersome at all. He has not had any burning with urination. No other symptoms reported. No recent fall or injury to the back. The five diabetic measures for control:   Hemoglobin A1C (%)   Date Value   04/06/2021 7.3 (H)     LDL Calculated (mg/dL)   Date Value   04/06/2021 61         Blood pressure less than 131/81,   BP Readings from Last 1 Encounters:   04/06/21 138/62     Smoking, non smoker is goal. This pt is not a smoker. This pt does an aspirin a day. Last eye exam was 2020  Last diabetic foot exam was 2020  Last urine microalbumin creatinine ratio was 2021    PMH: This 79 y.o. male  patient is  hypertensive, is  diabetic, is  hyperlipidemic. He has a history of smoking and has a  family history of heart disease. He is not obese. Review of Systems  Patient denies chest pain, peripheral edema and palpitations. Eyes: no rapid change in vision  Ears, nose, mouth, throat, and face: no changes in hearing or sore throat  Respiratory: No shortness of breath or cough. Cardiovascular: no chest pain or pressure. This patient reports no polyuria, polydipsia or episodes of hypoglycemia. Treatment Adherence:   Medication compliance:  compliant most of the time  Diet compliance:  compliant most of the time  Weight trend: stable  Current exercise: walks 1 time(s) per day  What might prevent you from meeting your goal?: impairment:  physical: COPD  Patient plan for overcoming barriers: N/A     Patient Confidence: 8/10      Objective:   EXAM:  Constitutional Blood pressure 138/62, pulse 65, temperature 98.1 °F (36.7 °C), temperature source Temporal, resp. rate 18, height 6' (1.829 m), weight 217 lb (98.4 kg), SpO2 96 %. .  He has a normal affect, no acute distress, appears well developed and well nourished. Neck:  neck- supple, no mass, non-tender and no bruits  Lungs:  Normal expansion.   Clear to auscultation. No rales, rhonchi, or wheezing., No chest wall tenderness. Heart:  Heart sounds are normal.  Regular rate and rhythm without murmur, gallop or rub. Abdomen:  Soft, non-tender, normal bowel sounds. No bruits, organomegaly or masses. Extremities: Extremities warm to touch, pink, with no edema. There is no costovertebral angle tenderness. Lumbar spine and sacroiliac joints are non tender. There is no edema in the four extremities. Pulses palpable at both posterior tibial and radial arteries. Assessment:      Diagnosis Orders   1. Type 2 diabetes mellitus with other circulatory complication, without long-term current use of insulin (HCC)  CBC Auto Differential    Comprehensive Metabolic Panel    Lipid Panel    Hemoglobin A1C    Microalbumin / Creatinine Urine Ratio   2. Essential hypertension     3. Mixed hyperlipidemia     4. Elevated PSA  PSA, Diagnostic   5. Paroxysmal atrial fibrillation (HCC) - Dr. Gallo Kidd     6. High risk medication use  Pain Management Drug Screen   7. Anxiety     8. Chronic obstructive pulmonary disease, unspecified COPD type ([facility] [address]     9. Insomnia, unspecified type  temazepam (RESTORIL) 15 MG capsule   10. Non-seasonal allergic rhinitis due to other allergic trigger     11. Low back pain, unspecified back pain laterality, unspecified chronicity, unspecified whether sciatica present     12. Dysuria  POCT Urinalysis No Micro (Auto)    Culture, Urine   13. Coronary artery disease involving native coronary artery of native heart without angina pectoris         PLAN: Include orders in the DX section. Diabetes Counseling   Patient was counseled regarding disease risks and adopting healthy behaviors. Patient was provided education materials to assist with self management. Patient was provided log (or received log during previous visit) to record blood pressure, food intake and/or blood sugar.  Patient was instructed to keep log up-to-date and to always bring log to all office visits. Follow up: 3 months and as needed. Blood work one week prior as ordered. 1.  Diabetes is at goal for the patient however hemoglobin A1c is slightly increased. He will try to cut back on breads and other starches. Recheck labs in 3 months. 2.  Blood pressure is well controlled on current medication. Continue the same. 3.  Lipid panel stable on statin. No side effects reported. Continue the same. 4.  PSA level is decreasing when compared to last check. No significant findings on 2019 ultrasound. No urinary symptoms reported. 5.  13.  He continues to follow with electrophysiology and cardiology through [facility]. Compliant with medication therapy. 6.  7.  9.  Anxiety symptoms have been better with BuSpar. He is also taking Restoril at bedtime to help him sleep and this has been effective with no side effects reported. Continue the same. 8.  Wears continuous oxygen. Continues to follow with pulmonology and has noticed progressive worsening of symptoms over the past several years. He will notify me if any symptoms of infection occur. Continue current medications and doses. 10.  Recommend daily antihistamine to help prevent excessive drainage in the morning. This can help reduce symptoms of COPD during the day. 11.  12.  Urinalysis done in the office does not show any evidence of infection but culture will be sent. He will notify me if symptoms do not continue to improve. Discussed the option of KUB today but he declines. Controlled Substance Monitoring:    Acute and Chronic Pain Monitoring:   RX Monitoring 4/6/2021   Attestation -   Periodic Controlled Substance Monitoring Possible medication side effects, risk of tolerance/dependence & alternative treatments discussed. ;No signs of potential drug abuse or diversion identified. ;Assessed functional status.        Please note this report has been partially produced using speech recognition software and may cause contain errors related to that system including grammar, punctuation and spelling as well as words and phrases that may seem inappropriate. If there are questions or concerns please feel free to contact me to clarify.         Electronically signed by Kayli Marquez, 9:36 AM 4/8/21

## 2021-04-06 NOTE — LETTER
[address]  Phone: [phone]  Fax: [phone]    TRINA Otero CNP        April 6, 2021     Patient: Amrik Pickard   YOB: 1940   Date of Visit: 4/6/2021       To Whom It May Concern: It is my medical opinion that Yenny Rosales requires a disability parking placard for the following reasons:  He cannot walk 200 feet without stopping to rest.  He uses portable oxygen. Duration of need: permanent    If you have any questions or concerns, please don't hesitate to call.     Sincerely,        TRINA Otero CNP

## 2021-04-07 NOTE — TELEPHONE ENCOUNTER
Pharmacy is requesting medication refill.  Please approve or deny this request.    Rx requested:  Requested Prescriptions     Pending Prescriptions Disp Refills    rosuvastatin (CRESTOR) 40 MG tablet [Pharmacy Med Name: ROSUVASTATIN TABS 40MG] 90 tablet 3     Sig: Take 1 tablet by mouth daily         Last Office Visit:   4/6/2021      Next Visit Date:  Future Appointments   Date Time Provider Valentín Perkins   7/13/2021  2:40 PM TRINA Gallagher - [facility]   7/14/2021  1:15 PM Vinnie Francisco MD [address] [place] [address]

## 2021-04-08 LAB — URINE CULTURE, ROUTINE: NORMAL

## 2021-04-08 RX ORDER — ROSUVASTATIN CALCIUM 40 MG/1
40 TABLET, COATED ORAL DAILY
Qty: 90 TABLET | Refills: 3 | Status: SHIPPED | OUTPATIENT
Start: 2021-04-08 | End: 2022-04-04

## 2021-04-26 ENCOUNTER — TELEPHONE (OUTPATIENT)
Dept: FAMILY MEDICINE CLINIC | Age: 81
End: 2021-04-26

## 2021-04-26 NOTE — TELEPHONE ENCOUNTER
Markus Lockhart called said that Yancey Severs needs a prior authorization for his temazepam (RESTORIL) 15 MG capsule she gave me a phone number [phone]. cp

## 2021-05-19 ENCOUNTER — VIRTUAL VISIT (OUTPATIENT)
Dept: PRIMARY CARE CLINIC | Age: 81
End: 2021-05-19
Payer: MEDICARE

## 2021-05-19 DIAGNOSIS — Z00.00 ROUTINE GENERAL MEDICAL EXAMINATION AT A HEALTH CARE FACILITY: Primary | ICD-10-CM

## 2021-05-19 PROCEDURE — 1123F ACP DISCUSS/DSCN MKR DOCD: CPT | Performed by: NURSE PRACTITIONER

## 2021-05-19 PROCEDURE — G0439 PPPS, SUBSEQ VISIT: HCPCS | Performed by: NURSE PRACTITIONER

## 2021-05-19 PROCEDURE — 4040F PNEUMOC VAC/ADMIN/RCVD: CPT | Performed by: NURSE PRACTITIONER

## 2021-05-19 SDOH — ECONOMIC STABILITY: FOOD INSECURITY: WITHIN THE PAST 12 MONTHS, THE FOOD YOU BOUGHT JUST DIDN'T LAST AND YOU DIDN'T HAVE MONEY TO GET MORE.: NEVER TRUE

## 2021-05-19 ASSESSMENT — PATIENT HEALTH QUESTIONNAIRE - PHQ9
SUM OF ALL RESPONSES TO PHQ QUESTIONS 1-9: 1
SUM OF ALL RESPONSES TO PHQ QUESTIONS 1-9: 1
1. LITTLE INTEREST OR PLEASURE IN DOING THINGS: 0

## 2021-05-19 NOTE — PROGRESS NOTES
Medicare Annual Wellness Visit  Are Name: Alondra Simmons Date: 2021   MRN: [id number] Sex: Male   Age: 79 y.o. Ethnicity: Non-/Non    : 1940 Race: King Chaparro is here for Medicare AWV    Screenings for behavioral, psychosocial and functional/safety risks, and cognitive dysfunction are all negative except as indicated below. These results, as well as other patient data from the [address] form, are documented in Flowsheets linked to this Encounter. Allergies   Allergen Reactions    Brilinta [Ticagrelor] Shortness Of Breath    Sildenafil Citrate      headache    Advair [Fluticasone-Salmeterol]      Chest pain    Fluticasone Propionate (Inhal)          Prior to Visit Medications    Medication Sig Taking? Authorizing Provider   rosuvastatin (CRESTOR) 40 MG tablet Take 1 tablet by mouth daily Yes TRINA Figueroa CNP   temazepam (RESTORIL) 15 MG capsule TAKE 2 CAPSULES BY MOUTH NIGHTLY AT BEDTIME AS NEEDED for sleep Yes TRINA Figueroa CNP   calcipotriene (DOVONEX) 0.005 % solution Apply to scalp daily Yes Historical Provider, MD   temazepam (RESTORIL) 15 MG capsule TAKE 2 CAPSULES BY MOUTH NIGHTLY AT BEDTIME AS NEEDED for sleep Yes Historical Provider, MD   albuterol sulfate HFA (PROAIR HFA) 108 (90 Base) MCG/ACT inhaler Inhale 2 puffs into the lungs every 6 hours as needed for Wheezing Yes Zoila Márquez, MD   busPIRone (BUSPAR) 30 MG tablet Take 30 mg by mouth daily Yes TRINA Figueroa CNP   fexofenadine (ALLEGRA) 180 MG tablet Take 1 tablet by mouth daily Yes TRINA Figueroa CNP   mupirocin (BACTROBAN) 2 % ointment Apply topically 3 times daily.  Yes TRINA Mcneil CNP   COMBIVENT RESPIMAT  MCG/ACT AERS inhaler USE 1 INHALATION EVERY 6 HOURS Yes TRINA Lopez CNP   Melatonin 10 MG TABS Take by mouth Yes Historical Provider, MD   predniSONE (DELTASONE) 10 MG tablet 4 tablets daily for 4 days, 3 tablets daily for 4 days, 2 tablets daily for 4 days, then 1 tablet daily for 4 days Yes Angel Thompson MD   SYMBICORT 80-4.5 MCG/ACT AERO USE 2 Genetta Avers Yes Angel Thompson MD   doxycycline hyclate (VIBRAMYCIN) 100 MG capsule Take 1 capsule by mouth 2 times daily Yes TRINA Robison CNP   metoprolol succinate (TOPROL XL) 25 MG extended release tablet Take 25 mg by mouth every morning Yes Historical Provider, MD   metoprolol succinate (TOPROL XL) 50 MG extended release tablet Take 50 mg by mouth every evening Yes Historical Provider, MD   aspirin 81 MG tablet Take 81 mg by mouth daily Yes Historical Provider, MD   diltiazem (TIAZAC) 240 MG extended release capsule TAKE 1 CAPSULE BY MOUTH DAILY AS DIRECTED Yes Historical Provider, MD   CARTIA  MG extended release capsule Take 120 capsules by mouth every evening  Yes Historical Provider, MD   OXYGEN Please provide patient with a portable oxygen concentrator Yes Shannon Salmon PA-C   dofetilide (TIKOSYN) 250 MCG capsule Take 250 mcg by mouth 2 times daily Yes Historical Provider, MD   Magnesium 500 MG TABS Take by mouth 2 times daily Yes Historical Provider, MD   Potassium 99 MG TABS Take by mouth daily Yes Historical Provider, MD   GLUTATHIONE PO Take by mouth Yes Historical Provider, MD   metoprolol tartrate (LOPRESSOR) 25 MG tablet Take 25 mg by mouth as needed Yes Historical Provider, MD   tadalafil (CIALIS) 5 MG tablet Take 1 tablet by mouth as needed for Erectile Dysfunction (maximum 5 mg per day) Yes TRINA Robison CNP   Handicap Placard MISC by Does not apply route Good for 5 Years from 06/16/2016 Yes Gustavo Cheema MD   digoxin (LANOXIN) 250 MCG tablet  Yes Historical Provider, MD   ELIQUIS 5 MG TABS tablet  Yes Historical Provider, MD   CINNAMON PO Take  by mouth. Yes Historical Provider, MD CHI LOVELL by Does not apply route.  Yes Historical Provider, MD   ipratropium-albuterol (DUONEB) 0.5-2.5 (3) MG/3ML SOLN nebulizer solution Take 3 mLs by nebulization every 6 hours as needed for Shortness of Breath. Yes Buzz Anne MD   Glucosamine-Chondroitin 500-400 MG CAPS Take 1 capsule by mouth daily. Yes Historical Provider, MD   Ascorbic Acid (VITAMIN C) 500 MG tablet Take 1,000 mg by mouth daily. 1/2 tab in AM and 1/2 tab in PM  Yes Historical MD Rachel   vitamin D (CHOLECALCIFEROL) 400 UNIT TABS tablet Take 800 Units by mouth daily. Yes Historical Provider, MD   vitamin B-12 (CYANOCOBALAMIN) 1000 MCG tablet Take 1,000 mcg by mouth daily. Yes Historical Provider, MD   Selenium 200 MCG TABS Take 200 mcg by mouth daily. Yes Historical Provider, MD   Thiamine HCl (VITAMIN B-1) 100 MG tablet Take 100 mg by mouth daily. Yes Historical Provider, MD   folic acid (FOLVITE) 299 MCG tablet Take 800 mcg by mouth daily. Yes Historical Provider, MD   Zinc 50 MG CAPS Take 50 mg by mouth daily. Yes Historical Provider, MD   beta carotene 65145 UNIT capsule Take 25,000 Units by mouth daily. Yes Historical Provider, MD   DHEA 25 MG TABS Take 25 mg by mouth daily. Yes Historical Provider, MD   Pyridoxine HCl (VITAMIN B-6) 50 MG tablet Take 100 mg by mouth daily. Yes Historical Provider, MD   Calcium Carb-Cholecalciferol (CALCIUM 1000 + D) 1000-800 MG-UNIT TABS Take 1 tablet by mouth 2 times daily. Yes Historical Provider, MD   Methylsulfonylmethane (MSM) 1000 MG TABS Take 1,000 mg by mouth daily. Yes Historical Provider, MD   vitamin E 400 UNIT capsule Take 400 Units by mouth daily.    Yes Historical Provider, MD   azelastine (OPTIVAR) 0.05 % ophthalmic solution Place 1 drop into both eyes 2 times daily  Patient not taking: Reported on 5/19/2021  Historical Provider, MD         Past Medical History:   Diagnosis Date    Adrenal adenoma     CT 6/2011 stable    Anxiety     Asbestosis(501)     CAD (coronary artery disease)     status post stent 2001    Cancer ([facility] [address]     basil cell carnoma    COPD [facility]chronic obstructive pulmonary disease) [facility] [address]     Elbow injury     left    Erectile dysfunction     Granulomatous lung disease (HCC)     Herpes zoster     Hyperlipidemia     Hypertension     Insomnia     Interstitial fibrosis     Obstructive sleep apnea on CPAP 11/13/2017    Prostate nodule     Scarlet fever     Urine frequency        Past Surgical History:   Procedure Laterality Date    CARDIAC CATHETERIZATION      CARDIOVASCULAR STRESS TEST      1/2010 normal per patient    COLONOSCOPY      5/2009 tubular adenomas    COLONOSCOPY  08/13/15    David Mcnamara MD    EYE SURGERY  02/10/14    DR Damir Medel  [facility]    HERNIA REPAIR      LEG SURGERY           Family History   Problem Relation Age of Onset    Cancer Other         colon cancer in multiple family members and breast cancer    Heart Disease Other        CareTeam (Including outside providers/suppliers regularly involved in providing care):   Patient Care Team:  TRINA Maier CNP as PCP - General (Certified Nurse Practitioner)  TRINA Maier CNP as PCP - [facility] Empaneled Provider  Nenita Sung MD (Interventional Cardiology)  Sabino Bullard MD (Urology)    Wt Readings from Last 3 Encounters:   04/06/21 217 lb (98.4 kg)   01/06/21 210 lb (95.3 kg)   12/10/20 220 lb (99.8 kg)      Patient-Reported Vitals 5/19/2021   Patient-Reported Weight 217lbs   Patient-Reported Height 6ft      There is no height or weight on file to calculate BMI. Based upon direct observation of the patient, evaluation of cognition reveals recent and remote memory intact. Patient's complete Health Risk Assessment and screening values have been reviewed and are found in Flowsheets. The following problems were reviewed today and where indicated follow up appointments were made and/or referrals ordered.     Positive Risk Factor Screenings with Interventions:            General Health and ACP:  General  In general, how would you say your health is?: Good  In the past 7 days, have you experienced any of the following? New or Increased Pain, New or Increased Fatigue, Loneliness, Social Isolation, Stress or Anger?: None of These  Do you get the social and emotional support that you need?: Yes  Do you have a Living Will?: Yes  Advance Directives     Power of Usama Isaacs Will ACP-Advance Directive ACP-Power of     Not on File Not on File Not on File Not on File      General Health Risk Interventions:  · pt declines any issues with any of these gen health      Safety:  Safety  Do you have working smoke detectors?: (!) No  Have all throw rugs been removed or fastened?: Yes  Do you have non-slip mats or surfaces in all bathtubs/showers?: Yes  Do all of your stairways have a railing or banister?: Yes  Are your doorways, halls and stairs free of clutter?: Yes  Do you always fasten your seatbelt when you are in a car?: Yes  Safety Interventions:  · Home safety tips provided  · Patient declines any further evaluation/treatment for this issue    ADL:  ADLs  In the past 7 days, did you need help from others to perform any of the following everyday activities? Eating, dressing, grooming, bathing, toileting, or walking/balance?: None  In the past 7 days, did you need help from others to take care of any of the following?  Laundry, housekeeping, banking/finances, shopping, telephone use, food preparation, transportation, or taking medications?: [employer], Housekeeping, Shopping (pt reports his GF Bernadine assist with the laundry/housekeeping)  ADL Interventions:  · pt reports his GF Naheed Martinez assists him with these listed but he is capable    Personalized Preventive Plan   Current Health Maintenance Status  Immunization History   Administered Date(s) Administered    Influenza A (Q4B1-25) Vaccine PF IM 01/06/2010    Influenza Virus Vaccine 10/05/2014    Influenza Whole 10/01/2010    Influenza, High Dose (Fluzone 65 yrs and older) 10/20/2015, 09/23/2016, 09/15/2017, 11/07/2018    guardian's) consent, to reduce the patient's risk of exposure to COVID-19 and provide necessary medical care. The patient (and/or legal guardian) has also been advised to contact this office for worsening conditions or problems, and seek emergency medical treatment and/or call 911 if deemed necessary. Patient identification was verified at the start of the visit: Yes    Services were provided through phone to substitute for in-person clinic visit. Patient and provider were located at their individual homes. --Rochell Severance, APRN - CNP on 5/19/2021 at 2:17 PM    An electronic signature was used to authenticate this note.

## 2021-05-19 NOTE — PATIENT INSTRUCTIONS
Personalized Preventive Plan for [facility] - 5/19/2021  Medicare offers a range of preventive health benefits. Some of the tests and screenings are paid in full while other may be subject to a deductible, co-insurance, and/or copay. Some of these benefits include a comprehensive review of your medical history including lifestyle, illnesses that may run in your family, and various assessments and screenings as appropriate. After reviewing your medical record and screening and assessments performed today your provider may have ordered immunizations, labs, imaging, and/or referrals for you. A list of these orders (if applicable) as well as your Preventive Care list are included within your After Visit Summary for your review. Other Preventive Recommendations:    · A preventive eye exam performed by an eye specialist is recommended every 1-2 years to screen for glaucoma; cataracts, macular degeneration, and other eye disorders. · A preventive dental visit is recommended every 6 months. · Try to get at least 150 minutes of exercise per week or 10,000 steps per day on a pedometer . · Order or download the FREE \"Exercise & Physical Activity: Your Everyday Guide\" from [employer]. Call 6-107.188.8196 or search [employer] online. · You need 2306-1587 mg of calcium and 2579-6006 IU of vitamin D per day. It is possible to meet your calcium requirement with diet alone, but a vitamin D supplement is usually necessary to meet this goal.  · When exposed to the sun, use a sunscreen that protects against both UVA and UVB radiation with an SPF of 30 or greater. Reapply every 2 to 3 hours or after sweating, drying off with a towel, or swimming. · Always wear a seat belt when traveling in a car. Always wear a helmet when riding a bicycle or motorcycle. Patient Education        Well Visit, Over 72: Care Instructions  Overview     Well visits can help you stay healthy.  Your doctor has checked your overall health and may have suggested ways to take good care of yourself. Your doctor also may have recommended tests. At home, you can help prevent illness with healthy eating, regular exercise, and other steps. Follow-up care is a key part of your treatment and safety. Be sure to make and go to all appointments, and call your doctor if you are having problems. It's also a good idea to know your test results and keep a list of the medicines you take. How can you care for yourself at home? Get screening tests that you and your doctor decide on. Screening helps find diseases before any symptoms appear. Eat healthy foods. Choose fruits, vegetables, whole grains, protein, and low-fat dairy foods. Limit fat, especially saturated fat. Reduce salt in your diet. Limit alcohol. If you are a man, have no more than 2 drinks a day or 14 drinks a week. If you are a woman, have no more than 1 drink a day or 7 drinks a week. Since alcohol affects older adults differently, you may want to limit alcohol even more. Or you may not want to drink at all. Get at least 30 minutes of exercise on most days of the week. Walking is a good choice. You also may want to do other activities, such as running, swimming, cycling, or playing tennis or team sports. Reach and stay at a healthy weight. This will lower your risk for many problems, such as obesity, diabetes, heart disease, and high blood pressure. Do not smoke. Smoking can make health problems worse. If you need help quitting, talk to your doctor about stop-smoking programs and medicines. These can increase your chances of quitting for good. Care for your mental health. It is easy to get weighed down by worry and stress. Learn strategies to manage stress, like deep breathing and mindfulness, and stay connected with your family and community. If you find you often feel sad or hopeless, talk with your doctor. Treatment can help.   Talk to your doctor about whether you have any risk factors for sexually transmitted infections (STIs). You can help prevent STIs if you wait to have sex with a new partner (or partners) until you've each been tested for STIs. It also helps if you use condoms (male or female condoms) and if you limit your sex partners to one person who only has sex with you. Vaccines are available for some STIs. If you think you may have a problem with alcohol or drug use, talk to your doctor. This includes prescription medicines (such as amphetamines and opioids) and illegal drugs (such as cocaine and methamphetamine). Your doctor can help you figure out what type of treatment is best for you. Protect your skin from too much sun. When you're outdoors from 10 a.m. to 4 p.m., stay in the shade or cover up with clothing and a hat with a wide brim. Wear sunglasses that block UV rays. Even when it's cloudy, put broad-spectrum sunscreen (SPF 30 or higher) on any exposed skin. See a dentist one or two times a year for checkups and to have your teeth cleaned. Wear a seat belt in the car. When should you call for help? Watch closely for changes in your health, and be sure to contact your doctor if you have any problems or symptoms that concern you. Where can you learn more? Go to [email or web address] and sign in to your [employer] account. Enter A589 in the [facility] box to learn more about \"Well Visit, Over 65: Care Instructions. \"     If you do not have an account, please click on the \"Sign Up Now\" link. Current as of: May 27, 2020               Content Version: 12.8  © 7217-4146 Healthwise, Incorporated. Care instructions adapted under license by [facility] ([facility]). If you have questions about a medical condition or this instruction, always ask your healthcare professional. [address] any warranty or liability for your use of this information.

## 2021-05-24 DIAGNOSIS — F41.9 ANXIETY: ICD-10-CM

## 2021-05-24 DIAGNOSIS — J30.89 SEASONAL ALLERGIC RHINITIS DUE TO OTHER ALLERGIC TRIGGER: ICD-10-CM

## 2021-05-24 RX ORDER — BUSPIRONE HYDROCHLORIDE 30 MG/1
30 TABLET ORAL DAILY
Qty: 30 TABLET | Refills: 3 | Status: SHIPPED | OUTPATIENT
Start: 2021-05-24 | End: 2022-01-03

## 2021-05-24 RX ORDER — FEXOFENADINE HCL 180 MG/1
180 TABLET ORAL DAILY
Qty: 90 TABLET | Refills: 2 | Status: SHIPPED | OUTPATIENT
Start: 2021-05-24 | End: 2022-02-22

## 2021-05-24 NOTE — TELEPHONE ENCOUNTER
Pharmacy is requesting medication refill.  Please approve or deny this request.    Rx requested:  Requested Prescriptions     Pending Prescriptions Disp Refills    busPIRone (BUSPAR) 30 MG tablet [Pharmacy Med Name: buspirone 30 mg tablet] 30 tablet 3     Sig: Take 30 mg by mouth daily    fexofenadine (ALLEGRA) 180 MG tablet [Pharmacy Med Name: fexofenadine 180 mg tablet] 90 tablet 2     Sig: Take 1 tablet by mouth daily         Last Office Visit:   4/6/2021      Next Visit Date:  Future Appointments   Date Time Provider Valentín Perkins   7/13/2021  2:40 PM TRINA So - CNP [facility]   7/14/2021  1:15 PM Emmett Khan MD [facility]

## 2021-06-03 DIAGNOSIS — J44.9 CHRONIC OBSTRUCTIVE PULMONARY DISEASE, UNSPECIFIED COPD TYPE (HCC): Primary | ICD-10-CM

## 2021-06-03 RX ORDER — BUDESONIDE AND FORMOTEROL FUMARATE DIHYDRATE 80; 4.5 UG/1; UG/1
2 AEROSOL RESPIRATORY (INHALATION) 2 TIMES DAILY
Qty: 3 INHALER | Refills: 0 | Status: SHIPPED | OUTPATIENT
Start: 2021-06-03 | End: 2021-08-19

## 2021-07-06 DIAGNOSIS — G47.00 INSOMNIA, UNSPECIFIED: ICD-10-CM

## 2021-07-06 NOTE — TELEPHONE ENCOUNTER
Requesting medication refill.  Please approve or deny this request.    Rx requested:  Requested Prescriptions     Pending Prescriptions Disp Refills    temazepam (RESTORIL) 15 MG capsule [Pharmacy Med Name: temazepam 15 mg capsule] 60 capsule 2     Sig: TAKE 2 CAPSULES BY MOUTH NIGHTLY AT BEDTIME AS NEEDED for sleep       Last Office Visit:   4/6/2021    Last Filled:      Last Labs:      Next Visit Date:  Future Appointments   Date Time Provider Valentín Perkins   7/13/2021  2:40 PM Gadiel Medel APRN - CNP [facility]   7/14/2021  1:15 PM Sole Lewis MD [facility]

## 2021-07-08 RX ORDER — TEMAZEPAM 15 MG/1
CAPSULE ORAL
Qty: 60 CAPSULE | Refills: 2 | OUTPATIENT
Start: 2021-07-08

## 2021-07-08 NOTE — TELEPHONE ENCOUNTER
Patient has not been seen in 3 months. This is a controlled substance, therefore there needs to be some sort of evaluation prior to prescribing. I would recommend patient making an appointment every 3 months to prevent this from occurring again. I am not in the office this afternoon, and have no availability tomorrow. ,  but would be willing to see him between scheduled patients for this concern only to provide a short term prescription until his PCP returns. (this can be done virtually). IF he is unable to wait until tomorrow, he should be seen in the ER.  Please let him to be available at 8:20 and I will try my best to see him within a reasonable amount of time as my scheduled patients will have to be seen first.

## 2021-07-08 NOTE — TELEPHONE ENCOUNTER
Most recent refill was given 2 weeks ago, medication is written as an AS NEEDED medication but appears to be being used as a scheduled medication and is at high end of dose range. I do not feel comfortable refilling this prescription and would suggest that patient discuss with PCP when she returns to the office whether medication change is needed or directions need to be changed.

## 2021-07-08 NOTE — TELEPHONE ENCOUNTER
Pt stated this medication is not as needed and he does take it everyday because that is how it has been prescribed for years. Pt states he is unable to sleep without this medication and reports his BP being \"out of whack\" when he is not on this medication. He is extremely upset that this is the case and would like to know if a shorter term refill until [address] gets in, could be sent in? I told patient I cannot guarantee that this medication is filled but I would ask. Please advise.

## 2021-07-09 ENCOUNTER — OFFICE VISIT (OUTPATIENT)
Dept: FAMILY MEDICINE CLINIC | Age: 81
End: 2021-07-09
Payer: MEDICARE

## 2021-07-09 ENCOUNTER — HOSPITAL ENCOUNTER (OUTPATIENT)
Dept: LAB | Age: 81
Discharge: HOME | End: 2021-07-09
Payer: MEDICARE

## 2021-07-09 VITALS
OXYGEN SATURATION: 97 % | WEIGHT: 213.6 LBS | HEIGHT: 72 IN | TEMPERATURE: 97.6 F | BODY MASS INDEX: 28.93 KG/M2 | DIASTOLIC BLOOD PRESSURE: 70 MMHG | HEART RATE: 86 BPM | SYSTOLIC BLOOD PRESSURE: 132 MMHG

## 2021-07-09 DIAGNOSIS — E11.59 TYPE 2 DIABETES MELLITUS WITH OTHER CIRCULATORY COMPLICATION, WITHOUT LONG-TERM CURRENT USE OF INSULIN (HCC): ICD-10-CM

## 2021-07-09 DIAGNOSIS — R97.20 ELEVATED PSA: ICD-10-CM

## 2021-07-09 DIAGNOSIS — G47.09 OTHER INSOMNIA: Primary | ICD-10-CM

## 2021-07-09 LAB
ALBUMIN SERPL-MCNC: 4.1 G/DL (ref 3.5–4.6)
ALP BLD-CCNC: 94 U/L (ref 35–104)
ALT SERPL-CCNC: 31 U/L (ref 0–41)
ANION GAP SERPL CALCULATED.3IONS-SCNC: 12 MEQ/L (ref 9–15)
AST SERPL-CCNC: 24 U/L (ref 0–40)
BASOPHILS ABSOLUTE: 0 K/UL (ref 0–0.2)
BASOPHILS RELATIVE PERCENT: 0.3 %
BILIRUB SERPL-MCNC: 0.8 MG/DL (ref 0.2–0.7)
BUN BLDV-MCNC: 16 MG/DL (ref 8–23)
CALCIUM SERPL-MCNC: 9.8 MG/DL (ref 8.5–9.9)
CHLORIDE BLD-SCNC: 97 MEQ/L (ref 95–107)
CHOLESTEROL, TOTAL: 141 MG/DL (ref 0–199)
CO2: 28 MEQ/L (ref 20–31)
CREAT SERPL-MCNC: 0.8 MG/DL (ref 0.7–1.2)
CREATININE URINE: 45.8 MG/DL
EOSINOPHILS ABSOLUTE: 0.1 K/UL (ref 0–0.7)
EOSINOPHILS RELATIVE PERCENT: 2 %
GFR AFRICAN AMERICAN: >60
GFR NON-AFRICAN AMERICAN: >60
GLOBULIN: 3.2 G/DL (ref 2.3–3.5)
GLUCOSE BLD-MCNC: 257 MG/DL (ref 70–99)
HBA1C MFR BLD: 7.9 % (ref 4.8–5.9)
HCT VFR BLD CALC: 46.7 % (ref 42–52)
HDLC SERPL-MCNC: 37 MG/DL (ref 40–59)
HEMOGLOBIN: 15.2 G/DL (ref 14–18)
LDL CHOLESTEROL CALCULATED: 65 MG/DL (ref 0–129)
LYMPHOCYTES ABSOLUTE: 1.7 K/UL (ref 1–4.8)
LYMPHOCYTES RELATIVE PERCENT: 24.6 %
MCH RBC QN AUTO: 28.8 PG (ref 27–31.3)
MCHC RBC AUTO-ENTMCNC: 32.7 % (ref 33–37)
MCV RBC AUTO: 88.3 FL (ref 80–100)
MICROALBUMIN UR-MCNC: 3.9 MG/DL
MICROALBUMIN/CREAT UR-RTO: 85.2 MG/G (ref 0–30)
MONOCYTES ABSOLUTE: 0.5 K/UL (ref 0.2–0.8)
MONOCYTES RELATIVE PERCENT: 7.7 %
NEUTROPHILS ABSOLUTE: 4.5 K/UL (ref 1.4–6.5)
NEUTROPHILS RELATIVE PERCENT: 65.4 %
PDW BLD-RTO: 13.9 % (ref 11.5–14.5)
PLATELET # BLD: 215 K/UL (ref 130–400)
POTASSIUM SERPL-SCNC: 4.6 MEQ/L (ref 3.4–4.9)
PROSTATE SPECIFIC ANTIGEN: 7.26 NG/ML (ref 0–4)
RBC # BLD: 5.28 M/UL (ref 4.7–6.1)
SODIUM BLD-SCNC: 137 MEQ/L (ref 135–144)
TOTAL PROTEIN: 7.3 G/DL (ref 6.3–8)
TRIGL SERPL-MCNC: 197 MG/DL (ref 0–150)
WBC # BLD: 6.8 K/UL (ref 4.8–10.8)

## 2021-07-09 PROCEDURE — 4040F PNEUMOC VAC/ADMIN/RCVD: CPT | Performed by: FAMILY MEDICINE

## 2021-07-09 PROCEDURE — 84153 ASSAY OF PSA TOTAL: CPT

## 2021-07-09 PROCEDURE — 99213 OFFICE O/P EST LOW 20 MIN: CPT | Performed by: FAMILY MEDICINE

## 2021-07-09 PROCEDURE — 80053 COMPREHEN METABOLIC PANEL: CPT

## 2021-07-09 PROCEDURE — 82043 UR ALBUMIN QUANTITATIVE: CPT

## 2021-07-09 PROCEDURE — 85025 COMPLETE CBC W/AUTO DIFF WBC: CPT

## 2021-07-09 PROCEDURE — 83036 HEMOGLOBIN GLYCOSYLATED A1C: CPT

## 2021-07-09 PROCEDURE — 80061 LIPID PANEL: CPT

## 2021-07-09 PROCEDURE — G8417 CALC BMI ABV UP PARAM F/U: HCPCS | Performed by: FAMILY MEDICINE

## 2021-07-09 PROCEDURE — 82570 ASSAY OF URINE CREATININE: CPT

## 2021-07-09 PROCEDURE — G8427 DOCREV CUR MEDS BY ELIG CLIN: HCPCS | Performed by: FAMILY MEDICINE

## 2021-07-09 PROCEDURE — 36415 COLL VENOUS BLD VENIPUNCTURE: CPT

## 2021-07-09 PROCEDURE — 1123F ACP DISCUSS/DSCN MKR DOCD: CPT | Performed by: FAMILY MEDICINE

## 2021-07-09 PROCEDURE — 1036F TOBACCO NON-USER: CPT | Performed by: FAMILY MEDICINE

## 2021-07-09 RX ORDER — TEMAZEPAM 15 MG/1
CAPSULE ORAL
Qty: 8 CAPSULE | Refills: 0 | Status: SHIPPED | OUTPATIENT
Start: 2021-07-09 | End: 2021-07-13

## 2021-07-09 ASSESSMENT — ENCOUNTER SYMPTOMS
APNEA: 0
SHORTNESS OF BREATH: 1
CHEST TIGHTNESS: 0
ABDOMINAL PAIN: 0
DIARRHEA: 0
VOMITING: 0
NAUSEA: 0
CONSTIPATION: 0
BLOOD IN STOOL: 0
COUGH: 0

## 2021-07-09 NOTE — PROGRESS NOTES
Subjective:      Patient ID: Krystina Crook is a 79 y.o. male who presents today for:     Chief Complaint   Patient presents with    Medication Refill       HPI  Patient is a 56-year-old male who presents today for a refill of his Restoril. He has been on this medication for many years and uses it at night to sleep due to severe anxiety secondary due  to a history of end-stage COPD. He states that he only had 2 hours of sleep last night because he was out of his medication . He is joined today by significant other who raised her voice and was extremely upset that medication was not refilled in what she felt was a timely manner. He has a medication contract with his PCP but did not make an appointment before the medication  . I explained that we were extending ourselves to provide a short-term prescription until his PCP returns and this medication was a controlled substance that has inherent risks due to his age and comorbidities.      Past Medical History:   Diagnosis Date    Adrenal adenoma     CT 2011 stable    Anxiety     Asbestosis(501)     CAD (coronary artery disease)     status post stent     Cancer ([facility] [address]     basil cell carnoma    COPD (chronic obstructive pulmonary disease) (HCC)     Elbow injury     left    Erectile dysfunction     Granulomatous lung disease (HCC)     Herpes zoster     Hyperlipidemia     Hypertension     Insomnia     Interstitial fibrosis     Obstructive sleep apnea on CPAP 2017    Prostate nodule     Scarlet fever     Urine frequency      Past Surgical History:   Procedure Laterality Date    CARDIAC CATHETERIZATION      CARDIOVASCULAR STRESS TEST      2010 normal per patient    COLONOSCOPY      2009 tubular adenomas    COLONOSCOPY  08/13/15    David Mcnamara MD    EYE SURGERY  02/10/14    DR WOLFE  [facility]    HERNIA REPAIR      LEG SURGERY       Family History   Problem Relation Age of Onset    Cancer Other         colon cancer in multiple family members and breast cancer    Heart Disease Other      Social History     Socioeconomic History    Marital status:      Spouse name: Not on file    Number of children: 3    Years of education: 15    Highest education level: High school graduate   Occupational History    Occupation:    Tobacco Use    Smoking status: Former Smoker     Packs/day: 2.00     Years: 40.00     Pack years: 80.00     Quit date: 2002     Years since quittin.9    Smokeless tobacco: Never Used   Vaping Use    Vaping Use: Never used   Substance and Sexual Activity    Alcohol use: No    Drug use: No    Sexual activity: Yes     Partners: Female   Other Topics Concern    Not on file   Social History Narrative    Not on file     Social Determinants of Health     Financial Resource Strain: Low Risk     Difficulty of Paying Living Expenses: Not hard at all   Food Insecurity: No Food Insecurity    Worried About [address] [employer] in the Last Year: Never true    [address] [facility] [employer] in the Last Year: Never true   Transportation Needs:     Lack of Transportation (Medical):      Lack of Transportation (Non-Medical):    Physical Activity:     Days of Exercise per Week:     Minutes of Exercise per Session:    Stress:     Feeling of Stress :    Social Connections:     Frequency of Communication with Friends and Family:     Frequency of Social Gatherings with Friends and Family:     Attends [employer] Services:     Active Member of Clubs or Organizations:     Attends Club or Organization Meetings:     Marital Status:    Intimate Partner Violence:     Fear of Current or Ex-Partner:     Emotionally Abused:     Physically Abused:     Sexually Abused:      Current Outpatient Medications on File Prior to Visit   Medication Sig Dispense Refill    budesonide-formoterol (SYMBICORT) 80-4.5 MCG/ACT AERO Inhale 2 puffs into the lungs 2 times daily 3 Inhaler 0    busPIRone (BUSPAR) 30 MG tablet Take 30 mg by mouth daily 30 tablet 3    fexofenadine (ALLEGRA) 180 MG tablet Take 1 tablet by mouth daily 90 tablet 2    rosuvastatin (CRESTOR) 40 MG tablet Take 1 tablet by mouth daily 90 tablet 3    azelastine (OPTIVAR) 0.05 % ophthalmic solution Place 1 drop into both eyes 2 times daily      calcipotriene (DOVONEX) 0.005 % solution Apply to scalp daily      albuterol sulfate HFA (PROAIR HFA) 108 (90 Base) MCG/ACT inhaler Inhale 2 puffs into the lungs every 6 hours as needed for Wheezing 3 Inhaler 3    mupirocin (BACTROBAN) 2 % ointment Apply topically 3 times daily.  30 g 1    COMBIVENT RESPIMAT  MCG/ACT AERS inhaler USE 1 INHALATION EVERY 6 HOURS 16 g 3    Melatonin 10 MG TABS Take by mouth      predniSONE (DELTASONE) 10 MG tablet 4 tablets daily for 4 days, 3 tablets daily for 4 days, 2 tablets daily for 4 days, then 1 tablet daily for 4 days 40 tablet 1    doxycycline hyclate (VIBRAMYCIN) 100 MG capsule Take 1 capsule by mouth 2 times daily 20 capsule 0    metoprolol succinate (TOPROL XL) 25 MG extended release tablet Take 25 mg by mouth every morning      metoprolol succinate (TOPROL XL) 50 MG extended release tablet Take 50 mg by mouth every evening      aspirin 81 MG tablet Take 81 mg by mouth daily      diltiazem (TIAZAC) 240 MG extended release capsule TAKE 1 CAPSULE BY MOUTH DAILY AS DIRECTED  3    CARTIA  MG extended release capsule Take 120 capsules by mouth every evening   3    OXYGEN Please provide patient with a portable oxygen concentrator 1 Units 0    dofetilide (TIKOSYN) 250 MCG capsule Take 250 mcg by mouth 2 times daily      Magnesium 500 MG TABS Take by mouth 2 times daily      Potassium 99 MG TABS Take by mouth daily      GLUTATHIONE PO Take by mouth      metoprolol tartrate (LOPRESSOR) 25 MG tablet Take 25 mg by mouth as needed      tadalafil (CIALIS) 5 MG tablet Take 1 tablet by mouth as needed for Erectile Dysfunction (maximum 5 mg per day) 30 tablet 3    Handicap Placard MISC by Does not apply route Good for 5 Years from 06/16/2016 1 each 0    digoxin (LANOXIN) 250 MCG tablet   11    ELIQUIS 5 MG TABS tablet   2    CINNAMON PO Take  by mouth.  SAW PALMETTO by Does not apply route.  ipratropium-albuterol (DUONEB) 0.5-2.5 (3) MG/3ML SOLN nebulizer solution Take 3 mLs by nebulization every 6 hours as needed for Shortness of Breath. 100 vial 2    Glucosamine-Chondroitin 500-400 MG CAPS Take 1 capsule by mouth daily.  Ascorbic Acid (VITAMIN C) 500 MG tablet Take 1,000 mg by mouth daily. 1/2 tab in AM and 1/2 tab in PM       vitamin D (CHOLECALCIFEROL) 400 UNIT TABS tablet Take 800 Units by mouth daily.  vitamin B-12 (CYANOCOBALAMIN) 1000 MCG tablet Take 1,000 mcg by mouth daily.  Selenium 200 MCG TABS Take 200 mcg by mouth daily.  Thiamine HCl (VITAMIN B-1) 100 MG tablet Take 100 mg by mouth daily.  folic acid (FOLVITE) 202 MCG tablet Take 800 mcg by mouth daily.  Zinc 50 MG CAPS Take 50 mg by mouth daily.  beta carotene 40269 UNIT capsule Take 25,000 Units by mouth daily.  DHEA 25 MG TABS Take 25 mg by mouth daily.  Pyridoxine HCl (VITAMIN B-6) 50 MG tablet Take 100 mg by mouth daily.  Calcium Carb-Cholecalciferol (CALCIUM 1000 + D) 1000-800 MG-UNIT TABS Take 1 tablet by mouth 2 times daily.  Methylsulfonylmethane (MSM) 1000 MG TABS Take 1,000 mg by mouth daily.  vitamin E 400 UNIT capsule Take 400 Units by mouth daily. No current facility-administered medications on file prior to visit. Allergies:  Brilinta [ticagrelor], Sildenafil citrate, Advair [fluticasone-salmeterol], and Fluticasone propionate (inhal)    Review of Systems   Constitutional: Negative for activity change, appetite change and fatigue. Respiratory: Positive for shortness of breath (Chronic). Negative for apnea, cough and chest tightness.     Cardiovascular: Negative for chest pain, palpitations and leg swelling. Gastrointestinal: Negative for abdominal pain, blood in stool, constipation, diarrhea, nausea and vomiting. Musculoskeletal: Negative for arthralgias. Neurological: Negative for seizures and headaches. Psychiatric/Behavioral: Negative for hallucinations and suicidal ideas. Objective:   /70 (Site: Right Upper Arm, Position: Sitting, Cuff Size: Large Adult)   Pulse 86   Temp 97.6 °F (36.4 °C) (Temporal)   Ht 6' (1.829 m)   Wt 213 lb 9.6 oz (96.9 kg)   SpO2 97%   BMI 28.97 kg/m²     Physical Exam  Vitals and nursing note reviewed. Constitutional:       General: He is not in acute distress. Appearance: Normal appearance. He is well-developed. He is not diaphoretic. Comments: On oxygen concentrator   HENT:      Head: Normocephalic and atraumatic. Nose: Nose normal.      Mouth/Throat:      Mouth: Mucous membranes are moist.      Pharynx: Oropharynx is clear. Eyes:      Conjunctiva/sclera: Conjunctivae normal.      Pupils: Pupils are equal, round, and reactive to light. Cardiovascular:      Rate and Rhythm: Normal rate and regular rhythm. Heart sounds: Normal heart sounds. No murmur heard. No friction rub. No gallop. Pulmonary:      Effort: Pulmonary effort is normal. No respiratory distress. Breath sounds: Normal breath sounds. Decreased air movement present. No wheezing or rales. Chest:      Chest wall: No tenderness. Abdominal:      General: Abdomen is flat. Bowel sounds are normal.      Palpations: Abdomen is soft. Tenderness: There is no abdominal tenderness. Musculoskeletal:      Cervical back: Normal range of motion. Skin:     General: Skin is warm and dry. Neurological:      Mental Status: He is alert and oriented to person, place, and time. Psychiatric:         Behavior: Behavior normal.         Thought Content: Thought content normal.         Judgment: Judgment normal.         Assessment & Plan:     1.  Other insomnia  Advised patient to take the lowest dose possible and only on an as-needed basis. Advised of the risk of oversedation, respiratory depression and death. Patient has tolerated this dose for over 1 year but would advise reducing dose or finding alternative medication for long-term management. In the absence of his PCP,  will refill medication, until his upcoming appointment in 4 days  - temazepam (RESTORIL) 15 MG capsule; TAKE 1- 2 CAPSULES BY MOUTH NIGHTLY AT BEDTIME AS NEEDED for sleep  Dispense: 8 capsule; Refill: 0      Return in about 4 days (around 7/13/2021) for with PCP.     Atilio Andrew MD

## 2021-07-13 ENCOUNTER — OFFICE VISIT (OUTPATIENT)
Dept: FAMILY MEDICINE CLINIC | Age: 81
End: 2021-07-13
Payer: MEDICARE

## 2021-07-13 VITALS
HEIGHT: 72 IN | WEIGHT: 214 LBS | OXYGEN SATURATION: 93 % | BODY MASS INDEX: 28.99 KG/M2 | HEART RATE: 61 BPM | TEMPERATURE: 98 F | SYSTOLIC BLOOD PRESSURE: 134 MMHG | DIASTOLIC BLOOD PRESSURE: 68 MMHG | RESPIRATION RATE: 14 BRPM

## 2021-07-13 DIAGNOSIS — E11.59 TYPE 2 DIABETES MELLITUS WITH OTHER CIRCULATORY COMPLICATION, WITHOUT LONG-TERM CURRENT USE OF INSULIN (HCC): Primary | ICD-10-CM

## 2021-07-13 DIAGNOSIS — E78.2 MIXED HYPERLIPIDEMIA: ICD-10-CM

## 2021-07-13 DIAGNOSIS — G47.00 INSOMNIA, UNSPECIFIED TYPE: ICD-10-CM

## 2021-07-13 DIAGNOSIS — N40.0 BENIGN PROSTATIC HYPERPLASIA, UNSPECIFIED WHETHER LOWER URINARY TRACT SYMPTOMS PRESENT: ICD-10-CM

## 2021-07-13 DIAGNOSIS — R97.20 ELEVATED PSA: ICD-10-CM

## 2021-07-13 DIAGNOSIS — J44.9 CHRONIC OBSTRUCTIVE PULMONARY DISEASE, UNSPECIFIED COPD TYPE (HCC): ICD-10-CM

## 2021-07-13 DIAGNOSIS — I48.0 PAROXYSMAL ATRIAL FIBRILLATION (HCC): ICD-10-CM

## 2021-07-13 DIAGNOSIS — I10 ESSENTIAL HYPERTENSION: ICD-10-CM

## 2021-07-13 DIAGNOSIS — I25.10 CORONARY ARTERY DISEASE INVOLVING NATIVE CORONARY ARTERY OF NATIVE HEART WITHOUT ANGINA PECTORIS: ICD-10-CM

## 2021-07-13 PROCEDURE — 3051F HG A1C>EQUAL 7.0%<8.0%: CPT | Performed by: NURSE PRACTITIONER

## 2021-07-13 PROCEDURE — G8417 CALC BMI ABV UP PARAM F/U: HCPCS | Performed by: NURSE PRACTITIONER

## 2021-07-13 PROCEDURE — 3023F SPIROM DOC REV: CPT | Performed by: NURSE PRACTITIONER

## 2021-07-13 PROCEDURE — 99214 OFFICE O/P EST MOD 30 MIN: CPT | Performed by: NURSE PRACTITIONER

## 2021-07-13 PROCEDURE — G8926 SPIRO NO PERF OR DOC: HCPCS | Performed by: NURSE PRACTITIONER

## 2021-07-13 PROCEDURE — G8427 DOCREV CUR MEDS BY ELIG CLIN: HCPCS | Performed by: NURSE PRACTITIONER

## 2021-07-13 PROCEDURE — 4040F PNEUMOC VAC/ADMIN/RCVD: CPT | Performed by: NURSE PRACTITIONER

## 2021-07-13 PROCEDURE — 1123F ACP DISCUSS/DSCN MKR DOCD: CPT | Performed by: NURSE PRACTITIONER

## 2021-07-13 PROCEDURE — 1036F TOBACCO NON-USER: CPT | Performed by: NURSE PRACTITIONER

## 2021-07-13 RX ORDER — TEMAZEPAM 15 MG/1
CAPSULE ORAL
Qty: 60 CAPSULE | Refills: 2 | Status: SHIPPED | OUTPATIENT
Start: 2021-07-13 | End: 2021-10-07 | Stop reason: SDUPTHER

## 2021-07-13 RX ORDER — TEMAZEPAM 15 MG/1
CAPSULE ORAL
Qty: 8 CAPSULE | Refills: 0 | Status: CANCELLED | OUTPATIENT
Start: 2021-07-13 | End: 2021-07-17

## 2021-07-13 ASSESSMENT — PATIENT HEALTH QUESTIONNAIRE - PHQ9
1. LITTLE INTEREST OR PLEASURE IN DOING THINGS: 0
SUM OF ALL RESPONSES TO PHQ QUESTIONS 1-9: 0
SUM OF ALL RESPONSES TO PHQ QUESTIONS 1-9: 0
2. FEELING DOWN, DEPRESSED OR HOPELESS: 0
SUM OF ALL RESPONSES TO PHQ QUESTIONS 1-9: 0
SUM OF ALL RESPONSES TO PHQ9 QUESTIONS 1 & 2: 0

## 2021-07-13 NOTE — PROGRESS NOTES
Subjective:   Diabetes Mellitus Type 2: Current symptoms/problems include none.     Home blood sugar records:    patient does not test  Any episodes of hypoglycemia? no  Tobacco history: He  reports that he quit smoking about 18 years ago. He has a 80.00 pack-year smoking history. He has never used smokeless tobacco.   Known diabetic complications: cardiovascular disease     Hypertension:  Home blood pressure monitoring: No.  He is adherent to a low sodium diet. Antihypertensive medication side effects: no medication side effects noted. Use of agents associated with hypertension: none.      Hyperlipidemia:  No new myalgias or GI upset on rosuvastatin (Crestor). CAD/atrial fibrillation/COPD: He states that he has follow-up appointment scheduled later this week with pulmonology and cardiology. Breathing has been at baseline on continuous oxygen. He has not had any heart palpitations. No lightheadedness or dizziness. No abnormal bruising or bleeding. Insomnia: He states that he continues to take Restoril routinely. He did run out of medication briefly recently and states that he is still trying to get back onto a normal sleeping pattern. It typically takes him 2 tablets throughout the night to help him sleep soundly. He has not had any medication side effects. The five diabetic measures for control:   Hemoglobin A1C (%)   Date Value   07/09/2021 7.9 (H)     LDL Calculated (mg/dL)   Date Value   07/09/2021 65         Blood pressure less than 131/81,   BP Readings from Last 1 Encounters:   07/13/21 134/68     Smoking, non smoker is goal. This pt is not a smoker. This pt does an aspirin a day. Last eye exam was 2021  Last diabetic foot exam was 2021  Last urine microalbumin creatinine ratio was 2021    PMH: This 79 y.o. female  patient is  hypertensive, is  diabetic, is  hyperlipidemic. He has a history of smoking and has a  family history of heart disease. He is not obese.     Review of Systems  Patient denies chest pain, shortness of breath, lightheadedness, blurred vision and palpitations. Eyes: no rapid change in vision  Ears, nose, mouth, throat, and face: no changes in hearing or sore throat  Respiratory: No shortness of breath or cough. Cardiovascular: no chest pain or pressure. This patient reports no polyuria, polydipsia or episodes of hypoglycemia. Treatment Adherence:   Medication compliance:  compliant most of the time  Diet compliance:  compliant most of the time  Weight trend: stable  Current exercise: no regular exercise  What might prevent you from meeting your goal?: none  Patient plan for overcoming barriers: N/A     Patient Confidence: 8/10      Objective:   EXAM:  Constitutional Blood pressure 134/68, pulse 61, temperature 98 °F (36.7 °C), temperature source Temporal, resp. rate 14, height 6' (1.829 m), weight 214 lb (97.1 kg), SpO2 93 %. .  He has a normal affect, no acute distress, appears well developed and well nourished. Neck:  neck- supple, no mass, non-tender and no bruits  Lungs:  Normal expansion. Clear to auscultation. No rales, rhonchi, or wheezing., No chest wall tenderness. Heart:  Heart sounds are normal.  Regular rate and rhythm without murmur, gallop or rub. Abdomen:  Soft, non-tender, normal bowel sounds. No bruits, organomegaly or masses. Extremities: Extremities warm to touch, pink, with no edema. Assessment:      Diagnosis Orders   1. Type 2 diabetes mellitus with other circulatory complication, without long-term current use of insulin (HCC)  CBC Auto Differential    Comprehensive Metabolic Panel    Lipid Panel    Hemoglobin A1C    Microalbumin / Creatinine Urine Ratio   2. Essential hypertension     3. Mixed hyperlipidemia     4. Elevated PSA     5. Paroxysmal atrial fibrillation (HCC) - Dr. Brandon Leal     6. Insomnia, unspecified type  temazepam (RESTORIL) 15 MG capsule   7.  Chronic obstructive pulmonary disease, unspecified COPD type ([address] 8. Coronary artery disease involving native coronary artery of native heart without angina pectoris     9. Benign prostatic hyperplasia, unspecified whether lower urinary tract symptoms present  PSA, Diagnostic       PLAN: Include orders in the DX section. Diabetes Counseling   Patient was counseled regarding disease risks and adopting healthy behaviors. Patient was provided education materials to assist with self management. Patient was provided log (or received log during previous visit) to record blood pressure, food intake and/or blood sugar. Patient was instructed to keep log up-to-date and to always bring log to all office visits. Follow up: 3 months and as needed. Blood work one week prior as ordered. 1.  Diabetes is not at goal but patient will be working on his diet between visits. He is aware recommended dietary changes. 2.  Blood pressure is well controlled on current medication. Continue the same. 3.  Lipid panel is stable on statin. No side effects reported. Continue the same. 4.  9.  No urinary symptoms reported. PSA is increased when compared to last check. Discussed recommendation to monitor trend and repeat ultrasound if levels continue to rise. He will also notify me if any symptoms occur. 5.  7.  8.  He continues to follow routinely with pulmonology and cardiology and has not had any recent exacerbation of chronic disease. Continuously wears oxygen. 6.  Insomnia symptoms have been well managed with current dosing of Restoril. Updated prescription given to take twice nightly for a total of 30 mg or 2 capsules/day. Controlled Substance Monitoring:    Acute and Chronic Pain Monitoring:   RX Monitoring 7/13/2021   Attestation -   Periodic Controlled Substance Monitoring Possible medication side effects, risk of tolerance/dependence & alternative treatments discussed. ;No signs of potential drug abuse or diversion identified. ;Assessed functional status.        Please note this report has been partially produced using speech recognition software and may cause contain errors related to that system including grammar, punctuation and spelling as well as words and phrases that may seem inappropriate. If there are questions or concerns please feel free to contact me to clarify.         Electronically signed by TRINA Escamilla, 5:26 PM 7/13/21

## 2021-07-14 ENCOUNTER — OFFICE VISIT (OUTPATIENT)
Dept: PULMONOLOGY | Age: 81
End: 2021-07-14
Payer: MEDICARE

## 2021-07-14 VITALS
BODY MASS INDEX: 28.71 KG/M2 | HEIGHT: 72 IN | WEIGHT: 212 LBS | TEMPERATURE: 98.2 F | SYSTOLIC BLOOD PRESSURE: 136 MMHG | OXYGEN SATURATION: 94 % | DIASTOLIC BLOOD PRESSURE: 85 MMHG | HEART RATE: 70 BPM

## 2021-07-14 DIAGNOSIS — J44.9 CHRONIC OBSTRUCTIVE PULMONARY DISEASE, UNSPECIFIED COPD TYPE (HCC): Primary | ICD-10-CM

## 2021-07-14 DIAGNOSIS — J96.11 CHRONIC RESPIRATORY FAILURE WITH HYPOXIA AND HYPERCAPNIA (HCC): ICD-10-CM

## 2021-07-14 DIAGNOSIS — J96.12 CHRONIC RESPIRATORY FAILURE WITH HYPOXIA AND HYPERCAPNIA (HCC): ICD-10-CM

## 2021-07-14 DIAGNOSIS — G47.33 OBSTRUCTIVE SLEEP APNEA: ICD-10-CM

## 2021-07-14 DIAGNOSIS — I27.20 PULMONARY HTN (HCC): ICD-10-CM

## 2021-07-14 PROCEDURE — 99214 OFFICE O/P EST MOD 30 MIN: CPT | Performed by: INTERNAL MEDICINE

## 2021-07-14 PROCEDURE — G8427 DOCREV CUR MEDS BY ELIG CLIN: HCPCS | Performed by: INTERNAL MEDICINE

## 2021-07-14 PROCEDURE — 1123F ACP DISCUSS/DSCN MKR DOCD: CPT | Performed by: INTERNAL MEDICINE

## 2021-07-14 PROCEDURE — G8417 CALC BMI ABV UP PARAM F/U: HCPCS | Performed by: INTERNAL MEDICINE

## 2021-07-14 PROCEDURE — 4040F PNEUMOC VAC/ADMIN/RCVD: CPT | Performed by: INTERNAL MEDICINE

## 2021-07-14 PROCEDURE — G8926 SPIRO NO PERF OR DOC: HCPCS | Performed by: INTERNAL MEDICINE

## 2021-07-14 PROCEDURE — 1036F TOBACCO NON-USER: CPT | Performed by: INTERNAL MEDICINE

## 2021-07-14 PROCEDURE — 3023F SPIROM DOC REV: CPT | Performed by: INTERNAL MEDICINE

## 2021-07-14 ASSESSMENT — ENCOUNTER SYMPTOMS
EYE ITCHING: 0
VOMITING: 0
NAUSEA: 0
WHEEZING: 0
CHEST TIGHTNESS: 0
RHINORRHEA: 0
SORE THROAT: 0
SHORTNESS OF BREATH: 1
COUGH: 0
VOICE CHANGE: 0
ABDOMINAL PAIN: 0
DIARRHEA: 0

## 2021-07-14 NOTE — PROGRESS NOTES
Subjective:     Qamar Green is a 79 y.o. male who complains today of:     Chief Complaint   Patient presents with    COPD     4 month f/u       HPI  He is using trilogy, noninvasive vent since last 3 years , DME is  medical services  He is using 4 Lit with rest up to 5 lit with activity from [place] [address]  He has COPD and chronic hypercapnic respiratory failure.   He is on 4-5 lit O2 24 hour a day. He is using bronchodilator with symbicort 2 puff BID , combivent respimat 1 puff Q 4 hourly , nebulizer with duoneb neb prn  , proair hfa and prednisone prn   C/o shortness of breath   with exertion. No  Wheezing. No Cough with  Sputum. No Hemoptysis. No Chest tightness. No Chest pain with radiation  or pleuritic pain. No orthopnea. No Fever or chills. No Rhinorrhea and postnasal drip.             Allergies:  Brilinta [ticagrelor], Sildenafil citrate, Advair [fluticasone-salmeterol], and Fluticasone propionate (inhal)  Past Medical History:   Diagnosis Date    Adrenal adenoma     CT 6/2011 stable    Anxiety     Asbestosis(501)     CAD (coronary artery disease)     status post stent 2001    Cancer (HCC)     basil cell carnoma    COPD (chronic obstructive pulmonary disease) (HCC)     Elbow injury     left    Erectile dysfunction     Granulomatous lung disease (HCC)     Herpes zoster     Hyperlipidemia     Hypertension     Insomnia     Interstitial fibrosis     Obstructive sleep apnea on CPAP 11/13/2017    Prostate nodule     Scarlet fever     Urine frequency      Past Surgical History:   Procedure Laterality Date    CARDIAC CATHETERIZATION      CARDIOVASCULAR STRESS TEST      1/2010 normal per patient    COLONOSCOPY      5/2009 tubular adenomas    COLONOSCOPY  08/13/15    David Mcnamara MD    EYE SURGERY  02/10/14    DR WOLFE  RT [facility]    HERNIA REPAIR      LEG SURGERY       Family History   Problem Relation Age of Onset    Cancer Other         colon cancer in multiple family members and breast cancer    Heart Disease Other      Social History     Socioeconomic History    Marital status:      Spouse name: Not on file    Number of children: 3    Years of education: 15    Highest education level: High school graduate   Occupational History    Occupation:    Tobacco Use    Smoking status: Former Smoker     Packs/day: 2.00     Years: 40.00     Pack years: 80.00     Quit date: 2002     Years since quittin.9    Smokeless tobacco: Never Used   Vaping Use    Vaping Use: Never used   Substance and Sexual Activity    Alcohol use: No    Drug use: No    Sexual activity: Yes     Partners: Female   Other Topics Concern    Not on file   Social History Narrative    Not on file     Social Determinants of Health     Financial Resource Strain: Low Risk     Difficulty of Paying Living Expenses: Not hard at all   Food Insecurity: No Food Insecurity    Worried About [address] in the Last Year: Never true    [facility] [employer] in the Last Year: Never true   Transportation Needs:     Lack of Transportation (Medical):  Lack of Transportation (Non-Medical):    Physical Activity:     Days of Exercise per Week:     Minutes of Exercise per Session:    Stress:     Feeling of Stress :    Social Connections:     Frequency of Communication with Friends and Family:     Frequency of Social Gatherings with Friends and Family:     Attends [employer] Services:     Active Member of Clubs or Organizations:     Attends Club or Organization Meetings:     Marital Status:    Intimate Partner Violence:     Fear of Current or Ex-Partner:     Emotionally Abused:     Physically Abused:     Sexually Abused:          Review of Systems   Constitutional: Negative for chills, diaphoresis, fatigue and fever. HENT: Negative for congestion, mouth sores, nosebleeds, postnasal drip, rhinorrhea, sneezing, sore throat and voice change. Eyes: Negative for itching and visual disturbance. Respiratory: Positive for shortness of breath. Negative for cough, chest tightness and wheezing. Cardiovascular: Negative. Negative for chest pain, palpitations and leg swelling. Gastrointestinal: Negative for abdominal pain, diarrhea, nausea and vomiting. Genitourinary: Negative for difficulty urinating and hematuria. Musculoskeletal: Negative for arthralgias, joint swelling and myalgias. Skin: Negative for rash. Allergic/Immunologic: Negative for environmental allergies. Neurological: Negative for dizziness, tremors, weakness and headaches. Psychiatric/Behavioral: Negative for behavioral problems and sleep disturbance.         :     Vitals:    07/14/21 1317   BP: 136/85   Pulse: 70   Temp: 98.2 °F (36.8 °C)   SpO2: 94%   Weight: 212 lb (96.2 kg)   Height: 6' (1.829 m)     Wt Readings from Last 3 Encounters:   07/14/21 212 lb (96.2 kg)   07/13/21 214 lb (97.1 kg)   07/09/21 213 lb 9.6 oz (96.9 kg)         Physical Exam  Constitutional:       Appearance: He is well-developed. HENT:      Head: Normocephalic and atraumatic. Nose: Nose normal.   Eyes:      Conjunctiva/sclera: Conjunctivae normal.      Pupils: Pupils are equal, round, and reactive to light. Neck:      Thyroid: No thyromegaly. Vascular: No JVD. Trachea: No tracheal deviation. Cardiovascular:      Rate and Rhythm: Normal rate and regular rhythm. Heart sounds: No murmur heard. No friction rub. No gallop. Pulmonary:      Effort: Pulmonary effort is normal. No respiratory distress. Breath sounds: Normal breath sounds. No wheezing or rales. Comments: diminished Breath sound bilaterally. Chest:      Chest wall: No tenderness. Abdominal:      General: There is no distension. Musculoskeletal:         General: Normal range of motion. Right lower leg: Edema (trace) present. Left lower leg: Edema (trace) present. Lymphadenopathy:      Cervical: No cervical adenopathy.    Skin: General: Skin is warm and dry. Findings: No rash. Neurological:      Mental Status: He is alert and oriented to person, place, and time. Cranial Nerves: No cranial nerve deficit. Psychiatric:         Behavior: Behavior normal.         Current Outpatient Medications   Medication Sig Dispense Refill    temazepam (RESTORIL) 15 MG capsule TAKE 1 CAPSULE TWICE DAILY 60 capsule 2    budesonide-formoterol (SYMBICORT) 80-4.5 MCG/ACT AERO Inhale 2 puffs into the lungs 2 times daily 3 Inhaler 0    busPIRone (BUSPAR) 30 MG tablet Take 30 mg by mouth daily 30 tablet 3    fexofenadine (ALLEGRA) 180 MG tablet Take 1 tablet by mouth daily 90 tablet 2    rosuvastatin (CRESTOR) 40 MG tablet Take 1 tablet by mouth daily 90 tablet 3    azelastine (OPTIVAR) 0.05 % ophthalmic solution Place 1 drop into both eyes 2 times daily      calcipotriene (DOVONEX) 0.005 % solution Apply to scalp daily      albuterol sulfate HFA (PROAIR HFA) 108 (90 Base) MCG/ACT inhaler Inhale 2 puffs into the lungs every 6 hours as needed for Wheezing 3 Inhaler 3    mupirocin (BACTROBAN) 2 % ointment Apply topically 3 times daily.  30 g 1    COMBIVENT RESPIMAT  MCG/ACT AERS inhaler USE 1 INHALATION EVERY 6 HOURS 16 g 3    Melatonin 10 MG TABS Take by mouth      predniSONE (DELTASONE) 10 MG tablet 4 tablets daily for 4 days, 3 tablets daily for 4 days, 2 tablets daily for 4 days, then 1 tablet daily for 4 days 40 tablet 1    doxycycline hyclate (VIBRAMYCIN) 100 MG capsule Take 1 capsule by mouth 2 times daily 20 capsule 0    metoprolol succinate (TOPROL XL) 25 MG extended release tablet Take 25 mg by mouth every morning      metoprolol succinate (TOPROL XL) 50 MG extended release tablet Take 50 mg by mouth every evening      aspirin 81 MG tablet Take 81 mg by mouth daily      diltiazem (TIAZAC) 240 MG extended release capsule TAKE 1 CAPSULE BY MOUTH DAILY AS DIRECTED  3    CARTIA  MG extended release capsule Take 120 capsules by mouth every evening   3    OXYGEN Please provide patient with a portable oxygen concentrator 1 Units 0    dofetilide (TIKOSYN) 250 MCG capsule Take 250 mcg by mouth 2 times daily      Magnesium 500 MG TABS Take by mouth 2 times daily      Potassium 99 MG TABS Take by mouth daily      GLUTATHIONE PO Take by mouth      metoprolol tartrate (LOPRESSOR) 25 MG tablet Take 25 mg by mouth as needed      tadalafil (CIALIS) 5 MG tablet Take 1 tablet by mouth as needed for Erectile Dysfunction (maximum 5 mg per day) 30 tablet 3    Handicap Placard MISC by Does not apply route Good for 5 Years from 06/16/2016 1 each 0    digoxin (LANOXIN) 250 MCG tablet   11    ELIQUIS 5 MG TABS tablet   2    CINNAMON PO Take  by mouth.  SAW PALMETTO by Does not apply route.  ipratropium-albuterol (DUONEB) 0.5-2.5 (3) MG/3ML SOLN nebulizer solution Take 3 mLs by nebulization every 6 hours as needed for Shortness of Breath. 100 vial 2    Glucosamine-Chondroitin 500-400 MG CAPS Take 1 capsule by mouth daily.  Ascorbic Acid (VITAMIN C) 500 MG tablet Take 1,000 mg by mouth daily. 1/2 tab in AM and 1/2 tab in PM       vitamin D (CHOLECALCIFEROL) 400 UNIT TABS tablet Take 800 Units by mouth daily.  vitamin B-12 (CYANOCOBALAMIN) 1000 MCG tablet Take 1,000 mcg by mouth daily.  Selenium 200 MCG TABS Take 200 mcg by mouth daily.  Thiamine HCl (VITAMIN B-1) 100 MG tablet Take 100 mg by mouth daily.  folic acid (FOLVITE) 571 MCG tablet Take 800 mcg by mouth daily.  Zinc 50 MG CAPS Take 50 mg by mouth daily.  beta carotene 95464 UNIT capsule Take 25,000 Units by mouth daily.  DHEA 25 MG TABS Take 25 mg by mouth daily.  Pyridoxine HCl (VITAMIN B-6) 50 MG tablet Take 100 mg by mouth daily.  Calcium Carb-Cholecalciferol (CALCIUM 1000 + D) 1000-800 MG-UNIT TABS Take 1 tablet by mouth 2 times daily.       Methylsulfonylmethane (MSM) 1000 MG TABS Take 1,000 mg by mouth daily.  vitamin E 400 UNIT capsule Take 400 Units by mouth daily. No current facility-administered medications for this visit. Results for orders placed during the hospital encounter of 01/28/20    XR CHEST STANDARD (2 VW)    Narrative  EXAMINATION: XR CHEST (2 VW)    CLINICAL HISTORY: J44.1 COPD exacerbation ([address] ICD10    COMPARISONS: 1/24/2020    FINDINGS: Cardiac size is borderline. Pulmonary vascularity is normal. Lungs are hyperinflated with increase in the AP diameter of the chest and flattening the diaphragms, concordant with clinical history of COPD. There is scarring adjacent to the  cardiac silhouette in the left lower lung. There is coronary artery stenting. There are no acute infiltrates. Prominent nipple shadows, left greater than right are noted. There are mild degenerative changes in the spine. Impression  COPD. NO ACUTE CARDIOPULMONARY DISEASE. Results for orders placed during the hospital encounter of 10/23/18    XR CHEST STANDARD (2 VW)    Narrative  EXAMINATION: XR CHEST (2 VW)    CLINICAL HISTORY: CHRONIC RESPIRATORY FAILURE    COMPARISONS: CT CHEST AUGUST 7, 2017, CHEST RADIOGRAPH, APRIL 6, 2017    FINDINGS: Degenerative change thoracic spine. Cardiopericardial silhouette is normal. Pulmonary vasculature is normal. Aorta calcified and tortuous. Lungs clear and hyperinflated. Diaphragms flattened. Retrosternal clear space increased. Impression  EMPHYSEMA. NO ACUTE CARDIOPULMONARY DISEASE      Results for orders placed during the hospital encounter of 04/06/17    XR Chest Standard TWO VW    Narrative  EXAMINATION: XR CHEST STANDARD TWO VW    DATE AND TIME:4/6/2017 1:07 PM    CLINICAL HISTORY: Acute shortness of breath J44.1 COPD exacerbation ([address] ICD10    COMPARISONS  3/6/15    FINDINGS:The heart, mediastinum and pulmonary vasculature are within normal limits. Visualized lung fields are clear. Bones unremarkable.     Impression  NO  ACTIVE LUNG DISEASE.  ]  Results for orders placed during the hospital encounter of 01/24/20    XR CHEST PORTABLE    Narrative  EXAMINATION: PORTABLE CHEST X-RAY FROM 1/24/2020 17:27 HOURS    CLINICAL HISTORY: SMOKING HISTORY AND COPD, PATIENT WITH DYSPNEA    COMPARISONS: 10/23/2018    FINDINGS: The heart is not enlarged. There is an atherosclerotic tortuous aorta. There is hyperinflation of the lungs and coarse pulmonary markings compatible with COPD with pulmonary interstitial fibrosis. There are granulomata in both hilar regions  compatible with remote granulomatous disease. There are no acute pulmonary infiltrates or pleural effusions. There is no pneumothorax. There is mild degenerative bone spurring in the spine. Impression  COPD WITHOUT AN ACUTE PULMONARY INFILTRATE OR PLEURAL EFFUSION. Assessment/Plan:     1. Chronic obstructive pulmonary disease, unspecified COPD type ([address]  He is using bronchodilator with symbicort 2 puff BID , combivent respimat 1 puff Q 4 hourly , nebulizer with duoneb neb prn  , proair hfa and prednisone prn. C/o shortness of breath   with exertion. No  Wheezing. No Cough with  Sputum. Continue bronchodilator therapy as before    2. Chronic respiratory failure with hypoxia and hypercapnia (HCC)  He is using trilogy, noninvasive vent since last 3 years , DME is  medical services. He is using 4 lit with rest up to 5 lit with activity. His oxygen is from [address]. He has COPD and chronic hypercapnic respiratory failure.     3. Pulmonary HTN ([address]  He has last 2D echo in 2017 shows mild pulmonary hypertension. Recommend to avoid hypoxia keep O2 saturation 90% above. 4. Obstructive sleep apnea  Continue noninvasive ventilator during sleep with 4 L O2. Return in about 4 months (around 11/14/2021) for delores, chronic respiratory failure, hypoxia on O2, COPD.       Christopher Quintanilla MD

## 2021-07-30 DIAGNOSIS — J44.9 CHRONIC OBSTRUCTIVE PULMONARY DISEASE, UNSPECIFIED COPD TYPE (HCC): ICD-10-CM

## 2021-07-30 RX ORDER — IPRATROPIUM/ALBUTEROL SULFATE 20-100 MCG
MIST INHALER (GRAM) INHALATION
Qty: 16 G | Refills: 2 | Status: SHIPPED | OUTPATIENT
Start: 2021-07-30

## 2021-07-30 NOTE — TELEPHONE ENCOUNTER
Requesting medication refill.  Please approve or deny this request.    Rx requested:  Requested Prescriptions     Pending Prescriptions Disp Refills    COMBIVENT RESPIMAT  MCG/ACT AERS inhaler [Pharmacy Med Name: Obey Diallo 4GM 20/100MCG] 16 g 2     Sig: USE 1 INHALATION EVERY 6 HOURS       Last Office Visit:   7/13/2021    Last Filled:      Last Labs:      Next Visit Date:  Future Appointments   Date Time Provider Valentín Perkins   10/7/2021  2:00 PM [facility]   11/17/2021  1:00 PM Sada Mtz MD [facility]

## 2021-08-19 DIAGNOSIS — J44.9 CHRONIC OBSTRUCTIVE PULMONARY DISEASE, UNSPECIFIED COPD TYPE (HCC): ICD-10-CM

## 2021-08-19 RX ORDER — DILTIAZEM HYDROCHLORIDE 60 MG/1
TABLET, FILM COATED ORAL
Qty: 30.6 G | Refills: 3 | Status: SHIPPED | OUTPATIENT
Start: 2021-08-19 | End: 2022-08-15

## 2021-08-31 ENCOUNTER — HOSPITAL ENCOUNTER (OUTPATIENT)
Dept: LAB | Age: 81
End: 2021-08-31
Payer: MEDICARE

## 2021-08-31 ENCOUNTER — HOSPITAL ENCOUNTER (OUTPATIENT)
Age: 81
Discharge: HOME | End: 2021-09-02
Payer: MEDICARE

## 2021-08-31 ENCOUNTER — HOSPITAL ENCOUNTER (OUTPATIENT)
Dept: GENERAL RADIOLOGY | Age: 81
Discharge: HOME | End: 2021-09-02
Payer: MEDICARE

## 2021-08-31 DIAGNOSIS — M89.8X9 BONY GROWTH: ICD-10-CM

## 2021-08-31 PROCEDURE — 73000 X-RAY EXAM OF COLLAR BONE: CPT

## 2021-10-05 ENCOUNTER — HOSPITAL ENCOUNTER (OUTPATIENT)
Dept: LAB | Age: 81
Discharge: HOME | End: 2021-10-05
Payer: MEDICARE

## 2021-10-05 DIAGNOSIS — N40.0 BENIGN PROSTATIC HYPERPLASIA, UNSPECIFIED WHETHER LOWER URINARY TRACT SYMPTOMS PRESENT: ICD-10-CM

## 2021-10-05 DIAGNOSIS — E11.59 TYPE 2 DIABETES MELLITUS WITH OTHER CIRCULATORY COMPLICATION, WITHOUT LONG-TERM CURRENT USE OF INSULIN (HCC): ICD-10-CM

## 2021-10-05 LAB
ALBUMIN SERPL-MCNC: 4 G/DL (ref 3.5–4.6)
ALP BLD-CCNC: 84 U/L (ref 35–104)
ALT SERPL-CCNC: 24 U/L (ref 0–41)
ANION GAP SERPL CALCULATED.3IONS-SCNC: 10 MEQ/L (ref 9–15)
AST SERPL-CCNC: 21 U/L (ref 0–40)
BASOPHILS ABSOLUTE: 0 K/UL (ref 0–0.2)
BASOPHILS RELATIVE PERCENT: 0.4 %
BILIRUB SERPL-MCNC: 0.9 MG/DL (ref 0.2–0.7)
BUN BLDV-MCNC: 16 MG/DL (ref 8–23)
CALCIUM SERPL-MCNC: 9.3 MG/DL (ref 8.5–9.9)
CHLORIDE BLD-SCNC: 98 MEQ/L (ref 95–107)
CHOLESTEROL, TOTAL: 130 MG/DL (ref 0–199)
CO2: 30 MEQ/L (ref 20–31)
CREAT SERPL-MCNC: 0.86 MG/DL (ref 0.7–1.2)
CREATININE URINE: 61.9 MG/DL
EOSINOPHILS ABSOLUTE: 0.1 K/UL (ref 0–0.7)
EOSINOPHILS RELATIVE PERCENT: 1.9 %
GFR AFRICAN AMERICAN: >60
GFR NON-AFRICAN AMERICAN: >60
GLOBULIN: 2.4 G/DL (ref 2.3–3.5)
GLUCOSE BLD-MCNC: 213 MG/DL (ref 70–99)
HCT VFR BLD CALC: 46 % (ref 42–52)
HDLC SERPL-MCNC: 36 MG/DL (ref 40–59)
HEMOGLOBIN: 15.2 G/DL (ref 14–18)
LDL CHOLESTEROL CALCULATED: 65 MG/DL (ref 0–129)
LYMPHOCYTES ABSOLUTE: 1.6 K/UL (ref 1–4.8)
LYMPHOCYTES RELATIVE PERCENT: 21.5 %
MCH RBC QN AUTO: 29.2 PG (ref 27–31.3)
MCHC RBC AUTO-ENTMCNC: 33 % (ref 33–37)
MCV RBC AUTO: 88.2 FL (ref 80–100)
MICROALBUMIN UR-MCNC: 3.2 MG/DL
MICROALBUMIN/CREAT UR-RTO: 51.7 MG/G (ref 0–30)
MONOCYTES ABSOLUTE: 0.6 K/UL (ref 0.2–0.8)
MONOCYTES RELATIVE PERCENT: 8.1 %
NEUTROPHILS ABSOLUTE: 5.1 K/UL (ref 1.4–6.5)
NEUTROPHILS RELATIVE PERCENT: 68.1 %
PDW BLD-RTO: 13.7 % (ref 11.5–14.5)
PLATELET # BLD: 223 K/UL (ref 130–400)
POTASSIUM SERPL-SCNC: 4.8 MEQ/L (ref 3.4–4.9)
PROSTATE SPECIFIC ANTIGEN: 5.71 NG/ML (ref 0–4)
RBC # BLD: 5.21 M/UL (ref 4.7–6.1)
SODIUM BLD-SCNC: 138 MEQ/L (ref 135–144)
TOTAL PROTEIN: 6.4 G/DL (ref 6.3–8)
TRIGL SERPL-MCNC: 144 MG/DL (ref 0–150)
WBC # BLD: 7.5 K/UL (ref 4.8–10.8)

## 2021-10-05 PROCEDURE — 84153 ASSAY OF PSA TOTAL: CPT

## 2021-10-05 PROCEDURE — 82570 ASSAY OF URINE CREATININE: CPT

## 2021-10-05 PROCEDURE — 82043 UR ALBUMIN QUANTITATIVE: CPT

## 2021-10-05 PROCEDURE — 80053 COMPREHEN METABOLIC PANEL: CPT

## 2021-10-05 PROCEDURE — 80061 LIPID PANEL: CPT

## 2021-10-05 PROCEDURE — 85025 COMPLETE CBC W/AUTO DIFF WBC: CPT

## 2021-10-05 PROCEDURE — 36415 COLL VENOUS BLD VENIPUNCTURE: CPT

## 2021-10-07 ENCOUNTER — OFFICE VISIT (OUTPATIENT)
Dept: FAMILY MEDICINE CLINIC | Age: 81
End: 2021-10-07
Payer: MEDICARE

## 2021-10-07 VITALS
WEIGHT: 218 LBS | HEART RATE: 67 BPM | DIASTOLIC BLOOD PRESSURE: 62 MMHG | OXYGEN SATURATION: 96 % | HEIGHT: 72 IN | SYSTOLIC BLOOD PRESSURE: 140 MMHG | TEMPERATURE: 98.4 F | RESPIRATION RATE: 16 BRPM | BODY MASS INDEX: 29.53 KG/M2

## 2021-10-07 DIAGNOSIS — R35.1 NOCTURIA: ICD-10-CM

## 2021-10-07 DIAGNOSIS — G47.00 INSOMNIA, UNSPECIFIED TYPE: ICD-10-CM

## 2021-10-07 DIAGNOSIS — I10 ESSENTIAL HYPERTENSION: ICD-10-CM

## 2021-10-07 DIAGNOSIS — R33.9 URINARY RETENTION: ICD-10-CM

## 2021-10-07 DIAGNOSIS — R97.20 ELEVATED PSA: ICD-10-CM

## 2021-10-07 DIAGNOSIS — J44.9 CHRONIC OBSTRUCTIVE PULMONARY DISEASE, UNSPECIFIED COPD TYPE (HCC): ICD-10-CM

## 2021-10-07 DIAGNOSIS — I48.0 PAROXYSMAL ATRIAL FIBRILLATION (HCC): ICD-10-CM

## 2021-10-07 DIAGNOSIS — E11.59 TYPE 2 DIABETES MELLITUS WITH OTHER CIRCULATORY COMPLICATION, WITHOUT LONG-TERM CURRENT USE OF INSULIN (HCC): Primary | ICD-10-CM

## 2021-10-07 DIAGNOSIS — Z23 NEED FOR INFLUENZA VACCINATION: ICD-10-CM

## 2021-10-07 DIAGNOSIS — E78.2 MIXED HYPERLIPIDEMIA: ICD-10-CM

## 2021-10-07 DIAGNOSIS — I25.10 CORONARY ARTERY DISEASE INVOLVING NATIVE CORONARY ARTERY OF NATIVE HEART WITHOUT ANGINA PECTORIS: ICD-10-CM

## 2021-10-07 LAB — HBA1C MFR BLD: 7.6 %

## 2021-10-07 PROCEDURE — G8427 DOCREV CUR MEDS BY ELIG CLIN: HCPCS | Performed by: NURSE PRACTITIONER

## 2021-10-07 PROCEDURE — 3023F SPIROM DOC REV: CPT | Performed by: NURSE PRACTITIONER

## 2021-10-07 PROCEDURE — G8926 SPIRO NO PERF OR DOC: HCPCS | Performed by: NURSE PRACTITIONER

## 2021-10-07 PROCEDURE — 83036 HEMOGLOBIN GLYCOSYLATED A1C: CPT | Performed by: NURSE PRACTITIONER

## 2021-10-07 PROCEDURE — G0008 ADMIN INFLUENZA VIRUS VAC: HCPCS | Performed by: NURSE PRACTITIONER

## 2021-10-07 PROCEDURE — G8417 CALC BMI ABV UP PARAM F/U: HCPCS | Performed by: NURSE PRACTITIONER

## 2021-10-07 PROCEDURE — 1123F ACP DISCUSS/DSCN MKR DOCD: CPT | Performed by: NURSE PRACTITIONER

## 2021-10-07 PROCEDURE — G8484 FLU IMMUNIZE NO ADMIN: HCPCS | Performed by: NURSE PRACTITIONER

## 2021-10-07 PROCEDURE — 1036F TOBACCO NON-USER: CPT | Performed by: NURSE PRACTITIONER

## 2021-10-07 PROCEDURE — 99214 OFFICE O/P EST MOD 30 MIN: CPT | Performed by: NURSE PRACTITIONER

## 2021-10-07 PROCEDURE — 4040F PNEUMOC VAC/ADMIN/RCVD: CPT | Performed by: NURSE PRACTITIONER

## 2021-10-07 PROCEDURE — 90694 VACC AIIV4 NO PRSRV 0.5ML IM: CPT | Performed by: NURSE PRACTITIONER

## 2021-10-07 PROCEDURE — 3051F HG A1C>EQUAL 7.0%<8.0%: CPT | Performed by: NURSE PRACTITIONER

## 2021-10-07 RX ORDER — TEMAZEPAM 15 MG/1
CAPSULE ORAL
COMMUNITY
Start: 2021-09-23 | End: 2022-09-13 | Stop reason: SDUPTHER

## 2021-10-07 RX ORDER — TEMAZEPAM 15 MG/1
CAPSULE ORAL
Qty: 60 CAPSULE | Refills: 2 | Status: SHIPPED | OUTPATIENT
Start: 2021-10-07 | End: 2022-01-03 | Stop reason: SDUPTHER

## 2021-10-07 RX ORDER — TEMAZEPAM 15 MG/1
CAPSULE ORAL
Status: CANCELLED | OUTPATIENT
Start: 2021-10-07

## 2021-10-07 ASSESSMENT — PATIENT HEALTH QUESTIONNAIRE - PHQ9
SUM OF ALL RESPONSES TO PHQ9 QUESTIONS 1 & 2: 0
SUM OF ALL RESPONSES TO PHQ QUESTIONS 1-9: 0
2. FEELING DOWN, DEPRESSED OR HOPELESS: 0
1. LITTLE INTEREST OR PLEASURE IN DOING THINGS: 0

## 2021-10-07 NOTE — PROGRESS NOTES
Subjective:   Diabetes Mellitus Type 2: Current symptoms/problems include none.     Home blood sugar records:    patient does not test  Any episodes of hypoglycemia? no  Tobacco history: He  reports that he quit smoking about 18 years ago. He has a 80.00 pack-year smoking history. He has never used smokeless tobacco.   Known diabetic complications: cardiovascular disease     Hypertension:  Home blood pressure monitoring: No.  He is adherent to a low sodium diet. Antihypertensive medication side effects: no medication side effects noted.  Use of agents associated with hypertension: none.      Hyperlipidemia:  No new myalgias or GI upset on rosuvastatin (Crestor).        Atrial fibrillation/CAD: Continues to follow with electrophysiology and cardiology through [facility]. No recent changes in medications. No recent symptoms reported. COPD: He wears continuous oxygen and states that he has not had any recent exacerbation of symptoms. Breathing has been at baseline lately. He continues to follow with pulmonology as well. Insomnia: He continues to take Restoril routinely for chronic insomnia. The medication has been helpful in allowing him to sleep better and has had significant difficulty with insomnia without the medication. He has not had any medication side effects. BPH with nocturia: He states that he continues to have some issues with urination during the night. Does not feel that he is completely able to empty his bladder sometimes but otherwise no symptoms reported. No hematuria or dysuria. No flank pain. No pressure in the pelvic region. The five diabetic measures for control:   Hemoglobin A1C (%)   Date Value   10/07/2021 7.6     LDL Calculated (mg/dL)   Date Value   10/05/2021 65         Blood pressure less than 131/81,   BP Readings from Last 1 Encounters:   10/07/21 (!) 140/62     Smoking, non smoker is goal. This pt is not a smoker. This pt does an aspirin a day.      Last eye exam was 2021  Last diabetic foot exam was 2021  Last urine microalbumin creatinine ratio was 2021    PMH: This 79 y.o. male  patient is  hypertensive, is  diabetic, is  hyperlipidemic. He has a history of smoking and has a  family history of heart disease. He is not obese. Review of Systems  Patient denies chest pain, headache, lightheadedness, blurred vision, peripheral edema and palpitations. Eyes: no rapid change in vision  Ears, nose, mouth, throat, and face: no changes in hearing or sore throat  Respiratory: No shortness of breath or cough. Cardiovascular: no chest pain or pressure. This patient reports no polyuria, polydipsia or episodes of hypoglycemia. Treatment Adherence:   Medication compliance:  compliant most of the time  Diet compliance:  compliant most of the time  Weight trend: stable  Current exercise: walks 1 time(s) per day  What might prevent you from meeting your goal?: none  Patient plan for overcoming barriers: N/A     Patient Confidence: 8/10      Objective:   EXAM:  Constitutional Blood pressure (!) 140/62, pulse 67, temperature 98.4 °F (36.9 °C), temperature source Temporal, resp. rate 16, height 6' (1.829 m), weight 218 lb (98.9 kg), SpO2 96 %. .  He has a normal affect, no acute distress, appears well developed and well nourished. Neck:  neck- supple, no mass, non-tender and no bruits  Lungs:  Normal expansion. Clear to auscultation. No rales, rhonchi, or wheezing., No chest wall tenderness. Heart:  Heart sounds are normal.  Regular rate and rhythm without murmur, gallop or rub. Abdomen:  Soft, non-tender, normal bowel sounds. No bruits, organomegaly or masses. Extremities: Extremities warm to touch, pink, with no edema. Assessment:      Diagnosis Orders   1.  Type 2 diabetes mellitus with other circulatory complication, without long-term current use of insulin (HCC)  CBC Auto Differential    Comprehensive Metabolic Panel    Lipid Panel    Microalbumin / Creatinine Urine Ratio    PSA, Diagnostic    POCT glycosylated hemoglobin (Hb A1C)   2. Essential hypertension     3. Mixed hyperlipidemia     4. Elevated PSA     5. Paroxysmal atrial fibrillation (HCC) - Dr. Josh Vera     6. Coronary artery disease involving native coronary artery of native heart without angina pectoris     7. Insomnia, unspecified type  temazepam (RESTORIL) 15 MG capsule   8. Need for influenza vaccination  INFLUENZA, QUADV, ADJUVANTED, 65 YRS =, IM, PF, PREFILL SYR, 0.5ML (FLUAD)   9. Urinary retention     10. Nocturia     11. Chronic obstructive pulmonary disease, unspecified COPD type [facility]         PLAN: Include orders in the DX section. Diabetes Counseling   Patient was counseled regarding disease risks and adopting healthy behaviors. Patient was provided education materials to assist with self management. Patient was provided log (or received log during previous visit) to record blood pressure, food intake and/or blood sugar. Patient was instructed to keep log up-to-date and to always bring log to all office visits. Follow up: 3 months and as needed. Blood work one week prior as ordered. 1.  Diabetes is better controlled with improved hemoglobin A1c. Continue current dietary changes. 2. 5. 6.  Blood pressure is well controlled overall on current medication. He continues to follow with cardiology and electrophysiology routinely. No recent symptoms reported. 3.  Lipid panel is stable on Crestor. 4.  9.  10.  Symptoms are overall stable. Continue current medication. PSA is improved when compared to last check. 7.  Restoril continues to help with chronic insomnia with no side effects reported. Continue the same. 8.  Flu vaccine updated. 11.  Breathing has been at baseline with no recent exacerbation.         Controlled Substance Monitoring:    Acute and Chronic Pain Monitoring:   RX Monitoring 10/7/2021   Attestation -   Periodic Controlled Substance Monitoring Possible medication side effects, risk of tolerance/dependence & alternative treatments discussed. ;No signs of potential drug abuse or diversion identified. ;Assessed functional status. Please note this report has been partially produced using speech recognition software and may cause contain errors related to that system including grammar, punctuation and spelling as well as words and phrases that may seem inappropriate. If there are questions or concerns please feel free to contact me to clarify.         Electronically signed by TRINA Galan - CNP-CNP, 7:52 AM 10/8/21

## 2021-10-07 NOTE — PROGRESS NOTES
Vaccine Information Sheet, \"Influenza - Inactivated\"  given to Wilfredo Arroyo, or parent/legal guardian of  Wilfredo Arroyo and verbalized understanding. Patient responses:    Have you ever had a reaction to a flu vaccine? No  Are you able to eat eggs without adverse effects? Yes  Do you have any current illness? No  Have you ever had Guillian Smo[place] Syndrome? No    Flu vaccine given per order. Please see immunization tab.

## 2021-10-19 ENCOUNTER — PATIENT MESSAGE (OUTPATIENT)
Dept: FAMILY MEDICINE CLINIC | Age: 81
End: 2021-10-19

## 2021-10-19 DIAGNOSIS — L03.113 CELLULITIS OF RIGHT UPPER EXTREMITY: ICD-10-CM

## 2021-10-21 RX ORDER — DOXYCYCLINE HYCLATE 100 MG/1
100 CAPSULE ORAL 2 TIMES DAILY
Qty: 20 CAPSULE | Refills: 0 | Status: SHIPPED | OUTPATIENT
Start: 2021-10-21

## 2021-10-22 ENCOUNTER — HOSPITAL ENCOUNTER (OUTPATIENT)
Dept: GENERAL RADIOLOGY | Age: 81
Discharge: HOME | End: 2021-10-24
Payer: MEDICARE

## 2021-10-22 ENCOUNTER — OFFICE VISIT (OUTPATIENT)
Dept: FAMILY MEDICINE CLINIC | Age: 81
End: 2021-10-22
Payer: MEDICARE

## 2021-10-22 ENCOUNTER — HOSPITAL ENCOUNTER (OUTPATIENT)
Age: 81
Discharge: HOME | End: 2021-10-24
Payer: MEDICARE

## 2021-10-22 VITALS
RESPIRATION RATE: 16 BRPM | HEIGHT: 72 IN | HEART RATE: 70 BPM | BODY MASS INDEX: 29.53 KG/M2 | DIASTOLIC BLOOD PRESSURE: 60 MMHG | OXYGEN SATURATION: 93 % | SYSTOLIC BLOOD PRESSURE: 150 MMHG | WEIGHT: 218 LBS | TEMPERATURE: 98 F

## 2021-10-22 DIAGNOSIS — J96.11 CHRONIC RESPIRATORY FAILURE WITH HYPOXIA AND HYPERCAPNIA (HCC): ICD-10-CM

## 2021-10-22 DIAGNOSIS — J96.12 CHRONIC RESPIRATORY FAILURE WITH HYPOXIA AND HYPERCAPNIA (HCC): ICD-10-CM

## 2021-10-22 DIAGNOSIS — R06.02 SOB (SHORTNESS OF BREATH): ICD-10-CM

## 2021-10-22 DIAGNOSIS — B34.9 VIRAL ILLNESS: Primary | ICD-10-CM

## 2021-10-22 LAB
INFLUENZA A ANTIBODY: NORMAL
INFLUENZA B ANTIBODY: NORMAL
Lab: NORMAL
PERFORMING INSTRUMENT: NORMAL
QC PASS/FAIL: NORMAL
SARS-COV-2, POC: NORMAL

## 2021-10-22 PROCEDURE — 99213 OFFICE O/P EST LOW 20 MIN: CPT | Performed by: PHYSICIAN ASSISTANT

## 2021-10-22 PROCEDURE — G8417 CALC BMI ABV UP PARAM F/U: HCPCS | Performed by: PHYSICIAN ASSISTANT

## 2021-10-22 PROCEDURE — 87426 SARSCOV CORONAVIRUS AG IA: CPT | Performed by: PHYSICIAN ASSISTANT

## 2021-10-22 PROCEDURE — 87804 INFLUENZA ASSAY W/OPTIC: CPT | Performed by: PHYSICIAN ASSISTANT

## 2021-10-22 PROCEDURE — G8484 FLU IMMUNIZE NO ADMIN: HCPCS | Performed by: PHYSICIAN ASSISTANT

## 2021-10-22 PROCEDURE — 1036F TOBACCO NON-USER: CPT | Performed by: PHYSICIAN ASSISTANT

## 2021-10-22 PROCEDURE — 71046 X-RAY EXAM CHEST 2 VIEWS: CPT

## 2021-10-22 PROCEDURE — 4040F PNEUMOC VAC/ADMIN/RCVD: CPT | Performed by: PHYSICIAN ASSISTANT

## 2021-10-22 PROCEDURE — G8427 DOCREV CUR MEDS BY ELIG CLIN: HCPCS | Performed by: PHYSICIAN ASSISTANT

## 2021-10-22 PROCEDURE — 1123F ACP DISCUSS/DSCN MKR DOCD: CPT | Performed by: PHYSICIAN ASSISTANT

## 2021-10-22 RX ORDER — LEVOFLOXACIN 500 MG/1
500 TABLET, FILM COATED ORAL DAILY
Qty: 10 TABLET | Refills: 0 | Status: SHIPPED | OUTPATIENT
Start: 2021-10-22 | End: 2021-11-01

## 2021-10-22 ASSESSMENT — ENCOUNTER SYMPTOMS
SINUS PAIN: 0
CHEST TIGHTNESS: 0
BACK PAIN: 0
SORE THROAT: 1
SHORTNESS OF BREATH: 1
RHINORRHEA: 0
DIARRHEA: 0
ABDOMINAL PAIN: 0
SINUS PRESSURE: 0
WHEEZING: 1
HEARTBURN: 0
NAUSEA: 0
HEMOPTYSIS: 0
COUGH: 1
VOMITING: 0

## 2021-10-22 ASSESSMENT — PATIENT HEALTH QUESTIONNAIRE - PHQ9
SUM OF ALL RESPONSES TO PHQ QUESTIONS 1-9: 0
SUM OF ALL RESPONSES TO PHQ QUESTIONS 1-9: 0
SUM OF ALL RESPONSES TO PHQ9 QUESTIONS 1 & 2: 0
1. LITTLE INTEREST OR PLEASURE IN DOING THINGS: 0
2. FEELING DOWN, DEPRESSED OR HOPELESS: 0
SUM OF ALL RESPONSES TO PHQ QUESTIONS 1-9: 0

## 2021-10-22 ASSESSMENT — VISUAL ACUITY: OU: 1

## 2021-10-22 NOTE — PROGRESS NOTES
[facility] [facility] Encounter  CHIEF COMPLAINT       Chief Complaint   Patient presents with    Cough     patient started to feel bad on monday.  Shortness of Breath     patient feels like it is a difference of sob.  Congestion    Headache       HISTORY OF PRESENT ILLNESS   Anders Land is a 79 y.o. male who presents with:  Cough  This is a new problem. Episode onset: monday. The problem has been unchanged. The cough is non-productive. Associated symptoms include headaches, nasal congestion, postnasal drip, a sore throat, shortness of breath and wheezing. Pertinent negatives include no chest pain, chills, ear congestion, ear pain, fever, heartburn, hemoptysis, myalgias, rash, rhinorrhea, sweats or weight loss. Nothing aggravates the symptoms. Shortness of Breath  Associated symptoms include headaches, a sore throat and wheezing. Pertinent negatives include no abdominal pain, chest pain, ear pain, fever, hemoptysis, rash, rhinorrhea or vomiting. Headache   Associated symptoms include coughing and a sore throat. Pertinent negatives include no abdominal pain, back pain, ear pain, fever, nausea, numbness, rhinorrhea, sinus pressure, vomiting, weakness or weight loss. On prednisone feeling alittle bit better. But patient's significant does not believe this is accurate. Patient does not feel that the doxy is helping. He has done well with levoquin in the past.   REVIEW OF SYSTEMS     Review of Systems   Constitutional: Negative for activity change, appetite change, chills, fever and weight loss. HENT: Positive for postnasal drip and sore throat. Negative for congestion, drooling, ear pain, rhinorrhea, sinus pressure and sinus pain. Eyes: Negative for visual disturbance. Respiratory: Positive for cough, shortness of breath and wheezing. Negative for hemoptysis and chest tightness. Cardiovascular: Negative for chest pain.    Gastrointestinal: Negative for (CALCIUM 1000 + D) 1000-800 MG-UNIT TABS    Take 1 tablet by mouth 2 times daily. CARTIA  MG EXTENDED RELEASE CAPSULE    Take 120 capsules by mouth every evening     CINNAMON PO    Take  by mouth. COMBIVENT RESPIMAT  MCG/ACT AERS INHALER    USE 1 INHALATION EVERY 6 HOURS    DHEA 25 MG TABS    Take 25 mg by mouth daily. DIGOXIN (LANOXIN) 250 MCG TABLET        DILTIAZEM (TIAZAC) 240 MG EXTENDED RELEASE CAPSULE    TAKE 1 CAPSULE BY MOUTH DAILY AS DIRECTED    DOFETILIDE (TIKOSYN) 250 MCG CAPSULE    Take 250 mcg by mouth 2 times daily    DOXYCYCLINE HYCLATE (VIBRAMYCIN) 100 MG CAPSULE    Take 1 capsule by mouth 2 times daily    ELIQUIS 5 MG TABS TABLET        FEXOFENADINE (ALLEGRA) 180 MG TABLET    Take 1 tablet by mouth daily    FOLIC ACID (FOLVITE) 130 MCG TABLET    Take 800 mcg by mouth daily. GLUCOSAMINE-CHONDROITIN 500-400 MG CAPS    Take 1 capsule by mouth daily. GLUTATHIONE PO    Take by mouth    HANDICAP PLACARD MISC    by Does not apply route Good for 5 Years from 06/16/2016    IPRATROPIUM-ALBUTEROL (DUONEB) 0.5-2.5 (3) MG/3ML SOLN NEBULIZER SOLUTION    Take 3 mLs by nebulization every 6 hours as needed for Shortness of Breath. MAGNESIUM 500 MG TABS    Take by mouth 2 times daily    MELATONIN 10 MG TABS    Take by mouth    METHYLSULFONYLMETHANE (MSM) 1000 MG TABS    Take 1,000 mg by mouth daily. METOPROLOL SUCCINATE (TOPROL XL) 25 MG EXTENDED RELEASE TABLET    Take 25 mg by mouth every morning    METOPROLOL SUCCINATE (TOPROL XL) 50 MG EXTENDED RELEASE TABLET    Take 50 mg by mouth every evening    METOPROLOL TARTRATE (LOPRESSOR) 25 MG TABLET    Take 25 mg by mouth as needed    MUPIROCIN (BACTROBAN) 2 % OINTMENT    Apply topically 3 times daily.     OXYGEN    Please provide patient with a portable oxygen concentrator    POTASSIUM 99 MG TABS    Take by mouth daily    PREDNISONE (DELTASONE) 10 MG TABLET    4 tablets daily for 4 days, 3 tablets daily for 4 days, 2 tablets daily for 4 days, then 1 tablet daily for 4 days    PYRIDOXINE HCL (VITAMIN B-6) 50 MG TABLET    Take 100 mg by mouth daily. ROSUVASTATIN (CRESTOR) 40 MG TABLET    Take 1 tablet by mouth daily    SAW PALMETTO    by Does not apply route. SELENIUM 200 MCG TABS    Take 200 mcg by mouth daily. SYMBICORT 80-4.5 MCG/ACT AERO    USE 2 INHALATIONS TWICE A DAY    TADALAFIL (CIALIS) 5 MG TABLET    Take 1 tablet by mouth as needed for Erectile Dysfunction (maximum 5 mg per day)    TEMAZEPAM (RESTORIL) 15 MG CAPSULE    TAKE 1 CAPSULE BY MOUTH TWICE DAILY    TEMAZEPAM (RESTORIL) 15 MG CAPSULE    TAKE 1 CAPSULE TWICE DAILY    THIAMINE HCL (VITAMIN B-1) 100 MG TABLET    Take 100 mg by mouth daily. VITAMIN B-12 (CYANOCOBALAMIN) 1000 MCG TABLET    Take 1,000 mcg by mouth daily. VITAMIN D (CHOLECALCIFEROL) 400 UNIT TABS TABLET    Take 800 Units by mouth daily. VITAMIN E 400 UNIT CAPSULE    Take 400 Units by mouth daily. ZINC 50 MG CAPS    Take 50 mg by mouth daily. ALLERGIES     Patient is is allergic to brilinta [ticagrelor], sildenafil citrate, advair [fluticasone-salmeterol], and fluticasone propionate (inhal). FAMILY HISTORY     Patient'sfamily history includes Cancer in an other family member; Heart Disease in an other family member. SOCIAL HISTORY     Patient  reports that he quit smoking about 19 years ago. He has a 80.00 pack-year smoking history. He has never used smokeless tobacco. He reports that he does not drink alcohol and does not use drugs. PHYSICAL EXAM     VITALS  BP: (!) 150/60, Temp: 98 °F (36.7 °C), Pulse: 70, Resp: 16, SpO2: 93 %  Physical Exam  Vitals and nursing note reviewed. Constitutional:       General: He is awake. He is not in acute distress. Appearance: Normal appearance. He is well-developed. He is not ill-appearing, toxic-appearing or diaphoretic. Interventions: Nasal cannula in place.       Comments: Patient on oxygen concentration     HENT:      Head: Normocephalic and atraumatic. Right Ear: Hearing and external ear normal.      Left Ear: Hearing and external ear normal.      Nose: Nose normal.   Eyes:      General: Lids are normal. Vision grossly intact. Gaze aligned appropriately. Conjunctiva/sclera: Conjunctivae normal.   Cardiovascular:      Rate and Rhythm: Normal rate and regular rhythm. Pulses: Normal pulses. Heart sounds: Normal heart sounds, S1 normal and S2 normal.   Pulmonary:      Effort: Pulmonary effort is normal. Tachypnea present. Breath sounds: Decreased air movement present. Decreased breath sounds present. No wheezing or rhonchi. Musculoskeletal:      Cervical back: Normal range of motion. Skin:     General: Skin is warm. Capillary Refill: Capillary refill takes less than 2 seconds. Neurological:      Mental Status: He is alert and oriented to person, place, and time. Gait: Gait is intact. Psychiatric:         Attention and Perception: Attention normal.         Mood and Affect: Mood normal.         Speech: Speech normal.         Behavior: Behavior normal. Behavior is cooperative. READY CARE COURSE   Labs:  Results for POC orders placed in visit on 10/22/21   POCT Influenza A/B   Result Value Ref Range    Influenza A Ab NEG     Influenza B Ab NEG      IMAGING:  No orders to display     Scheduled Meds:  Continuous Infusions:  PRN Meds:. PROCEDURES:  FINAL IMPRESSION      1. Viral illness    2. SOB (shortness of breath)    3. Chronic respiratory failure with hypoxia and hypercapnia (HCC)      DISPOSITION/PLAN   1,2,3. Patient is improving with prednisone, but feels that he needs to be on levaquin like he was in the past. Recommend that the patient obtain chest XR. Patient's GFR above >60. Will start patient on levaquin as well. Discussed signs and symptoms which require immediate follow-up in ED/call to 911. Patient verbalized understanding. Continue all medications as prescribed.  Discussed signs and symptoms which require immediate follow-up in ED/call to 911. Patient verbalized understanding. On this date 10/22/2021 I have spent 20 minutes reviewing previous notes, test results and face to face with the patient discussing the diagnosis and importance of compliance with the treatment plan as well as documenting on the day of the visit. PATIENT REFERRED TO:  Return if symptoms worsen or fail to improve. DISCHARGE MEDICATIONS:  New Prescriptions    No medications on file     Cannot display discharge medications since this is not an admission.        Yosef Connolly

## 2021-11-05 RX ORDER — PREDNISONE 10 MG/1
TABLET ORAL
Qty: 40 TABLET | Refills: 1 | Status: SHIPPED | OUTPATIENT
Start: 2021-11-05 | End: 2021-12-29

## 2021-11-11 RX ORDER — LEVOFLOXACIN 500 MG/1
500 TABLET, FILM COATED ORAL DAILY
Qty: 10 TABLET | Refills: 0 | Status: SHIPPED | OUTPATIENT
Start: 2021-11-11 | End: 2021-11-21

## 2021-11-17 ENCOUNTER — OFFICE VISIT (OUTPATIENT)
Dept: PULMONOLOGY | Age: 81
End: 2021-11-17
Payer: MEDICARE

## 2021-11-17 VITALS
TEMPERATURE: 98.2 F | WEIGHT: 215 LBS | HEART RATE: 73 BPM | BODY MASS INDEX: 29.12 KG/M2 | DIASTOLIC BLOOD PRESSURE: 84 MMHG | OXYGEN SATURATION: 92 % | HEIGHT: 72 IN | SYSTOLIC BLOOD PRESSURE: 172 MMHG

## 2021-11-17 DIAGNOSIS — I27.20 PULMONARY HTN (HCC): ICD-10-CM

## 2021-11-17 DIAGNOSIS — J96.11 CHRONIC RESPIRATORY FAILURE WITH HYPOXIA AND HYPERCAPNIA (HCC): ICD-10-CM

## 2021-11-17 DIAGNOSIS — G47.33 OBSTRUCTIVE SLEEP APNEA: ICD-10-CM

## 2021-11-17 DIAGNOSIS — J44.9 CHRONIC OBSTRUCTIVE PULMONARY DISEASE, UNSPECIFIED COPD TYPE (HCC): Primary | ICD-10-CM

## 2021-11-17 DIAGNOSIS — J96.12 CHRONIC RESPIRATORY FAILURE WITH HYPOXIA AND HYPERCAPNIA (HCC): ICD-10-CM

## 2021-11-17 PROCEDURE — G8484 FLU IMMUNIZE NO ADMIN: HCPCS | Performed by: INTERNAL MEDICINE

## 2021-11-17 PROCEDURE — 1036F TOBACCO NON-USER: CPT | Performed by: INTERNAL MEDICINE

## 2021-11-17 PROCEDURE — 1123F ACP DISCUSS/DSCN MKR DOCD: CPT | Performed by: INTERNAL MEDICINE

## 2021-11-17 PROCEDURE — 4040F PNEUMOC VAC/ADMIN/RCVD: CPT | Performed by: INTERNAL MEDICINE

## 2021-11-17 PROCEDURE — 3023F SPIROM DOC REV: CPT | Performed by: INTERNAL MEDICINE

## 2021-11-17 PROCEDURE — G8417 CALC BMI ABV UP PARAM F/U: HCPCS | Performed by: INTERNAL MEDICINE

## 2021-11-17 PROCEDURE — 99214 OFFICE O/P EST MOD 30 MIN: CPT | Performed by: INTERNAL MEDICINE

## 2021-11-17 PROCEDURE — G8427 DOCREV CUR MEDS BY ELIG CLIN: HCPCS | Performed by: INTERNAL MEDICINE

## 2021-11-17 PROCEDURE — G8926 SPIRO NO PERF OR DOC: HCPCS | Performed by: INTERNAL MEDICINE

## 2021-11-17 ASSESSMENT — ENCOUNTER SYMPTOMS
VOMITING: 0
VOICE CHANGE: 0
DIARRHEA: 0
COUGH: 1
SORE THROAT: 0
NAUSEA: 0
EYE ITCHING: 0
CHEST TIGHTNESS: 0
SHORTNESS OF BREATH: 1
WHEEZING: 1
RHINORRHEA: 0
ABDOMINAL PAIN: 0

## 2021-11-17 NOTE — PROGRESS NOTES
Subjective:     Tomás Aguilar is a 79 y.o. male who complains today of:     Chief Complaint   Patient presents with    COPD     4 month f/u       HPI  He is using trilogy, noninvasive vent since last 3 years , DME is  medical services  He is using 4 Lit with rest up to 5 lit with activity from [facility]. He has COPD and chronic hypercapnic respiratory failure.   He is on 4-5 lit O2 24 hour a day. He is using bronchodilator with symbicort 2 puff BID , combivent respimat 1 puff Q 4 hourly , nebulizer with duoneb neb prn , proair hfa and prednisone prn.   C/o shortness of breath , worse with exertion. Occasional Wheezing. Cough with Sputum. No Hemoptysis. No Chest tightness. No Chest pain with radiation  or pleuritic pain. No  leg edema. No orthopnea. No Fever or chills. No Rhinorrhea and postnasal drip.   CXR 10/22/21 no active disease          Allergies:  Brilinta [ticagrelor], Sildenafil citrate, Advair [fluticasone-salmeterol], and Fluticasone propionate (inhal)  Past Medical History:   Diagnosis Date    Adrenal adenoma     CT 6/2011 stable    Anxiety     Asbestosis(501)     CAD (coronary artery disease)     status post stent 2001    Cancer (HCC)     basil cell carnoma    COPD (chronic obstructive pulmonary disease) (HCC)     Elbow injury     left    Erectile dysfunction     Granulomatous lung disease (HCC)     Herpes zoster     Hyperlipidemia     Hypertension     Insomnia     Interstitial fibrosis     Obstructive sleep apnea on CPAP 11/13/2017    Prostate nodule     Scarlet fever     Urine frequency      Past Surgical History:   Procedure Laterality Date    CARDIAC CATHETERIZATION      CARDIOVASCULAR STRESS TEST      1/2010 normal per patient    COLONOSCOPY      5/2009 tubular adenomas    COLONOSCOPY  08/13/15    David Mcnamara MD    EYE SURGERY  02/10/14    DR Evelyn Mcduffie  [facility]    HERNIA REPAIR      LEG SURGERY       Family History   Problem Relation Age of Onset    Cancer Other         colon cancer in multiple family members and breast cancer    Heart Disease Other      Social History     Socioeconomic History    Marital status:      Spouse name: Not on file    Number of children: 3    Years of education: 15    Highest education level: High school graduate   Occupational History    Occupation:    Tobacco Use    Smoking status: Former Smoker     Packs/day: 2.00     Years: 40.00     Pack years: 80.00     Quit date: 2002     Years since quittin.2    Smokeless tobacco: Never Used   Vaping Use    Vaping Use: Never used   Substance and Sexual Activity    Alcohol use: No    Drug use: No    Sexual activity: Yes     Partners: Female   Other Topics Concern    Not on file   Social History Narrative    Not on file     Social Determinants of Health     Financial Resource Strain: Low Risk     Difficulty of Paying Living Expenses: Not hard at all   Food Insecurity: No Food Insecurity    Worried About [address] [facility] in the Last Year: Never true    [facility] in the Last Year: Never true   Transportation Needs:     Lack of Transportation (Medical): Not on file    Lack of Transportation (Non-Medical):  Not on file   Physical Activity:     Days of Exercise per Week: Not on file    Minutes of Exercise per Session: Not on file   Stress:     Feeling of Stress : Not on file   Social Connections:     Frequency of Communication with Friends and Family: Not on file    Frequency of Social Gatherings with Friends and Family: Not on file    Attends Faith Services: Not on file    Active Member of Clubs or Organizations: Not on file    Attends Club or Organization Meetings: Not on file    Marital Status: Not on file   Intimate Partner Violence:     Fear of Current or Ex-Partner: Not on file    Emotionally Abused: Not on file    Physically Abused: Not on file    Sexually Abused: Not on file   Housing Stability:     Unable to Pay for Housing in the Last Year: Not on file    Number of Places Lived in the Last Year: Not on file    Unstable Housing in the Last Year: Not on file         Review of Systems   Constitutional: Negative for chills, diaphoresis, fatigue and fever. HENT: Negative for congestion, mouth sores, nosebleeds, postnasal drip, rhinorrhea, sneezing, sore throat and voice change. Eyes: Negative for itching and visual disturbance. Respiratory: Positive for cough, shortness of breath and wheezing. Negative for chest tightness. Cardiovascular: Negative. Negative for chest pain, palpitations and leg swelling. Gastrointestinal: Negative for abdominal pain, diarrhea, nausea and vomiting. Genitourinary: Negative for difficulty urinating and hematuria. Musculoskeletal: Negative for arthralgias, joint swelling and myalgias. Skin: Negative for rash. Allergic/Immunologic: Negative for environmental allergies. Neurological: Negative for dizziness, tremors, weakness and headaches. Psychiatric/Behavioral: Negative for behavioral problems and sleep disturbance.         :     Vitals:    11/17/21 1147 11/17/21 1200   BP: (!) 154/87 (!) 172/84   Site: Right Upper Arm Left Upper Arm   Position: Sitting Sitting   Cuff Size: Large Adult Large Adult   Pulse: 73    Temp: 98.2 °F (36.8 °C)    SpO2: 92%    Weight: 215 lb (97.5 kg)    Height: 6' (1.829 m)      Wt Readings from Last 3 Encounters:   11/17/21 215 lb (97.5 kg)   10/22/21 218 lb (98.9 kg)   10/07/21 218 lb (98.9 kg)         Physical Exam  Constitutional:       Appearance: He is well-developed. HENT:      Head: Normocephalic and atraumatic. Nose: Nose normal.   Eyes:      Conjunctiva/sclera: Conjunctivae normal.      Pupils: Pupils are equal, round, and reactive to light. Neck:      Thyroid: No thyromegaly. Vascular: No JVD. Trachea: No tracheal deviation. Cardiovascular:      Rate and Rhythm: Normal rate and regular rhythm.       Heart sounds: No murmur heard.  No friction rub. No gallop. Pulmonary:      Effort: Pulmonary effort is normal. No respiratory distress. Breath sounds: Normal breath sounds. No wheezing or rales. Comments: diminished Breath sound bilaterally. Chest:      Chest wall: No tenderness. Abdominal:      General: There is no distension. Musculoskeletal:         General: Normal range of motion. Lymphadenopathy:      Cervical: No cervical adenopathy. Skin:     General: Skin is warm and dry. Findings: No rash. Neurological:      Mental Status: He is alert and oriented to person, place, and time. Cranial Nerves: No cranial nerve deficit. Psychiatric:         Behavior: Behavior normal.         Current Outpatient Medications   Medication Sig Dispense Refill    levoFLOXacin (LEVAQUIN) 500 MG tablet Take 1 tablet by mouth daily for 10 days 10 tablet 0    doxycycline hyclate (VIBRAMYCIN) 100 MG capsule Take 1 capsule by mouth 2 times daily 20 capsule 0    temazepam (RESTORIL) 15 MG capsule TAKE 1 CAPSULE BY MOUTH TWICE DAILY      temazepam (RESTORIL) 15 MG capsule TAKE 1 CAPSULE TWICE DAILY 60 capsule 2    SYMBICORT 80-4.5 MCG/ACT AERO USE 2 INHALATIONS TWICE A DAY 30.6 g 3    COMBIVENT RESPIMAT  MCG/ACT AERS inhaler USE 1 INHALATION EVERY 6 HOURS 16 g 2    busPIRone (BUSPAR) 30 MG tablet Take 30 mg by mouth daily 30 tablet 3    fexofenadine (ALLEGRA) 180 MG tablet Take 1 tablet by mouth daily 90 tablet 2    rosuvastatin (CRESTOR) 40 MG tablet Take 1 tablet by mouth daily 90 tablet 3    azelastine (OPTIVAR) 0.05 % ophthalmic solution Place 1 drop into both eyes 2 times daily      calcipotriene (DOVONEX) 0.005 % solution Apply to scalp daily      albuterol sulfate HFA (PROAIR HFA) 108 (90 Base) MCG/ACT inhaler Inhale 2 puffs into the lungs every 6 hours as needed for Wheezing 3 Inhaler 3    mupirocin (BACTROBAN) 2 % ointment Apply topically 3 times daily.  30 g 1    Melatonin 10 MG TABS Take by mouth  metoprolol succinate (TOPROL XL) 25 MG extended release tablet Take 25 mg by mouth every morning      metoprolol succinate (TOPROL XL) 50 MG extended release tablet Take 50 mg by mouth every evening      aspirin 81 MG tablet Take 81 mg by mouth daily      diltiazem (TIAZAC) 240 MG extended release capsule TAKE 1 CAPSULE BY MOUTH DAILY AS DIRECTED  3    CARTIA  MG extended release capsule Take 120 capsules by mouth every evening   3    OXYGEN Please provide patient with a portable oxygen concentrator 1 Units 0    dofetilide (TIKOSYN) 250 MCG capsule Take 250 mcg by mouth 2 times daily      Magnesium 500 MG TABS Take by mouth 2 times daily      Potassium 99 MG TABS Take by mouth daily      GLUTATHIONE PO Take by mouth      metoprolol tartrate (LOPRESSOR) 25 MG tablet Take 25 mg by mouth as needed      tadalafil (CIALIS) 5 MG tablet Take 1 tablet by mouth as needed for Erectile Dysfunction (maximum 5 mg per day) 30 tablet 3    Handicap Placard MISC by Does not apply route Good for 5 Years from 06/16/2016 1 each 0    digoxin (LANOXIN) 250 MCG tablet   11    ELIQUIS 5 MG TABS tablet   2    CINNAMON PO Take  by mouth.  SAW PALMETTO by Does not apply route.  ipratropium-albuterol (DUONEB) 0.5-2.5 (3) MG/3ML SOLN nebulizer solution Take 3 mLs by nebulization every 6 hours as needed for Shortness of Breath. 100 vial 2    Glucosamine-Chondroitin 500-400 MG CAPS Take 1 capsule by mouth daily.  Ascorbic Acid (VITAMIN C) 500 MG tablet Take 1,000 mg by mouth daily. 1/2 tab in AM and 1/2 tab in PM       vitamin D (CHOLECALCIFEROL) 400 UNIT TABS tablet Take 800 Units by mouth daily.  vitamin B-12 (CYANOCOBALAMIN) 1000 MCG tablet Take 1,000 mcg by mouth daily.  Selenium 200 MCG TABS Take 200 mcg by mouth daily.  Thiamine HCl (VITAMIN B-1) 100 MG tablet Take 100 mg by mouth daily.  folic acid (FOLVITE) 306 MCG tablet Take 800 mcg by mouth daily.         Zinc 50 MG CAPS Take 50 mg by mouth daily.  beta carotene 37239 UNIT capsule Take 25,000 Units by mouth daily.  DHEA 25 MG TABS Take 25 mg by mouth daily.  Pyridoxine HCl (VITAMIN B-6) 50 MG tablet Take 100 mg by mouth daily.  Calcium Carb-Cholecalciferol (CALCIUM 1000 + D) 1000-800 MG-UNIT TABS Take 1 tablet by mouth 2 times daily.  Methylsulfonylmethane (MSM) 1000 MG TABS Take 1,000 mg by mouth daily.  vitamin E 400 UNIT capsule Take 400 Units by mouth daily.  predniSONE (DELTASONE) 10 MG tablet 4 tablets daily for 4 days, 3 tablets daily for 4 days, 2 tablets daily for 4 days, then 1 tablet daily for 4 days (Patient not taking: Reported on 11/17/2021) 40 tablet 1     No current facility-administered medications for this visit. Results for orders placed during the hospital encounter of 10/22/21    XR CHEST (2 VW)    Narrative  EXAMINATION: XR CHEST (2 VW). DATE AND TIME:10/22/2021 6:07 PM    CLINICAL HISTORY: Shortness of breath  R06.02 SOB (shortness of breath) ICD10    COMPARISONS: January 20, 2020    FINDINGS: The heart, mediastinum and pulmonary vasculature are within normal limits. Visualized lung fields are clear. Bones unremarkable. Impression  NO  ACTIVE LUNG DISEASE. Results for orders placed during the hospital encounter of 01/28/20    XR CHEST STANDARD (2 VW)    Narrative  EXAMINATION: XR CHEST (2 VW)    CLINICAL HISTORY: J44.1 COPD exacerbation ([facility] [address] ICD10    COMPARISONS: 1/24/2020    FINDINGS: Cardiac size is borderline. Pulmonary vascularity is normal. Lungs are hyperinflated with increase in the AP diameter of the chest and flattening the diaphragms, concordant with clinical history of COPD. There is scarring adjacent to the  cardiac silhouette in the left lower lung. There is coronary artery stenting. There are no acute infiltrates. Prominent nipple shadows, left greater than right are noted.  There are mild degenerative changes in the spine.    Impression  COPD. NO ACUTE CARDIOPULMONARY DISEASE. Results for orders placed during the hospital encounter of 10/23/18    XR CHEST STANDARD (2 VW)    Narrative  EXAMINATION: XR CHEST (2 VW)    CLINICAL HISTORY: CHRONIC RESPIRATORY FAILURE    COMPARISONS: CT CHEST AUGUST 7, 2017, CHEST RADIOGRAPH, APRIL 6, 2017    FINDINGS: Degenerative change thoracic spine. Cardiopericardial silhouette is normal. Pulmonary vasculature is normal. Aorta calcified and tortuous. Lungs clear and hyperinflated. Diaphragms flattened. Retrosternal clear space increased. Impression  EMPHYSEMA. NO ACUTE CARDIOPULMONARY DISEASE  ]  Results for orders placed during the hospital encounter of 01/24/20    XR CHEST PORTABLE    Narrative  EXAMINATION: PORTABLE CHEST X-RAY FROM 1/24/2020 17:27 HOURS    CLINICAL HISTORY: SMOKING HISTORY AND COPD, PATIENT WITH DYSPNEA    COMPARISONS: 10/23/2018    FINDINGS: The heart is not enlarged. There is an atherosclerotic tortuous aorta. There is hyperinflation of the lungs and coarse pulmonary markings compatible with COPD with pulmonary interstitial fibrosis. There are granulomata in both hilar regions  compatible with remote granulomatous disease. There are no acute pulmonary infiltrates or pleural effusions. There is no pneumothorax. There is mild degenerative bone spurring in the spine. Impression  COPD WITHOUT AN ACUTE PULMONARY INFILTRATE OR PLEURAL EFFUSION. Assessment/Plan:     1. Chronic obstructive pulmonary disease, unspecified COPD type ([address]  He has COPD and chronic hypercapnic respiratory failure.   He is on 4-5 lit O2 24 hour a day. He is using bronchodilator with symbicort 2 puff BID , combivent respimat 1 puff Q 4 hourly , nebulizer with duoneb neb prn , proair hfa and prednisone prn. C/o shortness of breath , worse with exertion. Occasional Wheezing. Cough with Sputum. CXR 10/22/21 no active disease  Continue O2 and bronchodilator therapy as before.     2. Chronic respiratory failure with hypoxia and hypercapnia (HCC)  He is using trilogy, noninvasive vent since last 3 years , DME is  medical services  He is using 4 Lit with rest up to 5 lit with activity from [facility]. 3. Pulmonary HTN ([address]  He has last 2D echo in 2017 shows mild pulmonary hypertension.  Recommend to avoid hypoxia keep O2 saturation 90% above. 4. Obstructive sleep apnea  He is using trilogy, noninvasive vent and 4 L O2 since last 3 years. Continue same    Return in about 4 months (around 3/17/2022) for delores, chronic respiratory failure, COPD.       Ayana Washington MD

## 2021-12-29 ENCOUNTER — APPOINTMENT (OUTPATIENT)
Dept: GENERAL RADIOLOGY | Age: 81
End: 2021-12-29
Payer: MEDICARE

## 2021-12-29 ENCOUNTER — HOSPITAL ENCOUNTER (EMERGENCY)
Age: 81
Discharge: HOME | End: 2021-12-29
Attending: EMERGENCY MEDICINE
Payer: MEDICARE

## 2021-12-29 VITALS
BODY MASS INDEX: 28.44 KG/M2 | DIASTOLIC BLOOD PRESSURE: 76 MMHG | OXYGEN SATURATION: 98 % | SYSTOLIC BLOOD PRESSURE: 152 MMHG | WEIGHT: 210 LBS | HEIGHT: 72 IN | HEART RATE: 83 BPM | RESPIRATION RATE: 24 BRPM | TEMPERATURE: 98.8 F

## 2021-12-29 DIAGNOSIS — J44.1 COPD EXACERBATION (HCC): Primary | ICD-10-CM

## 2021-12-29 DIAGNOSIS — U07.1 COVID-19 VIRUS INFECTION: ICD-10-CM

## 2021-12-29 LAB
ALBUMIN SERPL-MCNC: 4.1 G/DL (ref 3.5–4.6)
ALP BLD-CCNC: 105 U/L (ref 35–104)
ALT SERPL-CCNC: 23 U/L (ref 0–41)
ANION GAP SERPL CALCULATED.3IONS-SCNC: 11 MEQ/L (ref 9–15)
AST SERPL-CCNC: 19 U/L (ref 0–40)
BASOPHILS ABSOLUTE: 0 K/UL (ref 0–0.1)
BASOPHILS RELATIVE PERCENT: 0.2 % (ref 0.2–1.2)
BILIRUB SERPL-MCNC: 1.6 MG/DL (ref 0.2–0.7)
BUN BLDV-MCNC: 20 MG/DL (ref 8–23)
CALCIUM SERPL-MCNC: 9 MG/DL (ref 8.5–9.9)
CHLORIDE BLD-SCNC: 91 MEQ/L (ref 95–107)
CO2: 29 MEQ/L (ref 20–31)
CREAT SERPL-MCNC: 0.65 MG/DL (ref 0.7–1.2)
DIGOXIN LEVEL: 1.2 NG/ML (ref 0.8–2)
EKG ATRIAL RATE: 84 BPM
EKG P AXIS: 64 DEGREES
EKG P-R INTERVAL: 232 MS
EKG Q-T INTERVAL: 344 MS
EKG QRS DURATION: 110 MS
EKG QTC CALCULATION (BAZETT): 406 MS
EKG R AXIS: 21 DEGREES
EKG T AXIS: 8 DEGREES
EKG VENTRICULAR RATE: 84 BPM
EOSINOPHILS ABSOLUTE: 0 K/UL (ref 0–0.5)
EOSINOPHILS RELATIVE PERCENT: 0 % (ref 0.8–7)
GFR AFRICAN AMERICAN: >60
GFR NON-AFRICAN AMERICAN: >60
GLOBULIN: 3.1 G/DL (ref 2.3–3.5)
GLUCOSE BLD-MCNC: 291 MG/DL (ref 70–99)
HCT VFR BLD CALC: 48.1 % (ref 42–52)
HEMOGLOBIN: 15.3 G/DL (ref 13.7–17.5)
IMMATURE GRANULOCYTES #: 0.1 K/UL
IMMATURE GRANULOCYTES %: 0.5 %
INR BLD: 1.2
LYMPHOCYTES ABSOLUTE: 1.2 K/UL (ref 1.3–3.6)
LYMPHOCYTES RELATIVE PERCENT: 8 %
MAGNESIUM: 2.2 MG/DL (ref 1.7–2.4)
MCH RBC QN AUTO: 28.7 PG (ref 25.7–32.2)
MCHC RBC AUTO-ENTMCNC: 31.8 % (ref 32.3–36.5)
MCV RBC AUTO: 90.2 FL (ref 79–92.2)
MONOCYTES ABSOLUTE: 1.2 K/UL (ref 0.3–0.8)
MONOCYTES RELATIVE PERCENT: 7.9 % (ref 5.3–12.2)
NEUTROPHILS ABSOLUTE: 12.6 K/UL (ref 1.8–5.4)
NEUTROPHILS RELATIVE PERCENT: 83.4 % (ref 34–67.9)
PDW BLD-RTO: 13.3 % (ref 11.6–14.4)
PLATELET # BLD: 193 K/UL (ref 163–337)
POTASSIUM SERPL-SCNC: 4.7 MEQ/L (ref 3.4–4.9)
PRO-BNP: 491 PG/ML
PROTHROMBIN TIME: 15.5 SEC (ref 12.3–14.9)
RBC # BLD: 5.33 M/UL (ref 4.63–6.08)
SARS-COV-2, NAAT: DETECTED
SODIUM BLD-SCNC: 131 MEQ/L (ref 135–144)
TOTAL PROTEIN: 7.2 G/DL (ref 6.3–8)
TROPONIN: <0.01 NG/ML (ref 0–0.01)
WBC # BLD: 15.1 K/UL (ref 4.2–9)

## 2021-12-29 PROCEDURE — 83880 ASSAY OF NATRIURETIC PEPTIDE: CPT

## 2021-12-29 PROCEDURE — 83735 ASSAY OF MAGNESIUM: CPT

## 2021-12-29 PROCEDURE — 2580000003 HC RX 258: Performed by: EMERGENCY MEDICINE

## 2021-12-29 PROCEDURE — 93005 ELECTROCARDIOGRAM TRACING: CPT

## 2021-12-29 PROCEDURE — 85610 PROTHROMBIN TIME: CPT

## 2021-12-29 PROCEDURE — 96365 THER/PROPH/DIAG IV INF INIT: CPT

## 2021-12-29 PROCEDURE — 6370000000 HC RX 637 (ALT 250 FOR IP): Performed by: EMERGENCY MEDICINE

## 2021-12-29 PROCEDURE — 71045 X-RAY EXAM CHEST 1 VIEW: CPT

## 2021-12-29 PROCEDURE — 93010 ELECTROCARDIOGRAM REPORT: CPT | Performed by: INTERNAL MEDICINE

## 2021-12-29 PROCEDURE — 87635 SARS-COV-2 COVID-19 AMP PRB: CPT

## 2021-12-29 PROCEDURE — 96366 THER/PROPH/DIAG IV INF ADDON: CPT

## 2021-12-29 PROCEDURE — 84484 ASSAY OF TROPONIN QUANT: CPT

## 2021-12-29 PROCEDURE — 96375 TX/PRO/DX INJ NEW DRUG ADDON: CPT

## 2021-12-29 PROCEDURE — 85025 COMPLETE CBC W/AUTO DIFF WBC: CPT

## 2021-12-29 PROCEDURE — 6360000002 HC RX W HCPCS: Performed by: EMERGENCY MEDICINE

## 2021-12-29 PROCEDURE — 36415 COLL VENOUS BLD VENIPUNCTURE: CPT

## 2021-12-29 PROCEDURE — 99285 EMERGENCY DEPT VISIT HI MDM: CPT

## 2021-12-29 PROCEDURE — 94640 AIRWAY INHALATION TREATMENT: CPT

## 2021-12-29 PROCEDURE — 80162 ASSAY OF DIGOXIN TOTAL: CPT

## 2021-12-29 PROCEDURE — 80053 COMPREHEN METABOLIC PANEL: CPT

## 2021-12-29 RX ORDER — FUROSEMIDE 10 MG/ML
20 INJECTION INTRAMUSCULAR; INTRAVENOUS ONCE
Status: COMPLETED | OUTPATIENT
Start: 2021-12-29 | End: 2021-12-29

## 2021-12-29 RX ORDER — MAGNESIUM SULFATE 1 G/100ML
1000 INJECTION INTRAVENOUS ONCE
Status: COMPLETED | OUTPATIENT
Start: 2021-12-29 | End: 2021-12-29

## 2021-12-29 RX ORDER — METHYLPREDNISOLONE SODIUM SUCCINATE 125 MG/2ML
125 INJECTION, POWDER, LYOPHILIZED, FOR SOLUTION INTRAMUSCULAR; INTRAVENOUS ONCE
Status: COMPLETED | OUTPATIENT
Start: 2021-12-29 | End: 2021-12-29

## 2021-12-29 RX ORDER — AMOXICILLIN AND CLAVULANATE POTASSIUM 875; 125 MG/1; MG/1
1 TABLET, FILM COATED ORAL 2 TIMES DAILY
Qty: 20 TABLET | Refills: 0 | Status: SHIPPED | OUTPATIENT
Start: 2021-12-29 | End: 2022-01-08

## 2021-12-29 RX ORDER — AZITHROMYCIN 250 MG/1
500 TABLET, FILM COATED ORAL ONCE
Status: COMPLETED | OUTPATIENT
Start: 2021-12-29 | End: 2021-12-29

## 2021-12-29 RX ORDER — PREDNISONE 20 MG/1
20 TABLET ORAL DAILY
Qty: 5 TABLET | Refills: 0 | Status: SHIPPED | OUTPATIENT
Start: 2021-12-29 | End: 2022-01-03 | Stop reason: ALTCHOICE

## 2021-12-29 RX ORDER — ALBUTEROL SULFATE 90 UG/1
2 AEROSOL, METERED RESPIRATORY (INHALATION) ONCE
Status: COMPLETED | OUTPATIENT
Start: 2021-12-29 | End: 2021-12-29

## 2021-12-29 RX ORDER — SODIUM CHLORIDE 0.9 % (FLUSH) 0.9 %
3 SYRINGE (ML) INJECTION EVERY 8 HOURS
Status: DISCONTINUED | OUTPATIENT
Start: 2021-12-29 | End: 2021-12-29 | Stop reason: HOSPADM

## 2021-12-29 RX ADMIN — FUROSEMIDE 20 MG: 10 INJECTION, SOLUTION INTRAMUSCULAR; INTRAVENOUS at 12:40

## 2021-12-29 RX ADMIN — METHYLPREDNISOLONE SODIUM SUCCINATE 125 MG: 125 INJECTION, POWDER, FOR SOLUTION INTRAMUSCULAR; INTRAVENOUS at 10:57

## 2021-12-29 RX ADMIN — MAGNESIUM SULFATE HEPTAHYDRATE 1000 MG: 1 INJECTION, SOLUTION INTRAVENOUS at 10:57

## 2021-12-29 RX ADMIN — ALBUTEROL SULFATE 2 PUFF: 108 INHALANT RESPIRATORY (INHALATION) at 11:11

## 2021-12-29 RX ADMIN — AZITHROMYCIN DIHYDRATE 500 MG: 250 TABLET, FILM COATED ORAL at 12:40

## 2021-12-29 RX ADMIN — Medication 3 ML: at 10:32

## 2021-12-29 ASSESSMENT — ENCOUNTER SYMPTOMS
COUGH: 1
STRIDOR: 0
EYE DISCHARGE: 0
SINUS PRESSURE: 0
FACIAL SWELLING: 0
BLOOD IN STOOL: 0
WHEEZING: 1
EYE PAIN: 0
CONSTIPATION: 0
TROUBLE SWALLOWING: 0
BACK PAIN: 0
DIARRHEA: 0
SHORTNESS OF BREATH: 1
EYE REDNESS: 0
ABDOMINAL PAIN: 0
CHEST TIGHTNESS: 0
CHOKING: 0
VOICE CHANGE: 0
VOMITING: 0
SORE THROAT: 0

## 2021-12-29 ASSESSMENT — PAIN DESCRIPTION - LOCATION: LOCATION: CHEST

## 2021-12-29 ASSESSMENT — PAIN DESCRIPTION - DESCRIPTORS: DESCRIPTORS: TIGHTNESS

## 2021-12-29 ASSESSMENT — PAIN DESCRIPTION - PAIN TYPE: TYPE: ACUTE PAIN

## 2021-12-29 ASSESSMENT — PAIN DESCRIPTION - FREQUENCY: FREQUENCY: CONTINUOUS

## 2021-12-29 ASSESSMENT — PAIN SCALES - GENERAL: PAINLEVEL_OUTOF10: 7

## 2021-12-29 NOTE — ED TRIAGE NOTES
Patient presents to ED with c/o chest tightness and increased SOB that started a few days ago and has become worse.  He also has concern for possible covid exposure over uriel

## 2021-12-29 NOTE — ED PROVIDER NOTES
[facility] ED  eMERGENCY dEPARTMENT eNCOUnter      Pt Name: Sukhdeep Ayala  MRN: [id number]  [place] 1940  Date of evaluation: 12/29/2021  Provider: Birdie Bradshaw MD    [address] [place] [address]       Chief Complaint   Patient presents with    Shortness of Breath     started monday     Chest Pain     started monday      HISTORY OF PRESENT ILLNESS   (Location/Symptom, Timing/Onset,Context/Setting, Quality, Duration, Modifying Factors, Severity)  Note limiting factors. Sukhdeep Ayala is a 79 y.o. male who presents to the emergency department patient with multiple medical issues including COPD home oxygen dependent coronary artery disease hyperlipidemia hypertension BPH paroxysmal atrial fibrillation history obstructive sleep apnea chronic respiratory failure come this emergency because of short of breath going on for the past 2 to 3 days time patient was seen by lung doctors and started prednisone and doxycycline 2 weeks ago has a concern with Covid and infection, as per patient during the [employer] party he was exposed to 2 people who had a Covid    HPI    NursingNotes were reviewed. REVIEW OF SYSTEMS    (2-9 systems for level 4, 10 or more for level 5)     Review of Systems   Constitutional: Positive for activity change and appetite change. Negative for fever. HENT: Positive for congestion. Negative for drooling, facial swelling, mouth sores, nosebleeds, sinus pressure, sore throat, trouble swallowing and voice change. Eyes: Negative for pain, discharge, redness and visual disturbance. Respiratory: Positive for cough, shortness of breath and wheezing. Negative for choking, chest tightness and stridor. Cardiovascular: Positive for chest pain. Negative for palpitations and leg swelling. Gastrointestinal: Negative for abdominal pain, blood in stool, constipation, diarrhea and vomiting. Endocrine: Negative for cold intolerance, polyphagia and polyuria.    Genitourinary: Negative for dysuria, flank pain, frequency, genital sores and urgency. Musculoskeletal: Negative for back pain, joint swelling, neck pain and neck stiffness. Skin: Negative for pallor and rash. Neurological: Negative for tremors, seizures, syncope, weakness, numbness and headaches. Hematological: Negative for adenopathy. Does not bruise/bleed easily. Psychiatric/Behavioral: Negative for agitation, behavioral problems, hallucinations and sleep disturbance. The patient is not hyperactive. All other systems reviewed and are negative. Except as noted above the remainder of the review of systems was reviewed and negative.        PAST MEDICAL HISTORY     Past Medical History:   Diagnosis Date    Adrenal adenoma     CT 6/2011 stable    Anxiety     Asbestosis(501)     CAD (coronary artery disease)     status post stent 2001    Cancer ([facility] [address]     basil cell carnoma    COPD (chronic obstructive pulmonary disease) (HCC)     Elbow injury     left    Erectile dysfunction     Granulomatous lung disease (HCC)     Herpes zoster     Hyperlipidemia     Hypertension     Insomnia     Interstitial fibrosis     Obstructive sleep apnea on CPAP 11/13/2017    Prostate nodule     Scarlet fever     Urine frequency          SURGICALHISTORY       Past Surgical History:   Procedure Laterality Date    CARDIAC CATHETERIZATION      CARDIOVASCULAR STRESS TEST      1/2010 normal per patient    COLONOSCOPY      5/2009 tubular adenomas    COLONOSCOPY  08/13/15    Karyle Manning MD    EYE SURGERY  02/10/14    DR Bella Trent  [facility]    HERNIA REPAIR      LEG SURGERY           CURRENT MEDICATIONS       Discharge Medication List as of 12/29/2021  1:57 PM      CONTINUE these medications which have NOT CHANGED    Details   doxycycline hyclate (VIBRAMYCIN) 100 MG capsule Take 1 capsule by mouth 2 times daily, Disp-20 capsule, R-0Normal      temazepam (RESTORIL) 15 MG capsule TAKE 1 CAPSULE BY MOUTH TWICE DAILYHistorical Med      SYMBICORT 80-4.5 MCG/ACT AERO USE 2 INHALATIONS TWICE A DAY, Disp-30.6 g, R-3Normal      COMBIVENT RESPIMAT  MCG/ACT AERS inhaler USE 1 INHALATION EVERY 6 HOURS, Disp-16 g, R-2Normal      busPIRone (BUSPAR) 30 MG tablet Take 30 mg by mouth daily, Disp-30 tablet, R-3Normal      fexofenadine (ALLEGRA) 180 MG tablet Take 1 tablet by mouth daily, Disp-90 tablet, R-2Normal      rosuvastatin (CRESTOR) 40 MG tablet Take 1 tablet by mouth daily, Disp-90 tablet, R-3Normal      azelastine (OPTIVAR) 0.05 % ophthalmic solution Place 1 drop into both eyes 2 times dailyHistorical Med      calcipotriene (DOVONEX) 0.005 % solution Apply to scalp daily, Historical Med      albuterol sulfate HFA (PROAIR HFA) 108 (90 Base) MCG/ACT inhaler Inhale 2 puffs into the lungs every 6 hours as needed for Wheezing, Disp-3 Inhaler, R-3Normal      mupirocin (BACTROBAN) 2 % ointment Apply topically 3 times daily. , Disp-30 g, R-1, Normal      Melatonin 10 MG TABS Take by mouthHistorical Med      !! metoprolol succinate (TOPROL XL) 25 MG extended release tablet Take 25 mg by mouth every morningHistorical Med      !! metoprolol succinate (TOPROL XL) 50 MG extended release tablet Take 50 mg by mouth every eveningHistorical Med      aspirin 81 MG tablet Take 81 mg by mouth dailyHistorical Med      diltiazem (TIAZAC) 240 MG extended release capsule TAKE 1 CAPSULE BY MOUTH DAILY AS DIRECTED, R-3Historical Med      CARTIA  MG extended release capsule Take 120 capsules by mouth every evening , R-3, DAWHistorical Med      OXYGEN Please provide patient with a portable oxygen concentrator, Disp-1 Units, R-0Print      dofetilide (TIKOSYN) 250 MCG capsule Take 250 mcg by mouth 2 times dailyHistorical Med      Magnesium 500 MG TABS Take by mouth 2 times dailyHistorical Med      Potassium 99 MG TABS Take by mouth dailyHistorical Med      GLUTATHIONE PO Take by mouthHistorical Med      metoprolol tartrate (LOPRESSOR) 25 MG tablet Take 25 mg by mouth as needed Housing in the Last Year: Not on file    Number of Places Lived in the Last Year: Not on file    Unstable Housing in the Last Year: Not on file       SCREENINGS    [place] Coma Scale  Eye Opening: Spontaneous  Best Verbal Response: Oriented  Best Motor Response: Obeys commands  [place] Coma Scale Score: 15 @FLOW([id number])@      PHYSICAL EXAM    (up to 7 for level 4, 8 or more for level 5)     ED Triage Vitals   BP Temp Temp src Pulse Resp SpO2 Height Weight   -- -- -- -- -- -- -- --       Physical Exam  Constitutional:       General: He is not in acute distress. Appearance: He is well-developed and normal weight. He is not ill-appearing, toxic-appearing or diaphoretic. HENT:      Head: Normocephalic and atraumatic. Mouth/Throat:      Pharynx: No pharyngeal swelling or oropharyngeal exudate. Neck:      Thyroid: No thyromegaly. Vascular: No hepatojugular reflux. Trachea: No tracheal deviation. Cardiovascular:      Rate and Rhythm: Normal rate. Heart sounds: Normal heart sounds. No murmur heard. No gallop. Pulmonary:      Effort: Pulmonary effort is normal. No respiratory distress. Breath sounds: Examination of the right-lower field reveals wheezing. Examination of the left-lower field reveals wheezing. Decreased breath sounds and wheezing present. No rales. Chest:      Chest wall: No mass, deformity, tenderness or edema. Abdominal:      General: Bowel sounds are normal.      Palpations: Abdomen is soft. There is no mass. Tenderness: There is no rebound. Musculoskeletal:         General: No tenderness. Normal range of motion. Cervical back: Neck supple. Skin:     General: Skin is warm. Findings: No erythema or rash. Neurological:      Mental Status: He is alert and oriented to person, place, and time. Cranial Nerves: No cranial nerve deficit. Motor: No abnormal muscle tone.    Psychiatric:         Behavior: Behavior normal.         Thought Content: Thought content normal.         DIAGNOSTIC RESULTS     EKG: All EKG's are interpreted by the Emergency Department Physician who either signs or Co-signsthis chart in the absence of a cardiologist.        RADIOLOGY:   [address] such as CT, Ultrasound and MRI are read by the radiologist. Plain radiographic images are visualized and preliminarily interpreted by the emergency physician with the below findings:        Interpretation per the Radiologist below, if available at the time ofthis note:    XR CHEST PORTABLE   Final Result   BIBASILAR AREAS OF ATELECTASIS, PATCHY INFILTRATES IN BOTH BASES LEFT GREATER THAN RIGHT SUPERIMPOSED UPON RADIOGRAPHIC FINDINGS OF COPD. IMAGING FEATURES CAN BE SEEN WITH (COVID-19) PNEUMONIA, THOUGH ARE NONSPECIFIC AND CAN OCCUR WITH A VARIETY OF INFECTIOUS AND NONINFECTIOUS PROCESSES.                ED BEDSIDE ULTRASOUND:   Performed by ED Physician - none    LABS:  Labs Reviewed   COVID-19, RAPID - Abnormal; Notable for the following components:       Result Value    SARS-CoV-2, NAAT DETECTED (*)     All other components within normal limits    Narrative:     Tracy BLACKWELL tel. [phone],  na, 12/29/2021 11:00, by MARIANNE   COMPREHENSIVE METABOLIC PANEL - Abnormal; Notable for the following components:    Sodium 131 (*)     Chloride 91 (*)     Glucose 291 (*)     CREATININE 0.65 (*)     Total Bilirubin 1.6 (*)     Alkaline Phosphatase 105 (*)     All other components within normal limits   CBC WITH AUTO DIFFERENTIAL - Abnormal; Notable for the following components:    WBC 15.1 (*)     MCHC 31.8 (*)     Neutrophils % 83.4 (*)     Eosinophils % 0.0 (*)     Neutrophils Absolute 12.6 (*)     Lymphocytes Absolute 1.2 (*)     Monocytes Absolute 1.2 (*)     All other components within normal limits   PROTIME-INR - Abnormal; Notable for the following components:    Protime 15.5 (*)     All other components within normal limits   MAGNESIUM   TROPONIN   BRAIN NATRIURETIC PEPTIDE   DIGOXIN LEVEL       All other labs were within normal range or not returned as of this dictation. EMERGENCY DEPARTMENT COURSE and DIFFERENTIAL DIAGNOSIS/MDM:   Vitals:    Vitals:    12/29/21 1030 12/29/21 1126 12/29/21 1237 12/29/21 1357   BP: (!) 168/77 (!) 157/79 (!) 164/79 (!) 152/76   Pulse: 85 103 95 83   Resp:  22 24 24   Temp: 98.8 °F (37.1 °C)      TempSrc: Temporal      SpO2: 98% 95% 96% 98%   Weight:       Height:           MDM  Number of Diagnoses or Management Options  COPD exacerbation ([facility] [address]  COVID-19 virus infection  Diagnosis management comments: Patient work of breathing is labored unable to talk in full sentence tachypnea noted entry diminished bilateral inspiratory expiratory wheezing noted EKG performed sinus rhythm with axillary block incomplete R bite block patient has no ST elevation no PVCs rate of 84/min TX interval 232 ms H 110 ms and QT interval 344 ms patient is on home oxygen and as per patient he is very comfortable going home with home oxygen and have a device at home for which she take it for nighttime not sure if he has a CPAP patient resting comfortably at this time able to talk in full sentences finding consistent with COPD exacerbation with some patchy pneumonia close follow-up with the doctors patient respiratory       Amount and/or Complexity of Data Reviewed  Clinical lab tests: ordered and reviewed  Tests in the radiology section of CPT®: ordered and reviewed      CRITICAL CARE TIME   Total Critical Care time was  minutes, excluding separately reportableprocedures. There was a high probability of clinicallysignificant/life threatening deterioration in the patient's condition which required my urgent intervention.       CONSULTS:  None    PROCEDURES:  Unless otherwise noted below, none     Procedures    FINAL IMPRESSION      1. COPD exacerbation ([facility] [address]    2. COVID-19 virus infection          DISPOSITION/PLAN   DISPOSITION        PATIENT REFERRED TO:  Samantha Clement, APRN - CNP  [address] [facility]  [phone]    In 3 days        DISCHARGE MEDICATIONS:  Discharge Medication List as of 12/29/2021  1:57 PM      START taking these medications    Details   amoxicillin-clavulanate (AUGMENTIN) 875-125 MG per tablet Take 1 tablet by mouth 2 times daily for 10 days, Disp-20 tablet, R-0Print                (Please note that portions of this note were completed with a voice recognition program.  Efforts were made to edit the dictations but occasionally words are mis-transcribed.)    John Mclean MD (electronically signed)  Attending Emergency Physician       John Mclean MD  12/29/21 5558       John Mclean MD  12/29/21 8932

## 2021-12-30 ENCOUNTER — CARE COORDINATION (OUTPATIENT)
Dept: CARE COORDINATION | Age: 81
End: 2021-12-30

## 2021-12-30 NOTE — CARE COORDINATION
Patient contacted regarding COVID-19 risk, exposure, diagnosis and pulse oximeter ordered at discharge. Discussed COVID-19 related testing which was available at this time. Test results were positive. Patient informed of results, if available? Yes and Completed 2021. Ambulatory Care Manager contacted the patient by telephone to perform post discharge assessment. Call within 2 business days of discharge: Yes. Verified name and  with patient as identifiers. Provided introduction to self, and explanation of the CTN/ACM role, and reason for call due to risk factors for infection and/or exposure to COVID-19. Symptoms reviewed with patient who verbalized the following symptoms: fatigue, cough, shortness of breath, no new symptoms and no worsening symptoms. Due to no new or worsening symptoms encounter was not routed to provider for escalation. Discussed follow-up appointments. If no appointment was previously scheduled, appointment scheduling offered: No and Previously scheduled. [facility] follow up appointment(s):   Future Appointments   Date Time Provider Valentín Herroni   1/3/2022  2:00 PM Adama Trevino, APRN - [id number] [facility]   3/17/2022  1:00 PM Fiordaliza Conte MD [address]     [facility] follow up appointment(s):     Non-face-to-face services provided:     Advance Care Planning:   Does patient have an Advance Directive:  reviewed and current. Educated patient about risk for severe COVID-19 due to risk factors according to CDC guidelines. ACM reviewed discharge instructions, medical action plan and red flag symptoms with the patient who verbalized understanding. Discussed COVID vaccination status: Yes and Patient received booster [employer] 10/15/2021). Education provided on COVID-19 vaccination as appropriate. Discussed exposure protocols and quarantine with CDC Guidelines.  Patient was given an opportunity to verbalize any questions and concerns and agrees to contact ACM or health care provider for questions related to their healthcare. Reviewed and educated patient on any new and changed medications related to discharge diagnosis     Was patient discharged with a pulse oximeter? Patient has own pulse oximeter Discussed and confirmed pulse oximeter discharge instructions and when to notify provider or seek emergency care. ACM provided contact information. Plan for follow-up call in 3-5 days based on severity of symptoms and risk factors. David Morris tells me that he is feeling better today. He does tell me that he has fatigue and he is SOB with activity. He is wearing his oxygen at 5 LPM/NC most of the time He uses his non invasive ventilator at night. He is checking his pulse oximeter frequently  His SaO2 ranges between 92%- 96% he will drop into the 80's with activity  I have asked him to return to the ER if he drops below 90% at rest with his oxygen . He  feels that he has a good understanding of COVID symptoms and protocols. he has picked up the medications and he is taking all medications as prescribed  I have asked him to rest, drink, plenty of liquids, take all medications as prescribed, and monitor symptoms closely. I have also made him aware of the symptoms that require returning to the ER for evaluation. He verbalizes understanding of the information discussed.  I have asked him to call me with any questions or concerns

## 2022-01-02 ENCOUNTER — CARE COORDINATION (OUTPATIENT)
Dept: CARE COORDINATION | Age: 82
End: 2022-01-02

## 2022-01-02 NOTE — CARE COORDINATION
Patient contacted regarding COVID-19 risk, exposure, diagnosis and pulse oximeter ordered at discharge. Discussed COVID-19 related testing which was available at this time. Test results were positive. Patient informed of results, if available? Yes and Completed 2021    Ambulatory Care Manager contacted the patient by telephone to perform follow-up assessment. Verified name and  with patient as identifiers. Patient has following risk factors of: COPD and diabetes. Symptoms reviewed with patient who verbalized the following symptoms: fatigue, shortness of breath, no new symptoms and no worsening symptoms. Due to no new or worsening symptoms encounter was not routed to provider for escalation. Educated patient about risk for severe COVID-19 due to risk factors according to CDC guidelines. ACM reviewed discharge instructions, medical action plan and red flag symptoms with the patient who verbalized understanding. Discussed COVID vaccination status: Yes and Patient received Pfizer booster dose 10/15/2021. Education provided on COVID-19 vaccination as appropriate. Discussed exposure protocols and quarantine with CDC Guidelines. Patient was given an opportunity to verbalize any questions and concerns and agrees to contact ACM or health care provider for questions related to their healthcare. Was patient discharged with a pulse oximeter? Yes Discussed and confirmed pulse oximeter discharge instructions and when to notify provider or seek emergency care. ACM provided contact information. Plan for follow-up call in 5-7 days based on severity of symptoms and risk factors. Giancarlo Broderick feels he is improving slowly. He states that he still has the oxygen at 5 LPM/NC which is where he feels he is breathing best.  He tells me that his SaO2 is > 92%  He is eating a little but making sure that he is drinking plenty of fluids. He is concerned about his 3 month follow up appointment scheduled for tomorrow.   I explained that I will reach out to [facility] to ask about her preference regarding this appointment. Again, I have reminded him to rest, drink plenty of fluids, and monitor symptoms closely. He verbalizes understanding of the information discussed.

## 2022-01-02 NOTE — CARE COORDINATION
Orquidea Pfeiffer can still see him in office or we can change to a virtual visit and I can still do his medication refill. Please let him know. I would prefer to change him to a virtual visit if he is ok with that. Please let me know.

## 2022-01-03 ENCOUNTER — VIRTUAL VISIT (OUTPATIENT)
Dept: FAMILY MEDICINE CLINIC | Age: 82
End: 2022-01-03
Payer: MEDICARE

## 2022-01-03 DIAGNOSIS — F41.9 ANXIETY: ICD-10-CM

## 2022-01-03 DIAGNOSIS — G47.00 INSOMNIA, UNSPECIFIED TYPE: Primary | ICD-10-CM

## 2022-01-03 DIAGNOSIS — U07.1 COVID-19: ICD-10-CM

## 2022-01-03 DIAGNOSIS — I48.0 PAROXYSMAL ATRIAL FIBRILLATION (HCC): ICD-10-CM

## 2022-01-03 DIAGNOSIS — I27.20 PULMONARY HTN (HCC): ICD-10-CM

## 2022-01-03 DIAGNOSIS — J44.1 COPD EXACERBATION (HCC): ICD-10-CM

## 2022-01-03 DIAGNOSIS — E11.59 TYPE 2 DIABETES MELLITUS WITH OTHER CIRCULATORY COMPLICATION, WITHOUT LONG-TERM CURRENT USE OF INSULIN (HCC): ICD-10-CM

## 2022-01-03 PROCEDURE — G8427 DOCREV CUR MEDS BY ELIG CLIN: HCPCS | Performed by: NURSE PRACTITIONER

## 2022-01-03 PROCEDURE — 1123F ACP DISCUSS/DSCN MKR DOCD: CPT | Performed by: NURSE PRACTITIONER

## 2022-01-03 PROCEDURE — 99214 OFFICE O/P EST MOD 30 MIN: CPT | Performed by: NURSE PRACTITIONER

## 2022-01-03 PROCEDURE — 4040F PNEUMOC VAC/ADMIN/RCVD: CPT | Performed by: NURSE PRACTITIONER

## 2022-01-03 RX ORDER — TEMAZEPAM 15 MG/1
CAPSULE ORAL
Status: CANCELLED | OUTPATIENT
Start: 2022-01-03

## 2022-01-03 RX ORDER — PREDNISONE 20 MG/1
TABLET ORAL
Qty: 20 TABLET | Refills: 0 | Status: SHIPPED | OUTPATIENT
Start: 2022-01-03 | End: 2022-01-13

## 2022-01-03 RX ORDER — TEMAZEPAM 15 MG/1
CAPSULE ORAL
Qty: 60 CAPSULE | Refills: 2 | Status: SHIPPED | OUTPATIENT
Start: 2022-01-03 | End: 2022-03-21 | Stop reason: SDUPTHER

## 2022-01-03 RX ORDER — BUSPIRONE HYDROCHLORIDE 30 MG/1
TABLET ORAL
Qty: 30 TABLET | Refills: 3 | Status: SHIPPED | OUTPATIENT
Start: 2022-01-03 | End: 2022-08-23

## 2022-01-03 NOTE — PROGRESS NOTES
1/3/2022    TELEHEALTH EVALUATION -- Audio/Visual (During SVTST-45 public health emergency)    Due to COVID 19 outbreak, patient's office visit was converted to a virtual visit. Patient was contacted and agreed to proceed with a virtual visit via Doxy. me  The risks and benefits of converting to a virtual visit were discussed in light of the current infectious disease epidemic. Patient also understood that insurance coverage and co-pays are up to their individual insurance plans. Nicky Priest, was evaluated through a synchronous (real-time) audio-video encounter. The patient (or guardian if applicable) is aware that this is a billable service. Verbal consent to proceed has been obtained within the past 12 months. The visit was conducted pursuant to the emergency declaration under the [address] [place] [address], [address] [place] [address] authority and the [employer] Act. Patient identification was verified, and a caregiver was present when appropriate. The patient was located in a state where the provider was credentialed to provide care. Total time spent for this encounter: Not billed by time    The patient is talking with me virtually from his home and I am located at my office in [facility]. HPI:    Nicky Priest (:  1940) has requested an audio/video evaluation for the following concern(s): Insomnia: He states that he continues to take Restoril to help him sleep at bedtime. He has not had any medication side effects and is found that he has not been able to sleep without the medication. Covid-19/COPD exacerbation: He was evaluated in the emergency room recently. Was prescribed 5 days of steroid and Augmentin. Is done with the steroid but is still taking the antibiotic. Is coughing up some sputum but it is described as being thick and white.   His oxygen level can drop to 82% when he is more active and he is on 5 L NC currently. He was told by ER physician and RN care coordinator that he may require additional oxygen until he completely recovers from Covid. He has been trying to get a lot of rest and staying hydrated. No fever or chills reported. Atrial fibrillation/pulmonary HTN: He does not report any recent heart palpitations. No lightheadedness or dizziness. No near syncope. He continues to follow routinely with his cardiologist and has been taking medication with no side effects. Diabetes: Glucose level was high when checked in the emergency room but otherwise he has not had any symptoms. No low sugar episodes reported. He is continuing to eat although he has no appetite. Is drinking fluids throughout the day. Is getting some activity throughout the day as well. Review of Systems   He denies any current chest pain or chest pressure. No flank pain. No hematuria or dysuria. No dark tarry colored stools or bloody stools. No persistent diarrhea or constipation. No significant lower extremity edema or orthopnea reported. Prior to Visit Medications    Medication Sig Taking? Authorizing Provider   temazepam (RESTORIL) 15 MG capsule TAKE 1 CAPSULE TWICE DAILY Yes TRINA Lopez CNP   predniSONE (DELTASONE) 20 MG tablet One po tid for 3 days then, One po bid for 3 days then, One po daily for 3 days then, 1/2 tab daily for 4 days.  Yes TRINA Cain CNP   amoxicillin-clavulanate (AUGMENTIN) 875-125 MG per tablet Take 1 tablet by mouth 2 times daily for 10 days Yes Lynn Be MD   temazepam (RESTORIL) 15 MG capsule TAKE 1 CAPSULE BY MOUTH TWICE DAILY Yes Historical Provider, MD   SYMBICORT 80-4.5 MCG/ACT AERO USE 2 [facility], MD   COMBIVENT RESPIMAT  MCG/ACT AERS inhaler USE 1 INHALATION EVERY 6 HOURS Yes TRINA Lopez CNP   busPIRone (BUSPAR) 30 MG tablet Take 30 mg by mouth daily Yes TRINA Cain CNP   fexofenadine [place]) 180 MG tablet Take 1 tablet by mouth daily Yes TRINA Leggett CNP   rosuvastatin (CRESTOR) 40 MG tablet Take 1 tablet by mouth daily Yes TRINA Leggett CNP   azelastine (OPTIVAR) 0.05 % ophthalmic solution Place 1 drop into both eyes 2 times daily Yes Historical Provider, MD   calcipotriene (DOVONEX) 0.005 % solution Apply to scalp daily Yes Historical Provider, MD   albuterol sulfate HFA (PROAIR HFA) 108 (90 Base) MCG/ACT inhaler Inhale 2 puffs into the lungs every 6 hours as needed for Wheezing Yes Melvin Anderson MD   Melatonin 10 MG TABS Take by mouth Yes Historical Provider, MD   metoprolol succinate (TOPROL XL) 25 MG extended release tablet Take 25 mg by mouth every morning Yes Historical Provider, MD   metoprolol succinate (TOPROL XL) 50 MG extended release tablet Take 50 mg by mouth every evening Yes Historical Provider, MD   aspirin 81 MG tablet Take 81 mg by mouth daily Yes Historical Provider, MD   diltiazem (TIAZAC) 240 MG extended release capsule TAKE 1 CAPSULE BY MOUTH DAILY AS DIRECTED Yes Historical Provider, MD   CARTIA  MG extended release capsule Take 120 capsules by mouth every evening  Yes Historical Provider, MD   OXYGEN Please provide patient with a portable oxygen concentrator Yes Shannon Salmon PA-C   dofetilide (TIKOSYN) 250 MCG capsule Take 250 mcg by mouth 2 times daily Yes Historical Provider, MD   Magnesium 500 MG TABS Take by mouth 2 times daily Yes Historical Provider, MD   Potassium 99 MG TABS Take by mouth daily Yes Historical Provider, MD   GLUTATHIONE PO Take by mouth Yes Historical Provider, MD   metoprolol tartrate (LOPRESSOR) 25 MG tablet Take 25 mg by mouth as needed Yes Historical Provider, MD   tadalafil (CIALIS) 5 MG tablet Take 1 tablet by mouth as needed for Erectile Dysfunction (maximum 5 mg per day) Yes TRINA Leggett CNP   Handicap Placard MISC by Does not apply route Good for 5 Years from 06/16/2016 Yes Maya Hand MD digoxin (LANOXIN) 250 MCG tablet  Yes Historical Provider, MD   ELIQUIS 5 MG TABS tablet  Yes Historical Provider, MD   CINNAMON PO Take  by mouth. Yes Historical ProviderMD MIRELES LILIYA by Does not apply route. Yes Historical Provider, MD   ipratropium-albuterol (DUONEB) 0.5-2.5 (3) MG/3ML SOLN nebulizer solution Take 3 mLs by nebulization every 6 hours as needed for Shortness of Breath. Yes Birgit Knight MD   Glucosamine-Chondroitin 500-400 MG CAPS Take 1 capsule by mouth daily. Yes Historical Provider, MD   Ascorbic Acid (VITAMIN C) 500 MG tablet Take 1,000 mg by mouth daily. 1/2 tab in AM and 1/2 tab in PM  Yes Historical Provider, MD   vitamin D (CHOLECALCIFEROL) 400 UNIT TABS tablet Take 800 Units by mouth daily. Yes Historical Provider, MD   vitamin B-12 (CYANOCOBALAMIN) 1000 MCG tablet Take 1,000 mcg by mouth daily. Yes Historical Provider, MD   Selenium 200 MCG TABS Take 200 mcg by mouth daily. Yes Historical Provider, MD   Thiamine HCl (VITAMIN B-1) 100 MG tablet Take 100 mg by mouth daily. Yes Historical Provider, MD   folic acid (FOLVITE) 507 MCG tablet Take 800 mcg by mouth daily. Yes Historical Provider, MD   Zinc 50 MG CAPS Take 50 mg by mouth daily. Yes Historical Provider, MD   beta carotene 04475 UNIT capsule Take 25,000 Units by mouth daily. Yes Historical Provider, MD   DHEA 25 MG TABS Take 25 mg by mouth daily. Yes Historical Provider, MD   Pyridoxine HCl (VITAMIN B-6) 50 MG tablet Take 100 mg by mouth daily. Yes Historical Provider, MD   Calcium Carb-Cholecalciferol (CALCIUM 1000 + D) 1000-800 MG-UNIT TABS Take 1 tablet by mouth 2 times daily. Yes Historical Provider, MD   Methylsulfonylmethane (MSM) 1000 MG TABS Take 1,000 mg by mouth daily. Yes Historical Provider, MD   vitamin E 400 UNIT capsule Take 400 Units by mouth daily.    Yes Historical Provider, MD   doxycycline hyclate (VIBRAMYCIN) 100 MG capsule Take 1 capsule by mouth 2 times daily  Patient not taking: Reported on 1/3/2022  Yesica Medel, APRN - CNP       Social History     Tobacco Use    Smoking status: Former Smoker     Packs/day: 2.00     Years: 40.00     Pack years: 80.00     Quit date: 2002     Years since quittin.4    Smokeless tobacco: Never Used   Vaping Use    Vaping Use: Never used   Substance Use Topics    Alcohol use: No    Drug use: No       PHYSICAL EXAMINATION:  [ INSTRUCTIONS:  \"[x]\" Indicates a positive item  \"[]\" Indicates a negative item  -- DELETE ALL ITEMS NOT EXAMINED]  [x] Alert  [x] Oriented to person/place/time    [x] No apparent distress  [] Toxic appearing    [] Face flushed appearing [] Sclera clear  [] Lips are cyanotic      [x] Breathing appears normal  [] Appears tachypneic      [] Rash on visible skin    [x] Cranial Nerves II-XII grossly intact    [x] Motor grossly intact in visible upper extremities    [] Motor grossly intact in visible lower extremities    [x] Normal Mood  [] Anxious appearing    [] Depressed appearing  [] Confused appearing      [] Poor short term memory  [] Poor long term memory    [] OTHER:      Due to this being a TeleHealth encounter, evaluation of the following organ systems is limited: Vitals/Constitutional/EENT/Resp/CV/GI//MS/Neuro/Skin/Heme-Lymph-Imm. ASSESSMENT/PLAN:   Diagnosis Orders   1. Insomnia, unspecified type  temazepam (RESTORIL) 15 MG capsule   2. COPD exacerbation ([address]     3. COVID-19     4. Pulmonary HTN (HCC)     5. Paroxysmal atrial fibrillation ([address]     6. Type 2 diabetes mellitus with other circulatory complication, without long-term current use of insulin ([address]         Return in about 3 months (around 4/3/2022) for diabetes- level 2.     1. Refill of Restoril given to help with chronic insomnia symptoms. Medication has been effective and no side effects reported. 2.  3.  Recommend higher dose prednisone taper given current symptoms.   Continue antibiotic until completion and notify me if symptoms do not improve. Go to ER if symptoms worsen. Continue higher concentration of oxygen for now. 4.  5.  Continue to follow with cardiology routinely continue current medication. No exacerbation of cardiac symptoms reported. 6.  Discussed that prednisone can increase glucose levels as does infection. Can repeat fasting lab prior to 3-month visit but will plan for 1 month visit to touch base on general health. Controlled Substance Monitoring:    Acute and Chronic Pain Monitoring:   RX Monitoring 1/3/2022   Attestation -   Periodic Controlled Substance Monitoring Possible medication side effects, risk of tolerance/dependence & alternative treatments discussed. ;No signs of potential drug abuse or diversion identified. ;Assessed functional status. Please note this report has been partially produced using speech recognition software and may cause contain errors related to that system including grammar, punctuation and spelling as well as words and phrases that may seem inappropriate. If there are questions or concerns please feel free to contact me to clarify. An  electronic signature was used to authenticate this note. --Mak Rodriguez, TRINA - CNP on 1/3/2022 at 3:42 PM        Pursuant to the emergency declaration under the [facility] waiver authority and the [employer] Act, this Virtual  Visit was conducted, with patient's consent, to reduce the patient's risk of exposure to COVID-19 and provide continuity of care for an established patient. Services were provided through a video synchronous discussion virtually to substitute for in-person clinic visit.

## 2022-01-03 NOTE — TELEPHONE ENCOUNTER
Requesting medication refill.  Please approve or deny this request.    Rx requested:  Requested Prescriptions     Pending Prescriptions Disp Refills    busPIRone (BUSPAR) 30 MG tablet [Pharmacy Med Name: buspirone 30 mg tablet] 30 tablet 3     Sig: take 1 (ONE) tablet daily       Last Office Visit:   10/7/2021    Last Filled:      Last Labs:      Next Visit Date:  Future Appointments   Date Time Provider Valentín Maddie   3/17/2022  1:00 PM Jose Manuel Rodrigues MD [facility]

## 2022-01-03 NOTE — CARE COORDINATION
I called to Lisa Mccain to make him aware that [address] is able to see him in person or via virtual visit  He tells me that virtual visit would work best for him at this time. He again tells me that he \"need to have his sleeping medication refilled. \"  I explained that [address] will discuss this with him at the time of his visit and will take care of his prescriptions  I will make Suma aware that virtual visit will work for Lisa Mccain.

## 2022-01-04 ENCOUNTER — CARE COORDINATION (OUTPATIENT)
Dept: CARE COORDINATION | Age: 82
End: 2022-01-04

## 2022-01-04 NOTE — CARE COORDINATION
Louise called asking for clarification regarding the prednisone dosage and weaning process. I have reviewed Suma's note regarding symptoms and plan of care. I reviewed the medications ordered  I have explained how to take his prednisone as ordered by Aubrie Costa yesterday. He does understand the plan and will take the medication as prescribed. I have asked him to call me with any questions or concerns.

## 2022-01-07 ENCOUNTER — CARE COORDINATION (OUTPATIENT)
Dept: CARE COORDINATION | Age: 82
End: 2022-01-07

## 2022-01-07 NOTE — CARE COORDINATION
You Patient resolved from the Care Transitions episode on 1/7/2022  Discussed COVID-19 related testing which was available at this time. Test results were positive. Patient informed of results, if available? Yes and Completed 12/29/2021    Patient/family has been provided the following resources and education related to COVID-19:                         Signs, symptoms and red flags related to COVID-19            CDC exposure and quarantine guidelines            Conduit exposure contact - [phone]            Contact for their local Department of Health                 Patient currently reports that the following symptoms have improved:  no new/worsening symptoms     No further outreach scheduled with this CTN/ACM. Episode of Care resolved. Patient has this CTN/ACM contact information if future needs arise.

## 2022-01-24 ENCOUNTER — CARE COORDINATION (OUTPATIENT)
Dept: CARE COORDINATION | Age: 82
End: 2022-01-24

## 2022-01-24 NOTE — CARE COORDINATION
ACM called patient. Left message requesting a return call to review [facility] program.  Contact information supplied. ANGELA also sent my chart and [address] mail letter.

## 2022-01-24 NOTE — LETTER
1/24/2022    Erick Flowers  [id number] [facility] [address]      Dear Erick Flowers,    My name is Aleksandra Estrada RN and I am a registered nurse who partners with TRINA Castillo CNP to improve patients' health. TRINA Castillo CNP believes you would benefit from working with me. As a member of your health care team, I would work with other providers involved in your care, offer education for your specific health conditions, and connect you with additional resources as needed. I will collaborate with TRINA Castillo CNP to support you in following your treatment plan. The additional support I provide is no additional cost to you. My primary focus is to help you achieve specific goals and improve your health. We are committed to walk with you on this journey and look forward to working with you. Please call me to further discuss your healthcare needs. I am available by phone or for appointments at the office. You can reach me at [phone].     In good health,     Aleksandra Estrada RN

## 2022-01-25 ENCOUNTER — CARE COORDINATION (OUTPATIENT)
Dept: CARE COORDINATION | Age: 82
End: 2022-01-25

## 2022-01-25 NOTE — CARE COORDINATION
ACM spoke to patient. Introduced [facility] program role of ACM and benefits and goals. Patient was a previous patient in care coordination with coworker Kenia Winter. He reports he is doing well. He manages all aspects of his health care. He knows his medications. He is able to get to all his doctors appointments. Patient is still active. Patient continues to drive he lives with his wife and they are both doing very well. Patient did have Covid at the beginning of the year and has since recovered. Patient declined any care coordination services at this time. Patient has contact information if his needs change.

## 2022-02-20 DIAGNOSIS — J30.89 SEASONAL ALLERGIC RHINITIS DUE TO OTHER ALLERGIC TRIGGER: ICD-10-CM

## 2022-02-22 RX ORDER — FEXOFENADINE HCL 180 MG/1
180 TABLET ORAL DAILY
Qty: 90 TABLET | Refills: 2 | Status: SHIPPED | OUTPATIENT
Start: 2022-02-22

## 2022-02-24 ENCOUNTER — OFFICE VISIT (OUTPATIENT)
Dept: ORTHOPEDIC SURGERY | Age: 82
End: 2022-02-24
Payer: MEDICARE

## 2022-02-24 VITALS
HEART RATE: 68 BPM | WEIGHT: 210 LBS | HEIGHT: 72 IN | BODY MASS INDEX: 28.44 KG/M2 | TEMPERATURE: 97.4 F | OXYGEN SATURATION: 98 %

## 2022-02-24 DIAGNOSIS — M19.011 OSTEOARTHRITIS OF RIGHT AC (ACROMIOCLAVICULAR) JOINT: Primary | ICD-10-CM

## 2022-02-24 PROCEDURE — 20605 DRAIN/INJ JOINT/BURSA W/O US: CPT | Performed by: ORTHOPAEDIC SURGERY

## 2022-02-24 PROCEDURE — G8427 DOCREV CUR MEDS BY ELIG CLIN: HCPCS | Performed by: ORTHOPAEDIC SURGERY

## 2022-02-24 PROCEDURE — 4040F PNEUMOC VAC/ADMIN/RCVD: CPT | Performed by: ORTHOPAEDIC SURGERY

## 2022-02-24 PROCEDURE — 99203 OFFICE O/P NEW LOW 30 MIN: CPT | Performed by: ORTHOPAEDIC SURGERY

## 2022-02-24 PROCEDURE — G8417 CALC BMI ABV UP PARAM F/U: HCPCS | Performed by: ORTHOPAEDIC SURGERY

## 2022-02-24 PROCEDURE — G8484 FLU IMMUNIZE NO ADMIN: HCPCS | Performed by: ORTHOPAEDIC SURGERY

## 2022-02-24 PROCEDURE — 1036F TOBACCO NON-USER: CPT | Performed by: ORTHOPAEDIC SURGERY

## 2022-02-24 PROCEDURE — 1123F ACP DISCUSS/DSCN MKR DOCD: CPT | Performed by: ORTHOPAEDIC SURGERY

## 2022-02-24 RX ADMIN — LIDOCAINE HYDROCHLORIDE 20 ML: 10 INJECTION, SOLUTION INFILTRATION; PERINEURAL at 12:15

## 2022-02-24 RX ADMIN — METHYLPREDNISOLONE ACETATE 80 MG: 80 INJECTION, SUSPENSION INTRA-ARTICULAR; INTRALESIONAL; INTRAMUSCULAR; SOFT TISSUE at 12:12

## 2022-02-24 NOTE — PROGRESS NOTES
Subjective:      Patient ID: Carolyn Bishop is a 79 y.o. male who presents today for:  Chief Complaint   Patient presents with    Shoulder Pain     spur on right shoulder. Pt states it has grown and is causing him to lose sleep at night. HPI Morley Severance has a history of discomfort and swelling about his right shoulder for the past several months. He was seen in August where an x-ray was taken and some spurring and degenerative changes of the right [facility] joint was noted. Since that time the swelling is increased significantly. His states he has a very large mass which is uncomfortable. There is been no trauma. Denies any inflammatory findings.     Past Medical History:   Diagnosis Date    Adrenal adenoma     CT 6/2011 stable    Anxiety     Asbestosis(501)     CAD (coronary artery disease)     status post stent 2001    Cancer ([facility]     basil cell carnoma    COPD (chronic obstructive pulmonary disease) (HCC)     Elbow injury     left    Erectile dysfunction     Granulomatous lung disease (HCC)     Herpes zoster     Hyperlipidemia     Hypertension     Insomnia     Interstitial fibrosis     Obstructive sleep apnea on CPAP 11/13/2017    Prostate nodule     Scarlet fever     Urine frequency      Past Surgical History:   Procedure Laterality Date    CARDIAC CATHETERIZATION      CARDIOVASCULAR STRESS TEST      1/2010 normal per patient    COLONOSCOPY      5/2009 tubular adenomas    COLONOSCOPY  08/13/15    David Mcnamara MD    EYE SURGERY  02/10/14    DR Cuellar Stands  RT EYE    HERNIA REPAIR      LEG SURGERY       Social History     Socioeconomic History    Marital status:      Spouse name: Not on file    Number of children: 3    Years of education: 15    Highest education level: High school graduate   Occupational History    Occupation:    Tobacco Use    Smoking status: Former Smoker     Packs/day: 2.00     Years: 40.00     Pack years: 80.00     Quit date: 8/5/2002     Years since quittin.5    Smokeless tobacco: Never Used   Vaping Use    Vaping Use: Never used   Substance and Sexual Activity    Alcohol use: No    Drug use: No    Sexual activity: Yes     Partners: Female   Other Topics Concern    Not on file   Social History Narrative    Not on file     Social Determinants of Health     Financial Resource Strain: Low Risk     Difficulty of Paying Living Expenses: Not hard at all   Food Insecurity: No Food Insecurity    Worried About [address] in the Last Year: Never true    [facility] in the Last Year: Never true   Transportation Needs:     Lack of Transportation (Medical): Not on file    Lack of Transportation (Non-Medical):  Not on file   Physical Activity:     Days of Exercise per Week: Not on file    Minutes of Exercise per Session: Not on file   Stress:     Feeling of Stress : Not on file   Social Connections:     Frequency of Communication with Friends and Family: Not on file    Frequency of Social Gatherings with Friends and Family: Not on file    Attends Jehovah's witness Services: Not on file    Active Member of [address] [place] [address] or Organizations: Not on file    Attends Club or Organization Meetings: Not on file    Marital Status: Not on file   Intimate Partner Violence:     Fear of Current or Ex-Partner: Not on file    Emotionally Abused: Not on file    Physically Abused: Not on file    Sexually Abused: Not on file   Housing Stability:     Unable to Pay for Housing in the Last Year: Not on file    Number of [place] in the Last Year: Not on file    Unstable Housing in the Last Year: Not on file     Family History   Problem Relation Age of Onset    Cancer Other         colon cancer in multiple family members and breast cancer    Heart Disease Other      Allergies   Allergen Reactions    Brilinta [Ticagrelor] Shortness Of Breath    Sildenafil Citrate      headache    Advair [Fluticasone-Salmeterol]      Chest pain    Fluticasone Propionate (Inhal)          Review of Systems  No other similar lesions reported. No fever chills. Objective:   Pulse 68   Temp 97.4 °F (36.3 °C) (Temporal)   Ht 6' (1.829 m)   Wt 210 lb (95.3 kg)   SpO2 98%   BMI 28.48 kg/m²     ORTHO EXAM  Pleasant gentleman in statured. About the right shoulder demonstrates a large cystic mass over the [facility] joint approximately 3 x 3 cm. Is fluctuant. There is no warmth, no erythema, no crepitus. Minimal tenderness. The remainder of her shoulder exam is unremarkable. Radiographs and Laboratory Studies:     Diagnostic Imaging Studies:      X-rays from last summer of the right shoulder demonstrate some mild degenerative changes and spurring about the right [facility] joint. Laboratory Studies:   Lab Results   Component Value Date    WBC 15.1 (H) 12/29/2021    HGB 15.3 12/29/2021    HCT 48.1 12/29/2021    MCV 90.2 12/29/2021     12/29/2021     No results found for: SEDRATE pression:  No results found for: CRP    Assessment:       Diagnosis Orders   1. Osteoarthritis of right AC (acromioclavicular) joint       Impression: Ganglion cyst associated with degenerative changes about the right AC joint. Plan: We elected proceed with an aspiration injection at this time. I reviewed with Giancarlo Broderick and his wife the procedure, risk, outcomes. Procedure: Area of cyst was prepped with Betadine and alcohol. Skin was anesthetized with 1% lidocaine. The cyst was aspirated for 10 cc of gelatinous fluid. Will 1 cc of Depo-Medrol was injected. This was tolerated well. He was instructed on icing his shoulder tonight. It may take some time for the swelling to subside and it may never subside completely. As long as his symptoms are manageable he will tolerate the current findings. Follow-up in any time should the cyst once again returned to its enlarged state. No orders of the defined types were placed in this encounter.     No orders of the defined types were placed in this encounter. No follow-ups on file.       Tim Sullivan MD

## 2022-03-10 ENCOUNTER — OFFICE VISIT (OUTPATIENT)
Dept: ORTHOPEDIC SURGERY | Age: 82
End: 2022-03-10
Payer: MEDICARE

## 2022-03-10 DIAGNOSIS — M67.40 GANGLION CYST: Primary | ICD-10-CM

## 2022-03-10 DIAGNOSIS — M67.40 GANGLION CYST: ICD-10-CM

## 2022-03-10 PROCEDURE — G8484 FLU IMMUNIZE NO ADMIN: HCPCS | Performed by: ORTHOPAEDIC SURGERY

## 2022-03-10 PROCEDURE — G8427 DOCREV CUR MEDS BY ELIG CLIN: HCPCS | Performed by: ORTHOPAEDIC SURGERY

## 2022-03-10 PROCEDURE — 99213 OFFICE O/P EST LOW 20 MIN: CPT | Performed by: ORTHOPAEDIC SURGERY

## 2022-03-10 PROCEDURE — 1123F ACP DISCUSS/DSCN MKR DOCD: CPT | Performed by: ORTHOPAEDIC SURGERY

## 2022-03-10 PROCEDURE — G8417 CALC BMI ABV UP PARAM F/U: HCPCS | Performed by: ORTHOPAEDIC SURGERY

## 2022-03-10 PROCEDURE — 4040F PNEUMOC VAC/ADMIN/RCVD: CPT | Performed by: ORTHOPAEDIC SURGERY

## 2022-03-10 PROCEDURE — 1036F TOBACCO NON-USER: CPT | Performed by: ORTHOPAEDIC SURGERY

## 2022-03-10 NOTE — PROGRESS NOTES
Subjective:      Patient ID: Werner Young is a 79 y.o. male who presents today for:  Chief Complaint   Patient presents with    Follow-up     follow up ganglion cyst right clavical. . Pt states he does have fluid come out of where you took the fluid out. HPI  Surgeon about 2 weeks ago for a ganglion cyst on his right AC joint. Was aspirated for typical gelatinous fluid. Small dose of the steroid preparation was injected. Since that time has had continuous drainage. Is been yellow serous occasionally thick. Denies any pain. Denies any inflammatory findings.     Past Medical History:   Diagnosis Date    Adrenal adenoma     CT 2011 stable    Anxiety     Asbestosis(501)     CAD (coronary artery disease)     status post stent     Cancer ([address]     basil cell carnoma    COPD (chronic obstructive pulmonary disease) (HCC)     Elbow injury     left    Erectile dysfunction     Granulomatous lung disease (HCC)     Herpes zoster     Hyperlipidemia     Hypertension     Insomnia     Interstitial fibrosis     Obstructive sleep apnea on CPAP 2017    Prostate nodule     Scarlet fever     Urine frequency      Past Surgical History:   Procedure Laterality Date    CARDIAC CATHETERIZATION      CARDIOVASCULAR STRESS TEST      2010 normal per patient    COLONOSCOPY      2009 tubular adenomas    [facility]  [facility]    EYE SURGERY  02/10/14    DR Carter [facility]hart  [facility]    HERNIA REPAIR      LEG SURGERY       Social History     Socioeconomic History    Marital status:      Spouse name: Not on file    Number of children: 3    Years of education: 15    Highest education level: High school graduate   Occupational History    Occupation:    Tobacco Use    Smoking status: Former Smoker     Packs/day: 2.00     Years: 40.00     Pack years: 80.00     Quit date: 2002     Years since quittin.6    Smokeless tobacco: Never Used   Vaping Use    Vaping Use: Never used   Substance and Sexual Activity    Alcohol use: No    Drug use: No    Sexual activity: Yes     Partners: Female   Other Topics Concern    Not on file   Social History Narrative    Not on file     Social Determinants of Health     Financial Resource Strain: Low Risk     Difficulty of Paying Living Expenses: Not hard at all   Food Insecurity: No Food Insecurity    Worried About Running Out of Food in the Last Year: Never true    [address] in the Last Year: Never true   Transportation Needs:     Lack of Transportation (Medical): Not on file    Lack of Transportation (Non-Medical): Not on file   Physical Activity:     Days of Exercise per Week: Not on file    Minutes of Exercise per Session: Not on file   Stress:     Feeling of Stress : Not on file   Social Connections:     Frequency of Communication with Friends and Family: Not on file    Frequency of Social Gatherings with Friends and Family: Not on file    Attends Muslim Services: Not on file    Active Member of [address] [place] [address] or Organizations: Not on file    Attends Club or Organization Meetings: Not on file    Marital Status: Not on file   Intimate Partner Violence:     Fear of Current or Ex-Partner: Not on file    Emotionally Abused: Not on file    Physically Abused: Not on file    Sexually Abused: Not on file   Housing Stability:     Unable to Pay for Housing in the Last Year: Not on file    Number of [place] in the Last Year: Not on file    Unstable Housing in the Last Year: Not on file     Family History   Problem Relation Age of Onset    Cancer Other         colon cancer in multiple family members and breast cancer    Heart Disease Other      Allergies   Allergen Reactions    Brilinta [Ticagrelor] Shortness Of Breath    Sildenafil Citrate      headache    Advair [Fluticasone-Salmeterol]      Chest pain    Fluticasone Propionate (Inhal)          Review of Systems  Unremarkable.   Denies fever chills. Objective: There were no vitals taken for this visit. ORTHO EXAM  [facility] is appreciated. Is not as large as it was last visit. Is quite soft. There is no erythema, no warmth and no tenderness. Trace yellow drainage is appreciated. Radiographs and Laboratory Studies:     Diagnostic Imaging Studies:          Laboratory Studies:   Lab Results   Component Value Date    WBC 15.1 (H) 12/29/2021    HGB 15.3 12/29/2021    HCT 48.1 12/29/2021    MCV 90.2 12/29/2021     12/29/2021     No results found for: SEDRATE  No results found for: CRP    Assessment:       Diagnosis Orders   1. Ganglion cyst  Culture, Body Fluid     Impression: Ganglion cyst about the right AC joint with continued drainage. He is not a good operative candidate due to his COPD for which she is on daily oxygen. Plan: Today extruded as much fluid from the cystic lesion as possible. This was actually quite comfortable for him. In addition the fluid was cultured. Continue to remain however clear and slightly gelatinous. Dressing was applied. We will continue to treat there is observation. Hopefully with the decrease in pressure the drain site with epithelialize. I will see him back in 2 weeks if there is any continuous drainage. Orders Placed This Encounter   Procedures    Culture, Body Fluid     Standing Status:   Future     Standing Expiration Date:   3/10/2023     No orders of the defined types were placed in this encounter. No follow-ups on file.       Joe Hutchins MD

## 2022-03-16 ENCOUNTER — TELEPHONE (OUTPATIENT)
Dept: FAMILY MEDICINE CLINIC | Age: 82
End: 2022-03-16

## 2022-03-16 LAB — BODY FLUID CULTURE, STERILE: NORMAL

## 2022-03-16 NOTE — TELEPHONE ENCOUNTER
----- Message from [facility] sent at 3/8/2022 12:08 PM EST -----  Subject: Appointment Request    Reason for Call: Routine (Patient Request) No Script    QUESTIONS  Type of Appointment? Established Patient  Reason for appointment request? No appointments available during search  Additional Information for Provider? Patient would like an appointment in   March and with his PCP only i the first available was not until aril 14   and it was VV he would like in person with his pcp I did find with other   providers this month but patient would not accept please call back to   assist.   ---------------------------------------------------------------------------  --------------  [address]  What is the best way for the office to contact you? OK to leave message on   voicemail  Preferred Call Back Phone Number? [phone]  ---------------------------------------------------------------------------  --------------  SCRIPT ANSWERS  Relationship to Patient? Self  (Is the patient requesting to see the provider for a procedure?)? No  (Is the patient requesting to see the provider urgently  today or   tomorrow. )? No  Have you been diagnosed with, awaiting test results for, or told that you   are suspected of having COVID-19 (Coronavirus)? (If patient has tested   negative or was tested as a requirement for work, school, or travel and   not based on symptoms, answer no)? Yes  Did your symptoms begin within the past 10 days or was your positive test   result within the past 10 days? No  Within the past 10 days have you developed any of the following symptoms   (answer no if symptoms have been present longer than 10 days or began   more than 10 days ago)? Fever or Chills, Cough, Shortness of breath or   difficulty breathing, Loss of taste or smell, Sore throat, Nasal   congestion, Sneezing or runny nose, Fatigue or generalized body aches   (answer no if pain is specific to a body part e.g. back pain), Diarrhea,   Headache? No  Have you had close contact with someone with COVID-19 in the last 7 days? No  (Service Expert  click yes below to proceed with [facility] As Usual   Scheduling)?  Yes

## 2022-03-17 ENCOUNTER — OFFICE VISIT (OUTPATIENT)
Dept: PULMONOLOGY | Age: 82
End: 2022-03-17
Payer: MEDICARE

## 2022-03-17 VITALS
BODY MASS INDEX: 28.85 KG/M2 | WEIGHT: 213 LBS | SYSTOLIC BLOOD PRESSURE: 133 MMHG | HEIGHT: 72 IN | OXYGEN SATURATION: 95 % | DIASTOLIC BLOOD PRESSURE: 83 MMHG | TEMPERATURE: 98.3 F | HEART RATE: 88 BPM

## 2022-03-17 DIAGNOSIS — U07.1 COVID-19: ICD-10-CM

## 2022-03-17 DIAGNOSIS — I27.20 PULMONARY HTN (HCC): ICD-10-CM

## 2022-03-17 DIAGNOSIS — G47.33 OBSTRUCTIVE SLEEP APNEA: ICD-10-CM

## 2022-03-17 DIAGNOSIS — J44.9 CHRONIC OBSTRUCTIVE PULMONARY DISEASE, UNSPECIFIED COPD TYPE (HCC): Primary | ICD-10-CM

## 2022-03-17 DIAGNOSIS — J96.11 CHRONIC RESPIRATORY FAILURE WITH HYPOXIA AND HYPERCAPNIA (HCC): ICD-10-CM

## 2022-03-17 DIAGNOSIS — J96.12 CHRONIC RESPIRATORY FAILURE WITH HYPOXIA AND HYPERCAPNIA (HCC): ICD-10-CM

## 2022-03-17 PROCEDURE — 1036F TOBACCO NON-USER: CPT | Performed by: INTERNAL MEDICINE

## 2022-03-17 PROCEDURE — G8417 CALC BMI ABV UP PARAM F/U: HCPCS | Performed by: INTERNAL MEDICINE

## 2022-03-17 PROCEDURE — G8484 FLU IMMUNIZE NO ADMIN: HCPCS | Performed by: INTERNAL MEDICINE

## 2022-03-17 PROCEDURE — G8427 DOCREV CUR MEDS BY ELIG CLIN: HCPCS | Performed by: INTERNAL MEDICINE

## 2022-03-17 PROCEDURE — 4040F PNEUMOC VAC/ADMIN/RCVD: CPT | Performed by: INTERNAL MEDICINE

## 2022-03-17 PROCEDURE — 3023F SPIROM DOC REV: CPT | Performed by: INTERNAL MEDICINE

## 2022-03-17 PROCEDURE — 99214 OFFICE O/P EST MOD 30 MIN: CPT | Performed by: INTERNAL MEDICINE

## 2022-03-17 PROCEDURE — 1123F ACP DISCUSS/DSCN MKR DOCD: CPT | Performed by: INTERNAL MEDICINE

## 2022-03-17 ASSESSMENT — ENCOUNTER SYMPTOMS
COUGH: 0
RHINORRHEA: 0
VOICE CHANGE: 0
ABDOMINAL PAIN: 0
EYE ITCHING: 0
SHORTNESS OF BREATH: 1
CHEST TIGHTNESS: 0
VOMITING: 0
SORE THROAT: 0
DIARRHEA: 0
NAUSEA: 0
WHEEZING: 0

## 2022-03-17 NOTE — PROGRESS NOTES
Subjective:     Kartik Stringer is a 79 y.o. male who complains today of:     Chief Complaint   Patient presents with    COPD     4 month f/u    Sleep Apnea       HPI    He is using bronchodilator with symbicort 2 puff BID , combivent respimat 1 puff Q 4 hourly , nebulizer with duoneb neb prn , proair hfa prn , prednisone prn.   He is using 4 Lit with rest up to 5 lit with activity from [facility] [place]. He has COPD and chronic hypercapnic respiratory failure.   He is on 4-5 lit O2 24 hour a day.   He is using trilogy, noninvasive vent since last 3 years , DME is  medical services. C/o shortness of breath , worse with exertion. Occasional Wheezing. No Cough No Hemoptysis. No Chest tightness. No Chest pain with radiation  or pleuritic pain. No  leg edema. No orthopnea. No Fever or chills. No Rhinorrhea and postnasal drip.    12/29/21  BIBASILAR AREAS OF ATELECTASIS, PATCHY INFILTRATES IN BOTH BASES LEFT GREATER THAN RIGHT SUPERIMPOSED UPON RADIOGRAPHIC FINDINGS OF COPD. IMAGING FEATURES CAN BE SEEN WITH (COVID-19) PNEUMONIA, THOUGH ARE NONSPECIFIC AND CAN OCCUR WITH A VARIETY OF INFECTIOUS AND NONINFECTIOUS PROCESSES.       Allergies:  Brilinta [ticagrelor], Sildenafil citrate, Advair [fluticasone-salmeterol], and Fluticasone propionate (inhal)  Past Medical History:   Diagnosis Date    Adrenal adenoma     CT 6/2011 stable    Anxiety     Asbestosis(501)     CAD (coronary artery disease)     status post stent 2001    Cancer ([facility] [address]     basil cell carnoma    COPD (chronic obstructive pulmonary disease) (HCC)     Elbow injury     left    Erectile dysfunction     Granulomatous lung disease (HCC)     Herpes zoster     Hyperlipidemia     Hypertension     Insomnia     Interstitial fibrosis     Obstructive sleep apnea on CPAP 11/13/2017    Prostate nodule     Scarlet fever     Urine frequency      Past Surgical History:   Procedure Laterality Date    CARDIAC CATHETERIZATION      CARDIOVASCULAR STRESS TEST      2010 normal per patient    COLONOSCOPY      2009 tubular adenomas    COLONOSCOPY  08/13/15    Gabrielle Bryant MD    EYE SURGERY  02/10/14    DR Aline Mu[facility]iz  [facility]    HERNIA REPAIR      LEG SURGERY       Family History   Problem Relation Age of Onset    Cancer Other         colon cancer in multiple family members and breast cancer    Heart Disease Other      Social History     Socioeconomic History    Marital status:      Spouse name: Not on file    Number of children: 3    Years of education: 15    Highest education level: High school graduate   Occupational History    Occupation:    Tobacco Use    Smoking status: Former Smoker     Packs/day: 2.00     Years: 40.00     Pack years: 80.00     Quit date: 2002     Years since quittin.6    Smokeless tobacco: Never Used   Vaping Use    Vaping Use: Never used   Substance and Sexual Activity    Alcohol use: No    Drug use: No    Sexual activity: Yes     Partners: Female   Other Topics Concern    Not on file   Social History Narrative    Not on file     Social Determinants of Health     Financial Resource Strain: Low Risk     Difficulty of Paying Living Expenses: Not hard at all   Food Insecurity: No Food Insecurity    Worried About [address] in the Last Year: Never true    [facility] in the Last Year: Never true   Transportation Needs:     Lack of Transportation (Medical): Not on file    Lack of Transportation (Non-Medical):  Not on file   Physical Activity:     Days of Exercise per Week: Not on file    Minutes of Exercise per Session: Not on file   Stress:     Feeling of Stress : Not on file   Social Connections:     Frequency of Communication with Friends and Family: Not on file    Frequency of Social Gatherings with Friends and Family: Not on file    Attends [employer] Services: Not on file    Active Member of Clubs or Organizations: Not on file    Attends Club or Organization Meetings: Not on file    Marital Status: Not on file   Intimate Partner Violence:     Fear of Current or Ex-Partner: Not on file    Emotionally Abused: Not on file    Physically Abused: Not on file    Sexually Abused: Not on file   Housing Stability:     Unable to Pay for Housing in the Last Year: Not on file    Number of [place] in the Last Year: Not on file    Unstable Housing in the Last Year: Not on file         Review of Systems   Constitutional: Negative for chills, diaphoresis, fatigue and fever. HENT: Negative for congestion, mouth sores, nosebleeds, postnasal drip, rhinorrhea, sneezing, sore throat and voice change. Eyes: Negative for itching and visual disturbance. Respiratory: Positive for shortness of breath. Negative for cough, chest tightness and wheezing. Cardiovascular: Negative. Negative for chest pain, palpitations and leg swelling. Gastrointestinal: Negative for abdominal pain, diarrhea, nausea and vomiting. Genitourinary: Negative for difficulty urinating and hematuria. Musculoskeletal: Negative for arthralgias, joint swelling and myalgias. Skin: Negative for rash. Allergic/Immunologic: Negative for environmental allergies. Neurological: Negative for dizziness, tremors, weakness and headaches. Psychiatric/Behavioral: Negative for behavioral problems and sleep disturbance.         :     Vitals:    03/17/22 1259   BP: 133/83   Pulse: 88   Temp: 98.3 °F (36.8 °C)   SpO2: 95%   Weight: 213 lb (96.6 kg)   Height: 6' (1.829 m)     Wt Readings from Last 3 Encounters:   03/17/22 213 lb (96.6 kg)   02/24/22 210 lb (95.3 kg)   12/29/21 210 lb (95.3 kg)         Physical Exam  Constitutional:       Appearance: He is well-developed. HENT:      Head: Normocephalic and atraumatic. Nose: Nose normal.   Eyes:      General: Scleral icterus: r. Conjunctiva/sclera: Conjunctivae normal.      Pupils: Pupils are equal, round, and reactive to light. Neck:      Thyroid: No thyromegaly. Vascular: No JVD. Trachea: No tracheal deviation. Cardiovascular:      Rate and Rhythm: Normal rate and regular rhythm. Heart sounds: No murmur heard. No friction rub. No gallop. Pulmonary:      Effort: Pulmonary effort is normal. No respiratory distress. Breath sounds: Normal breath sounds. No wheezing or rales. Chest:      Chest wall: No tenderness. Abdominal:      General: There is no distension. Musculoskeletal:         General: Normal range of motion. Lymphadenopathy:      Cervical: No cervical adenopathy. Skin:     General: Skin is warm and dry. Findings: No rash. Neurological:      Mental Status: He is alert and oriented to person, place, and time. Cranial Nerves: No cranial nerve deficit.    Psychiatric:         Behavior: Behavior normal.         Current Outpatient Medications   Medication Sig Dispense Refill    fexofenadine (ALLEGRA) 180 MG tablet Take 1 tablet by mouth daily 90 tablet 2    busPIRone (BUSPAR) 30 MG tablet take 1 (ONE) tablet daily 30 tablet 3    doxycycline hyclate (VIBRAMYCIN) 100 MG capsule Take 1 capsule by mouth 2 times daily 20 capsule 0    temazepam (RESTORIL) 15 MG capsule TAKE 1 CAPSULE BY MOUTH TWICE DAILY      SYMBICORT 80-4.5 MCG/ACT AERO USE 2 INHALATIONS TWICE A DAY 30.6 g 3    COMBIVENT RESPIMAT  MCG/ACT AERS inhaler USE 1 INHALATION EVERY 6 HOURS 16 g 2    rosuvastatin (CRESTOR) 40 MG tablet Take 1 tablet by mouth daily 90 tablet 3    azelastine (OPTIVAR) 0.05 % ophthalmic solution Place 1 drop into both eyes 2 times daily      calcipotriene (DOVONEX) 0.005 % solution Apply to scalp daily      albuterol sulfate HFA (PROAIR HFA) 108 (90 Base) MCG/ACT inhaler Inhale 2 puffs into the lungs every 6 hours as needed for Wheezing 3 Inhaler 3    Melatonin 10 MG TABS Take by mouth      metoprolol succinate (TOPROL XL) 25 MG extended release tablet Take 25 mg by mouth every morning      metoprolol succinate (TOPROL XL) 50 MG extended release tablet Take 50 mg by mouth every evening      aspirin 81 MG tablet Take 81 mg by mouth daily      diltiazem (TIAZAC) 240 MG extended release capsule TAKE 1 CAPSULE BY MOUTH DAILY AS DIRECTED  3    CARTIA  MG extended release capsule Take 120 capsules by mouth every evening   3    OXYGEN Please provide patient with a portable oxygen concentrator 1 Units 0    dofetilide (TIKOSYN) 250 MCG capsule Take 250 mcg by mouth 2 times daily      Magnesium 500 MG TABS Take by mouth 2 times daily      Potassium 99 MG TABS Take by mouth daily      GLUTATHIONE PO Take by mouth      metoprolol tartrate (LOPRESSOR) 25 MG tablet Take 25 mg by mouth as needed      tadalafil (CIALIS) 5 MG tablet Take 1 tablet by mouth as needed for Erectile Dysfunction (maximum 5 mg per day) 30 tablet 3    Handicap Placard MISC by Does not apply route Good for 5 Years from 06/16/2016 1 each 0    digoxin (LANOXIN) 250 MCG tablet   11    ELIQUIS 5 MG TABS tablet   2    CINNAMON PO Take  by mouth.  SAW PALMETTO by Does not apply route.  ipratropium-albuterol (DUONEB) 0.5-2.5 (3) MG/3ML SOLN nebulizer solution Take 3 mLs by nebulization every 6 hours as needed for Shortness of Breath. 100 vial 2    Glucosamine-Chondroitin 500-400 MG CAPS Take 1 capsule by mouth daily.  Ascorbic Acid (VITAMIN C) 500 MG tablet Take 1,000 mg by mouth daily. 1/2 tab in AM and 1/2 tab in PM       vitamin D (CHOLECALCIFEROL) 400 UNIT TABS tablet Take 800 Units by mouth daily.  vitamin B-12 (CYANOCOBALAMIN) 1000 MCG tablet Take 1,000 mcg by mouth daily.  Selenium 200 MCG TABS Take 200 mcg by mouth daily.  Thiamine HCl (VITAMIN B-1) 100 MG tablet Take 100 mg by mouth daily.  folic acid (FOLVITE) 643 MCG tablet Take 800 mcg by mouth daily.  Zinc 50 MG CAPS Take 50 mg by mouth daily.  beta carotene 29526 UNIT capsule Take 25,000 Units by mouth daily.         DHEA 25 MG TABS Take 25 mg by mouth daily.  Pyridoxine HCl (VITAMIN B-6) 50 MG tablet Take 100 mg by mouth daily.  Calcium Carb-Cholecalciferol (CALCIUM 1000 + D) 1000-800 MG-UNIT TABS Take 1 tablet by mouth 2 times daily.  Methylsulfonylmethane (MSM) 1000 MG TABS Take 1,000 mg by mouth daily.  vitamin E 400 UNIT capsule Take 400 Units by mouth daily. No current facility-administered medications for this visit. Results for orders placed during the hospital encounter of 10/22/21    XR CHEST (2 VW)    Narrative  EXAMINATION: XR CHEST (2 VW). DATE AND TIME:10/22/2021 6:07 PM    CLINICAL HISTORY: Shortness of breath  R06.02 SOB (shortness of breath) ICD10    COMPARISONS: January 20, 2020    FINDINGS: The heart, mediastinum and pulmonary vasculature are within normal limits. Visualized lung fields are clear. Bones unremarkable. Impression  NO  ACTIVE LUNG DISEASE. Results for orders placed during the hospital encounter of 01/28/20    XR CHEST STANDARD (2 VW)    Narrative  EXAMINATION: XR CHEST (2 VW)    CLINICAL HISTORY: J44.1 COPD exacerbation ([facility] [address] ICD10    COMPARISONS: 1/24/2020    FINDINGS: Cardiac size is borderline. Pulmonary vascularity is normal. Lungs are hyperinflated with increase in the AP diameter of the chest and flattening the diaphragms, concordant with clinical history of COPD. There is scarring adjacent to the  cardiac silhouette in the left lower lung. There is coronary artery stenting. There are no acute infiltrates. Prominent nipple shadows, left greater than right are noted. There are mild degenerative changes in the spine. Impression  COPD. NO ACUTE CARDIOPULMONARY DISEASE.       Results for orders placed during the hospital encounter of 10/23/18    XR CHEST STANDARD (2 VW)    Narrative  EXAMINATION: XR CHEST (2 VW)    CLINICAL HISTORY: CHRONIC RESPIRATORY FAILURE    COMPARISONS: CT CHEST AUGUST 7, 2017, CHEST RADIOGRAPH, APRIL 6, 2017    FINDINGS: Degenerative change thoracic spine. Cardiopericardial silhouette is normal. Pulmonary vasculature is normal. Aorta calcified and tortuous. Lungs clear and hyperinflated. Diaphragms flattened. Retrosternal clear space increased. Impression  EMPHYSEMA. NO ACUTE CARDIOPULMONARY DISEASE  ]  Results for orders placed during the hospital encounter of 12/29/21    XR CHEST PORTABLE    Narrative  EXAMINATION: CHEST PORTABLE VIEW    CLINICAL HISTORY: Short of breath    COMPARISONS: October 22, 2021 1806 hours    FINDINGS:    Single  views of the chest is submitted. The cardiac silhouette is enlarged  Pulmonary vascular attenuated  Right sided trachea. There are bibasilar areas of atelectasis, patchy infiltrates in both bases left greater than right. .  No Pneumothoraces. Impression  BIBASILAR AREAS OF ATELECTASIS, PATCHY INFILTRATES IN BOTH BASES LEFT GREATER THAN RIGHT SUPERIMPOSED UPON RADIOGRAPHIC FINDINGS OF COPD. IMAGING FEATURES CAN BE SEEN WITH (COVID-19) PNEUMONIA, THOUGH ARE NONSPECIFIC AND CAN OCCUR WITH A VARIETY OF INFECTIOUS AND NONINFECTIOUS PROCESSES. Results for orders placed during the hospital encounter of 01/24/20    XR CHEST PORTABLE    Narrative  EXAMINATION: PORTABLE CHEST X-RAY FROM 1/24/2020 17:27 HOURS    CLINICAL HISTORY: SMOKING HISTORY AND COPD, PATIENT WITH DYSPNEA    COMPARISONS: 10/23/2018    FINDINGS: The heart is not enlarged. There is an atherosclerotic tortuous aorta. There is hyperinflation of the lungs and coarse pulmonary markings compatible with COPD with pulmonary interstitial fibrosis. There are granulomata in both hilar regions  compatible with remote granulomatous disease. There are no acute pulmonary infiltrates or pleural effusions. There is no pneumothorax. There is mild degenerative bone spurring in the spine. Impression  COPD WITHOUT AN ACUTE PULMONARY INFILTRATE OR PLEURAL EFFUSION. Assessment/Plan:     1.  Chronic obstructive pulmonary disease, unspecified COPD type ([facility] [address]  He is using bronchodilator with symbicort 2 puff BID , combivent respimat 1 puff Q 4 hourly , nebulizer with duoneb neb prn , proair hfa prn , prednisone prn. He is using 4 Lit with rest up to 5 lit with activity from [facility]. He has COPD and chronic hypercapnic respiratory failure.   C/o shortness of breath , worse with exertion. Occasional Wheezing. No Cough. 12/29/21  BIBASILAR AREAS OF ATELECTASIS, PATCHY INFILTRATES IN BOTH BASES LEFT GREATER THAN RIGHT SUPERIMPOSED UPON RADIOGRAPHIC FINDINGS OF COPD. IMAGING FEATURES CAN BE SEEN WITH (COVID-19) PNEUMONIA, THOUGH ARE NONSPECIFIC AND CAN OCCUR WITH A VARIETY OF INFECTIOUS AND NONINFECTIOUS PROCESSES. - XR CHEST STANDARD (2 VW); Future    2. Chronic respiratory failure with hypoxia and hypercapnia (HCC)  He is on 4-5 lit O2 24 hour a day. He is using trilogy, noninvasive vent since last 3 years , DME is  medical services. 3. Pulmonary HTN ([facility] [address]  He has last 2D echo in 2017 shows mild pulmonary hypertension.  Recommend to avoid hypoxia keep O2 saturation 90% above. 4. Obstructive sleep apnea  He is using trilogy, noninvasive vent and 4 L O2 since last 3 years. Continue same      Return in about 3 months (around 6/17/2022) for COPD, chronic respiratory failure.       Kacey Servin MD

## 2022-03-18 ENCOUNTER — HOSPITAL ENCOUNTER (OUTPATIENT)
Dept: LAB | Age: 82
Discharge: HOME | End: 2022-03-18
Payer: MEDICARE

## 2022-03-18 DIAGNOSIS — R97.20 ELEVATED PSA: Primary | ICD-10-CM

## 2022-03-18 DIAGNOSIS — R97.20 ELEVATED PSA: ICD-10-CM

## 2022-03-18 DIAGNOSIS — E11.59 TYPE 2 DIABETES MELLITUS WITH OTHER CIRCULATORY COMPLICATION, WITHOUT LONG-TERM CURRENT USE OF INSULIN (HCC): ICD-10-CM

## 2022-03-18 LAB
ALBUMIN SERPL-MCNC: 4.3 G/DL (ref 3.5–4.6)
ALP BLD-CCNC: 98 U/L (ref 35–104)
ALT SERPL-CCNC: 32 U/L (ref 0–41)
ANION GAP SERPL CALCULATED.3IONS-SCNC: 14 MEQ/L (ref 9–15)
ANISOCYTOSIS: ABNORMAL
AST SERPL-CCNC: 23 U/L (ref 0–40)
BANDED NEUTROPHILS RELATIVE PERCENT: 2 %
BASOPHILS ABSOLUTE: 0 K/UL (ref 0–0.2)
BASOPHILS RELATIVE PERCENT: 0.3 %
BILIRUB SERPL-MCNC: 0.8 MG/DL (ref 0.2–0.7)
BUN BLDV-MCNC: 15 MG/DL (ref 8–23)
CALCIUM SERPL-MCNC: 9.6 MG/DL (ref 8.5–9.9)
CHLORIDE BLD-SCNC: 96 MEQ/L (ref 95–107)
CHOLESTEROL, TOTAL: 156 MG/DL (ref 0–199)
CO2: 26 MEQ/L (ref 20–31)
CREAT SERPL-MCNC: 0.68 MG/DL (ref 0.7–1.2)
CREATININE URINE: 51.7 MG/DL
EOSINOPHILS ABSOLUTE: 0.1 K/UL (ref 0–0.7)
EOSINOPHILS RELATIVE PERCENT: 2 %
GFR AFRICAN AMERICAN: >60
GFR NON-AFRICAN AMERICAN: >60
GLOBULIN: 2.5 G/DL (ref 2.3–3.5)
GLUCOSE BLD-MCNC: 273 MG/DL (ref 70–99)
HCT VFR BLD CALC: 49.1 % (ref 42–52)
HDLC SERPL-MCNC: 44 MG/DL (ref 40–59)
HEMOGLOBIN: 15.7 G/DL (ref 14–18)
LDL CHOLESTEROL CALCULATED: 78 MG/DL (ref 0–129)
LYMPHOCYTES ABSOLUTE: 1.2 K/UL (ref 1–4.8)
LYMPHOCYTES RELATIVE PERCENT: 18 %
MCH RBC QN AUTO: 28.2 PG (ref 27–31.3)
MCHC RBC AUTO-ENTMCNC: 32 % (ref 33–37)
MCV RBC AUTO: 88.3 FL (ref 80–100)
MICROALBUMIN UR-MCNC: 5 MG/DL
MICROALBUMIN/CREAT UR-RTO: 96.7 MG/G (ref 0–30)
MONOCYTES ABSOLUTE: 0.8 K/UL (ref 0.2–0.8)
MONOCYTES RELATIVE PERCENT: 11.9 %
NEUTROPHILS ABSOLUTE: 4.4 K/UL (ref 1.4–6.5)
NEUTROPHILS RELATIVE PERCENT: 66 %
NUCLEATED RED BLOOD CELLS: 1 /100 WBC
PDW BLD-RTO: 15.5 % (ref 11.5–14.5)
PLATELET # BLD: 205 K/UL (ref 130–400)
PLATELET SLIDE REVIEW: NORMAL
POTASSIUM SERPL-SCNC: 4.5 MEQ/L (ref 3.4–4.9)
PROSTATE SPECIFIC ANTIGEN: 5.75 NG/ML (ref 0–4)
RBC # BLD: 5.56 M/UL (ref 4.7–6.1)
SODIUM BLD-SCNC: 136 MEQ/L (ref 135–144)
TOTAL PROTEIN: 6.8 G/DL (ref 6.3–8)
TRIGL SERPL-MCNC: 172 MG/DL (ref 0–150)
WBC # BLD: 6.5 K/UL (ref 4.8–10.8)

## 2022-03-18 PROCEDURE — 80053 COMPREHEN METABOLIC PANEL: CPT

## 2022-03-18 PROCEDURE — 82043 UR ALBUMIN QUANTITATIVE: CPT

## 2022-03-18 PROCEDURE — 84153 ASSAY OF PSA TOTAL: CPT

## 2022-03-18 PROCEDURE — 82570 ASSAY OF URINE CREATININE: CPT

## 2022-03-18 PROCEDURE — 85025 COMPLETE CBC W/AUTO DIFF WBC: CPT

## 2022-03-18 PROCEDURE — 80061 LIPID PANEL: CPT

## 2022-03-18 PROCEDURE — 36415 COLL VENOUS BLD VENIPUNCTURE: CPT

## 2022-03-21 ENCOUNTER — TELEMEDICINE (OUTPATIENT)
Dept: FAMILY MEDICINE CLINIC | Age: 82
End: 2022-03-21
Payer: MEDICARE

## 2022-03-21 DIAGNOSIS — E11.59 TYPE 2 DIABETES MELLITUS WITH OTHER CIRCULATORY COMPLICATION, WITHOUT LONG-TERM CURRENT USE OF INSULIN (HCC): Primary | ICD-10-CM

## 2022-03-21 DIAGNOSIS — J96.12 CHRONIC RESPIRATORY FAILURE WITH HYPOXIA AND HYPERCAPNIA (HCC): ICD-10-CM

## 2022-03-21 DIAGNOSIS — I48.0 PAROXYSMAL ATRIAL FIBRILLATION (HCC): ICD-10-CM

## 2022-03-21 DIAGNOSIS — G47.00 INSOMNIA, UNSPECIFIED TYPE: ICD-10-CM

## 2022-03-21 DIAGNOSIS — E78.2 MIXED HYPERLIPIDEMIA: ICD-10-CM

## 2022-03-21 DIAGNOSIS — I10 ESSENTIAL HYPERTENSION: ICD-10-CM

## 2022-03-21 DIAGNOSIS — I25.10 CORONARY ARTERY DISEASE INVOLVING NATIVE CORONARY ARTERY OF NATIVE HEART WITHOUT ANGINA PECTORIS: ICD-10-CM

## 2022-03-21 DIAGNOSIS — J44.9 CHRONIC OBSTRUCTIVE PULMONARY DISEASE, UNSPECIFIED COPD TYPE (HCC): ICD-10-CM

## 2022-03-21 DIAGNOSIS — J96.11 CHRONIC RESPIRATORY FAILURE WITH HYPOXIA AND HYPERCAPNIA (HCC): ICD-10-CM

## 2022-03-21 DIAGNOSIS — M67.40 GANGLION CYST: ICD-10-CM

## 2022-03-21 DIAGNOSIS — R97.20 ELEVATED PSA: ICD-10-CM

## 2022-03-21 DIAGNOSIS — M89.8X9 BONY GROWTH: ICD-10-CM

## 2022-03-21 PROCEDURE — 1036F TOBACCO NON-USER: CPT | Performed by: NURSE PRACTITIONER

## 2022-03-21 PROCEDURE — G8417 CALC BMI ABV UP PARAM F/U: HCPCS | Performed by: NURSE PRACTITIONER

## 2022-03-21 PROCEDURE — G8427 DOCREV CUR MEDS BY ELIG CLIN: HCPCS | Performed by: NURSE PRACTITIONER

## 2022-03-21 PROCEDURE — G8484 FLU IMMUNIZE NO ADMIN: HCPCS | Performed by: NURSE PRACTITIONER

## 2022-03-21 PROCEDURE — 99214 OFFICE O/P EST MOD 30 MIN: CPT | Performed by: NURSE PRACTITIONER

## 2022-03-21 PROCEDURE — 4040F PNEUMOC VAC/ADMIN/RCVD: CPT | Performed by: NURSE PRACTITIONER

## 2022-03-21 PROCEDURE — 3023F SPIROM DOC REV: CPT | Performed by: NURSE PRACTITIONER

## 2022-03-21 PROCEDURE — 1123F ACP DISCUSS/DSCN MKR DOCD: CPT | Performed by: NURSE PRACTITIONER

## 2022-03-21 RX ORDER — TEMAZEPAM 15 MG/1
CAPSULE ORAL
Qty: 60 CAPSULE | Refills: 2 | Status: SHIPPED | OUTPATIENT
Start: 2022-03-21 | End: 2022-06-07 | Stop reason: SDUPTHER

## 2022-03-21 RX ORDER — TEMAZEPAM 15 MG/1
CAPSULE ORAL
Status: CANCELLED | OUTPATIENT
Start: 2022-03-21

## 2022-03-21 NOTE — PROGRESS NOTES
3/21/2022    TELEHEALTH EVALUATION -- Audio/Visual (During SQFOS-90 public health emergency)    Due to [place] 19 outbreak, patient's office visit was converted to a virtual visit. Patient was contacted and agreed to proceed with a virtual visit via D[employer]. me  The risks and benefits of converting to a virtual visit were discussed in light of the current infectious disease epidemic. Patient also understood that insurance coverage and co-pays are up to their individual insurance plans. Marizol Manzanares, was evaluated through a synchronous (real-time) audio-video encounter. The patient (or guardian if applicable) is aware that this is a billable service, which includes applicable co-pays. This Virtual Visit was conducted with patient's (and/or legal guardian's) consent. The visit was conducted pursuant to the emergency declaration under the [address] [place] [address], [address] [place] [address] authority and the [employer] Act. Patient identification was verified, and a caregiver was present when appropriate. The patient was located at home in a state where the provider was licensed to provide care. Total time spent for this encounter: Not billed by time    The patient is talking with me virtually from his home and I am located at my office in [facility]. HPI:    Marizol Manzanares (:  1940) has requested an audio/video evaluation for the following concern(s):    Diabetes: No reported hypoglycemic episodes. Patient has been getting activity as tolerated. No significant changes to his diet overall. He prefers to remain off of medication for diabetes as long as possible. HTN, dyslipidemia/CAD/atrial fibrillation: He continues to follow routinely with cardiology. No recent problems with stomach upset or muscle cramping. No heart palpitations. No significant lightheadedness or dizziness.     COPD: He feels that breathing is back to baseline recently. No significant chest congestion or sputum production reported. Continues to wear oxygen routinely. He follows with [facility] pulmonology. Insomnia: He continues to take Restoril to help him sleep at night. Typically takes 2 tablets to help him sleep throughout the night. No medication side effects reported and he has been better able to sleep consecutive hours with the help of the medication. Elevated PSA: He is not reporting new onset urinary symptoms. No flank pain. No hematuria or dysuria. Review of Systems   This patient reports no chest pain or pressure. There is no shortness of breath or cough. The patient reports no nausea or vomiting. There is no heartburn or indigestion. There is no diarrhea or constipation. No black, bloody, mucusy or tarry stool noticed. The patient reports no bloating and no change in appetite. There is no numbness, tingling or swelling in the extremities. He states that he has followed with orthopedic specialist for bony growth near the right shoulder. Has had cultured fluid him plans to follow-up later this week. No redness or swelling to the site reported. Prior to Visit Medications    Medication Sig Taking?  Authorizing Provider   temazepam (RESTORIL) 15 MG capsule TAKE 1 CAPSULE TWICE DAILY Yes TRINA Lopez CNP   albuterol-ipratropium (COMBIVENT RESPIMAT)  MCG/ACT AERS inhaler Inhale 1 puff into the lungs every 4 hours as needed for Wheezing or Shortness of Breath Yes TRINA Trujillo CNP   fexofenadine (ALLEGRA) 180 MG tablet Take 1 tablet by mouth daily Yes TRINA Trujillo CNP   busPIRone (BUSPAR) 30 MG tablet take 1 (ONE) tablet daily Yes TRINA Trujillo CNP   doxycycline hyclate (VIBRAMYCIN) 100 MG capsule Take 1 capsule by mouth 2 times daily Yes TRINA Trujillo CNP   temazepam (RESTORIL) 15 MG capsule TAKE 1 CAPSULE BY MOUTH TWICE DAILY Yes Historical Provider, MD   SYMBICORT 80-4.5 MCG/ACT AERO USE 2 INHALATIONS TWICE A DAY Yes Isabel Rain MD   COMBIVENT RESPIMAT  MCG/ACT AERS inhaler USE 1 INHALATION EVERY 6 HOURS Yes TRINA Lopez CNP   rosuvastatin (CRESTOR) 40 MG tablet Take 1 tablet by mouth daily Yes TRINA Ramirez - CNP   azelastine (OPTIVAR) 0.05 % ophthalmic solution Place 1 drop into both eyes 2 times daily Yes Historical Provider, MD   calcipotriene (DOVONEX) 0.005 % solution Apply to scalp daily Yes Historical Provider, MD   albuterol sulfate HFA (PROAIR HFA) 108 (90 Base) MCG/ACT inhaler Inhale 2 puffs into the lungs every 6 hours as needed for Wheezing Yes Isabel Rain MD   Melatonin 10 MG TABS Take by mouth Yes Historical Provider, MD   metoprolol succinate (TOPROL XL) 25 MG extended release tablet Take 25 mg by mouth every morning Yes Historical Provider, MD   metoprolol succinate (TOPROL XL) 50 MG extended release tablet Take 50 mg by mouth every evening Yes Historical Provider, MD   aspirin 81 MG tablet Take 81 mg by mouth daily Yes Historical Provider, MD   diltiazem (TIAZAC) 240 MG extended release capsule TAKE 1 CAPSULE BY MOUTH DAILY AS DIRECTED Yes Historical Provider, MD   CARTIA  MG extended release capsule Take 120 capsules by mouth every evening  Yes Historical Provider, MD   OXYGEN Please provide patient with a portable oxygen concentrator Yes Shannon Salmon PA-C   dofetilide (TIKOSYN) 250 MCG capsule Take 250 mcg by mouth 2 times daily Yes Historical Provider, MD   Magnesium 500 MG TABS Take by mouth 2 times daily Yes Historical Provider, MD   Potassium 99 MG TABS Take by mouth daily Yes Historical Provider, MD   metoprolol tartrate (LOPRESSOR) 25 MG tablet Take 25 mg by mouth as needed Yes Historical Provider, MD   tadalafil (CIALIS) 5 MG tablet Take 1 tablet by mouth as needed for Erectile Dysfunction (maximum 5 mg per day) Yes TRINA Ramirez CNP   Handicap Placard MISC by Does not apply route Good for 5 Years from 06/16/2016 Yes Nitza Montanez MD   digoxin (LANOXIN) 250 MCG tablet  Yes Historical Provider, MD   ELIQUIS 5 MG TABS tablet  Yes Historical Provider, MD   CINNAMON PO Take  by mouth. Yes Historical Provider, MD CHI KURTZARIANA by Does not apply route. Yes Historical Provider, MD   ipratropium-albuterol (DUONEB) 0.5-2.5 (3) MG/3ML SOLN nebulizer solution Take 3 mLs by nebulization every 6 hours as needed for Shortness of Breath. Yes Christopher Moulton MD   Glucosamine-Chondroitin 500-400 MG CAPS Take 1 capsule by mouth daily. Yes Historical Provider, MD   Ascorbic Acid (VITAMIN C) 500 MG tablet Take 1,000 mg by mouth daily. 1/2 tab in AM and 1/2 tab in PM  Yes Historical Provider, MD   vitamin D (CHOLECALCIFEROL) 400 UNIT TABS tablet Take 800 Units by mouth daily. Yes Historical Provider, MD   vitamin B-12 (CYANOCOBALAMIN) 1000 MCG tablet Take 1,000 mcg by mouth daily. Yes Historical Provider, MD   Selenium 200 MCG TABS Take 200 mcg by mouth daily. Yes Historical Provider, MD   Thiamine HCl (VITAMIN B-1) 100 MG tablet Take 100 mg by mouth daily. Yes Historical Provider, MD   folic acid (FOLVITE) 688 MCG tablet Take 800 mcg by mouth daily. Yes Historical Provider, MD   Zinc 50 MG CAPS Take 50 mg by mouth daily. Yes Historical Provider, MD   beta carotene 87354 UNIT capsule Take 25,000 Units by mouth daily. Yes Historical Provider, MD   DHEA 25 MG TABS Take 25 mg by mouth daily. Yes Historical Provider, MD   Pyridoxine HCl (VITAMIN B-6) 50 MG tablet Take 100 mg by mouth daily. Yes Historical Provider, MD   Calcium Carb-Cholecalciferol (CALCIUM 1000 + D) 1000-800 MG-UNIT TABS Take 1 tablet by mouth 2 times daily. Yes Historical Provider, MD   Methylsulfonylmethane (MSM) 1000 MG TABS Take 1,000 mg by mouth daily. Yes Historical Provider, MD   vitamin E 400 UNIT capsule Take 400 Units by mouth daily.    Yes Historical Provider, MD   GLUTATHIONE PO Take by mouth  Historical Provider, MD       Social History     Tobacco Use    Smoking status: Former Smoker     Packs/day: 2.00     Years: 40.00     Pack years: 80.00     Quit date: 2002     Years since quittin.6    Smokeless tobacco: Never Used   Vaping Use    Vaping Use: Never used   Substance Use Topics    Alcohol use: No    Drug use: No        PHYSICAL EXAMINATION:  [ INSTRUCTIONS:  \"[x]\" Indicates a positive item  \"[]\" Indicates a negative item  -- DELETE ALL ITEMS NOT EXAMINED]  [x] Alert  [x] Oriented to person/place/time    [x] No apparent distress  [] Toxic appearing    [] Face flushed appearing [] Sclera clear  [] Lips are cyanotic      [x] Breathing appears normal  [] Appears tachypneic      [] Rash on visible skin    [x] Cranial Nerves II-XII grossly intact    [x] Motor grossly intact in visible upper extremities    [] Motor grossly intact in visible lower extremities    [x] Normal Mood  [] Anxious appearing    [] Depressed appearing  [] Confused appearing      [] Poor short term memory  [] Poor long term memory    [] OTHER:      Due to this being a TeleHealth encounter, evaluation of the following organ systems is limited: Vitals/Constitutional/EENT/Resp/CV/GI//MS/Neuro/Skin/Heme-Lymph-Imm. ASSESSMENT/PLAN:   Diagnosis Orders   1. Type 2 diabetes mellitus with other circulatory complication, without long-term current use of insulin (HCC)  CBC with Auto Differential    Comprehensive Metabolic Panel    Lipid Panel    Hemoglobin A1C    Microalbumin / Creatinine Urine Ratio   2. Essential hypertension     3. Mixed hyperlipidemia     4. Coronary artery disease involving native coronary artery of native heart without angina pectoris     5. Chronic respiratory failure with hypoxia and hypercapnia (HCC)  albuterol-ipratropium (COMBIVENT RESPIMAT)  MCG/ACT AERS inhaler   6. Chronic obstructive pulmonary disease, unspecified COPD type (HCC)  albuterol-ipratropium (COMBIVENT RESPIMAT)  MCG/ACT AERS inhaler   7.  Paroxysmal atrial clarify. An  electronic signature was used to authenticate this note. --Rena Eldridge, APRN - CNP on 3/21/2022 at 7:29 PM        Pursuant to the emergency declaration under the [address] [place] [address], [facility] waiver authority and the [employer] Act, this Virtual  Visit was conducted, with patient's consent, to reduce the patient's risk of exposure to COVID-19 and provide continuity of care for an established patient. Services were provided through a video synchronous discussion virtually to substitute for in-person clinic visit.

## 2022-03-24 ENCOUNTER — OFFICE VISIT (OUTPATIENT)
Dept: ORTHOPEDIC SURGERY | Age: 82
End: 2022-03-24
Payer: MEDICARE

## 2022-03-24 VITALS
OXYGEN SATURATION: 94 % | HEART RATE: 71 BPM | WEIGHT: 213 LBS | HEIGHT: 72 IN | BODY MASS INDEX: 28.85 KG/M2 | TEMPERATURE: 96.8 F

## 2022-03-24 DIAGNOSIS — M67.411 GANGLION OF RIGHT SHOULDER: Primary | ICD-10-CM

## 2022-03-24 PROCEDURE — G8484 FLU IMMUNIZE NO ADMIN: HCPCS | Performed by: ORTHOPAEDIC SURGERY

## 2022-03-24 PROCEDURE — 99213 OFFICE O/P EST LOW 20 MIN: CPT | Performed by: ORTHOPAEDIC SURGERY

## 2022-03-24 PROCEDURE — 1036F TOBACCO NON-USER: CPT | Performed by: ORTHOPAEDIC SURGERY

## 2022-03-24 PROCEDURE — G8427 DOCREV CUR MEDS BY ELIG CLIN: HCPCS | Performed by: ORTHOPAEDIC SURGERY

## 2022-03-24 PROCEDURE — 1123F ACP DISCUSS/DSCN MKR DOCD: CPT | Performed by: ORTHOPAEDIC SURGERY

## 2022-03-24 PROCEDURE — 4040F PNEUMOC VAC/ADMIN/RCVD: CPT | Performed by: ORTHOPAEDIC SURGERY

## 2022-03-24 PROCEDURE — G8417 CALC BMI ABV UP PARAM F/U: HCPCS | Performed by: ORTHOPAEDIC SURGERY

## 2022-03-24 RX ORDER — CEPHALEXIN 500 MG/1
500 CAPSULE ORAL 3 TIMES DAILY
Qty: 21 CAPSULE | Refills: 0 | Status: SHIPPED | OUTPATIENT
Start: 2022-03-24 | End: 2022-03-31

## 2022-03-24 NOTE — PROGRESS NOTES
Subjective:      Patient ID: Annie Sarmiento is a 79 y.o. male who presents today for:  Chief Complaint   Patient presents with    2 Week Follow-Up     culture results, it is still draining. HPI  Lisa Mccain and his wife noted increased swelling about the right shoulder. The cyst returned significantly. The drainage stopped and wife stated that it was more red and inflamed. She managed to open the pinpoint hole to allow for drainage and he has been more comfortable although the size has been significant. The cultures from the aspirate demonstrated a rare gram-positive cocci in a cluster on Gram stain but the culture was consistent with normal skin obed. No sensitivities were done.     Past Medical History:   Diagnosis Date    Adrenal adenoma     CT 6/2011 stable    Anxiety     Asbestosis(501)     CAD (coronary artery disease)     status post stent 2001    Cancer ([facility] [address]     basil cell carnoma    COPD (chronic obstructive pulmonary disease) (HCC)     Elbow injury     left    Erectile dysfunction     Granulomatous lung disease (HCC)     Herpes zoster     Hyperlipidemia     Hypertension     Insomnia     Interstitial fibrosis     Obstructive sleep apnea on CPAP 11/13/2017    Prostate nodule     Scarlet fever     Urine frequency      Past Surgical History:   Procedure Laterality Date    CARDIAC CATHETERIZATION      CARDIOVASCULAR STRESS TEST      1/2010 normal per patient    COLONOSCOPY      5/2009 tubular adenomas    COLONOSCOPY  08/13/15    [place] [facility] CORDERO    EYE SURGERY  02/10/14    DR Bubba Younger  RT EYE    HERNIA REPAIR      LEG SURGERY       Social History     Socioeconomic History    Marital status:      Spouse name: Not on file    Number of children: 3    Years of education: 15    Highest education level: High school graduate   Occupational History    Occupation:    Tobacco Use    Smoking status: Former Smoker     Packs/day: 2.00     Years: 40.00     Pack years: 80.00     Quit date: 2002     Years since quittin.6    Smokeless tobacco: Never Used   Vaping Use    Vaping Use: Never used   Substance and Sexual Activity    Alcohol use: No    Drug use: No    Sexual activity: Yes     Partners: Female   Other Topics Concern    Not on file   Social History Narrative    Not on file     Social Determinants of Health     Financial Resource Strain: Low Risk     Difficulty of Paying Living Expenses: Not hard at all   Food Insecurity: No Food Insecurity    Worried About Running Out of Food in the Last Year: Never true    Margarette of Food in the Last Year: Never true   Transportation Needs:     Lack of Transportation (Medical): Not on file    Lack of Transportation (Non-Medical):  Not on file   Physical Activity:     Days of Exercise per Week: Not on file    Minutes of Exercise per Session: Not on file   Stress:     Feeling of Stress : Not on file   Social Connections:     Frequency of Communication with Friends and Family: Not on file    Frequency of Social Gatherings with Friends and Family: Not on file    Attends [employer] Services: Not on file    Active Member of [address] [place] [address] or Organizations: Not on file    Attends Club or Organization Meetings: Not on file    Marital Status: Not on file   Intimate Partner Violence:     Fear of Current or Ex-Partner: Not on file    Emotionally Abused: Not on file    Physically Abused: Not on file    Sexually Abused: Not on file   Housing Stability:     Unable to Pay for Housing in the Last Year: Not on file    Number of [place] in the Last Year: Not on file    Unstable Housing in the Last Year: Not on file     Family History   Problem Relation Age of Onset    Cancer Other         colon cancer in multiple family members and breast cancer    Heart Disease Other      Allergies   Allergen Reactions    Brilinta [Ticagrelor] Shortness Of Breath    Sildenafil Citrate      headache    Advair [Fluticasone-Salmeterol] Chest pain    Fluticasone Propionate (Inhal)          Review of Systems  No fever or chills. Objective:   Pulse 71   Temp 96.8 °F (36 °C) (Temporal)   Ht 6' (1.829 m)   Wt 213 lb (96.6 kg)   SpO2 94%   BMI 28.89 kg/m²     ORTHO EXAM  Large cystic mass over the right AC joint. No significant tenderness, no erythema, no warmth. Drainage is appreciated on his dressing from the original aspiration site. Radiographs and Laboratory Studies:     Diagnostic Imaging Studies:          Laboratory Studies:   Lab Results   Component Value Date    WBC 6.5 03/18/2022    HGB 15.7 03/18/2022    HCT 49.1 03/18/2022    MCV 88.3 03/18/2022     03/18/2022     No results found for: SEDRATE  No results found for: CRP    Assessment:       Diagnosis Orders   1. Ganglion of right shoulder       Impression: Unfortunately the cyst continues to be problematic. It may indeed be a low-grade infection at this time. Definitive treatment may require open excision however he is such a poor surgical candidate due to his severe COPD. This may be done under a block or even under local given his significant pain tolerance. Plan:     I am going to place him on Keflex at this time. He will follow-up with Dr. Aranza Ron my associate who specializes in shoulder surgery in a timely fashion. No orders of the defined types were placed in this encounter. Orders Placed This Encounter   Medications    cephALEXin (KEFLEX) 500 MG capsule     Sig: Take 1 capsule by mouth 3 times daily for 7 days     Dispense:  21 capsule     Refill:  0       No follow-ups on file.       Pebbles Leslie MD

## 2022-03-25 ENCOUNTER — OFFICE VISIT (OUTPATIENT)
Dept: ORTHOPEDIC SURGERY | Age: 82
End: 2022-03-25
Payer: MEDICARE

## 2022-03-25 VITALS — HEIGHT: 72 IN | WEIGHT: 213 LBS | BODY MASS INDEX: 28.85 KG/M2 | HEART RATE: 70 BPM | OXYGEN SATURATION: 98 %

## 2022-03-25 DIAGNOSIS — M19.011 OSTEOARTHRITIS OF RIGHT AC (ACROMIOCLAVICULAR) JOINT: ICD-10-CM

## 2022-03-25 DIAGNOSIS — R22.31 MASS OF SKIN OF SHOULDER, RIGHT: Primary | ICD-10-CM

## 2022-03-25 DIAGNOSIS — M67.411 GANGLION OF RIGHT SHOULDER: ICD-10-CM

## 2022-03-25 PROCEDURE — G8417 CALC BMI ABV UP PARAM F/U: HCPCS | Performed by: ORTHOPAEDIC SURGERY

## 2022-03-25 PROCEDURE — G8427 DOCREV CUR MEDS BY ELIG CLIN: HCPCS | Performed by: ORTHOPAEDIC SURGERY

## 2022-03-25 PROCEDURE — 4040F PNEUMOC VAC/ADMIN/RCVD: CPT | Performed by: ORTHOPAEDIC SURGERY

## 2022-03-25 PROCEDURE — 99214 OFFICE O/P EST MOD 30 MIN: CPT | Performed by: ORTHOPAEDIC SURGERY

## 2022-03-25 PROCEDURE — 1123F ACP DISCUSS/DSCN MKR DOCD: CPT | Performed by: ORTHOPAEDIC SURGERY

## 2022-03-25 PROCEDURE — 1036F TOBACCO NON-USER: CPT | Performed by: ORTHOPAEDIC SURGERY

## 2022-03-25 PROCEDURE — G8484 FLU IMMUNIZE NO ADMIN: HCPCS | Performed by: ORTHOPAEDIC SURGERY

## 2022-03-25 NOTE — PROGRESS NOTES
Subjective:      Patient ID: Louise Cat is a 79 y.o. male who presents today for:  Chief Complaint   Patient presents with    Shoulder Pain     cyst on right shoulder, patient stated Dr. Court Hylton drained at previous appointments. pt denies pain. HPI  81-year of age right-hand-dominant male presents with his fiancée after referral from Dr. oCurt Hylton for a mass on his right shoulder. They report that he had x-rays back in August 2021. In January 2021 is the mass prominence of the right superior shoulder increased in size Dr. Court Hylton aspirated it. Since that time its been draining. He has had a 2 x 2 type dressing over it. There is a small area of yellowish fluid stain. Patient denies fevers or chills. Denies any systemic complaints.     Past Medical History:   Diagnosis Date    Adrenal adenoma     CT 6/2011 stable    Anxiety     Asbestosis(501)     CAD (coronary artery disease)     status post stent 2001    Cancer ([facility] [address]     basil cell carnoma    COPD (chronic obstructive pulmonary disease) (HCC)     Elbow injury     left    Erectile dysfunction     Granulomatous lung disease (HCC)     Herpes zoster     Hyperlipidemia     Hypertension     Insomnia     Interstitial fibrosis     Obstructive sleep apnea on CPAP 11/13/2017    Osteoarthritis of right AC (acromioclavicular) joint 3/25/2022    Prostate nodule     Scarlet fever     Urine frequency       Past Surgical History:   Procedure Laterality Date    CARDIAC CATHETERIZATION      CARDIOVASCULAR STRESS TEST      1/2010 normal per patient    COLONOSCOPY      5/2009 tubular adenomas    COLONOSCOPY  08/13/15    David Mcnamara MD    EYE SURGERY  02/10/14    DR Martin Beltrán  [facility]    HERNIA REPAIR      LEG SURGERY       Social History     Socioeconomic History    Marital status:      Spouse name: Not on file    Number of children: 4    Years of education: 15    Highest education level: High school graduate   Occupational History  Occupation:    Tobacco Use    Smoking status: Former Smoker     Packs/day: 2.00     Years: 40.00     Pack years: 80.00     Quit date: 2002     Years since quittin.6    Smokeless tobacco: Never Used   Vaping Use    Vaping Use: Never used   Substance and Sexual Activity    Alcohol use: No    Drug use: No    Sexual activity: Yes     Partners: Female   Other Topics Concern    Not on file   Social History Narrative    Not on file     Social Determinants of Health     Financial Resource Strain: Low Risk     Difficulty of Paying Living Expenses: Not hard at all   Food Insecurity: No Food Insecurity    Worried About [address] in the Last Year: Never true    [address] in the Last Year: Never true   Transportation Needs:     Lack of Transportation (Medical): Not on file    Lack of Transportation (Non-Medical):  Not on file   Physical Activity:     Days of Exercise per Week: Not on file    Minutes of Exercise per Session: Not on file   Stress:     Feeling of Stress : Not on file   Social Connections:     Frequency of Communication with Friends and Family: Not on file    Frequency of Social Gatherings with Friends and Family: Not on file    Attends Muslim Services: Not on file    Active Member of [address] [place] [address] or Organizations: Not on file    Attends Club or Organization Meetings: Not on file    Marital Status: Not on file   Intimate Partner Violence:     Fear of Current or Ex-Partner: Not on file    Emotionally Abused: Not on file    Physically Abused: Not on file    Sexually Abused: Not on file   Housing Stability:     Unable to Pay for Housing in the Last Year: Not on file    Number of [place] in the Last Year: Not on file    Unstable Housing in the Last Year: Not on file     Family History   Problem Relation Age of Onset    Cancer Other         colon cancer in multiple family members and breast cancer    Heart Disease Other      Allergies   Allergen Reactions    Brilinta [Ticagrelor] Shortness Of Breath    Sildenafil Citrate      headache    Advair [Fluticasone-Salmeterol]      Chest pain    Fluticasone Propionate (Inhal)      Current Outpatient Medications on File Prior to Visit   Medication Sig Dispense Refill    cephALEXin (KEFLEX) 500 MG capsule Take 1 capsule by mouth 3 times daily for 7 days 21 capsule 0    temazepam (RESTORIL) 15 MG capsule TAKE 1 CAPSULE TWICE DAILY 60 capsule 2    albuterol-ipratropium (COMBIVENT RESPIMAT)  MCG/ACT AERS inhaler Inhale 1 puff into the lungs every 4 hours as needed for Wheezing or Shortness of Breath 4 each 3    fexofenadine (ALLEGRA) 180 MG tablet Take 1 tablet by mouth daily 90 tablet 2    busPIRone (BUSPAR) 30 MG tablet take 1 (ONE) tablet daily 30 tablet 3    doxycycline hyclate (VIBRAMYCIN) 100 MG capsule Take 1 capsule by mouth 2 times daily 20 capsule 0    temazepam (RESTORIL) 15 MG capsule TAKE 1 CAPSULE BY MOUTH TWICE DAILY      SYMBICORT 80-4.5 MCG/ACT AERO USE 2 INHALATIONS TWICE A DAY 30.6 g 3    COMBIVENT RESPIMAT  MCG/ACT AERS inhaler USE 1 INHALATION EVERY 6 HOURS 16 g 2    rosuvastatin (CRESTOR) 40 MG tablet Take 1 tablet by mouth daily 90 tablet 3    azelastine (OPTIVAR) 0.05 % ophthalmic solution Place 1 drop into both eyes 2 times daily      calcipotriene (DOVONEX) 0.005 % solution Apply to scalp daily      albuterol sulfate HFA (PROAIR HFA) 108 (90 Base) MCG/ACT inhaler Inhale 2 puffs into the lungs every 6 hours as needed for Wheezing 3 Inhaler 3    Melatonin 10 MG TABS Take by mouth      metoprolol succinate (TOPROL XL) 25 MG extended release tablet Take 25 mg by mouth every morning      metoprolol succinate (TOPROL XL) 50 MG extended release tablet Take 50 mg by mouth every evening      aspirin 81 MG tablet Take 81 mg by mouth daily      diltiazem (TIAZAC) 240 MG extended release capsule TAKE 1 CAPSULE BY MOUTH DAILY AS DIRECTED  3    CARTIA  MG extended release capsule Take 120 capsules by mouth every evening   3    OXYGEN Please provide patient with a portable oxygen concentrator 1 Units 0    dofetilide (TIKOSYN) 250 MCG capsule Take 250 mcg by mouth 2 times daily      Magnesium 500 MG TABS Take by mouth 2 times daily      Potassium 99 MG TABS Take by mouth daily      GLUTATHIONE PO Take by mouth      metoprolol tartrate (LOPRESSOR) 25 MG tablet Take 25 mg by mouth as needed      tadalafil (CIALIS) 5 MG tablet Take 1 tablet by mouth as needed for Erectile Dysfunction (maximum 5 mg per day) 30 tablet 3    Handicap Placard MISC by Does not apply route Good for 5 Years from 06/16/2016 1 each 0    digoxin (LANOXIN) 250 MCG tablet   11    ELIQUIS 5 MG TABS tablet   2    CINNAMON PO Take  by mouth.  SAW PALMETTO by Does not apply route.  ipratropium-albuterol (DUONEB) 0.5-2.5 (3) MG/3ML SOLN nebulizer solution Take 3 mLs by nebulization every 6 hours as needed for Shortness of Breath. 100 vial 2    Glucosamine-Chondroitin 500-400 MG CAPS Take 1 capsule by mouth daily.  Ascorbic Acid (VITAMIN C) 500 MG tablet Take 1,000 mg by mouth daily. 1/2 tab in AM and 1/2 tab in PM       vitamin D (CHOLECALCIFEROL) 400 UNIT TABS tablet Take 800 Units by mouth daily.  vitamin B-12 (CYANOCOBALAMIN) 1000 MCG tablet Take 1,000 mcg by mouth daily.  Selenium 200 MCG TABS Take 200 mcg by mouth daily.  Thiamine HCl (VITAMIN B-1) 100 MG tablet Take 100 mg by mouth daily.  folic acid (FOLVITE) 267 MCG tablet Take 800 mcg by mouth daily.  Zinc 50 MG CAPS Take 50 mg by mouth daily.  beta carotene 86075 UNIT capsule Take 25,000 Units by mouth daily.  DHEA 25 MG TABS Take 25 mg by mouth daily.  Pyridoxine HCl (VITAMIN B-6) 50 MG tablet Take 100 mg by mouth daily.  Calcium Carb-Cholecalciferol (CALCIUM 1000 + D) 1000-800 MG-UNIT TABS Take 1 tablet by mouth 2 times daily.       Methylsulfonylmethane (MSM) 1000 MG TABS Take 1,000 mg by mouth daily.  vitamin E 400 UNIT capsule Take 400 Units by mouth daily. No current facility-administered medications on file prior to visit. Review of Systems      Objective:   Pulse 70   Ht 6' (1.829 m)   Wt 213 lb (96.6 kg)   SpO2 98%   BMI 28.89 kg/m²     Ortho Exam  On exam patient is comfortable no acute distress. He has mass over the superior aspect of the shoulder over the region of the acromioclavicular joint. There is a small pinhole area where this was likely the aspiration site. There is firmness to the mass. He has no tenderness over it. He has range of motion of the right shoulder 170/60/70. This is symmetric to the contralateral side. His supraspinatus, infraspinatus and ex rotation strength on the right is 4- out of 5. Subscapularis functions intact. His axillary patch is intact. Radial, ulnar, median nerve motor and sensory distribution are intact radial pulses palpable symmetric bilaterally. Radiographs and Laboratory Studies:     Diagnostic Imaging Studies:    I reviewed 2 views of the right shoulder acromioclavicular joint from August 2020 one of the right shoulder. This demonstrates degeneration of the acromioclavicular joint. There is inferior spur formation. There is superior elevation of the humeral head. Laboratory Studies:   Lab Results   Component Value Date    WBC 6.5 03/18/2022    HGB 15.7 03/18/2022    HCT 49.1 03/18/2022    MCV 88.3 03/18/2022     03/18/2022     No results found for: SEDRATE  No results found for: CRP    Assessment:      Diagnosis Orders   1. Mass of skin of shoulder, right  MRI SHOULDER RIGHT WO CONTRAST   2. Ganglion of right shoulder     3. Osteoarthritis of right AC (acromioclavicular) joint            Plan:   I reviewed with the patient and his fiancée that Dr. Shereen Lopez had aspirated this on February 24, 2022. At that time Dr. Shereen Lopez described this is 10 cc of gelatinous fluid.   He injected 1 cc of Depo-Medrol back into the cyst.  Patient was seen back on March 10, 2022 by Dr. Shereen Lopez. At this visit the patient was describing that since the aspiration he was having continuous drainage and this was yellow serous thick drainage. Patient then followed up yesterday with Dr. Shereen Lopez the aspirate demonstrated rare gram-positive cocci in cluster but the culture was consistent with normal skin obed. I spoke with Dr. Shereen Lopez he described that he expressed fluid from it and it was gelatinous typical with a ganglion cyst.    The patient has significant medical comorbidities. This time I recommend we get an MRI to see whether the patient has a massive rotator cuff tear in the communications with the joint or whether this is isolated to the acromioclavicular joint. To better distinguish the anatomy morphology we will attempt to obtain an MRI. I explained to the patient depending on when his stents were placed and what make of the stents that he may not be able to have the MRI. Will defer this to the MRI prescreening. He will follow-up after MRI is obtained or if he cannot obtain the MRI then we will have further discussion about consideration of surgical debridement and excision. In the interim we will contact his primary medical physician and review potential medical risk with any type of anesthetic. Orders Placed This Encounter   Procedures    MRI SHOULDER RIGHT WO CONTRAST     Standing Status:   Future     Standing Expiration Date:   3/25/2023     No orders of the defined types were placed in this encounter. Return for MRI followup.       Tree Villalobos MD

## 2022-03-26 ENCOUNTER — PATIENT MESSAGE (OUTPATIENT)
Dept: FAMILY MEDICINE CLINIC | Age: 82
End: 2022-03-26

## 2022-03-26 DIAGNOSIS — F40.240 CLAUSTROPHOBIA: Primary | ICD-10-CM

## 2022-03-28 NOTE — TELEPHONE ENCOUNTER
From: Stephanie Look  To: Ralph Sevilla  Sent: 3/26/2022 9:45 AM EDT  Subject: Cyst Right Shoulder     S[place],  We just wanted to follow-up with you on Jose Cruz's appointment with Dr. Gab Ferrer this past Thursday. He prescribed an antibiotic for the possible infection in the cyst. He then referred him to see Dr Anatoliy Armendariz yesterday(friday). Dr Anatoliy Armendariz has ordered an MRI to see what is all contained in the cyst. The MRI is scheduled for April 1st. They suggested to get with you for volume or something for Christine Kurtz due to him being claustrophobic. Please let us know.   [facility]

## 2022-03-29 RX ORDER — DIAZEPAM 10 MG/1
10 TABLET ORAL ONCE
Qty: 1 TABLET | Refills: 0 | Status: SHIPPED | OUTPATIENT
Start: 2022-03-29 | End: 2022-03-29

## 2022-03-31 RX ORDER — LIDOCAINE HYDROCHLORIDE 10 MG/ML
20 INJECTION, SOLUTION INFILTRATION; PERINEURAL ONCE
Status: COMPLETED | OUTPATIENT
Start: 2022-03-31 | End: 2022-02-24

## 2022-03-31 RX ORDER — METHYLPREDNISOLONE ACETATE 80 MG/ML
80 INJECTION, SUSPENSION INTRA-ARTICULAR; INTRALESIONAL; INTRAMUSCULAR; SOFT TISSUE ONCE
Status: COMPLETED | OUTPATIENT
Start: 2022-03-31 | End: 2022-02-24

## 2022-04-01 ENCOUNTER — HOSPITAL ENCOUNTER (OUTPATIENT)
Dept: MRI IMAGING | Age: 82
Discharge: HOME | End: 2022-04-03
Payer: MEDICARE

## 2022-04-01 DIAGNOSIS — R22.31 MASS OF SKIN OF SHOULDER, RIGHT: ICD-10-CM

## 2022-04-01 PROCEDURE — 73221 MRI JOINT UPR EXTREM W/O DYE: CPT

## 2022-04-04 RX ORDER — ROSUVASTATIN CALCIUM 40 MG/1
TABLET, COATED ORAL
Qty: 90 TABLET | Refills: 3 | Status: SHIPPED | OUTPATIENT
Start: 2022-04-04

## 2022-04-05 ENCOUNTER — OFFICE VISIT (OUTPATIENT)
Dept: ORTHOPEDIC SURGERY | Age: 82
End: 2022-04-05
Payer: MEDICARE

## 2022-04-05 VITALS
WEIGHT: 213 LBS | OXYGEN SATURATION: 98 % | HEIGHT: 72 IN | BODY MASS INDEX: 28.85 KG/M2 | TEMPERATURE: 98 F | HEART RATE: 66 BPM

## 2022-04-05 DIAGNOSIS — R22.31 MASS OF SKIN OF SHOULDER, RIGHT: Primary | ICD-10-CM

## 2022-04-05 PROCEDURE — 1123F ACP DISCUSS/DSCN MKR DOCD: CPT | Performed by: PHYSICIAN ASSISTANT

## 2022-04-05 PROCEDURE — G8417 CALC BMI ABV UP PARAM F/U: HCPCS | Performed by: PHYSICIAN ASSISTANT

## 2022-04-05 PROCEDURE — 1036F TOBACCO NON-USER: CPT | Performed by: PHYSICIAN ASSISTANT

## 2022-04-05 PROCEDURE — 4040F PNEUMOC VAC/ADMIN/RCVD: CPT | Performed by: PHYSICIAN ASSISTANT

## 2022-04-05 PROCEDURE — 99214 OFFICE O/P EST MOD 30 MIN: CPT | Performed by: PHYSICIAN ASSISTANT

## 2022-04-05 PROCEDURE — G8427 DOCREV CUR MEDS BY ELIG CLIN: HCPCS | Performed by: PHYSICIAN ASSISTANT

## 2022-04-05 NOTE — PROGRESS NOTES
[facility]      Subjective:      Chief Complaint   Patient presents with    Follow-up     MRI results done 4/1/22 of his (R) shoulder. Pt states its not draining anymore. He says it has gotten more swollen. He also states its been itching. HPI: Cara Yoder is a 79 y.o. male who for right shoulder follow-up. MRI showed a cystlike mass at the [facility] joint. Also massive rotator cuff tear. He has no issues with motion or strength.   He just has a bothersome mass in his shoulder that he wants to talk about taking out or drained    Past Medical History:   Diagnosis Date    Adrenal adenoma     CT 6/2011 stable    Anxiety     Asbestosis(501)     CAD (coronary artery disease)     status post stent 2001    Cancer ([facility] [address]     basil cell carnoma    COPD (chronic obstructive pulmonary disease) ([facility] [address]     Elbow injury     left    Erectile dysfunction     Granulomatous lung disease (HCC)     Herpes zoster     Hyperlipidemia     Hypertension     Insomnia     Interstitial fibrosis     Obstructive sleep apnea on CPAP 11/13/2017    Osteoarthritis of right AC (acromioclavicular) joint 3/25/2022    Prostate nodule     Scarlet fever     Urine frequency       Past Surgical History:   Procedure Laterality Date    CARDIAC CATHETERIZATION      CARDIOVASCULAR STRESS TEST      1/2010 normal per patient    COLONOSCOPY      5/2009 tubular adenomas    COLONOSCOPY  08/13/15    David Mcnamara MD    EYE SURGERY  02/10/14    DR Isamar Machuca  [facility]    HERNIA REPAIR      LEG SURGERY       Social History     Socioeconomic History    Marital status:      Spouse name: Not on file    Number of children: 3    Years of education: 15    Highest education level: High school graduate   Occupational History    Occupation:    Tobacco Use    Smoking status: Former Smoker     Packs/day: 2.00     Years: 40.00     Pack years: 80.00     Quit date: 8/5/2002     Years since quitting: 19.6    Smokeless tobacco: Never Used   Vaping Use    Vaping Use: Never used   Substance and Sexual Activity    Alcohol use: No    Drug use: No    Sexual activity: Yes     Partners: Female   Other Topics Concern    Not on file   Social History Narrative    Not on file     Social Determinants of Health     Financial Resource Strain: Low Risk     Difficulty of Paying Living Expenses: Not hard at all   Food Insecurity: No Food Insecurity    Worried About [address] [employer] in the Last Year: Never true    [address] in the Last Year: Never true   Transportation Needs:     Lack of Transportation (Medical): Not on file    Lack of Transportation (Non-Medical):  Not on file   Physical Activity:     Days of Exercise per Week: Not on file    Minutes of Exercise per Session: Not on file   Stress:     Feeling of Stress : Not on file   Social Connections:     Frequency of Communication with Friends and Family: Not on file    Frequency of Social Gatherings with Friends and Family: Not on file    Attends [employer] Services: Not on file    Active Member of [address] [place] [address] or Organizations: Not on file    Attends Club or Organization Meetings: Not on file    Marital Status: Not on file   Intimate Partner Violence:     Fear of Current or Ex-Partner: Not on file    Emotionally Abused: Not on file    Physically Abused: Not on file    Sexually Abused: Not on file   Housing Stability:     Unable to Pay for Housing in the Last Year: Not on file    Number of [place] in the Last Year: Not on file    Unstable Housing in the Last Year: Not on file     Family History   Problem Relation Age of Onset    Cancer Other         colon cancer in multiple family members and breast cancer    Heart Disease Other      Allergies   Allergen Reactions    Brilinta [Ticagrelor] Shortness Of Breath    Sildenafil Citrate      headache    Advair [Fluticasone-Salmeterol]      Chest pain    Fluticasone Propionate (Inhal) MG TABS Take by mouth daily      GLUTATHIONE PO Take by mouth      metoprolol tartrate (LOPRESSOR) 25 MG tablet Take 25 mg by mouth as needed      tadalafil (CIALIS) 5 MG tablet Take 1 tablet by mouth as needed for Erectile Dysfunction (maximum 5 mg per day) 30 tablet 3    Handicap Placard MISC by Does not apply route Good for 5 Years from 06/16/2016 1 each 0    digoxin (LANOXIN) 250 MCG tablet   11    ELIQUIS 5 MG TABS tablet   2    CINNAMON PO Take  by mouth.  SAW PALMETTO by Does not apply route.  ipratropium-albuterol (DUONEB) 0.5-2.5 (3) MG/3ML SOLN nebulizer solution Take 3 mLs by nebulization every 6 hours as needed for Shortness of Breath. 100 vial 2    Glucosamine-Chondroitin 500-400 MG CAPS Take 1 capsule by mouth daily.  Ascorbic Acid (VITAMIN C) 500 MG tablet Take 1,000 mg by mouth daily. 1/2 tab in AM and 1/2 tab in PM       vitamin D (CHOLECALCIFEROL) 400 UNIT TABS tablet Take 800 Units by mouth daily.  vitamin B-12 (CYANOCOBALAMIN) 1000 MCG tablet Take 1,000 mcg by mouth daily.  Selenium 200 MCG TABS Take 200 mcg by mouth daily.  Thiamine HCl (VITAMIN B-1) 100 MG tablet Take 100 mg by mouth daily.  folic acid (FOLVITE) 337 MCG tablet Take 800 mcg by mouth daily.  Zinc 50 MG CAPS Take 50 mg by mouth daily.  beta carotene 12588 UNIT capsule Take 25,000 Units by mouth daily.  DHEA 25 MG TABS Take 25 mg by mouth daily.  Pyridoxine HCl (VITAMIN B-6) 50 MG tablet Take 100 mg by mouth daily.  Calcium Carb-Cholecalciferol (CALCIUM 1000 + D) 1000-800 MG-UNIT TABS Take 1 tablet by mouth 2 times daily.  Methylsulfonylmethane (MSM) 1000 MG TABS Take 1,000 mg by mouth daily.  vitamin E 400 UNIT capsule Take 400 Units by mouth daily. No current facility-administered medications on file prior to visit. Objective: There were no vitals taken for this visit.       Radiographs and Laboratory Studies: Previous diagnostic imaging studies were reviewed. No radiation information found for this patient  Narrative   MRI of the right shoulder       HISTORY: Soft tissue mass top of shoulder. Pain.       COMPARISON: 8/31/2021 right clavicle x-ray. .       TECHNIQUE: Coronal and sagittal T2 with fat suppression. Axial proton density with fat suppression. Coronal proton density with fat suppression and sagittal T1. 3 plane gradient echo localizer.       FINDINGS:       3.8 x 3.6 x 2.6 cm well-defined mass abutting the superior acromial clavicular (AC)  joint. Mass has for the most part bright T2 signal but is complicated with numerous internal septations particularly at its inferior margin where there is an    approximately 15 mm rounded area of more pronounced low signal likely synovial hypertrophy or scarring. AC joint moderate osteoarthritic changes. No bony remodeling or destruction by the adjacent mass. No demonstratable communication of the mass and AC     joint although an imperceptible tiny communication or intermittent communication can be present. Question tiny communication with the skin surface superior laterally.       Rotator cuff massive likely chronic tear involving the entire supraspinatus, infraspinatus and most if not the entire subscapularis tendon component, and rotator cuff interval. Tendinopathy and interstitial tearing without tendon retraction of the teres    minor component. AP tear dimension is nearly 7 cm.  6 cm of tendon retraction in the coronal plane. Superior humeral migration and mild inferior acromial remodeling, \"acetabularization\", compatible with chronic tearing. Mild reactive marrow signal    changes at the inferior acromion and superior humerus.  Severe supraspinatus, and superior infraspinatus and subscapularis muscle atrophy also compatible with chronic rotator cuff tearing.       Mass adjacent to the superior AC joint most likely represents a complicated ganglion or synovial cyst developing as a result of fluid communicating between the glenohumeral joint through the large rotator cuff tear with the [facility] joint and then the mass    (\"geyser sign\") despite the lack of demonstratable communication on today's exam. Complication may represent synovial hypertrophy, scarring and/or hemorrhage in addition to infection or postinfectious sequelae. Alternatively, mass represents isolated    myxoid neoplasm.       Biceps tendon is far medially subluxed associated with subscapularis tendon tear/distal biceps pulley disruption. Likely biceps tendinopathy but no tendon discontinuity.       Acromioclavicular joint without sign of separation. Moderate arthritic changes as detailed above. Coracoclavicular and coracoacromial ligaments intact.       Acromion anterior downward sloping in the sagittal plane (Bigliani type II) morphology can predispose to subacromial impingement.       Coracohumeral distance is narrowed measuring 6 mm compatible with chronic severe subscapularis tendon tearing.       Glenohumeral joint osteoarthritic changes with at least moderate cartilage thinning at the anterior humerus diffusely and anterior superior glenoid. 5 mm anterior superior glenoid degenerative subchondral cyst. Superior and posterior superior glenoid    spurring. No joint erosions. Small joint effusion and mild synovial hypertrophy.       Glenoid labrum particularly anterior superiorly has degeneration and degenerative tearing compatible with the glenohumeral joint osteoarthritic changes. Attenuation of the posterior glenoid labrum also likely degenerative. No axillary recess capsular    disruption.       No fractures or bone bruising. Hematopoietic marrow within the proximal humeral diaphysis and diametaphyseal junction partly included on the exam.               Impression       Well-defined complicated mass superior to the [facility] joint.  Although no demonstratable communication with the [facility] joint differential includes complicated ganglion or synovial cyst from tiny or intermittent communication between the glenohumeral joint through    a massive chronic rotator cuff tear, AC joint, and the mass. Alternatively myxoid neoplasm.       Chronic massive rotator cuff tear. Associated biceps pulley disruption/marked medial biceps tendon subluxation, and glenohumeral joint osteoarthritis.       Supraspinatus, infraspinatus and subscapularis marked muscle atrophy secondary to chronic rotator cuff tear. Laboratory Studies:   Lab Results   Component Value Date    WBC 6.5 03/18/2022    HGB 15.7 03/18/2022    HCT 49.1 03/18/2022    MCV 88.3 03/18/2022     03/18/2022     No results found for: SEDRATE  No results found for: CRP    Assessment and Plan:      Diagnosis Orders   1. Mass of skin of shoulder, right         Carlos Harris is here from Dr. Alonzo López to discuss his MRI results. MRI of the right shoulder does demonstrate a massive rotator cuff tear. There also appears to be a cyst -could be stemming from rotator cuff tear versus glenohumeral joint versus myxoid per MRI results. This is likely extending from the glenohumeral joint versus chronic rotator cuff tear as gayser sign. This was drained by Dr. Alonzo López about a month ago. He unfortunately developed cellulitis from this. Is since returned and a subsequent MRI was ordered. On exam he does have a palpable golf ball sized mass at the [facility] [place] by [facility] joint. He has no loss of motion and has preserved strength. Injection site still appreciated is a healing wound. Talked for further modalities of treatment of this which is surgical excision versus surgical drainage as it likely will not eradicate the cyst.  Given his comorbidities we have decided to treat this conservatively with serial drainage to help get him through the golf season.   He will call back 1 week before he starts coughing for drainage of the cyst.    The above plan was discussed in thorough detail with Dr. Virginia Hills and the patient. No orders of the defined types were placed in this encounter. No orders of the defined types were placed in this encounter. No follow-ups on file.     Naren Pierre PA-C  [facility]  [phone]

## 2022-04-21 ENCOUNTER — OFFICE VISIT (OUTPATIENT)
Dept: ORTHOPEDIC SURGERY | Age: 82
End: 2022-04-21
Payer: MEDICARE

## 2022-04-21 VITALS
WEIGHT: 213 LBS | HEART RATE: 72 BPM | OXYGEN SATURATION: 95 % | TEMPERATURE: 96.9 F | HEIGHT: 72 IN | BODY MASS INDEX: 28.85 KG/M2

## 2022-04-21 DIAGNOSIS — R22.31 MASS OF SKIN OF SHOULDER, RIGHT: Primary | ICD-10-CM

## 2022-04-21 DIAGNOSIS — M19.011 OSTEOARTHRITIS OF RIGHT AC (ACROMIOCLAVICULAR) JOINT: ICD-10-CM

## 2022-04-21 PROCEDURE — G8427 DOCREV CUR MEDS BY ELIG CLIN: HCPCS | Performed by: PHYSICIAN ASSISTANT

## 2022-04-21 PROCEDURE — 20610 DRAIN/INJ JOINT/BURSA W/O US: CPT | Performed by: PHYSICIAN ASSISTANT

## 2022-04-21 PROCEDURE — 1036F TOBACCO NON-USER: CPT | Performed by: PHYSICIAN ASSISTANT

## 2022-04-21 PROCEDURE — 99214 OFFICE O/P EST MOD 30 MIN: CPT | Performed by: PHYSICIAN ASSISTANT

## 2022-04-21 PROCEDURE — G8417 CALC BMI ABV UP PARAM F/U: HCPCS | Performed by: PHYSICIAN ASSISTANT

## 2022-04-21 PROCEDURE — 1123F ACP DISCUSS/DSCN MKR DOCD: CPT | Performed by: PHYSICIAN ASSISTANT

## 2022-04-21 PROCEDURE — 4040F PNEUMOC VAC/ADMIN/RCVD: CPT | Performed by: PHYSICIAN ASSISTANT

## 2022-04-21 NOTE — PROGRESS NOTES
[facility]      Subjective:      Chief Complaint   Patient presents with    Follow-up     Mass of skin of shoulder, right. Pt states the mass has gotten larger \" its as big as a golf ball\" causing more discomfort . HPI: Piper Espinal is a 79 y.o. male who is here for a cyst at the right shoulder follow-up. Recall that we discussed at her last visit surgical excision versus chronic serial drainage. Given his comorbidities it would be safest although not as of self active to do the serial drains. He was on a golf trip next week and wants to have this drained today. Has not changed in terms of size.     Past Medical History:   Diagnosis Date    Adrenal adenoma     CT 6/2011 stable    Anxiety     Asbestosis(501)     CAD (coronary artery disease)     status post stent 2001    [facility] ([facility] [address]     basil cell carnoma    COPD (chronic obstructive pulmonary disease) (HCC)     Elbow injury     left    Erectile dysfunction     Granulomatous lung disease (HCC)     Herpes zoster     Hyperlipidemia     Hypertension     Insomnia     Interstitial fibrosis     Obstructive sleep apnea on CPAP 11/13/2017    Osteoarthritis of right AC (acromioclavicular) joint 3/25/2022    Prostate nodule     Scarlet fever     Urine frequency       Past Surgical History:   Procedure Laterality Date    CARDIAC CATHETERIZATION      CARDIOVASCULAR STRESS TEST      1/2010 normal per patient    COLONOSCOPY      5/2009 tubular adenomas    COLONOSCOPY  08/13/15    David Mcnamara MD    EYE SURGERY  02/10/14    DR Maura Sheriff  [facility]    HERNIA REPAIR      LEG SURGERY       Social History     Socioeconomic History    Marital status:      Spouse name: Not on file    Number of children: 3    Years of education: 15    Highest education level: High school graduate   Occupational History    Occupation:    Tobacco Use    Smoking status: Former Smoker     Packs/day: 2.00     Years: 40.00     Pack years: 80.00     Quit date: 2002     Years since quittin.7    Smokeless tobacco: Never Used   Vaping Use    Vaping Use: Never used   Substance and Sexual Activity    Alcohol use: No    Drug use: No    Sexual activity: Yes     Partners: Female   Other Topics Concern    Not on file   Social History Narrative    Not on file     Social Determinants of Health     Financial Resource Strain: Low Risk     Difficulty of Paying Living Expenses: Not hard at all   Food Insecurity: No Food Insecurity    Worried About Running Out of Food in the Last Year: Never true    Margarette of Food in the Last Year: Never true   Transportation Needs:     Lack of Transportation (Medical): Not on file    Lack of Transportation (Non-Medical):  Not on file   Physical Activity:     Days of Exercise per Week: Not on file    Minutes of Exercise per Session: Not on file   Stress:     Feeling of Stress : Not on file   Social Connections:     Frequency of Communication with Friends and Family: Not on file    Frequency of Social Gatherings with Friends and Family: Not on file    Attends Yazidi Services: Not on file    Active Member of [address] [place] [address] or Organizations: Not on file    Attends Club or Organization Meetings: Not on file    Marital Status: Not on file   Intimate Partner Violence:     Fear of Current or Ex-Partner: Not on file    Emotionally Abused: Not on file    Physically Abused: Not on file    Sexually Abused: Not on file   Housing Stability:     Unable to Pay for Housing in the Last Year: Not on file    Number of [place] in the Last Year: Not on file    Unstable Housing in the Last Year: Not on file     Family History   Problem Relation Age of Onset    Cancer Other         colon cancer in multiple family members and breast cancer    Heart Disease Other      Allergies   Allergen Reactions    Brilinta [Ticagrelor] Shortness Of Breath    Sildenafil Citrate      headache    Advair [Fluticasone-Salmeterol]      Chest pain    Fluticasone Propionate (Inhal)      Current Outpatient Medications on File Prior to Visit   Medication Sig Dispense Refill    rosuvastatin (CRESTOR) 40 MG tablet TAKE 1 TABLET DAILY 90 tablet 3    temazepam (RESTORIL) 15 MG capsule TAKE 1 CAPSULE TWICE DAILY 60 capsule 2    albuterol-ipratropium (COMBIVENT RESPIMAT)  MCG/ACT AERS inhaler Inhale 1 puff into the lungs every 4 hours as needed for Wheezing or Shortness of Breath 4 each 3    fexofenadine (ALLEGRA) 180 MG tablet Take 1 tablet by mouth daily 90 tablet 2    busPIRone (BUSPAR) 30 MG tablet take 1 (ONE) tablet daily 30 tablet 3    doxycycline hyclate (VIBRAMYCIN) 100 MG capsule Take 1 capsule by mouth 2 times daily 20 capsule 0    temazepam (RESTORIL) 15 MG capsule TAKE 1 CAPSULE BY MOUTH TWICE DAILY      SYMBICORT 80-4.5 MCG/ACT AERO USE 2 INHALATIONS TWICE A DAY 30.6 g 3    COMBIVENT RESPIMAT  MCG/ACT AERS inhaler USE 1 INHALATION EVERY 6 HOURS 16 g 2    azelastine (OPTIVAR) 0.05 % ophthalmic solution Place 1 drop into both eyes 2 times daily      calcipotriene (DOVONEX) 0.005 % solution Apply to scalp daily      albuterol sulfate HFA (PROAIR HFA) 108 (90 Base) MCG/ACT inhaler Inhale 2 puffs into the lungs every 6 hours as needed for Wheezing 3 Inhaler 3    Melatonin 10 MG TABS Take by mouth      metoprolol succinate (TOPROL XL) 25 MG extended release tablet Take 25 mg by mouth every morning      metoprolol succinate (TOPROL XL) 50 MG extended release tablet Take 50 mg by mouth every evening      aspirin 81 MG tablet Take 81 mg by mouth daily      diltiazem (TIAZAC) 240 MG extended release capsule TAKE 1 CAPSULE BY MOUTH DAILY AS DIRECTED  3    CARTIA  MG extended release capsule Take 120 capsules by mouth every evening   3    OXYGEN Please provide patient with a portable oxygen concentrator 1 Units 0    dofetilide (TIKOSYN) 250 MCG capsule Take 250 mcg by mouth 2 times daily  Magnesium 500 MG TABS Take by mouth 2 times daily      Potassium 99 MG TABS Take by mouth daily      GLUTATHIONE PO Take by mouth      metoprolol tartrate (LOPRESSOR) 25 MG tablet Take 25 mg by mouth as needed      tadalafil (CIALIS) 5 MG tablet Take 1 tablet by mouth as needed for Erectile Dysfunction (maximum 5 mg per day) 30 tablet 3    Handicap Placard MISC by Does not apply route Good for 5 Years from 06/16/2016 1 each 0    digoxin (LANOXIN) 250 MCG tablet   11    ELIQUIS 5 MG TABS tablet   2    CINNAMON PO Take  by mouth.  SAW PALMETTO by Does not apply route.  ipratropium-albuterol (DUONEB) 0.5-2.5 (3) MG/3ML SOLN nebulizer solution Take 3 mLs by nebulization every 6 hours as needed for Shortness of Breath. 100 vial 2    Glucosamine-Chondroitin 500-400 MG CAPS Take 1 capsule by mouth daily.  Ascorbic Acid (VITAMIN C) 500 MG tablet Take 1,000 mg by mouth daily. 1/2 tab in AM and 1/2 tab in PM       vitamin D (CHOLECALCIFEROL) 400 UNIT TABS tablet Take 800 Units by mouth daily.  vitamin B-12 (CYANOCOBALAMIN) 1000 MCG tablet Take 1,000 mcg by mouth daily.  Selenium 200 MCG TABS Take 200 mcg by mouth daily.  Thiamine HCl (VITAMIN B-1) 100 MG tablet Take 100 mg by mouth daily.  folic acid (FOLVITE) 021 MCG tablet Take 800 mcg by mouth daily.  Zinc 50 MG CAPS Take 50 mg by mouth daily.  beta carotene 60246 UNIT capsule Take 25,000 Units by mouth daily.  DHEA 25 MG TABS Take 25 mg by mouth daily.  Pyridoxine HCl (VITAMIN B-6) 50 MG tablet Take 100 mg by mouth daily.  Calcium Carb-Cholecalciferol (CALCIUM 1000 + D) 1000-800 MG-UNIT TABS Take 1 tablet by mouth 2 times daily.  Methylsulfonylmethane (MSM) 1000 MG TABS Take 1,000 mg by mouth daily.  vitamin E 400 UNIT capsule Take 400 Units by mouth daily. No current facility-administered medications on file prior to visit.        Objective:   Pulse 72   Temp adjacent to the superior AC joint most likely represents a complicated ganglion or synovial cyst developing as a result of fluid communicating between the glenohumeral joint through the large rotator cuff tear with [facility] joint and then the mass    (\"geyser sign\") despite the lack of demonstratable communication on today's exam. Complication may represent synovial hypertrophy, scarring and/or hemorrhage in addition to infection or postinfectious sequelae. Alternatively, mass represents isolated    myxoid neoplasm.       Biceps tendon is far medially subluxed associated with subscapularis tendon tear/distal biceps pulley disruption. Likely biceps tendinopathy but no tendon discontinuity.       Acromioclavicular joint without sign of separation. Moderate arthritic changes as detailed above. Coracoclavicular and coracoacromial ligaments intact.       Acromion anterior downward sloping in the sagittal plane (Bigliani type II) morphology can predispose to subacromial impingement.       Coracohumeral distance is narrowed measuring 6 mm compatible with chronic severe subscapularis tendon tearing.       Glenohumeral joint osteoarthritic changes with at least moderate cartilage thinning at the anterior humerus diffusely and anterior superior glenoid. 5 mm anterior superior glenoid degenerative subchondral cyst. Superior and posterior superior glenoid    spurring. No joint erosions. Small joint effusion and mild synovial hypertrophy.       Glenoid labrum particularly anterior superiorly has degeneration and degenerative tearing compatible with the glenohumeral joint osteoarthritic changes. Attenuation of the posterior glenoid labrum also likely degenerative. No axillary recess capsular    disruption.       No fractures or bone bruising.  Hematopoietic marrow within the proximal humeral diaphysis and diametaphyseal junction partly included on the exam.               Impression       Well-defined complicated mass superior to the AC joint. Although no demonstratable communication with the [facility] joint differential includes complicated ganglion or synovial cyst from tiny or intermittent communication between the glenohumeral joint through    a massive chronic rotator cuff tear, AC joint, and the mass. Alternatively myxoid neoplasm.       Chronic massive rotator cuff tear. Associated biceps pulley disruption/marked medial biceps tendon subluxation, and glenohumeral joint osteoarthritis.       Supraspinatus, infraspinatus and subscapularis marked muscle atrophy secondary to chronic rotator cuff tear. Laboratory Studies:   Lab Results   Component Value Date    WBC 6.5 03/18/2022    HGB 15.7 03/18/2022    HCT 49.1 03/18/2022    MCV 88.3 03/18/2022     03/18/2022     No results found for: SEDRATE  No results found for: CRP    Assessment and Plan:      Diagnosis Orders   1. Mass of skin of shoulder, right     2. Osteoarthritis of right AC (acromioclavicular) joint         Jef Manrique is here for right shoulder follow-up. MRI revealed a cyst extending to the glenohumeral joint, also with a chronic rotator cuff tear as a Con sign. Discussed at the last appointment surgical excision versus serial drainage. Given his comorbidities we both agree that it would be best and safest however not as effective to do serial drains of the mass. He is here today for his second drainage, last was done 6 weeks ago. He understand that this will likely go back and we also discussed the risk of infection. We will see him back when the cyst recurs for drain but will need to be in June at the earliest.  Wife and him were taught how to cleanse the injection site for the next 7 days. They were also instructed about signs of infection and to call our office immediately if such occurs. The procedure include a cyst drain at the [facility] joint of the right shoulder. The mass was just larger than a golf ball. There is cleansed with Betadine.   Using sterile gloves and sterile technique an 18-gauge needle was inserted into the cyst.  40 cc of cystic yellow and jellylike consistency was removed. Prior to the insertion of the nail the area was injected with about 2 cc of 1% lidocaine using a 25-gauge needle. Patient tolerated the procedure well. As stated previously we will see him back in June at the earliest for serial draining        The above plan was discussed in thorough detail with Dr. Alli Quinn  and the patient. No orders of the defined types were placed in this encounter. No orders of the defined types were placed in this encounter. No follow-ups on file.     Aminah Schultz PA-C  [address] [facility]  [phone]

## 2022-04-22 DIAGNOSIS — J44.9 CHRONIC OBSTRUCTIVE PULMONARY DISEASE, UNSPECIFIED COPD TYPE (HCC): Primary | ICD-10-CM

## 2022-04-22 NOTE — TELEPHONE ENCOUNTER
Rx requested:  Requested Prescriptions     Pending Prescriptions Disp Refills    ipratropium-albuterol (DUONEB) 0.5-2.5 (3) MG/3ML SOLN nebulizer solution       Sig: Take 3 mLs by nebulization every 6 hours as needed for Shortness of Breath       Last Office Visit:   3/17/2022      Next Visit Date:  Future Appointments   Date Time Provider Valentín Perkins   6/7/2022  1:00 PM TRINA Roberts - [place] [address]   6/10/2022  2:00 PM Mildred Pompa PA-C [address] [facility]   6/23/2022  2:00 PM Cristy Hernandez MD [facility]

## 2022-04-23 RX ORDER — IPRATROPIUM BROMIDE AND ALBUTEROL SULFATE 2.5; .5 MG/3ML; MG/3ML
1 SOLUTION RESPIRATORY (INHALATION) EVERY 6 HOURS PRN
Qty: 360 EACH | Refills: 1 | Status: SHIPPED | OUTPATIENT
Start: 2022-04-23 | End: 2022-06-01 | Stop reason: SDUPTHER

## 2022-06-01 DIAGNOSIS — J44.9 CHRONIC OBSTRUCTIVE PULMONARY DISEASE, UNSPECIFIED COPD TYPE (HCC): ICD-10-CM

## 2022-06-01 RX ORDER — IPRATROPIUM BROMIDE AND ALBUTEROL SULFATE 2.5; .5 MG/3ML; MG/3ML
1 SOLUTION RESPIRATORY (INHALATION) EVERY 6 HOURS PRN
Qty: 360 EACH | Refills: 1 | Status: SHIPPED | OUTPATIENT
Start: 2022-06-01

## 2022-06-01 NOTE — TELEPHONE ENCOUNTER
Rx requested:  Requested Prescriptions     Pending Prescriptions Disp Refills    ipratropium-albuterol (DUONEB) 0.5-2.5 (3) MG/3ML SOLN nebulizer solution 360 each 1     Sig: Take 3 mLs by nebulization every 6 hours as needed for Shortness of Breath       Last Office Visit:   3/17/2022      Next Visit Date:  Future Appointments   Date Time Provider Valentín Herroni   6/7/2022  1:00 PM TRINA Alvarado - [place] [address]   6/10/2022  2:00 PM Alcides Chicas PA-C [address]   6/23/2022  2:00 PM Ariela Motley MD [facility]

## 2022-06-03 ENCOUNTER — COMMUNITY OUTREACH (OUTPATIENT)
Dept: INTERNAL MEDICINE | Age: 82
End: 2022-06-03

## 2022-06-06 ENCOUNTER — HOSPITAL ENCOUNTER (OUTPATIENT)
Dept: LAB | Age: 82
Discharge: HOME | End: 2022-06-06
Payer: MEDICARE

## 2022-06-06 DIAGNOSIS — R97.20 ELEVATED PSA: ICD-10-CM

## 2022-06-06 DIAGNOSIS — E11.59 TYPE 2 DIABETES MELLITUS WITH OTHER CIRCULATORY COMPLICATION, WITHOUT LONG-TERM CURRENT USE OF INSULIN (HCC): ICD-10-CM

## 2022-06-06 LAB
ALBUMIN SERPL-MCNC: 4 G/DL (ref 3.5–4.6)
ALP BLD-CCNC: 96 U/L (ref 35–104)
ALT SERPL-CCNC: 24 U/L (ref 0–41)
ANION GAP SERPL CALCULATED.3IONS-SCNC: 12 MEQ/L (ref 9–15)
AST SERPL-CCNC: 17 U/L (ref 0–40)
BASOPHILS ABSOLUTE: 0 K/UL (ref 0–0.2)
BASOPHILS RELATIVE PERCENT: 0.2 %
BILIRUB SERPL-MCNC: 0.7 MG/DL (ref 0.2–0.7)
BUN BLDV-MCNC: 19 MG/DL (ref 8–23)
CALCIUM SERPL-MCNC: 9.8 MG/DL (ref 8.5–9.9)
CHLORIDE BLD-SCNC: 98 MEQ/L (ref 95–107)
CHOLESTEROL, TOTAL: 137 MG/DL (ref 0–199)
CO2: 29 MEQ/L (ref 20–31)
CREAT SERPL-MCNC: 0.64 MG/DL (ref 0.7–1.2)
CREATININE URINE: 45.3 MG/DL
EOSINOPHILS ABSOLUTE: 0.2 K/UL (ref 0–0.7)
EOSINOPHILS RELATIVE PERCENT: 2.3 %
GFR AFRICAN AMERICAN: >60
GFR NON-AFRICAN AMERICAN: >60
GLOBULIN: 2.8 G/DL (ref 2.3–3.5)
GLUCOSE BLD-MCNC: 282 MG/DL (ref 70–99)
HBA1C MFR BLD: 9.8 % (ref 4.8–5.9)
HCT VFR BLD CALC: 46.4 % (ref 42–52)
HDLC SERPL-MCNC: 43 MG/DL (ref 40–59)
HEMOGLOBIN: 15.2 G/DL (ref 14–18)
LDL CHOLESTEROL CALCULATED: 60 MG/DL (ref 0–129)
LYMPHOCYTES ABSOLUTE: 1.6 K/UL (ref 1–4.8)
LYMPHOCYTES RELATIVE PERCENT: 24.2 %
MCH RBC QN AUTO: 29.1 PG (ref 27–31.3)
MCHC RBC AUTO-ENTMCNC: 32.7 % (ref 33–37)
MCV RBC AUTO: 89 FL (ref 80–100)
MICROALBUMIN UR-MCNC: 4.8 MG/DL
MICROALBUMIN/CREAT UR-RTO: 106 MG/G (ref 0–30)
MONOCYTES ABSOLUTE: 0.5 K/UL (ref 0.2–0.8)
MONOCYTES RELATIVE PERCENT: 7.9 %
NEUTROPHILS ABSOLUTE: 4.4 K/UL (ref 1.4–6.5)
NEUTROPHILS RELATIVE PERCENT: 65.4 %
PDW BLD-RTO: 13.8 % (ref 11.5–14.5)
PLATELET # BLD: 189 K/UL (ref 130–400)
POTASSIUM SERPL-SCNC: 4.4 MEQ/L (ref 3.4–4.9)
PROSTATE SPECIFIC ANTIGEN: 4.69 NG/ML (ref 0–4)
RBC # BLD: 5.21 M/UL (ref 4.7–6.1)
SODIUM BLD-SCNC: 139 MEQ/L (ref 135–144)
TOTAL PROTEIN: 6.8 G/DL (ref 6.3–8)
TRIGL SERPL-MCNC: 172 MG/DL (ref 0–150)
WBC # BLD: 6.8 K/UL (ref 4.8–10.8)

## 2022-06-06 PROCEDURE — 82570 ASSAY OF URINE CREATININE: CPT

## 2022-06-06 PROCEDURE — 84153 ASSAY OF PSA TOTAL: CPT

## 2022-06-06 PROCEDURE — 36415 COLL VENOUS BLD VENIPUNCTURE: CPT

## 2022-06-06 PROCEDURE — 82043 UR ALBUMIN QUANTITATIVE: CPT

## 2022-06-06 PROCEDURE — 85025 COMPLETE CBC W/AUTO DIFF WBC: CPT

## 2022-06-06 PROCEDURE — 80053 COMPREHEN METABOLIC PANEL: CPT

## 2022-06-06 PROCEDURE — 83036 HEMOGLOBIN GLYCOSYLATED A1C: CPT

## 2022-06-06 PROCEDURE — 80061 LIPID PANEL: CPT

## 2022-06-07 ENCOUNTER — OFFICE VISIT (OUTPATIENT)
Dept: FAMILY MEDICINE CLINIC | Age: 82
End: 2022-06-07
Payer: MEDICARE

## 2022-06-07 VITALS
DIASTOLIC BLOOD PRESSURE: 74 MMHG | WEIGHT: 215.4 LBS | SYSTOLIC BLOOD PRESSURE: 152 MMHG | BODY MASS INDEX: 29.17 KG/M2 | OXYGEN SATURATION: 98 % | HEART RATE: 65 BPM | RESPIRATION RATE: 18 BRPM | TEMPERATURE: 97.2 F | HEIGHT: 72 IN

## 2022-06-07 DIAGNOSIS — E78.2 MIXED HYPERLIPIDEMIA: ICD-10-CM

## 2022-06-07 DIAGNOSIS — Z79.899 HIGH RISK MEDICATION USE: ICD-10-CM

## 2022-06-07 DIAGNOSIS — R97.20 ELEVATED PSA: ICD-10-CM

## 2022-06-07 DIAGNOSIS — I48.0 PAROXYSMAL ATRIAL FIBRILLATION (HCC): ICD-10-CM

## 2022-06-07 DIAGNOSIS — J44.9 CHRONIC OBSTRUCTIVE PULMONARY DISEASE, UNSPECIFIED COPD TYPE (HCC): ICD-10-CM

## 2022-06-07 DIAGNOSIS — E11.59 TYPE 2 DIABETES MELLITUS WITH OTHER CIRCULATORY COMPLICATION, WITHOUT LONG-TERM CURRENT USE OF INSULIN (HCC): ICD-10-CM

## 2022-06-07 DIAGNOSIS — I10 ESSENTIAL HYPERTENSION: ICD-10-CM

## 2022-06-07 DIAGNOSIS — G47.00 INSOMNIA, UNSPECIFIED TYPE: ICD-10-CM

## 2022-06-07 DIAGNOSIS — Z00.00 MEDICARE ANNUAL WELLNESS VISIT, SUBSEQUENT: Primary | ICD-10-CM

## 2022-06-07 DIAGNOSIS — I25.10 CORONARY ARTERY DISEASE INVOLVING NATIVE CORONARY ARTERY OF NATIVE HEART WITHOUT ANGINA PECTORIS: ICD-10-CM

## 2022-06-07 PROCEDURE — 3046F HEMOGLOBIN A1C LEVEL >9.0%: CPT | Performed by: NURSE PRACTITIONER

## 2022-06-07 PROCEDURE — G8427 DOCREV CUR MEDS BY ELIG CLIN: HCPCS | Performed by: NURSE PRACTITIONER

## 2022-06-07 PROCEDURE — G8417 CALC BMI ABV UP PARAM F/U: HCPCS | Performed by: NURSE PRACTITIONER

## 2022-06-07 PROCEDURE — G0439 PPPS, SUBSEQ VISIT: HCPCS | Performed by: NURSE PRACTITIONER

## 2022-06-07 PROCEDURE — 99214 OFFICE O/P EST MOD 30 MIN: CPT | Performed by: NURSE PRACTITIONER

## 2022-06-07 PROCEDURE — 3023F SPIROM DOC REV: CPT | Performed by: NURSE PRACTITIONER

## 2022-06-07 PROCEDURE — 1036F TOBACCO NON-USER: CPT | Performed by: NURSE PRACTITIONER

## 2022-06-07 PROCEDURE — 1123F ACP DISCUSS/DSCN MKR DOCD: CPT | Performed by: NURSE PRACTITIONER

## 2022-06-07 RX ORDER — TEMAZEPAM 15 MG/1
CAPSULE ORAL
Qty: 60 CAPSULE | Refills: 2 | Status: SHIPPED | OUTPATIENT
Start: 2022-06-20 | End: 2022-09-22

## 2022-06-07 SDOH — ECONOMIC STABILITY: FOOD INSECURITY: WITHIN THE PAST 12 MONTHS, THE FOOD YOU BOUGHT JUST DIDN'T LAST AND YOU DIDN'T HAVE MONEY TO GET MORE.: NEVER TRUE

## 2022-06-07 SDOH — ECONOMIC STABILITY: FOOD INSECURITY: WITHIN THE PAST 12 MONTHS, YOU WORRIED THAT YOUR FOOD WOULD RUN OUT BEFORE YOU GOT MONEY TO BUY MORE.: NEVER TRUE

## 2022-06-07 ASSESSMENT — PATIENT HEALTH QUESTIONNAIRE - PHQ9
SUM OF ALL RESPONSES TO PHQ QUESTIONS 1-9: 1
SUM OF ALL RESPONSES TO PHQ QUESTIONS 1-9: 1
SUM OF ALL RESPONSES TO PHQ9 QUESTIONS 1 & 2: 1
SUM OF ALL RESPONSES TO PHQ QUESTIONS 1-9: 1
SUM OF ALL RESPONSES TO PHQ QUESTIONS 1-9: 1
1. LITTLE INTEREST OR PLEASURE IN DOING THINGS: 0
2. FEELING DOWN, DEPRESSED OR HOPELESS: 1

## 2022-06-07 ASSESSMENT — LIFESTYLE VARIABLES: HOW OFTEN DO YOU HAVE A DRINK CONTAINING ALCOHOL: NEVER

## 2022-06-07 ASSESSMENT — SOCIAL DETERMINANTS OF HEALTH (SDOH): HOW HARD IS IT FOR YOU TO PAY FOR THE VERY BASICS LIKE FOOD, HOUSING, MEDICAL CARE, AND HEATING?: NOT HARD AT ALL

## 2022-06-07 NOTE — PATIENT INSTRUCTIONS
Personalized Preventive Plan for Atilio Maldonado - 6/7/2022  Medicare offers a range of preventive health benefits. Some of the tests and screenings are paid in full while other may be subject to a deductible, co-insurance, and/or copay. Some of these benefits include a comprehensive review of your medical history including lifestyle, illnesses that may run in your family, and various assessments and screenings as appropriate. After reviewing your medical record and screening and assessments performed today your provider may have ordered immunizations, labs, imaging, and/or referrals for you. A list of these orders (if applicable) as well as your Preventive Care list are included within your After Visit Summary for your review. Other Preventive Recommendations:    · A preventive eye exam performed by an eye specialist is recommended every 1-2 years to screen for glaucoma; cataracts, macular degeneration, and other eye disorders. · A preventive dental visit is recommended every 6 months. · Try to get at least 150 minutes of exercise per week or 10,000 steps per day on a pedometer . · Order or download the FREE \"Exercise & Physical Activity: Your Everyday Guide\" from [employer]. Call 1-461.758.5615 or search [employer] online. · You need 9860-6136 mg of calcium and 1085-0550 IU of vitamin D per day. It is possible to meet your calcium requirement with diet alone, but a vitamin D supplement is usually necessary to meet this goal.  · When exposed to the sun, use a sunscreen that protects against both UVA and UVB radiation with an SPF of 30 or greater. Reapply every 2 to 3 hours or after sweating, drying off with a towel, or swimming. · Always wear a seat belt when traveling in a car. Always wear a helmet when riding a bicycle or motorcycle.

## 2022-06-07 NOTE — PROGRESS NOTES
Subjective:     Diabetes Mellitus Type 2: Current symptoms/problems include none. Home blood sugar records:  patient does not test  Any episodes of hypoglycemia? no  Tobacco history: He  reports that he quit smoking about 19 years ago. He has a 80.00 pack-year smoking history. He has never used smokeless tobacco.   Known diabetic complications: cardiovascular disease    Hypertension:  Home blood pressure monitoring: Yes - stable. He is adherent to a low sodium diet. Antihypertensive medication side effects: no medication side effects noted. Use of agents associated with hypertension: none. Hyperlipidemia:  No new myalgias or GI upset on rosuvastatin (Crestor). CAD/Atrial fibrillation: He states that he continues to take cardiac medication as prescribed with no reported side effects. He does not report any swelling in the lower extremities or significant weight fluctuation. No chest discomfort or worsening activity intolerance. He has not had any recent heart palpitations. No lightheadedness or dizziness. No near falls. He states that he occasionally will feel a little off balance when for standing from a sitting position but has never felt that he would pass out or fall. He has been drinking more water lately. Has follow-up appointment scheduled with cardiology in the next month or so. COPD: Continues to follow with pulmonology routinely and is wearing continuous oxygen at 5 L nasal cannula. Reports that his breathing does feel better than it has in a long time. He has not had any recent exacerbation of symptoms. No significant sputum production including purulent sputum. No fever or chills. He continues to get some routine activity every day. Reports sleeping okay at night if taking Restoril to help with sleep.     The five diabetic measures for control:   Hemoglobin A1C (%)   Date Value   06/06/2022 9.8 (H)     LDL Calculated (mg/dL)   Date Value   06/06/2022 60         Blood pressure less than 131/81,   BP Readings from Last 1 Encounters:   06/07/22 (!) 152/74     Smoking, non smoker is goal. This pt is not a smoker. This pt does an aspirin a day. Last eye exam was 2021  Last diabetic foot exam was 2021  Last urine microalbumin creatinine ratio was 2022    PMH: This 79 y.o. male  patient is  hypertensive, is  diabetic, is  hyperlipidemic. He has a history of smoking and has a  family history of heart disease. He is not obese. Review of Systems  This patient reports no chest pain or pressure. There is no shortness of breath or cough. The patient reports no nausea or vomiting. There is no heartburn or indigestion. There is no diarrhea or constipation. No black, bloody, mucusy or tarry stool noticed. The patient reports no bloating and no change in appetite. There is no numbness, tingling or swelling in the extremities. This patient reports no polyuria, polydipsia or episodes of hypoglycemia. Not reporting new onset urinary symptoms. Treatment Adherence:   Medication compliance:  compliant most of the time  Diet compliance:  noncompliant: admits to eating alot more simple sugars  Weight trend: stable  Current exercise: walks 1 time(s) per day  What might prevent you from meeting your goal?: impairment:  physical: COPD  Patient plan for overcoming barriers: N/A     Patient Confidence: 8/10      Objective:   EXAM:  Constitutional Blood pressure (!) 152/74, pulse 65, temperature 97.2 °F (36.2 °C), temperature source Temporal, resp. rate 18, height 6' (1.829 m), weight 215 lb 6.4 oz (97.7 kg), SpO2 98 %. .  He has a normal affect, no acute distress, appears well developed and well nourished. Neck:  neck- supple, no mass, non-tender and no bruits  Lungs:  Normal expansion. Clear to auscultation. No rales, rhonchi, or wheezing., No chest wall tenderness. Diminished bases. Heart:  Heart sounds are normal.  Regular rate and rhythm without murmur, gallop or rub.   Abdomen:  Soft, non-tender, normal bowel sounds. No bruits, organomegaly or masses. Extremities: Extremities warm to touch, pink, with no edema. Assessment:      Diagnosis Orders   1. Medicare annual wellness visit, subsequent     2. Type 2 diabetes mellitus with other circulatory complication, without long-term current use of insulin (HCC)  CBC with Auto Differential    Comprehensive Metabolic Panel    Lipid Panel    Hemoglobin A1C    Microalbumin / Creatinine Urine Ratio    SITagliptin (JANUVIA) 50 MG tablet   3. Essential hypertension     4. Mixed hyperlipidemia     5. Coronary artery disease involving native coronary artery of native heart without angina pectoris     6. Chronic obstructive pulmonary disease, unspecified COPD type ([facility] [address]     7. Paroxysmal atrial fibrillation ([facility] [address]     8. Elevated PSA  PSA, Diagnostic   9. Insomnia, unspecified type  temazepam (RESTORIL) 15 MG capsule   10. High risk medication use  Pain Management Drug Screen       PLAN: Include orders in the DX section. Diabetes Counseling   Patient was counseled regarding disease risks and adopting healthy behaviors. Patient was provided education materials to assist with self management. Patient was provided log (or received log during previous visit) to record blood pressure, food intake and/or blood sugar. Patient was instructed to keep log up-to-date and to always bring log to all office visits. Follow up: 3 months and as needed. Blood work one week prior as ordered. 1.  Also see AWV note from today. 2.  Diabetes is uncontrolled with hemoglobin A1c greater than 9. Discussed risk of macrovascular and microvascular complications associate with uncontrolled glucose levels. We discussed in detail recommended dietary modifications and recommendation to start oral medication. Patient has had diarrhea with metformin. Self anuria is not advised due to patient age and comorbid medical conditions.   Actos not advised secondary to history of heart disease. Would prefer to start [address] for reduction in glucose with subsequent underlying coronary artery disease. However, this medication is branded and quite expensive. Can try to get approved in the future but we will start with Januvia once daily with the biggest meal of the day. He will reduce simple sugars in the diet. Verbalizes understanding of treatment plan and his significant other verbalizes understanding as well. 3.  4.  5.  He continues to follow routinely with [facility] cardiology with no recent changes in medication reported. No recent symptoms consistent with unstable angina or fluid overload. No abnormal bruising or bleeding on Eliquis. No recent symptoms consistent with RVR. 6.  Reading is reported to be stable with no recent exacerbation. Compliant with medication therapy and oxygen use. 8.  PSA is decreased when compared to last check 3 months ago. No new urinary symptoms reported. 9.  10.  Medication has been effective in allowing him to have more restful sleep with no side effects reported. Continue current medication and refill given for the next 3 months. Controlled Substance Monitoring:    Acute and Chronic Pain Monitoring:   RX Monitoring 6/7/2022   Attestation -   Periodic Controlled Substance Monitoring Possible medication side effects, risk of tolerance/dependence & alternative treatments discussed. ;No signs of potential drug abuse or diversion identified. ;Assessed functional status. Controlled medication review:    Indication reviewed- insomnia  Reviewed that condition still requires assistance with current medication. Reviewed that condition impacts psychological and/or physical function in daily life. Reviewed that medication does indeed improve function/pain  Reviewed patient/provider roles in compliance. Reviewed acceptable alternatives (CBT/medication/exercise/therapy)  Reviewed medication agreement.   Reviewed use, abuse and diversion of medications. Discussed need for random urine testing at least yearly. No significant interactions/side effects reported. Please note this report has been partially produced using speech recognition software and may cause contain errors related to that system including grammar, punctuation and spelling as well as words and phrases that may seem inappropriate. If there are questions or concerns please feel free to contact me to clarify.         Electronically signed by TRINA Leyva CNP-CNP, 3:00 PM 6/7/22

## 2022-06-07 NOTE — PROGRESS NOTES
provided  · Patient declines any further evaluation/treatment for this issue   · Change position slowly to help avoid lightheadedness secondary to cardiac medications. · Stay hydrated. General Health and ACP:  General  In general, how would you say your health is?: Good  In the past 7 days, have you experienced any of the following: New or Increased Pain, New or Increased Fatigue, Loneliness, Social Isolation, Stress or Anger?: (!) Yes  Select all that apply: (!) New or Increased Fatigue (pt reports feeling fatigued at the end of his days)  Do you get the social and emotional support that you need?: Yes  Do you have a Living Will?: Yes    Advance Directives     Power of  Living Will ACP-Advance Directive ACP-Power of     Not on File Not on File Not on File Not on File      General Health Risk Interventions:  · Fatigue: patient declines any further evaluation/treatment for this issue    Health Habits/Nutrition:     Physical Activity: Insufficiently Active    Days of Exercise per Week: 5 days    Minutes of Exercise per Session: 10 min     Have you lost any weight without trying in the past 3 months?: No  Body mass index: (!) 29.21  Have you seen the dentist within the past year?: (!) No    Health Habits/Nutrition Interventions:  · Nutritional issues:  educational materials for healthy, well-balanced diet provided    Hearing/Vision:  Do you or your family notice any trouble with your hearing that hasn't been managed with hearing aids?: No  Do you have difficulty driving, watching TV, or doing any of your daily activities because of your eyesight?: (!) Yes (pt has cataracts)  Have you had an eye exam within the past year?: Yes  No exam data present    Hearing/Vision Interventions:  · Vision concerns:  patient encouraged to make appointment with his/her eye specialist   · Is aware of cataracts to the eyes. No recent changes.      Safety:  Do you have working smoke detectors?: (!) No  Do you have any tripping hazards - loose or unsecured carpets or rugs?: (!) Yes  Do you have any tripping hazards - clutter in doorways, halls, or stairs?: (!) Yes  Do you have either shower bars, grab bars, non-slip mats or non-slip surfaces in your shower or bathtub?: (!) No (pt takes baths)  Do all of your stairways have a railing or banister?: Yes  Do you always fasten your seatbelt when you are in a car?: Yes    Safety Interventions:  · Home safety tips provided           Objective   Vitals:    06/07/22 1252 06/07/22 1309   BP: (!) 156/74 (!) 152/74   Site: Right Upper Arm Right Upper Arm   Position: Sitting Sitting   Cuff Size: Medium Adult Medium Adult   Pulse: 65    Resp: 18    Temp: 97.2 °F (36.2 °C)    TempSrc: Temporal    SpO2: 98%    Weight: 215 lb 6.4 oz (97.7 kg)    Height: 6' (1.829 m)       Body mass index is 29.21 kg/m². General Appearance: alert and oriented to person, place and time, well-developed and well-nourished, in no acute distress       Allergies   Allergen Reactions    Brilinta [Ticagrelor] Shortness Of Breath    Sildenafil Citrate      headache    Advair [Fluticasone-Salmeterol]      Chest pain    Fluticasone Propionate (Inhal)      Prior to Visit Medications    Medication Sig Taking?  Authorizing Provider   SITagliptin (JANUVIA) 50 MG tablet Take 1 tablet by mouth daily Yes Artelita DeceTRINA Barger CNP   temazepam (RESTORIL) 15 MG capsule TAKE 1 CAPSULE TWICE DAILY Yes TRINA Lopez CNP   ipratropium-albuterol (DUONEB) 0.5-2.5 (3) MG/3ML SOLN nebulizer solution Take 3 mLs by nebulization every 6 hours as needed for Shortness of Breath Yes Biju Sena MD   rosuvastatin (CRESTOR) 40 MG tablet TAKE 1 TABLET DAILY Yes TRINA Lopez CNP   albuterol-ipratropium (COMBIVENT RESPIMAT)  MCG/ACT AERS inhaler Inhale 1 puff into the lungs every 4 hours as needed for Wheezing or Shortness of Breath Yes Deja Medel, APRN - CNP   fexofenadine (ALLEGRA) 180 MG tablet Take 1 tablet by mouth daily Yes TRINA Lopez CNP   busPIRone (BUSPAR) 30 MG tablet take 1 (ONE) tablet daily Yes TRINA Lopez CNP   doxycycline hyclate (VIBRAMYCIN) 100 MG capsule Take 1 capsule by mouth 2 times daily Yes TRINA Lopez - CNP   temazepam (RESTORIL) 15 MG capsule TAKE 1 CAPSULE BY MOUTH TWICE DAILY Yes Historical Provider, MD   SYMBICORT 80-4.5 MCG/ACT AERO USE 2 [address] [place] [address], MD   COMBIVENT RESPIMAT  MCG/ACT AERS inhaler USE 1 INHALATION EVERY 6 HOURS Yes TRINA Lopez CNP   azelastine (OPTIVAR) 0.05 % ophthalmic solution Place 1 drop into both eyes 2 times daily Yes Historical Provider, MD   calcipotriene (DOVONEX) 0.005 % solution Apply to scalp daily Yes Historical Provider, MD   albuterol sulfate HFA (PROAIR HFA) 108 (90 Base) MCG/ACT inhaler Inhale 2 puffs into the lungs every 6 hours as needed for Wheezing Yes Malinda Broderick MD   Melatonin 10 MG TABS Take by mouth Yes Historical Provider, MD   metoprolol succinate (TOPROL XL) 25 MG extended release tablet Take 25 mg by mouth every morning Yes Historical Provider, MD   metoprolol succinate (TOPROL XL) 50 MG extended release tablet Take 50 mg by mouth every evening Yes Historical Provider, MD   aspirin 81 MG tablet Take 81 mg by mouth daily Yes Historical Provider, MD   diltiazem (TIAZAC) 240 MG extended release capsule TAKE 1 CAPSULE BY MOUTH DAILY AS DIRECTED Yes Historical Provider, MD DALTON  MG extended release capsule Take 120 capsules by mouth every evening  Yes Historical Provider, MD   OXYGEN Please provide patient with a portable oxygen concentrator Yes Shannon Salmon PA-C   dofetilide (TIKOSYN) 250 MCG capsule Take 250 mcg by mouth 2 times daily Yes Historical Provider, MD   Magnesium 500 MG TABS Take by mouth 2 times daily Yes Historical Provider, MD   Potassium 99 MG TABS Take by mouth daily Yes Historical Provider, MD   GLUTATHIONE PO Historical Provider, MD   vitamin E 400 UNIT capsule Take 400 Units by mouth daily. Yes Historical Provider, MD Arnold (Including outside providers/suppliers regularly involved in providing care):   Patient Care Team:  TRINA Pardo CNP as PCP - General (Certified Nurse Practitioner)  TRINA Pardo CNP as PCP - [facility] Empaneled Provider  Dipti Valdez MD (Interventional Cardiology)  Stephani Mena MD (Urology)     Reviewed and updated this visit:  Tobacco  Allergies  Meds  Med Hx  Surg Hx  Soc Hx  Fam Hx                    Is encouraged to bring medical power of  forms into the office to have scanned to the chart. His primary medical decision maker is his medical power of . Secondary medical decision maker was changed to his grandson after the passing of his son. Please note this report has been partially produced using speech recognition software and may cause contain errors related to that system including grammar, punctuation and spelling as well as words and phrases that may seem inappropriate. If there are questions or concerns please feel free to contact me to clarify.       Electronically signed by TRINA Pardo CNP-CNP, 2:49 PM 6/7/22

## 2022-07-15 ENCOUNTER — HOSPITAL ENCOUNTER (OUTPATIENT)
Age: 82
Discharge: HOME | End: 2022-07-17
Payer: MEDICARE

## 2022-07-15 ENCOUNTER — HOSPITAL ENCOUNTER (OUTPATIENT)
Dept: GENERAL RADIOLOGY | Age: 82
Discharge: HOME | End: 2022-07-17
Payer: MEDICARE

## 2022-07-15 DIAGNOSIS — J44.9 CHRONIC OBSTRUCTIVE PULMONARY DISEASE, UNSPECIFIED COPD TYPE (HCC): ICD-10-CM

## 2022-07-15 PROCEDURE — 71046 X-RAY EXAM CHEST 2 VIEWS: CPT

## 2022-07-22 ENCOUNTER — OFFICE VISIT (OUTPATIENT)
Dept: PULMONOLOGY | Age: 82
End: 2022-07-22
Payer: MEDICARE

## 2022-07-22 VITALS
SYSTOLIC BLOOD PRESSURE: 138 MMHG | HEIGHT: 72 IN | OXYGEN SATURATION: 96 % | DIASTOLIC BLOOD PRESSURE: 70 MMHG | TEMPERATURE: 97.1 F | WEIGHT: 206 LBS | RESPIRATION RATE: 18 BRPM | HEART RATE: 61 BPM | BODY MASS INDEX: 27.9 KG/M2

## 2022-07-22 DIAGNOSIS — I27.20 PULMONARY HTN (HCC): ICD-10-CM

## 2022-07-22 DIAGNOSIS — G47.33 OBSTRUCTIVE SLEEP APNEA: ICD-10-CM

## 2022-07-22 DIAGNOSIS — J44.9 CHRONIC OBSTRUCTIVE PULMONARY DISEASE, UNSPECIFIED COPD TYPE (HCC): Primary | ICD-10-CM

## 2022-07-22 DIAGNOSIS — J96.12 CHRONIC RESPIRATORY FAILURE WITH HYPOXIA AND HYPERCAPNIA (HCC): ICD-10-CM

## 2022-07-22 DIAGNOSIS — J96.11 CHRONIC RESPIRATORY FAILURE WITH HYPOXIA AND HYPERCAPNIA (HCC): ICD-10-CM

## 2022-07-22 PROCEDURE — 99214 OFFICE O/P EST MOD 30 MIN: CPT | Performed by: INTERNAL MEDICINE

## 2022-07-22 PROCEDURE — 1123F ACP DISCUSS/DSCN MKR DOCD: CPT | Performed by: INTERNAL MEDICINE

## 2022-07-22 ASSESSMENT — ENCOUNTER SYMPTOMS
WHEEZING: 0
NAUSEA: 0
EYE ITCHING: 0
SHORTNESS OF BREATH: 1
CHEST TIGHTNESS: 0
ABDOMINAL PAIN: 0
VOMITING: 0
DIARRHEA: 0
RHINORRHEA: 0
COUGH: 0
SORE THROAT: 0
VOICE CHANGE: 0

## 2022-07-22 NOTE — PROGRESS NOTES
Subjective:     Pastor Danielson is a 79 y.o. male who complains today of:     Chief Complaint   Patient presents with    Follow-up     Patient is following up on COPD and Chronic respiratory failure. Patient is on 5 liter of oxygen daily. HPI  He is using 5 lit O2  24 hour a day. He is using trilogy, noninvasive vent since last 5 years , DME is  medical services  He is using bronchodilator with symbicort 2 puff BID , combivent respimat 1 puff Q 4 hourly , nebulizer with duoneb neb prn , proair hfa prn , prednisone prn.   C/o shortness of breath  with exertion and humid weather    Occasional Wheezing. No Cough   No Hemoptysis. No Chest tightness. No Chest pain with radiation  or pleuritic pain. No  leg edema. No orthopnea. No Fever or chills. No Rhinorrhea and postnasal drip.   CXR no active disease  7/15/22    Allergies:  Brilinta [ticagrelor], Sildenafil citrate, Advair [fluticasone-salmeterol], and Fluticasone propionate (inhal)  Past Medical History:   Diagnosis Date    Adrenal adenoma     CT 6/2011 stable    Anxiety     Asbestosis(501)     CAD (coronary artery disease)     status post stent 2001    Cancer (HCC)     basil cell carnoma    COPD (chronic obstructive pulmonary disease) (HCC)     Elbow injury     left    Erectile dysfunction     Granulomatous lung disease (HCC)     Herpes zoster     Hyperlipidemia     Hypertension     Insomnia     Interstitial fibrosis     Obstructive sleep apnea on CPAP 11/13/2017    Osteoarthritis of right AC (acromioclavicular) joint 3/25/2022    Prostate nodule     Scarlet fever     Urine frequency      Past Surgical History:   Procedure Laterality Date    CARDIAC CATHETERIZATION      CARDIOVASCULAR STRESS TEST      1/2010 normal per patient    COLONOSCOPY      5/2009 tubular adenomas    COLONOSCOPY  08/13/15    Alma Rosa Olguin MD    EYE SURGERY  02/10/14    DR WOLFE  [facility]    HERNIA REPAIR      LEG SURGERY       Family History   Problem Relation Age of Onset Cancer Other         colon cancer in multiple family members and breast cancer    Heart Disease Other      Social History     Socioeconomic History    Marital status:      Spouse name: Not on file    Number of children: 4    Years of education: 13    Highest education level: High school graduate   Occupational History    Occupation:    Tobacco Use    Smoking status: Former     Packs/day: 2.00     Years: 40.00     Pack years: 80.00     Types: Cigarettes     Quit date: 2002     Years since quittin.9    Smokeless tobacco: Never   Vaping Use    Vaping Use: Never used   Substance and Sexual Activity    Alcohol use: No    Drug use: No    Sexual activity: Yes     Partners: Female   Other Topics Concern    Not on file   Social History Narrative    Not on file     Social Determinants of Health     Financial Resource Strain: Low Risk     Difficulty of Paying Living Expenses: Not hard at all   Food Insecurity: No Food Insecurity    Worried About Running Out of Food in the Last Year: Never true    [address] in the Last Year: Never true   Transportation Needs: Not on file   Physical Activity: Insufficiently Active    Days of Exercise per Week: 5 days    Minutes of Exercise per Session: 10 min   Stress: Not on file   Social Connections: Not on file   Intimate Partner Violence: Not on file   Housing Stability: Not on file         Review of Systems   Constitutional:  Negative for chills, diaphoresis, fatigue and fever. HENT:  Negative for congestion, mouth sores, nosebleeds, postnasal drip, rhinorrhea, sneezing, sore throat and voice change. Eyes:  Negative for itching and visual disturbance. Respiratory:  Positive for shortness of breath. Negative for cough, chest tightness and wheezing. Cardiovascular: Negative. Negative for chest pain, palpitations and leg swelling. Gastrointestinal:  Negative for abdominal pain, diarrhea, nausea and vomiting.    Genitourinary:  Negative for difficulty urinating and hematuria. Musculoskeletal:  Negative for arthralgias, joint swelling and myalgias. Skin:  Negative for rash. Allergic/Immunologic: Negative for environmental allergies. Neurological:  Negative for dizziness, tremors, weakness and headaches. Psychiatric/Behavioral:  Negative for behavioral problems and sleep disturbance.        :     Vitals:    07/22/22 1109   BP: 138/70   Site: Right Upper Arm   Position: Sitting   Cuff Size: Small Adult   Pulse: 61   Resp: 18   Temp: 97.1 °F (36.2 °C)   TempSrc: Temporal   SpO2: 96%   Weight: 206 lb (93.4 kg)   Height: 6' (1.829 m)     Wt Readings from Last 3 Encounters:   07/22/22 206 lb (93.4 kg)   06/07/22 215 lb 6.4 oz (97.7 kg)   04/21/22 213 lb (96.6 kg)         Physical Exam  Constitutional:       Appearance: He is well-developed. HENT:      Head: Normocephalic and atraumatic. Nose: Nose normal.   Eyes:      Conjunctiva/sclera: Conjunctivae normal.      Pupils: Pupils are equal, round, and reactive to light. Neck:      Thyroid: No thyromegaly. Vascular: No JVD. Trachea: No tracheal deviation. Cardiovascular:      Rate and Rhythm: Normal rate and regular rhythm. Heart sounds: No murmur heard. No friction rub. No gallop. Pulmonary:      Effort: Pulmonary effort is normal. No respiratory distress. Breath sounds: Normal breath sounds. No wheezing or rales. Comments: diminished Breath sound bilaterally. Chest:      Chest wall: No tenderness. Abdominal:      General: There is no distension. Musculoskeletal:         General: Normal range of motion. Right lower leg: Edema present. Left lower leg: Edema present. Lymphadenopathy:      Cervical: No cervical adenopathy. Skin:     General: Skin is warm and dry. Findings: No rash. Neurological:      Mental Status: He is alert and oriented to person, place, and time. Cranial Nerves: No cranial nerve deficit.    Psychiatric:         Behavior: Behavior normal.       Current Outpatient Medications   Medication Sig Dispense Refill    SITagliptin (JANUVIA) 50 MG tablet Take 1 tablet by mouth daily 30 tablet 3    temazepam (RESTORIL) 15 MG capsule TAKE 1 CAPSULE TWICE DAILY 60 capsule 2    ipratropium-albuterol (DUONEB) 0.5-2.5 (3) MG/3ML SOLN nebulizer solution Take 3 mLs by nebulization every 6 hours as needed for Shortness of Breath 360 each 1    rosuvastatin (CRESTOR) 40 MG tablet TAKE 1 TABLET DAILY 90 tablet 3    albuterol-ipratropium (COMBIVENT RESPIMAT)  MCG/ACT AERS inhaler Inhale 1 puff into the lungs every 4 hours as needed for Wheezing or Shortness of Breath 4 each 3    fexofenadine (ALLEGRA) 180 MG tablet Take 1 tablet by mouth daily 90 tablet 2    busPIRone (BUSPAR) 30 MG tablet take 1 (ONE) tablet daily 30 tablet 3    doxycycline hyclate (VIBRAMYCIN) 100 MG capsule Take 1 capsule by mouth 2 times daily 20 capsule 0    temazepam (RESTORIL) 15 MG capsule TAKE 1 CAPSULE BY MOUTH TWICE DAILY      SYMBICORT 80-4.5 MCG/ACT AERO USE 2 INHALATIONS TWICE A DAY 30.6 g 3    COMBIVENT RESPIMAT  MCG/ACT AERS inhaler USE 1 INHALATION EVERY 6 HOURS 16 g 2    azelastine (OPTIVAR) 0.05 % ophthalmic solution Place 1 drop into both eyes 2 times daily      calcipotriene (DOVONEX) 0.005 % solution Apply to scalp daily      albuterol sulfate HFA (PROAIR HFA) 108 (90 Base) MCG/ACT inhaler Inhale 2 puffs into the lungs every 6 hours as needed for Wheezing 3 Inhaler 3    Melatonin 10 MG TABS Take by mouth      metoprolol succinate (TOPROL XL) 25 MG extended release tablet Take 25 mg by mouth every morning      metoprolol succinate (TOPROL XL) 50 MG extended release tablet Take 50 mg by mouth every evening      aspirin 81 MG tablet Take 81 mg by mouth daily      diltiazem (TIAZAC) 240 MG extended release capsule TAKE 1 CAPSULE BY MOUTH DAILY AS DIRECTED  3    CARTIA  MG extended release capsule Take 120 capsules by mouth every evening   3    OXYGEN Please provide patient with a portable oxygen concentrator 1 Units 0    dofetilide (TIKOSYN) 250 MCG capsule Take 250 mcg by mouth 2 times daily      Magnesium 500 MG TABS Take by mouth 2 times daily      Potassium 99 MG TABS Take by mouth daily      GLUTATHIONE PO Take by mouth      tadalafil (CIALIS) 5 MG tablet Take 1 tablet by mouth as needed for Erectile Dysfunction (maximum 5 mg per day) 30 tablet 3    Handicap Placard MISC by Does not apply route Good for 5 Years from 06/16/2016 1 each 0    digoxin (LANOXIN) 250 MCG tablet   11    ELIQUIS 5 MG TABS tablet   2    CINNAMON PO Take  by mouth. SAW PALMETTO by Does not apply route. Glucosamine-Chondroitin 500-400 MG CAPS Take 1 capsule by mouth daily. Ascorbic Acid (VITAMIN C) 500 MG tablet Take 1,000 mg by mouth daily. 1/2 tab in AM and 1/2 tab in PM       vitamin D (CHOLECALCIFEROL) 400 UNIT TABS tablet Take 800 Units by mouth daily. vitamin B-12 (CYANOCOBALAMIN) 1000 MCG tablet Take 1,000 mcg by mouth daily. Selenium 200 MCG TABS Take 200 mcg by mouth daily. Thiamine HCl (VITAMIN B-1) 100 MG tablet Take 100 mg by mouth daily. folic acid (FOLVITE) 957 MCG tablet Take 800 mcg by mouth daily. Zinc 50 MG CAPS Take 50 mg by mouth daily. beta carotene 22616 UNIT capsule Take 25,000 Units by mouth daily. DHEA 25 MG TABS Take 25 mg by mouth daily. Pyridoxine HCl (VITAMIN B-6) 50 MG tablet Take 100 mg by mouth daily. Calcium Carb-Cholecalciferol 1000-800 MG-UNIT TABS Take 1 tablet by mouth 2 times daily. Methylsulfonylmethane (MSM) 1000 MG TABS Take 1,000 mg by mouth daily. vitamin E 400 UNIT capsule Take 400 Units by mouth daily. No current facility-administered medications for this visit.        Results for orders placed during the hospital encounter of 07/15/22    XR CHEST (2 VW)    Narrative  EXAMINATION: XR CHEST (2 VW)    DATE AND TIME:7/15/2022 12:53 PM    CLINICAL HISTORY: Shortness of breath  J44.9 Chronic obstructive pulmonary disease, unspecified COPD type ([address] ICD10    COMPARISONS: 12/29/2021    FINDINGS: The heart, mediastinum and pulmonary vasculature are within normal limits. Visualized lung fields are clear. Bones unremarkable. Impression  NO ACTIVE LUNG DISEASE. Results for orders placed during the hospital encounter of 10/22/21    XR CHEST (2 VW)    Narrative  EXAMINATION: XR CHEST (2 VW). DATE AND TIME:10/22/2021 6:07 PM    CLINICAL HISTORY: Shortness of breath  R06.02 SOB (shortness of breath) ICD10    COMPARISONS: January 20, 2020    FINDINGS: The heart, mediastinum and pulmonary vasculature are within normal limits. Visualized lung fields are clear. Bones unremarkable. Impression  NO  ACTIVE LUNG DISEASE. Results for orders placed during the hospital encounter of 01/28/20    XR CHEST STANDARD (2 VW)    Narrative  EXAMINATION: XR CHEST (2 VW)    CLINICAL HISTORY: J44.1 COPD exacerbation ([address] ICD10    COMPARISONS: 1/24/2020    FINDINGS: Cardiac size is borderline. Pulmonary vascularity is normal. Lungs are hyperinflated with increase in the AP diameter of the chest and flattening the diaphragms, concordant with clinical history of COPD. There is scarring adjacent to the  cardiac silhouette in the left lower lung. There is coronary artery stenting. There are no acute infiltrates. Prominent nipple shadows, left greater than right are noted. There are mild degenerative changes in the spine. Impression  COPD. NO ACUTE CARDIOPULMONARY DISEASE.  ]  Results for orders placed during the hospital encounter of 12/29/21    XR CHEST PORTABLE    Narrative  EXAMINATION: CHEST PORTABLE VIEW    CLINICAL HISTORY: Short of breath    COMPARISONS: October 22, 2021 1806 hours    FINDINGS:    Single  views of the chest is submitted. The cardiac silhouette is enlarged  Pulmonary vascular attenuated  Right sided trachea.   There are bibasilar areas of atelectasis, patchy infiltrates in both bases left greater than right. .  No Pneumothoraces. Impression  BIBASILAR AREAS OF ATELECTASIS, PATCHY INFILTRATES IN BOTH BASES LEFT GREATER THAN RIGHT SUPERIMPOSED UPON RADIOGRAPHIC FINDINGS OF COPD. IMAGING FEATURES CAN BE SEEN WITH (COVID-19) PNEUMONIA, THOUGH ARE NONSPECIFIC AND CAN OCCUR WITH A VARIETY OF INFECTIOUS AND NONINFECTIOUS PROCESSES. Results for orders placed during the hospital encounter of 01/24/20    XR CHEST PORTABLE    Narrative  EXAMINATION: PORTABLE CHEST X-RAY FROM 1/24/2020 17:27 HOURS    CLINICAL HISTORY: SMOKING HISTORY AND COPD, PATIENT WITH DYSPNEA    COMPARISONS: 10/23/2018    FINDINGS: The heart is not enlarged. There is an atherosclerotic tortuous aorta. There is hyperinflation of the lungs and coarse pulmonary markings compatible with COPD with pulmonary interstitial fibrosis. There are granulomata in both hilar regions  compatible with remote granulomatous disease. There are no acute pulmonary infiltrates or pleural effusions. There is no pneumothorax. There is mild degenerative bone spurring in the spine. Impression  COPD WITHOUT AN ACUTE PULMONARY INFILTRATE OR PLEURAL EFFUSION. Assessment/Plan:     1. Chronic obstructive pulmonary disease, unspecified COPD type ([address]  He is using 5 lit O2  24 hour a day. He is using trilogy, noninvasive vent since last 5 years , DME is  medical services He is using bronchodilator with symbicort 2 puff BID , combivent respimat 1 puff Q 4 hourly , nebulizer with duoneb neb prn , proair hfa prn , prednisone prn. C/o shortness of breath  with exertion and humid weather  Occasional Wheezing. No Cough continue O2 and bronchodilator for  CXR no active disease  7/15/22    2. Chronic respiratory failure with hypoxia and hypercapnia (HCC)  He is using 5 lit O2  24 hour a day. He is using trilogy, noninvasive vent since last 5 years , DME is  medical services . Continue same    3.  Pulmonary HTN ([address]  Continue bronchodilator therapy, CPAP therapy and O2 to keep saturation 90% or above. 4. Obstructive sleep apnea  Continue noninvasive ventilator at nighttime. Return in about 4 months (around 11/22/2022) for COPD, chronic respiratory failure.       Tank Kulkarni MD

## 2022-08-15 DIAGNOSIS — J44.9 CHRONIC OBSTRUCTIVE PULMONARY DISEASE, UNSPECIFIED COPD TYPE (HCC): ICD-10-CM

## 2022-08-15 RX ORDER — DILTIAZEM HYDROCHLORIDE 60 MG/1
TABLET, FILM COATED ORAL
Qty: 3 EACH | Refills: 2 | Status: SHIPPED | OUTPATIENT
Start: 2022-08-15

## 2022-08-15 NOTE — TELEPHONE ENCOUNTER
Rx requested:  Requested Prescriptions     Pending Prescriptions Disp Refills    SYMBICORT 80-4.5 MCG/ACT AERO [Pharmacy Med Name: Miguel Smith 80/4.5MCG] 30.6 g 3     Sig: USE 2 INHALATIONS TWICE A DAY       Last Office Visit:   7/22/2022      Next Visit Date:  Future Appointments   Date Time Provider Valentín Perkins   9/13/2022  1:00 PM TRINA Bowles - CNP [facility] [place] [facility]   11/22/2022 11:45 AM Marvine Blizzard, MD [facility]

## 2022-08-23 DIAGNOSIS — F41.9 ANXIETY: ICD-10-CM

## 2022-08-23 RX ORDER — BUSPIRONE HYDROCHLORIDE 30 MG/1
30 TABLET ORAL DAILY
Qty: 30 TABLET | Refills: 0 | Status: SHIPPED | OUTPATIENT
Start: 2022-08-23 | End: 2022-10-24

## 2022-08-23 NOTE — TELEPHONE ENCOUNTER
Pharmacy is requesting medication refill.  Please approve or deny this request.    Rx requested:  Requested Prescriptions     Pending Prescriptions Disp Refills    busPIRone (BUSPAR) 30 MG tablet [Pharmacy Med Name: buspirone 30 mg tablet] 30 tablet 3     Sig: take 1 (ONE) tablet daily         Last Office Visit:   6/7/2022      Next Visit Date:  Future Appointments   Date Time Provider Valentín Perkins   9/13/2022  1:00 PM TRINA Klein - [facility]   11/22/2022 11:45 AM Michel Mukherjee MD [address] [place] [address]

## 2022-09-09 ENCOUNTER — HOSPITAL ENCOUNTER (OUTPATIENT)
Dept: LAB | Age: 82
Discharge: HOME | End: 2022-09-09
Payer: MEDICARE

## 2022-09-09 DIAGNOSIS — E11.59 TYPE 2 DIABETES MELLITUS WITH OTHER CIRCULATORY COMPLICATION, WITHOUT LONG-TERM CURRENT USE OF INSULIN (HCC): ICD-10-CM

## 2022-09-09 DIAGNOSIS — R97.20 ELEVATED PSA: ICD-10-CM

## 2022-09-09 DIAGNOSIS — Z79.899 HIGH RISK MEDICATION USE: ICD-10-CM

## 2022-09-09 LAB
ALBUMIN SERPL-MCNC: 4.3 G/DL (ref 3.5–4.6)
ALP BLD-CCNC: 84 U/L (ref 35–104)
ALT SERPL-CCNC: 23 U/L (ref 0–41)
ANION GAP SERPL CALCULATED.3IONS-SCNC: 8 MEQ/L (ref 9–15)
AST SERPL-CCNC: 22 U/L (ref 0–40)
BASOPHILS ABSOLUTE: 0 K/UL (ref 0–0.2)
BASOPHILS RELATIVE PERCENT: 0.2 %
BILIRUB SERPL-MCNC: 0.6 MG/DL (ref 0.2–0.7)
BUN BLDV-MCNC: 17 MG/DL (ref 8–23)
CALCIUM SERPL-MCNC: 9.8 MG/DL (ref 8.5–9.9)
CHLORIDE BLD-SCNC: 98 MEQ/L (ref 95–107)
CHOLESTEROL, TOTAL: 114 MG/DL (ref 0–199)
CO2: 32 MEQ/L (ref 20–31)
CREAT SERPL-MCNC: 0.77 MG/DL (ref 0.7–1.2)
CREATININE URINE: 53 MG/DL
EOSINOPHILS ABSOLUTE: 0.2 K/UL (ref 0–0.7)
EOSINOPHILS RELATIVE PERCENT: 2.2 %
GFR AFRICAN AMERICAN: >60
GFR NON-AFRICAN AMERICAN: >60
GLOBULIN: 2.6 G/DL (ref 2.3–3.5)
GLUCOSE BLD-MCNC: 191 MG/DL (ref 70–99)
HBA1C MFR BLD: 7 % (ref 4.8–5.9)
HCT VFR BLD CALC: 47.3 % (ref 42–52)
HDLC SERPL-MCNC: 38 MG/DL (ref 40–59)
HEMOGLOBIN: 15.7 G/DL (ref 14–18)
LDL CHOLESTEROL CALCULATED: 54 MG/DL (ref 0–129)
LYMPHOCYTES ABSOLUTE: 1.5 K/UL (ref 1–4.8)
LYMPHOCYTES RELATIVE PERCENT: 22.1 %
MCH RBC QN AUTO: 29.1 PG (ref 27–31.3)
MCHC RBC AUTO-ENTMCNC: 33.1 % (ref 33–37)
MCV RBC AUTO: 88 FL (ref 80–100)
MICROALBUMIN UR-MCNC: 4.6 MG/DL
MICROALBUMIN/CREAT UR-RTO: 86.8 MG/G (ref 0–30)
MONOCYTES ABSOLUTE: 0.6 K/UL (ref 0.2–0.8)
MONOCYTES RELATIVE PERCENT: 8.7 %
NEUTROPHILS ABSOLUTE: 4.6 K/UL (ref 1.4–6.5)
NEUTROPHILS RELATIVE PERCENT: 66.8 %
PDW BLD-RTO: 13.9 % (ref 11.5–14.5)
PLATELET # BLD: 194 K/UL (ref 130–400)
POTASSIUM SERPL-SCNC: 4.6 MEQ/L (ref 3.4–4.9)
PROSTATE SPECIFIC ANTIGEN: 6.55 NG/ML (ref 0–4)
RBC # BLD: 5.38 M/UL (ref 4.7–6.1)
SODIUM BLD-SCNC: 138 MEQ/L (ref 135–144)
TOTAL PROTEIN: 6.9 G/DL (ref 6.3–8)
TRIGL SERPL-MCNC: 108 MG/DL (ref 0–150)
WBC # BLD: 6.9 K/UL (ref 4.8–10.8)

## 2022-09-09 PROCEDURE — 80307 DRUG TEST PRSMV CHEM ANLYZR: CPT

## 2022-09-09 PROCEDURE — 36415 COLL VENOUS BLD VENIPUNCTURE: CPT

## 2022-09-09 PROCEDURE — 80053 COMPREHEN METABOLIC PANEL: CPT

## 2022-09-09 PROCEDURE — 82570 ASSAY OF URINE CREATININE: CPT

## 2022-09-09 PROCEDURE — 83036 HEMOGLOBIN GLYCOSYLATED A1C: CPT

## 2022-09-09 PROCEDURE — 84153 ASSAY OF PSA TOTAL: CPT

## 2022-09-09 PROCEDURE — 80061 LIPID PANEL: CPT

## 2022-09-09 PROCEDURE — 85025 COMPLETE CBC W/AUTO DIFF WBC: CPT

## 2022-09-09 PROCEDURE — 82043 UR ALBUMIN QUANTITATIVE: CPT

## 2022-09-12 LAB
6-ACETYLMORPHINE: NOT DETECTED
7-AMINOCLONAZEPAM: NOT DETECTED
ALPHA-OH-ALPRAZOLAM: NOT DETECTED
ALPHA-OH-MIDAZOLAM, URINE: NOT DETECTED
ALPRAZOLAM: NOT DETECTED
AMPHETAMINE: NOT DETECTED
BARBITURATES: NOT DETECTED
BENZOYLECGONINE: NOT DETECTED
BUPRENORPHINE: NOT DETECTED
CARISOPRODOL: NOT DETECTED
CLONAZEPAM: NOT DETECTED
CODEINE: NOT DETECTED
CREATININE URINE: 51.8 MG/DL (ref 20–400)
DIAZEPAM: NOT DETECTED
EER PAIN MGT DRUG PANEL, HIGH RES/EMIT U: NORMAL
ETHYL GLUCURONIDE: NOT DETECTED
FENTANYL: NOT DETECTED
GABAPENTIN: NOT DETECTED
HYDROCODONE: NOT DETECTED
HYDROMORPHONE: NOT DETECTED
LORAZEPAM: NOT DETECTED
MARIJUANA METABOLITE: NOT DETECTED
MDA: NOT DETECTED
MDEA: NOT DETECTED
MDMA URINE: NOT DETECTED
MEPERIDINE: NOT DETECTED
METHADONE: NOT DETECTED
METHAMPHETAMINE: NOT DETECTED
METHYLPHENIDATE: NOT DETECTED
MIDAZOLAM: NOT DETECTED
MORPHINE: NOT DETECTED
NALOXONE: NOT DETECTED
NORBUPRENORPHINE, FREE: NOT DETECTED
NORDIAZEPAM: NOT DETECTED
NORFENTANYL: NOT DETECTED
NORHYDROCODONE, URINE: NOT DETECTED
NOROXYCODONE: NOT DETECTED
NOROXYMORPHONE, URINE: NOT DETECTED
OXAZEPAM: PRESENT
OXYCODONE: NOT DETECTED
OXYMORPHONE: NOT DETECTED
PAIN MANAGEMENT DRUG PANEL: NORMAL
PCP: NOT DETECTED
PHENTERMINE: NOT DETECTED
PREGABALIN: NOT DETECTED
TAPENTADOL, URINE: NOT DETECTED
TAPENTADOL-O-SULFATE, URINE: NOT DETECTED
TEMAZEPAM: PRESENT
TRAMADOL: NOT DETECTED
ZOLPIDEM: NOT DETECTED

## 2022-09-13 ENCOUNTER — OFFICE VISIT (OUTPATIENT)
Dept: FAMILY MEDICINE CLINIC | Age: 82
End: 2022-09-13
Payer: MEDICARE

## 2022-09-13 VITALS
SYSTOLIC BLOOD PRESSURE: 138 MMHG | BODY MASS INDEX: 27.93 KG/M2 | OXYGEN SATURATION: 97 % | HEART RATE: 61 BPM | RESPIRATION RATE: 16 BRPM | WEIGHT: 206.2 LBS | HEIGHT: 72 IN | DIASTOLIC BLOOD PRESSURE: 66 MMHG | TEMPERATURE: 97.1 F

## 2022-09-13 DIAGNOSIS — I10 ESSENTIAL HYPERTENSION: ICD-10-CM

## 2022-09-13 DIAGNOSIS — J96.11 CHRONIC RESPIRATORY FAILURE WITH HYPOXIA AND HYPERCAPNIA (HCC): ICD-10-CM

## 2022-09-13 DIAGNOSIS — Z23 NEED FOR VACCINATION: ICD-10-CM

## 2022-09-13 DIAGNOSIS — J44.9 CHRONIC OBSTRUCTIVE PULMONARY DISEASE, UNSPECIFIED COPD TYPE (HCC): ICD-10-CM

## 2022-09-13 DIAGNOSIS — I25.10 CORONARY ARTERY DISEASE INVOLVING NATIVE CORONARY ARTERY OF NATIVE HEART WITHOUT ANGINA PECTORIS: ICD-10-CM

## 2022-09-13 DIAGNOSIS — E11.59 TYPE 2 DIABETES MELLITUS WITH OTHER CIRCULATORY COMPLICATION, WITHOUT LONG-TERM CURRENT USE OF INSULIN (HCC): Primary | ICD-10-CM

## 2022-09-13 DIAGNOSIS — I48.0 PAROXYSMAL ATRIAL FIBRILLATION (HCC): ICD-10-CM

## 2022-09-13 DIAGNOSIS — E78.2 MIXED HYPERLIPIDEMIA: ICD-10-CM

## 2022-09-13 DIAGNOSIS — G47.00 INSOMNIA, UNSPECIFIED TYPE: ICD-10-CM

## 2022-09-13 DIAGNOSIS — R97.20 ELEVATED PSA: ICD-10-CM

## 2022-09-13 DIAGNOSIS — J96.12 CHRONIC RESPIRATORY FAILURE WITH HYPOXIA AND HYPERCAPNIA (HCC): ICD-10-CM

## 2022-09-13 PROCEDURE — 1036F TOBACCO NON-USER: CPT | Performed by: NURSE PRACTITIONER

## 2022-09-13 PROCEDURE — G8417 CALC BMI ABV UP PARAM F/U: HCPCS | Performed by: NURSE PRACTITIONER

## 2022-09-13 PROCEDURE — G8427 DOCREV CUR MEDS BY ELIG CLIN: HCPCS | Performed by: NURSE PRACTITIONER

## 2022-09-13 PROCEDURE — 3051F HG A1C>EQUAL 7.0%<8.0%: CPT | Performed by: NURSE PRACTITIONER

## 2022-09-13 PROCEDURE — 90694 VACC AIIV4 NO PRSRV 0.5ML IM: CPT | Performed by: NURSE PRACTITIONER

## 2022-09-13 PROCEDURE — 3023F SPIROM DOC REV: CPT | Performed by: NURSE PRACTITIONER

## 2022-09-13 PROCEDURE — 99214 OFFICE O/P EST MOD 30 MIN: CPT | Performed by: NURSE PRACTITIONER

## 2022-09-13 PROCEDURE — G0008 ADMIN INFLUENZA VIRUS VAC: HCPCS | Performed by: NURSE PRACTITIONER

## 2022-09-13 PROCEDURE — 1123F ACP DISCUSS/DSCN MKR DOCD: CPT | Performed by: NURSE PRACTITIONER

## 2022-09-13 RX ORDER — TEMAZEPAM 15 MG/1
CAPSULE ORAL
Refills: 0 | Status: CANCELLED | OUTPATIENT
Start: 2022-09-13

## 2022-09-13 RX ORDER — TEMAZEPAM 15 MG/1
CAPSULE ORAL
Qty: 180 CAPSULE | Refills: 0 | Status: SHIPPED | OUTPATIENT
Start: 2022-09-13 | End: 2022-10-13

## 2022-09-13 NOTE — PROGRESS NOTES
Subjective:      Diabetes Mellitus Type 2: Current symptoms/problems include none. Home blood sugar records:    patient does not test  Any episodes of hypoglycemia? no  Tobacco history: He  reports that he quit smoking about 19 years ago. He has a 80.00 pack-year smoking history. He has never used smokeless tobacco.   Known diabetic complications: cardiovascular disease     Hypertension:  Home blood pressure monitoring: Yes - stable. He is adherent to a low sodium diet. Antihypertensive medication side effects: no medication side effects noted. Use of agents associated with hypertension: none. Hyperlipidemia:  No new myalgias or GI upset on rosuvastatin (Crestor). CAD/Atrial fibrillation: He states that he continues to take cardiac medication as prescribed with no reported side effects. orthopnea or significant weight fluctuation. No chest discomfort or worsening activity intolerance. He has not had any recent heart palpitations. No lightheadedness or dizziness. No near falls. He states that he occasionally will feel a little off balance when for standing from a sitting position but has never felt that he would pass out or fall. He has been drinking more water lately. Has follow-up appointment scheduled with cardiology in the next month or so. Significant other reports that swelling has been off and on over the summer months with pronounced sock line noted. Also some darkening of the skin to lower extremities. The patient denies any numbness or tingling or cold sensation to the legs. No intermittent claudication symptoms reported. COPD/chronic hypoxia: Allison Fernandez has obstructive sleep apnea that is treated with Trilogy. The CPAP is used  7 hours 7 nights per week. The CPAP use helps the daytime sleepiness and low energy levels. He continues to follow routinely with pulmonology and is using continuous oxygen at 5 L nasal cannula.   No recent exacerbation of breathing symptoms reported. Insomnia: He states that he continues to take Restoril at bedtime 1 to 2 tablets as needed for sleep and finds that he is able to get better quality sleep while taking the medication. Has not noticed any medication side effects. Has not been able to sleep well without the medication. Atrial fibrillation/CAD: He continues to follow routinely with cardiologist soon with [address] [place] [address] heart group. Most recent visit was in August.  No medication changes. He denies any side effects with cardiac medications. No new onset cardiology symptoms. Generalized abdominal pain: He states that he has been having some generalized abdominal pain that comes and goes. Not really secondary to eating or positioning but has a couple of tender areas to his mid abdominal region. Started several weeks ago. No change in bowel patterns overall but some occasional constipation. No blood in the stool or mucus reported in the stool. He denies any nausea or vomiting or fever or chills. He states that he does have a strong family history of colon cancer but would not be a candidate for additional colorectal cancer screening tests. The five diabetic measures for control:   Hemoglobin A1C (%)   Date Value   09/09/2022 7.0 (H)     LDL Calculated (mg/dL)   Date Value   09/09/2022 54         Blood pressure less than 131/81,   BP Readings from Last 1 Encounters:   09/13/22 138/66     Smoking, non smoker is goal. This pt is not a smoker. This pt does an aspirin a day. Last eye exam was 2022  Last diabetic foot exam was 2022  Last urine microalbumin creatinine ratio was 2022    PMH: This 79 y.o. male  patient is  hypertensive, is  diabetic, is  hyperlipidemic. He has a history of smoking and has a  family history of heart disease. He is not obese. Review of Systems  Patient denies chest pain, lightheadedness, and palpitations.    Eyes: no rapid change in vision  Ears, nose, mouth, throat, and face: no changes in hearing or sore throat  Respiratory: No shortness of breath or cough. Cardiovascular: no chest pain or pressure. This patient reports no polyuria, polydipsia or episodes of hypoglycemia. Treatment Adherence:   Medication compliance:  compliant most of the time  Diet compliance:  compliant most of the time  Weight trend: stable  Current exercise: walks 1 time(s) per day  What might prevent you from meeting your goal?: none  Patient plan for overcoming barriers: N/A     Patient Confidence: 8/10      Objective:   EXAM:  Constitutional Blood pressure 138/66, pulse 61, temperature 97.1 °F (36.2 °C), temperature source Temporal, resp. rate 16, height 6' (1.829 m), weight 206 lb 3.2 oz (93.5 kg), SpO2 97 %. .  He has a normal affect, no acute distress, appears well developed and well nourished. Neck:  neck- supple, no mass, non-tender and no bruits  Lungs:  Normal expansion. Clear to auscultation. No rales, rhonchi, or wheezing., No chest wall tenderness. Diminished bases. Heart:  Heart sounds are normal.  Regular rate and rhythm without murmur, gallop or rub. Abdomen:  Soft, normal bowel sounds. No bruits, organomegaly or masses. Mild tenderness noted to bilateral mid abdomen region on either side of umbilicus without palpable hernia. No rebound. No distention. Extremities: 1+ edema of  bilateral lower extremities (pitting). Mild hemosiderin staining to bilateral lower legs. Palpable PT pulses. Assessment:      Diagnosis Orders   1. Type 2 diabetes mellitus with other circulatory complication, without long-term current use of insulin ([facility])  CBC with Auto Differential    Comprehensive Metabolic Panel    Lipid Panel    Hemoglobin A1C    Microalbumin / Creatinine Urine Ratio      2. Essential hypertension        3. Mixed hyperlipidemia        4. Coronary artery disease involving native coronary artery of native heart without angina pectoris        5.  Chronic obstructive pulmonary disease, unspecified COPD type ([facility] [address]        6. Paroxysmal atrial fibrillation (HCC)        7. Elevated PSA  PSA, Diagnostic      8. Insomnia, unspecified type  temazepam (RESTORIL) 15 MG capsule      9. Need for vaccination  Influenza, FLUAD, (age 72 y+), IM, Preservative Free, 0.5 mL      10. Chronic respiratory failure with hypoxia and hypercapnia (HCC)            PLAN: Include orders in the DX section. Diabetes Counseling   Patient was counseled regarding disease risks and adopting healthy behaviors. Patient was provided education materials to assist with self management. Patient was provided log (or received log during previous visit) to record blood pressure, food intake and/or blood sugar. Patient was instructed to keep log up-to-date and to always bring log to all office visits. Follow up: 3 months and as needed. Blood work one week prior as ordered. 1.  Diabetes is much better controlled now at 7.0. Will advise the patient to continue with healthy diet and will hold off taking Januvia for now. Discussed possible GI side effects and will assess hemoglobin A1c with dietary modifications only with next lab. Discussed option of x-ray to check for obstruction, etc. if symptoms worsen. Go to ER if abdominal pain becomes more severe. 2. Blood pressure is well controlled on current medication. No side effects reported. Continue the same. 3. Lipid panel is well controlled on statin. No side effects. Continue the same. 4. 6. He continues to follow routinely with [facility] cardiology and has had no recent changes to medication regimen. Most recent records to be requested. No medication side effects. Continue the same. 5.  10.  He continues to follow closely with pulmonology and has had no recent exacerbation of breathing symptoms lately while using continuous oxygen and trilogy at night. Continue the same. 7.  Discussed more elevated PSA but no new onset urinary symptoms.   We will continue to recheck with subsequent labs and notify me if any new onset symptoms. 8.  He continues to take Restoril to help with chronic insomnia symptoms. Has been able to rest well with the help of medication with no side effects reported. Continue the same. 9.  Discussed recommendation for routine flu vaccine and given during visit today. Controlled Substance Monitoring:    Acute and Chronic Pain Monitoring:   RX Monitoring 9/13/2022   Attestation -   Periodic Controlled Substance Monitoring Possible medication side effects, risk of tolerance/dependence & alternative treatments discussed. ;No signs of potential drug abuse or diversion identified. ;Assessed functional status. Controlled medication review:    Indication reviewed- insomnia  Reviewed that condition still requires assistance with current medication. Reviewed that condition impacts psychological and/or physical function in daily life. Reviewed that medication does indeed improve function/pain  Reviewed patient/provider roles in compliance. Reviewed acceptable alternatives (CBT/medication/exercise/therapy)  Reviewed medication agreement. Reviewed use, abuse and diversion of medications. Discussed need for random urine testing at least yearly. No significant interactions/side effects reported. Please note this report has been partially produced using speech recognition software and may cause contain errors related to that system including grammar, punctuation and spelling as well as words and phrases that may seem inappropriate. If there are questions or concerns please feel free to contact me to clarify.       Electronically signed by TRINA Reddy CNP-CNP, 3:19 PM 9/13/22

## 2022-09-13 NOTE — PROGRESS NOTES
Vaccine Information Sheet, \"Influenza - Inactivated\"  given to Pastor Danielson, or parent/legal guardian of  Pastor Danielson and verbalized understanding. Patient responses:    Have you ever had a reaction to a flu vaccine? No  Are you able to eat eggs without adverse effects? Yes  Do you have any current illness? No  Have you ever had [place] Syndrome? No    Flu vaccine given per order. Please see immunization tab.

## 2022-10-23 DIAGNOSIS — F41.9 ANXIETY: ICD-10-CM

## 2022-10-24 RX ORDER — BUSPIRONE HYDROCHLORIDE 30 MG/1
30 TABLET ORAL DAILY
Qty: 30 TABLET | Refills: 0 | Status: SHIPPED | OUTPATIENT
Start: 2022-10-24

## 2022-10-24 NOTE — TELEPHONE ENCOUNTER
Pharmacy is requesting medication refill.  Please approve or deny this request.    Rx requested:  Requested Prescriptions     Pending Prescriptions Disp Refills    busPIRone (BUSPAR) 30 MG tablet [Pharmacy Med Name: buspirone 30 mg tablet] 30 tablet 0     Sig: Take 30 mg by mouth daily         Last Office Visit:   Visit date not found      Next Visit Date:  Future Appointments   Date Time Provider Valentín Perkins   11/22/2022 11:45 AM Maurice Landa MD [facility]   12/13/2022  1:00 PM Jsoe Showmatthew, APRN - CNP [address]

## 2022-11-11 ENCOUNTER — CARE COORDINATION (OUTPATIENT)
Dept: CARE COORDINATION | Age: 82
End: 2022-11-11

## 2022-11-19 DIAGNOSIS — J30.89 SEASONAL ALLERGIC RHINITIS DUE TO OTHER ALLERGIC TRIGGER: ICD-10-CM

## 2022-11-21 RX ORDER — FEXOFENADINE HCL 180 MG/1
180 TABLET ORAL DAILY
Qty: 90 TABLET | Refills: 2 | Status: SHIPPED | OUTPATIENT
Start: 2022-11-21

## 2022-11-21 NOTE — TELEPHONE ENCOUNTER
Comments:     Last Office Visit (last PCP visit):   9/13/2022    Next Visit Date:  Future Appointments   Date Time Provider Valentín Perkins   11/22/2022 11:45 AM Babita Kwong MD [facility]   12/13/2022  1:00 PM Frances Medel, APRN - CNP [facility]       **If hasn't been seen in over a year OR hasn't followed up according to last diabetes/ADHD visit, make appointment for patient before sending refill to provider.     Rx requested:  Requested Prescriptions     Pending Prescriptions Disp Refills    fexofenadine (ALLEGRA) 180 MG tablet [Pharmacy Med Name: fexofenadine 180 mg tablet] 90 tablet 2     Sig: TAKE 1 TABLET BY MOUTH DAILY

## 2022-11-22 ENCOUNTER — OFFICE VISIT (OUTPATIENT)
Dept: PULMONOLOGY | Age: 82
End: 2022-11-22
Payer: MEDICARE

## 2022-11-22 VITALS
HEART RATE: 66 BPM | DIASTOLIC BLOOD PRESSURE: 78 MMHG | SYSTOLIC BLOOD PRESSURE: 166 MMHG | BODY MASS INDEX: 28.21 KG/M2 | WEIGHT: 208 LBS | OXYGEN SATURATION: 92 %

## 2022-11-22 DIAGNOSIS — J96.11 CHRONIC RESPIRATORY FAILURE WITH HYPOXIA AND HYPERCAPNIA (HCC): ICD-10-CM

## 2022-11-22 DIAGNOSIS — J44.9 CHRONIC OBSTRUCTIVE PULMONARY DISEASE, UNSPECIFIED COPD TYPE (HCC): Primary | ICD-10-CM

## 2022-11-22 DIAGNOSIS — J96.12 CHRONIC RESPIRATORY FAILURE WITH HYPOXIA AND HYPERCAPNIA (HCC): ICD-10-CM

## 2022-11-22 DIAGNOSIS — G47.33 OBSTRUCTIVE SLEEP APNEA: ICD-10-CM

## 2022-11-22 PROCEDURE — 3074F SYST BP LT 130 MM HG: CPT | Performed by: INTERNAL MEDICINE

## 2022-11-22 PROCEDURE — 1123F ACP DISCUSS/DSCN MKR DOCD: CPT | Performed by: INTERNAL MEDICINE

## 2022-11-22 PROCEDURE — 99214 OFFICE O/P EST MOD 30 MIN: CPT | Performed by: INTERNAL MEDICINE

## 2022-11-22 PROCEDURE — 1036F TOBACCO NON-USER: CPT | Performed by: INTERNAL MEDICINE

## 2022-11-22 PROCEDURE — 3078F DIAST BP <80 MM HG: CPT | Performed by: INTERNAL MEDICINE

## 2022-11-22 PROCEDURE — G8484 FLU IMMUNIZE NO ADMIN: HCPCS | Performed by: INTERNAL MEDICINE

## 2022-11-22 PROCEDURE — G8417 CALC BMI ABV UP PARAM F/U: HCPCS | Performed by: INTERNAL MEDICINE

## 2022-11-22 PROCEDURE — 3023F SPIROM DOC REV: CPT | Performed by: INTERNAL MEDICINE

## 2022-11-22 PROCEDURE — G8427 DOCREV CUR MEDS BY ELIG CLIN: HCPCS | Performed by: INTERNAL MEDICINE

## 2022-11-22 ASSESSMENT — ENCOUNTER SYMPTOMS: SHORTNESS OF BREATH: 1

## 2022-11-22 NOTE — PROGRESS NOTES
Subjective:     Cassandra Gaona is a 79 y.o. male who complains today of:     Chief Complaint   Patient presents with    Follow-up     4m f/u     COPD       HPI  He is using 5 lit O2  24 hour a day. He is using trilogy, noninvasive vent since last 5 years ,  He is using bronchodilator with symbicort 2 puff BID , combivent respimat 1 puff Q 4 hourly , nebulizer with duoneb neb prn , proair hfa prn , prednisone prn.   C/o shortness of breath  with exertion and humid weather    Occasional Wheezing. No Cough . No Hemoptysis. No Chest tightness. No Chest pain with radiation  or pleuritic pain. No  leg edema. No orthopnea. No Fever or chills. No Rhinorrhea and postnasal drip.   CXR no active disease  7/15/22      Allergies:  Brilinta [ticagrelor], Sildenafil citrate, Advair [fluticasone-salmeterol], and Fluticasone propionate (inhal)  Past Medical History:   Diagnosis Date    Adrenal adenoma     CT 6/2011 stable    Anxiety     Asbestosis(501)     CAD (coronary artery disease)     status post stent 2001    Cancer (HCC)     basil cell carnoma    COPD (chronic obstructive pulmonary disease) (HCC)     Elbow injury     left    Erectile dysfunction     Granulomatous lung disease (HCC)     Herpes zoster     Hyperlipidemia     Hypertension     Insomnia     Interstitial fibrosis     Obstructive sleep apnea on CPAP 11/13/2017    Osteoarthritis of right AC (acromioclavicular) joint 3/25/2022    Prostate nodule     Scarlet fever     Urine frequency      Past Surgical History:   Procedure Laterality Date    CARDIAC CATHETERIZATION      CARDIOVASCULAR STRESS TEST      1/2010 normal per patient    COLONOSCOPY      5/2009 tubular adenomas    COLONOSCOPY  08/13/15    Aleksandra Hoover MD    EYE SURGERY  02/10/14    DR WOLFE  [facility]    HERNIA REPAIR      LEG SURGERY       Family History   Problem Relation Age of Onset    Cancer Other         colon cancer in multiple family members and breast cancer    Heart Disease Other      Social History     Socioeconomic History    Marital status:      Spouse name: Not on file    Number of children: 4    Years of education: 13    Highest education level: High school graduate   Occupational History    Occupation:    Tobacco Use    Smoking status: Former     Packs/day: 2.00     Years: 40.00     Pack years: 80.00     Types: Cigarettes     Quit date: 2002     Years since quittin.3    Smokeless tobacco: Never   Vaping Use    Vaping Use: Never used   Substance and Sexual Activity    Alcohol use: No    Drug use: No    Sexual activity: Yes     Partners: Female   Other Topics Concern    Not on file   Social History Narrative    Not on file     Social Determinants of Health     Financial Resource Strain: Low Risk     Difficulty of Paying Living Expenses: Not hard at all   Food Insecurity: No Food Insecurity    Worried About Running Out of Food in the Last Year: Never true    Ran Out of Food in the Last Year: Never true   Transportation Needs: Not on file   Physical Activity: Insufficiently Active    Days of Exercise per Week: 5 days    Minutes of Exercise per Session: 10 min   Stress: Not on file   Social Connections: Not on file   Intimate Partner Violence: Not on file   Housing Stability: Not on file         Review of Systems   Respiratory:  Positive for shortness of breath.        :     Vitals:    22 1207 22 1209   BP: (!) 158/80 (!) 166/78   Site: Right Upper Arm Left Upper Arm   Position: Sitting Sitting   Cuff Size: Large Adult Large Adult   Pulse: 66    SpO2: 92%    Weight: 208 lb (94.3 kg)      Wt Readings from Last 3 Encounters:   22 208 lb (94.3 kg)   22 206 lb 3.2 oz (93.5 kg)   22 206 lb (93.4 kg)         Physical Exam  Constitutional:       Appearance: He is well-developed. HENT:      Head: Normocephalic and atraumatic.       Nose: Nose normal.   Eyes:      Conjunctiva/sclera: Conjunctivae normal.      Pupils: Pupils are equal, round, and reactive to light. Neck:      Thyroid: No thyromegaly. Vascular: No JVD. Trachea: No tracheal deviation. Cardiovascular:      Rate and Rhythm: Normal rate and regular rhythm. Heart sounds: No murmur heard. No friction rub. No gallop. Pulmonary:      Effort: Pulmonary effort is normal. No respiratory distress. Breath sounds: Normal breath sounds. No wheezing or rales. Comments: diminished Breath sound bilaterally. Chest:      Chest wall: No tenderness. Abdominal:      General: There is no distension. Musculoskeletal:         General: Normal range of motion. Lymphadenopathy:      Cervical: No cervical adenopathy. Skin:     General: Skin is warm and dry. Findings: No rash. Neurological:      Mental Status: He is alert and oriented to person, place, and time. Cranial Nerves: No cranial nerve deficit.    Psychiatric:         Behavior: Behavior normal.       Current Outpatient Medications   Medication Sig Dispense Refill    fexofenadine (ALLEGRA) 180 MG tablet TAKE 1 TABLET BY MOUTH DAILY 90 tablet 2    busPIRone (BUSPAR) 30 MG tablet Take 30 mg by mouth daily 30 tablet 0    SYMBICORT 80-4.5 MCG/ACT AERO USE 2 INHALATIONS TWICE A DAY 3 each 2    SITagliptin (JANUVIA) 50 MG tablet Take 1 tablet by mouth daily 30 tablet 3    ipratropium-albuterol (DUONEB) 0.5-2.5 (3) MG/3ML SOLN nebulizer solution Take 3 mLs by nebulization every 6 hours as needed for Shortness of Breath 360 each 1    rosuvastatin (CRESTOR) 40 MG tablet TAKE 1 TABLET DAILY 90 tablet 3    albuterol-ipratropium (COMBIVENT RESPIMAT)  MCG/ACT AERS inhaler Inhale 1 puff into the lungs every 4 hours as needed for Wheezing or Shortness of Breath 4 each 3    doxycycline hyclate (VIBRAMYCIN) 100 MG capsule Take 1 capsule by mouth 2 times daily 20 capsule 0    COMBIVENT RESPIMAT  MCG/ACT AERS inhaler USE 1 INHALATION EVERY 6 HOURS 16 g 2    azelastine (OPTIVAR) 0.05 % ophthalmic solution Place 1 drop into both eyes 2 times daily      calcipotriene (DOVONEX) 0.005 % solution Apply to scalp daily      albuterol sulfate HFA (PROAIR HFA) 108 (90 Base) MCG/ACT inhaler Inhale 2 puffs into the lungs every 6 hours as needed for Wheezing 3 Inhaler 3    Melatonin 10 MG TABS Take by mouth      metoprolol succinate (TOPROL XL) 25 MG extended release tablet Take 25 mg by mouth every morning      metoprolol succinate (TOPROL XL) 50 MG extended release tablet Take 50 mg by mouth every evening      aspirin 81 MG tablet Take 81 mg by mouth daily      diltiazem (TIAZAC) 240 MG extended release capsule TAKE 1 CAPSULE BY MOUTH DAILY AS DIRECTED  3    CARTIA  MG extended release capsule Take 120 capsules by mouth every evening   3    OXYGEN Please provide patient with a portable oxygen concentrator 1 Units 0    dofetilide (TIKOSYN) 250 MCG capsule Take 250 mcg by mouth 2 times daily      Magnesium 500 MG TABS Take by mouth 2 times daily      Potassium 99 MG TABS Take by mouth daily      GLUTATHIONE PO Take by mouth      tadalafil (CIALIS) 5 MG tablet Take 1 tablet by mouth as needed for Erectile Dysfunction (maximum 5 mg per day) 30 tablet 3    Handicap Placard MISC by Does not apply route Good for 5 Years from 06/16/2016 1 each 0    digoxin (LANOXIN) 250 MCG tablet   11    ELIQUIS 5 MG TABS tablet   2    CINNAMON PO Take  by mouth. SAW PALMETTO by Does not apply route. Glucosamine-Chondroitin 500-400 MG CAPS Take 1 capsule by mouth daily. Ascorbic Acid (VITAMIN C) 500 MG tablet Take 1,000 mg by mouth daily. 1/2 tab in AM and 1/2 tab in PM       vitamin D (CHOLECALCIFEROL) 400 UNIT TABS tablet Take 800 Units by mouth daily. vitamin B-12 (CYANOCOBALAMIN) 1000 MCG tablet Take 1,000 mcg by mouth daily. Selenium 200 MCG TABS Take 200 mcg by mouth daily. Thiamine HCl (VITAMIN B-1) 100 MG tablet Take 100 mg by mouth daily. folic acid (FOLVITE) 367 MCG tablet Take 800 mcg by mouth daily. Zinc 50 MG CAPS Take 50 mg by mouth daily. beta carotene 42903 UNIT capsule Take 25,000 Units by mouth daily. DHEA 25 MG TABS Take 25 mg by mouth daily. Pyridoxine HCl (VITAMIN B-6) 50 MG tablet Take 100 mg by mouth daily. Calcium Carb-Cholecalciferol 1000-800 MG-UNIT TABS Take 1 tablet by mouth 2 times daily. Methylsulfonylmethane (MSM) 1000 MG TABS Take 1,000 mg by mouth daily. vitamin E 400 UNIT capsule Take 400 Units by mouth daily. No current facility-administered medications for this visit. Results for orders placed during the hospital encounter of 07/15/22    XR CHEST (2 VW)    Narrative  EXAMINATION: XR CHEST (2 VW)    DATE AND TIME:7/15/2022 12:53 PM    CLINICAL HISTORY: Shortness of breath  J44.9 Chronic obstructive pulmonary disease, unspecified COPD type ([address] ICD10    COMPARISONS: 12/29/2021    FINDINGS: The heart, mediastinum and pulmonary vasculature are within normal limits. Visualized lung fields are clear. Bones unremarkable. Impression  NO ACTIVE LUNG DISEASE. Results for orders placed during the hospital encounter of 10/22/21    XR CHEST (2 VW)    Narrative  EXAMINATION: XR CHEST (2 VW). DATE AND TIME:10/22/2021 6:07 PM    CLINICAL HISTORY: Shortness of breath  R06.02 SOB (shortness of breath) ICD10    COMPARISONS: January 20, 2020    FINDINGS: The heart, mediastinum and pulmonary vasculature are within normal limits. Visualized lung fields are clear. Bones unremarkable. Impression  NO  ACTIVE LUNG DISEASE. Results for orders placed during the hospital encounter of 01/28/20    XR CHEST STANDARD (2 VW)    Narrative  EXAMINATION: XR CHEST (2 VW)    CLINICAL HISTORY: J44.1 COPD exacerbation ([address] ICD10    COMPARISONS: 1/24/2020    FINDINGS: Cardiac size is borderline.  Pulmonary vascularity is normal. Lungs are hyperinflated with increase in the AP diameter of the chest and flattening the diaphragms, concordant with clinical history of COPD. There is scarring adjacent to the  cardiac silhouette in the left lower lung. There is coronary artery stenting. There are no acute infiltrates. Prominent nipple shadows, left greater than right are noted. There are mild degenerative changes in the spine. Impression  COPD. NO ACUTE CARDIOPULMONARY DISEASE.  ]  Results for orders placed during the hospital encounter of 12/29/21    XR CHEST PORTABLE    Narrative  EXAMINATION: CHEST PORTABLE VIEW    CLINICAL HISTORY: Short of breath    COMPARISONS: October 22, 2021 1806 hours    FINDINGS:    Single  views of the chest is submitted. The cardiac silhouette is enlarged  Pulmonary vascular attenuated  Right sided trachea. There are bibasilar areas of atelectasis, patchy infiltrates in both bases left greater than right. .  No Pneumothoraces. Impression  BIBASILAR AREAS OF ATELECTASIS, PATCHY INFILTRATES IN BOTH BASES LEFT GREATER THAN RIGHT SUPERIMPOSED UPON RADIOGRAPHIC FINDINGS OF COPD. IMAGING FEATURES CAN BE SEEN WITH (COVID-19) PNEUMONIA, THOUGH ARE NONSPECIFIC AND CAN OCCUR WITH A VARIETY OF INFECTIOUS AND NONINFECTIOUS PROCESSES. Results for orders placed during the hospital encounter of 01/24/20    XR CHEST PORTABLE    Narrative  EXAMINATION: PORTABLE CHEST X-RAY FROM 1/24/2020 17:27 HOURS    CLINICAL HISTORY: SMOKING HISTORY AND COPD, PATIENT WITH DYSPNEA    COMPARISONS: 10/23/2018    FINDINGS: The heart is not enlarged. There is an atherosclerotic tortuous aorta. There is hyperinflation of the lungs and coarse pulmonary markings compatible with COPD with pulmonary interstitial fibrosis. There are granulomata in both hilar regions  compatible with remote granulomatous disease. There are no acute pulmonary infiltrates or pleural effusions. There is no pneumothorax. There is mild degenerative bone spurring in the spine. Impression  COPD WITHOUT AN ACUTE PULMONARY INFILTRATE OR PLEURAL EFFUSION. Assessment/Plan:     1.  Chronic obstructive pulmonary disease, unspecified COPD type ([address]  He is using bronchodilator with symbicort 2 puff BID , combivent respimat 1 puff Q 4 hourly , nebulizer with duoneb neb prn , proair hfa prn , prednisone prn.   C/o shortness of breath  with exertion and humid weather. Occasional Wheezing. No Cough . CXR no active disease  7/15/22    2. Chronic respiratory failure with hypoxia and hypercapnia (HCC)  He is using 5 lit O2  24 hour a day. He is using trilogy, noninvasive vent since last 5 years. 3. Obstructive sleep apnea  He is on noninvasive ventilator and O2 during sleep. Return in about 4 months (around 3/22/2023) for COPD, chronic respiratory failure, delores.       Carmina Thornton MD

## 2022-11-25 ENCOUNTER — CARE COORDINATION (OUTPATIENT)
Dept: CARE COORDINATION | Age: 82
End: 2022-11-25

## 2022-12-07 ENCOUNTER — HOSPITAL ENCOUNTER (OUTPATIENT)
Dept: LAB | Age: 82
Discharge: HOME | End: 2022-12-07
Payer: MEDICARE

## 2022-12-07 DIAGNOSIS — R97.20 ELEVATED PSA: ICD-10-CM

## 2022-12-07 DIAGNOSIS — E11.59 TYPE 2 DIABETES MELLITUS WITH OTHER CIRCULATORY COMPLICATION, WITHOUT LONG-TERM CURRENT USE OF INSULIN (HCC): ICD-10-CM

## 2022-12-07 LAB
ALBUMIN SERPL-MCNC: 4.1 G/DL (ref 3.5–4.6)
ALP BLD-CCNC: 101 U/L (ref 35–104)
ALT SERPL-CCNC: 32 U/L (ref 0–41)
ANION GAP SERPL CALCULATED.3IONS-SCNC: 11 MEQ/L (ref 9–15)
AST SERPL-CCNC: 23 U/L (ref 0–40)
BASOPHILS ABSOLUTE: 0 K/UL (ref 0–0.2)
BASOPHILS RELATIVE PERCENT: 0.3 %
BILIRUB SERPL-MCNC: 0.8 MG/DL (ref 0.2–0.7)
BUN BLDV-MCNC: 20 MG/DL (ref 8–23)
CALCIUM SERPL-MCNC: 9.6 MG/DL (ref 8.5–9.9)
CHLORIDE BLD-SCNC: 98 MEQ/L (ref 95–107)
CHOLESTEROL, TOTAL: 114 MG/DL (ref 0–199)
CO2: 31 MEQ/L (ref 20–31)
CREAT SERPL-MCNC: 0.76 MG/DL (ref 0.7–1.2)
CREATININE URINE: 81.7 MG/DL
EOSINOPHILS ABSOLUTE: 0.2 K/UL (ref 0–0.7)
EOSINOPHILS RELATIVE PERCENT: 2.2 %
GFR SERPL CREATININE-BSD FRML MDRD: >60 ML/MIN/{1.73_M2}
GLOBULIN: 2.9 G/DL (ref 2.3–3.5)
GLUCOSE BLD-MCNC: 267 MG/DL (ref 70–99)
HBA1C MFR BLD: 8 % (ref 4.8–5.9)
HCT VFR BLD CALC: 46.6 % (ref 42–52)
HDLC SERPL-MCNC: 35 MG/DL (ref 40–59)
HEMOGLOBIN: 14.7 G/DL (ref 14–18)
LDL CHOLESTEROL CALCULATED: 49 MG/DL (ref 0–129)
LYMPHOCYTES ABSOLUTE: 1.9 K/UL (ref 1–4.8)
LYMPHOCYTES RELATIVE PERCENT: 21.3 %
MCH RBC QN AUTO: 28.5 PG (ref 27–31.3)
MCHC RBC AUTO-ENTMCNC: 31.6 % (ref 33–37)
MCV RBC AUTO: 90.3 FL (ref 79–92.2)
MICROALBUMIN UR-MCNC: 9.5 MG/DL
MICROALBUMIN/CREAT UR-RTO: 116.3 MG/G (ref 0–30)
MONOCYTES ABSOLUTE: 0.8 K/UL (ref 0.2–0.8)
MONOCYTES RELATIVE PERCENT: 8.5 %
NEUTROPHILS ABSOLUTE: 6 K/UL (ref 1.4–6.5)
NEUTROPHILS RELATIVE PERCENT: 67.7 %
PDW BLD-RTO: 14.3 % (ref 11.5–14.5)
PLATELET # BLD: 217 K/UL (ref 130–400)
POTASSIUM SERPL-SCNC: 4.7 MEQ/L (ref 3.4–4.9)
PROSTATE SPECIFIC ANTIGEN: 6.44 NG/ML (ref 0–4)
RBC # BLD: 5.17 M/UL (ref 4.7–6.1)
SODIUM BLD-SCNC: 140 MEQ/L (ref 135–144)
TOTAL PROTEIN: 7 G/DL (ref 6.3–8)
TRIGL SERPL-MCNC: 151 MG/DL (ref 0–150)
WBC # BLD: 8.9 K/UL (ref 4.8–10.8)

## 2022-12-07 PROCEDURE — 80061 LIPID PANEL: CPT

## 2022-12-07 PROCEDURE — 36415 COLL VENOUS BLD VENIPUNCTURE: CPT

## 2022-12-07 PROCEDURE — 82570 ASSAY OF URINE CREATININE: CPT

## 2022-12-07 PROCEDURE — 85025 COMPLETE CBC W/AUTO DIFF WBC: CPT

## 2022-12-07 PROCEDURE — 80053 COMPREHEN METABOLIC PANEL: CPT

## 2022-12-07 PROCEDURE — 84153 ASSAY OF PSA TOTAL: CPT

## 2022-12-07 PROCEDURE — 82043 UR ALBUMIN QUANTITATIVE: CPT

## 2022-12-07 PROCEDURE — 83036 HEMOGLOBIN GLYCOSYLATED A1C: CPT

## 2022-12-13 ENCOUNTER — OFFICE VISIT (OUTPATIENT)
Dept: FAMILY MEDICINE CLINIC | Age: 82
End: 2022-12-13
Payer: MEDICARE

## 2022-12-13 VITALS
WEIGHT: 207 LBS | RESPIRATION RATE: 18 BRPM | SYSTOLIC BLOOD PRESSURE: 128 MMHG | OXYGEN SATURATION: 97 % | DIASTOLIC BLOOD PRESSURE: 66 MMHG | HEIGHT: 72 IN | TEMPERATURE: 97.8 F | HEART RATE: 69 BPM | BODY MASS INDEX: 28.04 KG/M2

## 2022-12-13 DIAGNOSIS — F41.9 ANXIETY: ICD-10-CM

## 2022-12-13 DIAGNOSIS — I10 ESSENTIAL HYPERTENSION: ICD-10-CM

## 2022-12-13 DIAGNOSIS — I25.10 CORONARY ARTERY DISEASE INVOLVING NATIVE CORONARY ARTERY OF NATIVE HEART WITHOUT ANGINA PECTORIS: ICD-10-CM

## 2022-12-13 DIAGNOSIS — R10.84 GENERALIZED ABDOMINAL PAIN: ICD-10-CM

## 2022-12-13 DIAGNOSIS — J30.89 SEASONAL ALLERGIC RHINITIS DUE TO OTHER ALLERGIC TRIGGER: ICD-10-CM

## 2022-12-13 DIAGNOSIS — J44.9 CHRONIC OBSTRUCTIVE PULMONARY DISEASE, UNSPECIFIED COPD TYPE (HCC): ICD-10-CM

## 2022-12-13 DIAGNOSIS — I48.0 PAROXYSMAL ATRIAL FIBRILLATION (HCC): ICD-10-CM

## 2022-12-13 DIAGNOSIS — R06.02 SHORTNESS OF BREATH: ICD-10-CM

## 2022-12-13 DIAGNOSIS — G47.00 INSOMNIA, UNSPECIFIED TYPE: ICD-10-CM

## 2022-12-13 DIAGNOSIS — E78.2 MIXED HYPERLIPIDEMIA: ICD-10-CM

## 2022-12-13 DIAGNOSIS — E11.59 TYPE 2 DIABETES MELLITUS WITH OTHER CIRCULATORY COMPLICATION, WITHOUT LONG-TERM CURRENT USE OF INSULIN (HCC): Primary | ICD-10-CM

## 2022-12-13 DIAGNOSIS — R97.20 ELEVATED PSA: ICD-10-CM

## 2022-12-13 DIAGNOSIS — K59.09 OTHER CONSTIPATION: ICD-10-CM

## 2022-12-13 PROCEDURE — 3052F HG A1C>EQUAL 8.0%<EQUAL 9.0%: CPT | Performed by: NURSE PRACTITIONER

## 2022-12-13 PROCEDURE — 3078F DIAST BP <80 MM HG: CPT | Performed by: NURSE PRACTITIONER

## 2022-12-13 PROCEDURE — G8427 DOCREV CUR MEDS BY ELIG CLIN: HCPCS | Performed by: NURSE PRACTITIONER

## 2022-12-13 PROCEDURE — 99214 OFFICE O/P EST MOD 30 MIN: CPT | Performed by: NURSE PRACTITIONER

## 2022-12-13 PROCEDURE — G8484 FLU IMMUNIZE NO ADMIN: HCPCS | Performed by: NURSE PRACTITIONER

## 2022-12-13 PROCEDURE — 1036F TOBACCO NON-USER: CPT | Performed by: NURSE PRACTITIONER

## 2022-12-13 PROCEDURE — 3023F SPIROM DOC REV: CPT | Performed by: NURSE PRACTITIONER

## 2022-12-13 PROCEDURE — 1123F ACP DISCUSS/DSCN MKR DOCD: CPT | Performed by: NURSE PRACTITIONER

## 2022-12-13 PROCEDURE — 3074F SYST BP LT 130 MM HG: CPT | Performed by: NURSE PRACTITIONER

## 2022-12-13 PROCEDURE — G8417 CALC BMI ABV UP PARAM F/U: HCPCS | Performed by: NURSE PRACTITIONER

## 2022-12-13 RX ORDER — CHOLECALCIFEROL (VITAMIN D3) 125 MCG
CAPSULE ORAL
COMMUNITY

## 2022-12-13 RX ORDER — DILTIAZEM HYDROCHLORIDE 240 MG/1
CAPSULE, EXTENDED RELEASE ORAL
COMMUNITY
Start: 2022-09-13 | End: 2022-12-13 | Stop reason: SDUPTHER

## 2022-12-13 RX ORDER — KETOCONAZOLE 20 MG/ML
SHAMPOO TOPICAL
COMMUNITY
Start: 2022-11-03

## 2022-12-13 RX ORDER — TEMAZEPAM 15 MG/1
15 CAPSULE ORAL 2 TIMES DAILY
COMMUNITY

## 2022-12-13 RX ORDER — CLOBETASOL PROPIONATE 0.46 MG/ML
SOLUTION TOPICAL
COMMUNITY
Start: 2022-11-03 | End: 2022-12-13

## 2022-12-13 RX ORDER — MULTIVITAMIN WITH IRON
100 TABLET ORAL DAILY
COMMUNITY

## 2022-12-13 RX ORDER — DOCUSATE SODIUM 100 MG/1
100 CAPSULE, LIQUID FILLED ORAL 2 TIMES DAILY PRN
Qty: 180 CAPSULE | Refills: 2 | Status: SHIPPED | OUTPATIENT
Start: 2022-12-13

## 2022-12-13 RX ORDER — BUSPIRONE HYDROCHLORIDE 30 MG/1
30 TABLET ORAL DAILY
Qty: 90 TABLET | Refills: 1 | Status: SHIPPED | OUTPATIENT
Start: 2022-12-13

## 2022-12-13 RX ORDER — MULTIVITAMIN WITH IRON
TABLET ORAL
COMMUNITY

## 2022-12-13 RX ORDER — TEMAZEPAM 15 MG/1
CAPSULE ORAL
Qty: 180 CAPSULE | Refills: 0 | Status: SHIPPED | OUTPATIENT
Start: 2022-12-13 | End: 2023-03-17

## 2022-12-13 RX ORDER — B-COMPLEX WITH VITAMIN C
250 TABLET ORAL DAILY
COMMUNITY

## 2022-12-13 RX ORDER — PRASTERONE (DHEA) 50 MG
TABLET ORAL
COMMUNITY

## 2022-12-13 NOTE — PROGRESS NOTES
Subjective:   Diabetes Mellitus Type 2: Current symptoms/problems include none. Home blood sugar records:    patient does not test  Any episodes of hypoglycemia? no  Tobacco history: He  reports that he quit smoking about 19 years ago. He has a 80.00 pack-year smoking history. He has never used smokeless tobacco.   Known diabetic complications: cardiovascular disease     Hypertension:  Home blood pressure monitoring: Yes - stable. He is adherent to a low sodium diet. Antihypertensive medication side effects: no medication side effects noted. Use of agents associated with hypertension: none. Hyperlipidemia:  No new myalgias or GI upset on rosuvastatin (Crestor). COPD/chronic hypoxia: 3 weeks ago, electricity went out and he had not had oxygen for a while. Pulmonary rehab last in 2020. He has been using the Trelegy and continues to follow with [facility]. CAD/Atrial fibrillation: He continues to follow with [facility] cardiology and states that he has not had any new onset symptoms. No heart palpitations. No near syncope or positional vertigo. He has not had any abnormal bruising or bleeding. Taking medication as prescribed and following low-salt diet. Insomnia/Anxiety: He states that he continues to have some issues with sleep especially while using the external ventilator. He has tried multiple different things and the only thing that has worked for him has been the Restoril. He has not had any side effects with it. He does take the BuSpar once a day for anxiety which is typically secondary to shortness of breath. He does not report any down or depressed moods. Generalized abdominal pain/constipation: He states that he continues to have symptoms off and on of abdominal pain that tend to be more on the left lower part of his abdomen. He does have some issues with constipation and has been trying to drink green tea which typically helps him use the restroom.   He feels that he drinks enough water. He has not had any severe pain. No dark tarry or bloody stools and no mucus in the stool. No nausea or vomiting. The five diabetic measures for control:   Hemoglobin A1C (%)   Date Value   12/07/2022 8.0 (H)     LDL Calculated (mg/dL)   Date Value   12/07/2022 49         Blood pressure less than 131/81,   BP Readings from Last 1 Encounters:   12/13/22 128/66     Smoking, non smoker is goal. This pt is not a smoker. This pt does an aspirin a day. Last eye exam was 2022  Last diabetic foot exam was 2022  Last urine microalbumin creatinine ratio was 2022    Past Medical History:   Diagnosis Date    Adrenal adenoma     CT 6/2011 stable    Anxiety     Asbestosis(501)     CAD (coronary artery disease)     status post stent 2001    Cancer ([facility] [address]     basil cell carnoma    COPD (chronic obstructive pulmonary disease) (HCC)     Elbow injury     left    Erectile dysfunction     Granulomatous lung disease ([facility] [address]     Herpes zoster     Hyperlipidemia     Hypertension     Insomnia     Interstitial fibrosis     Obstructive sleep apnea on CPAP 11/13/2017    Osteoarthritis of right AC (acromioclavicular) joint 3/25/2022    Prostate nodule     Scarlet fever     Urine frequency          Review of Systems  Patient denies chest pain, shortness of breath, peripheral edema, and palpitations. Eyes: no rapid change in vision  Ears, nose, mouth, throat, and face: no changes in hearing or sore throat  Respiratory: No shortness of breath or cough. Cardiovascular: no chest pain or pressure. This patient reports no polyuria, polydipsia or episodes of hypoglycemia. No new onset urinary issues. No hematuria.     Treatment Adherence:   Medication compliance:  compliant most of the time  Diet compliance:  compliant most of the time  Weight trend: stable  Current exercise: walks 1 time(s) per day  What might prevent you from meeting your goal?: none  Patient plan for overcoming barriers: N/A     Patient Confidence: 8/10      Objective:   EXAM:  Constitutional Blood pressure 128/66, pulse 69, temperature 97.8 °F (36.6 °C), temperature source Temporal, resp. rate 18, height 6' (1.829 m), weight 207 lb (93.9 kg), SpO2 97 %. .  He has a normal affect, no acute distress, appears well developed and well nourished. Lungs:  Normal expansion. Clear to auscultation. No rales, rhonchi, or wheezing., No chest wall tenderness. Diminished bases. Heart:  Heart sounds are normal.  Regular rate and rhythm without murmur, gallop or rub. Abdomen:  Soft, normal bowel sounds. No bruits, organomegaly or masses. Mild tenderness noted to bilateral mid abdomen region on either side of umbilicus without palpable hernia. No rebound. No distention. Extremities: 1+ edema of  bilateral lower extremities (pitting). Mild hemosiderin staining to bilateral lower legs. Palpable PT pulses. Assessment:      Diagnosis Orders   1. Type 2 diabetes mellitus with other circulatory complication, without long-term current use of insulin ([facility] [address]        2. Essential hypertension        3. Mixed hyperlipidemia        4. Coronary artery disease involving native coronary artery of native heart without angina pectoris  Echocardiogram complete      5. Chronic obstructive pulmonary disease, unspecified COPD type (HCC)  Echocardiogram complete      6. Paroxysmal atrial fibrillation (HCC)        7. Elevated PSA        8. Insomnia, unspecified type  temazepam (RESTORIL) 15 MG capsule      9. Shortness of breath  Echocardiogram complete      10. Other constipation  docusate sodium (COLACE) 100 MG capsule      11. Generalized abdominal pain  docusate sodium (COLACE) 100 MG capsule      12. Anxiety  busPIRone (BUSPAR) 30 MG tablet      13. Seasonal allergic rhinitis due to other allergic trigger            PLAN: Include orders in the DX section. Diabetes Counseling   Patient was counseled regarding disease risks and adopting healthy behaviors.  Patient was provided education materials to assist with self management. Patient was provided log (or received log during previous visit) to record blood pressure, food intake and/or blood sugar. Patient was instructed to keep log up-to-date and to always bring log to all office visits. We reviewed most recent blood test results with the patient. Glucose levels are up some when compared to last check and he will make some dietary adjustments. He has not been able to get as much activity secondary to increase shortness of breath. He continues to follow routinely with cardiology and he follows with [facility] pulmonology. Upon additional questioning, he is not sure the last time he has had an echocardiogram but I do recommend getting 1 with increased shortness of breath lately. He verbalizes understanding. We discussed that chronic lung disease can impact the heart and lead to heart failure which could be contributing to his symptoms. He is encouraged to follow with his cardiologist as well. Continue Restoril at bedtime to help with sleep and BuSpar daily to help with anxiety levels commonly secondary to shortness of breath. No new onset symptoms reported. Continue antihistamine to help with allergies. No medication side effects reported. We discussed possible diverticulitis episodes. Most recent imaging of the abdomen shows moderate amount of diverticula to the sigmoid region. We discussed recommendation for adequate water intake and stool softener to help avoid significant constipation. We discussed signs or symptoms that would warrant ER evaluation for acute diverticulitis. He would like to hold off on having any additional diagnostic testing at this time but will work on balancing fiber in the diet and we discussed need to avoid fiber in the diet if severe pain occurs again.     Controlled Substance Monitoring:    Acute and Chronic Pain Monitoring:   RX Monitoring 12/13/2022   Attestation -   Periodic Controlled Substance Monitoring Possible medication side effects, risk of tolerance/dependence & alternative treatments discussed. ;No signs of potential drug abuse or diversion identified. ;Assessed functional status. Controlled medication review:    Indication reviewed- insomnia/anxiety  Reviewed that condition still requires assistance with current medication. Reviewed that condition impacts psychological and/or physical function in daily life. Reviewed that medication does indeed improve function/pain  Reviewed patient/provider roles in compliance. Reviewed acceptable alternatives (CBT/medication/exercise/therapy)  Reviewed medication agreement. Reviewed use, abuse and diversion of medications. Discussed need for random urine testing at least yearly. No significant interactions/side effects reported. Please note this report has been partially produced using speech recognition software and may cause contain errors related to that system including grammar, punctuation and spelling as well as words and phrases that may seem inappropriate. If there are questions or concerns please feel free to contact me to clarify.       Electronically signed by TRINA Rodriguez - CNP-CNP, 9:52 AM 12/15/22

## 2022-12-14 ENCOUNTER — CARE COORDINATION (OUTPATIENT)
Dept: CARE COORDINATION | Age: 82
End: 2022-12-14

## 2022-12-14 NOTE — CARE COORDINATION
I called to discuss care coordination with Gabo Ortiz  I have worked with him in the past  He states that his COPD has gotten worse  He adds that he will have a cardiac workup as well  He feels that he is doing as much as he can at this point  I have provided him with my contact information and I have asked him to call me with any issues or concerns.

## 2023-01-10 ENCOUNTER — HOSPITAL ENCOUNTER (OUTPATIENT)
Dept: NON INVASIVE DIAGNOSTICS | Age: 83
Discharge: HOME | End: 2023-01-10
Payer: MEDICARE

## 2023-01-10 DIAGNOSIS — R06.02 SHORTNESS OF BREATH: ICD-10-CM

## 2023-01-10 DIAGNOSIS — J44.9 CHRONIC OBSTRUCTIVE PULMONARY DISEASE, UNSPECIFIED COPD TYPE (HCC): ICD-10-CM

## 2023-01-10 DIAGNOSIS — I25.10 CORONARY ARTERY DISEASE INVOLVING NATIVE CORONARY ARTERY OF NATIVE HEART WITHOUT ANGINA PECTORIS: ICD-10-CM

## 2023-01-10 LAB
LV EF: 60 %
LVEF MODALITY: NORMAL

## 2023-01-10 PROCEDURE — 93306 TTE W/DOPPLER COMPLETE: CPT

## 2023-03-22 ENCOUNTER — OFFICE VISIT (OUTPATIENT)
Dept: PULMONOLOGY | Age: 83
End: 2023-03-22
Payer: MEDICARE

## 2023-03-22 VITALS
SYSTOLIC BLOOD PRESSURE: 170 MMHG | HEART RATE: 70 BPM | OXYGEN SATURATION: 99 % | TEMPERATURE: 96.6 F | DIASTOLIC BLOOD PRESSURE: 80 MMHG | BODY MASS INDEX: 28.07 KG/M2 | WEIGHT: 207 LBS

## 2023-03-22 DIAGNOSIS — I27.20 PULMONARY HTN (HCC): ICD-10-CM

## 2023-03-22 DIAGNOSIS — G47.33 OBSTRUCTIVE SLEEP APNEA: ICD-10-CM

## 2023-03-22 DIAGNOSIS — J44.9 CHRONIC OBSTRUCTIVE PULMONARY DISEASE, UNSPECIFIED COPD TYPE (HCC): Primary | ICD-10-CM

## 2023-03-22 DIAGNOSIS — J96.11 CHRONIC RESPIRATORY FAILURE WITH HYPOXIA AND HYPERCAPNIA (HCC): ICD-10-CM

## 2023-03-22 DIAGNOSIS — J96.12 CHRONIC RESPIRATORY FAILURE WITH HYPOXIA AND HYPERCAPNIA (HCC): ICD-10-CM

## 2023-03-22 PROCEDURE — G8484 FLU IMMUNIZE NO ADMIN: HCPCS | Performed by: INTERNAL MEDICINE

## 2023-03-22 PROCEDURE — 3078F DIAST BP <80 MM HG: CPT | Performed by: INTERNAL MEDICINE

## 2023-03-22 PROCEDURE — 99214 OFFICE O/P EST MOD 30 MIN: CPT | Performed by: INTERNAL MEDICINE

## 2023-03-22 PROCEDURE — 3023F SPIROM DOC REV: CPT | Performed by: INTERNAL MEDICINE

## 2023-03-22 PROCEDURE — G8427 DOCREV CUR MEDS BY ELIG CLIN: HCPCS | Performed by: INTERNAL MEDICINE

## 2023-03-22 PROCEDURE — G8417 CALC BMI ABV UP PARAM F/U: HCPCS | Performed by: INTERNAL MEDICINE

## 2023-03-22 PROCEDURE — 1036F TOBACCO NON-USER: CPT | Performed by: INTERNAL MEDICINE

## 2023-03-22 PROCEDURE — 1123F ACP DISCUSS/DSCN MKR DOCD: CPT | Performed by: INTERNAL MEDICINE

## 2023-03-22 PROCEDURE — 3074F SYST BP LT 130 MM HG: CPT | Performed by: INTERNAL MEDICINE

## 2023-03-22 RX ORDER — LEVOFLOXACIN 500 MG/1
TABLET, FILM COATED ORAL
COMMUNITY
Start: 2023-01-16

## 2023-03-22 RX ORDER — UBIDECARENONE/VIT E/VIT E MIX 100-20-15
CAPSULE ORAL
COMMUNITY

## 2023-03-22 ASSESSMENT — ENCOUNTER SYMPTOMS
RHINORRHEA: 0
CHEST TIGHTNESS: 0
EYE ITCHING: 0
WHEEZING: 0
NAUSEA: 0
DIARRHEA: 0
SORE THROAT: 0
ABDOMINAL PAIN: 0
SHORTNESS OF BREATH: 1
VOICE CHANGE: 0
VOMITING: 0
COUGH: 1

## 2023-03-22 NOTE — PROGRESS NOTES
submitted. The cardiac silhouette is enlarged  Pulmonary vascular attenuated  Right sided trachea. There are bibasilar areas of atelectasis, patchy infiltrates in both bases left greater than right. .  No Pneumothoraces. Impression  BIBASILAR AREAS OF ATELECTASIS, PATCHY INFILTRATES IN BOTH BASES LEFT GREATER THAN RIGHT SUPERIMPOSED UPON RADIOGRAPHIC FINDINGS OF COPD. IMAGING FEATURES CAN BE SEEN WITH (COVID-19) PNEUMONIA, THOUGH ARE NONSPECIFIC AND CAN OCCUR WITH A VARIETY OF INFECTIOUS AND NONINFECTIOUS PROCESSES. Results for orders placed during the hospital encounter of 01/24/20    XR CHEST PORTABLE    Narrative  EXAMINATION: PORTABLE CHEST X-RAY FROM 1/24/2020 17:27 HOURS    CLINICAL HISTORY: SMOKING HISTORY AND COPD, PATIENT WITH DYSPNEA    COMPARISONS: 10/23/2018    FINDINGS: The heart is not enlarged. There is an atherosclerotic tortuous aorta. There is hyperinflation of the lungs and coarse pulmonary markings compatible with COPD with pulmonary interstitial fibrosis. There are granulomata in both hilar regions  compatible with remote granulomatous disease. There are no acute pulmonary infiltrates or pleural effusions. There is no pneumothorax. There is mild degenerative bone spurring in the spine. Impression  COPD WITHOUT AN ACUTE PULMONARY INFILTRATE OR PLEURAL EFFUSION. Assessment/Plan:     1. Chronic obstructive pulmonary disease, unspecified COPD type ([address]   He is using bronchodilator with symbicort 2 puff BID , combivent respimat 1 puff Q 4 hourly , nebulizer with duoneb neb prn , proair hfa prn , prednisone prn. C/o shortness of breath with exertion and humid weather  Occasional Wheezing. No Cough . CXR no active disease  7/15/22    2. Chronic respiratory failure with hypoxia and hypercapnia (HCC)  He is using 5 lit O2  24 hour a day. He is using trilogy, he said he had new noninvasive vent since jan 2023.    3. Obstructive sleep apnea  Continue noninvasive ventilator during sleep

## 2023-03-23 DIAGNOSIS — J44.9 CHRONIC OBSTRUCTIVE PULMONARY DISEASE, UNSPECIFIED COPD TYPE (HCC): ICD-10-CM

## 2023-03-24 RX ORDER — DILTIAZEM HYDROCHLORIDE 60 MG/1
TABLET, FILM COATED ORAL
Qty: 3 EACH | Refills: 3 | Status: SHIPPED | OUTPATIENT
Start: 2023-03-24

## 2023-03-24 NOTE — TELEPHONE ENCOUNTER
Please approve or deny this request.    Rx requested:  Requested Prescriptions     Pending Prescriptions Disp Refills    SYMBICORT 80-4.5 MCG/ACT AERO [Pharmacy Med Name: Symbicort 80-4.5 MCG/ACT Inhalation Aerosol] 3 each 3     Sig: USE 2 INHALATIONS TWICE A DAY         Last Office Visit:   3/22/2023      Next Visit Date:  Future Appointments   Date Time Provider Valentín Perkins   7/24/2023  1:00 PM Tonia Simmons MD [facility]

## 2023-04-26 ENCOUNTER — HOSPITAL ENCOUNTER (OUTPATIENT)
Dept: LAB | Age: 83
Discharge: HOME | End: 2023-04-26
Payer: MEDICARE

## 2023-04-26 DIAGNOSIS — J44.9 CHRONIC OBSTRUCTIVE PULMONARY DISEASE, UNSPECIFIED COPD TYPE (HCC): ICD-10-CM

## 2023-04-26 LAB
ALBUMIN SERPL-MCNC: 4.2 G/DL (ref 3.5–4.6)
ANION GAP SERPL CALCULATED.3IONS-SCNC: 11 MEQ/L (ref 9–15)
BUN SERPL-MCNC: 22 MG/DL (ref 8–23)
CALCIUM SERPL-MCNC: 10 MG/DL (ref 8.5–9.9)
CHLORIDE SERPL-SCNC: 91 MEQ/L (ref 95–107)
CO2 SERPL-SCNC: 35 MEQ/L (ref 20–31)
CREAT SERPL-MCNC: 0.8 MG/DL (ref 0.7–1.2)
GLUCOSE SERPL-MCNC: 275 MG/DL (ref 70–99)
PHOSPHATE SERPL-MCNC: 3.8 MG/DL (ref 2.3–4.8)
POTASSIUM SERPL-SCNC: 5.1 MEQ/L (ref 3.4–4.9)
SODIUM SERPL-SCNC: 137 MEQ/L (ref 135–144)

## 2023-04-26 PROCEDURE — 36415 COLL VENOUS BLD VENIPUNCTURE: CPT

## 2023-04-26 PROCEDURE — 80069 RENAL FUNCTION PANEL: CPT

## 2023-04-26 NOTE — TELEPHONE ENCOUNTER
Please approve or deny this request.    Rx requested:  Requested Prescriptions     Pending Prescriptions Disp Refills    albuterol-ipratropium (COMBIVENT RESPIMAT)  MCG/ACT AERS inhaler 3 each 0     Sig: Inhale 1 puff into the lungs every 4 hours         Last Office Visit:   3/22/2023      Next Visit Date:  Future Appointments   Date Time Provider Valentín Perkins   7/24/2023  1:00 PM Ajit Major MD [facility]

## 2023-04-27 DIAGNOSIS — J44.9 CHRONIC OBSTRUCTIVE PULMONARY DISEASE, UNSPECIFIED COPD TYPE (HCC): ICD-10-CM

## 2023-04-28 RX ORDER — IPRATROPIUM BROMIDE AND ALBUTEROL SULFATE 2.5; .5 MG/3ML; MG/3ML
1 SOLUTION RESPIRATORY (INHALATION) EVERY 6 HOURS PRN
Qty: 360 EACH | Refills: 1 | Status: SHIPPED | OUTPATIENT
Start: 2023-04-28

## 2023-04-28 NOTE — TELEPHONE ENCOUNTER
Rx requested:  Requested Prescriptions     Pending Prescriptions Disp Refills    ipratropium-albuterol (DUONEB) 0.5-2.5 (3) MG/3ML SOLN nebulizer solution 360 each 1     Sig: Take 3 mLs by nebulization every 6 hours as needed for Shortness of Breath       Last Office Visit:   3/22/2023      Next Visit Date:  Future Appointments   Date Time Provider Valentín Perkins   7/24/2023  1:00 PM West Keenan MD [facility]

## 2023-07-24 ENCOUNTER — OFFICE VISIT (OUTPATIENT)
Dept: PULMONOLOGY | Age: 83
End: 2023-07-24
Payer: MEDICARE

## 2023-07-24 VITALS
HEART RATE: 67 BPM | DIASTOLIC BLOOD PRESSURE: 62 MMHG | OXYGEN SATURATION: 96 % | WEIGHT: 198 LBS | SYSTOLIC BLOOD PRESSURE: 132 MMHG | BODY MASS INDEX: 26.85 KG/M2 | TEMPERATURE: 97.1 F

## 2023-07-24 DIAGNOSIS — J96.11 CHRONIC RESPIRATORY FAILURE WITH HYPOXIA AND HYPERCAPNIA (HCC): ICD-10-CM

## 2023-07-24 DIAGNOSIS — J96.12 CHRONIC RESPIRATORY FAILURE WITH HYPOXIA AND HYPERCAPNIA (HCC): ICD-10-CM

## 2023-07-24 DIAGNOSIS — G47.33 OBSTRUCTIVE SLEEP APNEA: ICD-10-CM

## 2023-07-24 DIAGNOSIS — I27.20 PULMONARY HTN (HCC): ICD-10-CM

## 2023-07-24 DIAGNOSIS — J44.9 CHRONIC OBSTRUCTIVE PULMONARY DISEASE, UNSPECIFIED COPD TYPE (HCC): Primary | ICD-10-CM

## 2023-07-24 PROCEDURE — G8427 DOCREV CUR MEDS BY ELIG CLIN: HCPCS | Performed by: INTERNAL MEDICINE

## 2023-07-24 PROCEDURE — 3078F DIAST BP <80 MM HG: CPT | Performed by: INTERNAL MEDICINE

## 2023-07-24 PROCEDURE — G8417 CALC BMI ABV UP PARAM F/U: HCPCS | Performed by: INTERNAL MEDICINE

## 2023-07-24 PROCEDURE — 1036F TOBACCO NON-USER: CPT | Performed by: INTERNAL MEDICINE

## 2023-07-24 PROCEDURE — 3023F SPIROM DOC REV: CPT | Performed by: INTERNAL MEDICINE

## 2023-07-24 PROCEDURE — 99214 OFFICE O/P EST MOD 30 MIN: CPT | Performed by: INTERNAL MEDICINE

## 2023-07-24 PROCEDURE — 1123F ACP DISCUSS/DSCN MKR DOCD: CPT | Performed by: INTERNAL MEDICINE

## 2023-07-24 PROCEDURE — 3074F SYST BP LT 130 MM HG: CPT | Performed by: INTERNAL MEDICINE

## 2023-07-24 RX ORDER — LOSARTAN POTASSIUM 50 MG/1
TABLET ORAL
COMMUNITY
Start: 2023-05-11

## 2023-07-24 RX ORDER — LOSARTAN POTASSIUM AND HYDROCHLOROTHIAZIDE 12.5; 5 MG/1; MG/1
TABLET ORAL
COMMUNITY
Start: 2023-06-03

## 2023-07-24 ASSESSMENT — ENCOUNTER SYMPTOMS
EYE ITCHING: 0
SORE THROAT: 0
SHORTNESS OF BREATH: 1
VOICE CHANGE: 0
CHEST TIGHTNESS: 0
NAUSEA: 0
WHEEZING: 1
ABDOMINAL PAIN: 0
VOMITING: 0
COUGH: 1
RHINORRHEA: 0
DIARRHEA: 0

## 2023-07-24 NOTE — PROGRESS NOTES
Subjective:     Latrice Cotto is a 79 y.o. male who complains today of:     Chief Complaint   Patient presents with    Follow-up     4m f/u on COPD, chronic respiratory failure       HPI  He is using 5-6 lit O2  24 hour a day. He is using trilogy, NIV at night . He was at [place] for urinary retention and he has morrison cath   due BPH with retention   He is using bronchodilator with symbicort 2 puff BID , combivent respimat 1 puff Q 4 hourly , nebulizer with duoneb neb prn , proair hfa prn , prednisone prn.   C/o shortness of breath with exertion. Occasional Wheezing and cough . No Cough . No Hemoptysis. No Chest tightness. No Chest pain with radiation  or pleuritic pain. No  leg edema. No orthopnea. No Fever or chills. No Rhinorrhea and postnasal drip.     Allergies:  Brilinta [ticagrelor], Sildenafil citrate, Advair [fluticasone-salmeterol], and Fluticasone furoate  Past Medical History:   Diagnosis Date    Adrenal adenoma     CT 6/2011 stable    Anxiety     Asbestosis(501)     CAD (coronary artery disease)     status post stent 2001    Cancer (HCC)     basil cell carnoma    COPD (chronic obstructive pulmonary disease) (HCC)     Elbow injury     left    Erectile dysfunction     Granulomatous lung disease ([address])     Herpes zoster     Hyperlipidemia     Hypertension     Insomnia     Interstitial fibrosis     Obstructive sleep apnea on CPAP 11/13/2017    Osteoarthritis of right AC (acromioclavicular) joint 3/25/2022    Prostate nodule     Scarlet fever     Urine frequency      Past Surgical History:   Procedure Laterality Date    CARDIAC CATHETERIZATION      CARDIOVASCULAR STRESS TEST      1/2010 normal per patient    COLONOSCOPY      5/2009 tubular adenomas    COLONOSCOPY  08/13/15    Cynthia Mcduffie MD    EYE SURGERY  02/10/14    DR WOLFE  [facility]    HERNIA REPAIR      LEG SURGERY       Family History   Problem Relation Age of Onset    Cancer Other         colon cancer in multiple family members and

## 2023-11-03 DIAGNOSIS — J44.9 CHRONIC OBSTRUCTIVE PULMONARY DISEASE, UNSPECIFIED COPD TYPE (HCC): ICD-10-CM

## 2023-11-03 RX ORDER — IPRATROPIUM BROMIDE AND ALBUTEROL SULFATE 2.5; .5 MG/3ML; MG/3ML
1 SOLUTION RESPIRATORY (INHALATION) EVERY 6 HOURS PRN
Qty: 360 EACH | Refills: 0 | Status: SHIPPED | OUTPATIENT
Start: 2023-11-03

## 2023-11-03 NOTE — TELEPHONE ENCOUNTER
Please approve or deny this request.    Rx requested:  Requested Prescriptions     Pending Prescriptions Disp Refills    ipratropium 0.5 mg-albuterol 2.5 mg (DUONEB) 0.5-2.5 (3) MG/3ML SOLN nebulizer solution 360 each 1     Sig: Take 3 mLs by nebulization every 6 hours as needed for Shortness of Breath         Last Office Visit:   7/24/2023      Next Visit Date:  Future Appointments   Date Time Provider Department Center   12/6/2023 10:45 AM Werner Trevino MD [facility]

## 2023-11-16 ENCOUNTER — OFFICE VISIT (OUTPATIENT)
Dept: CARDIOLOGY | Facility: CLINIC | Age: 83
End: 2023-11-16
Payer: MEDICARE

## 2023-11-16 VITALS
DIASTOLIC BLOOD PRESSURE: 68 MMHG | HEIGHT: 71 IN | WEIGHT: 204.2 LBS | BODY MASS INDEX: 28.59 KG/M2 | SYSTOLIC BLOOD PRESSURE: 140 MMHG | HEART RATE: 72 BPM

## 2023-11-16 DIAGNOSIS — I48.11 LONGSTANDING PERSISTENT ATRIAL FIBRILLATION (MULTI): ICD-10-CM

## 2023-11-16 DIAGNOSIS — Z87.891 FORMER SMOKER: ICD-10-CM

## 2023-11-16 DIAGNOSIS — E11.9 DIABETES MELLITUS TYPE II, NON INSULIN DEPENDENT (MULTI): ICD-10-CM

## 2023-11-16 DIAGNOSIS — I25.10 CAD S/P PERCUTANEOUS CORONARY ANGIOPLASTY: ICD-10-CM

## 2023-11-16 DIAGNOSIS — J44.9 CHRONIC OBSTRUCTIVE PULMONARY DISEASE, UNSPECIFIED COPD TYPE (MULTI): ICD-10-CM

## 2023-11-16 DIAGNOSIS — Z98.61 CAD S/P PERCUTANEOUS CORONARY ANGIOPLASTY: ICD-10-CM

## 2023-11-16 DIAGNOSIS — E78.2 MIXED HYPERLIPIDEMIA: ICD-10-CM

## 2023-11-16 DIAGNOSIS — I10 ESSENTIAL HYPERTENSION: ICD-10-CM

## 2023-11-16 DIAGNOSIS — E66.3 OVERWEIGHT WITH BODY MASS INDEX (BMI) OF 28 TO 28.9 IN ADULT: ICD-10-CM

## 2023-11-16 DIAGNOSIS — Z86.16 HISTORY OF COVID-19: ICD-10-CM

## 2023-11-16 PROBLEM — R00.2 PALPITATIONS: Status: ACTIVE | Noted: 2023-11-16

## 2023-11-16 PROBLEM — R06.89 DIFFICULTY BREATHING: Status: ACTIVE | Noted: 2023-11-16

## 2023-11-16 PROBLEM — G47.30 SLEEP APNEA: Status: ACTIVE | Noted: 2023-11-16

## 2023-11-16 PROBLEM — I47.10 SVT (SUPRAVENTRICULAR TACHYCARDIA) (CMS-HCC): Status: ACTIVE | Noted: 2023-11-16

## 2023-11-16 PROBLEM — R06.00 DYSPNEA: Status: ACTIVE | Noted: 2023-11-16

## 2023-11-16 PROBLEM — I20.9 ANGINA, CLASS IV (CMS-HCC): Status: ACTIVE | Noted: 2023-11-16

## 2023-11-16 PROBLEM — R94.30 ABNORMAL CARDIOVASCULAR FUNCTION STUDY: Status: ACTIVE | Noted: 2023-11-16

## 2023-11-16 PROCEDURE — 3077F SYST BP >= 140 MM HG: CPT | Performed by: INTERNAL MEDICINE

## 2023-11-16 PROCEDURE — 99214 OFFICE O/P EST MOD 30 MIN: CPT | Performed by: INTERNAL MEDICINE

## 2023-11-16 PROCEDURE — 1159F MED LIST DOCD IN RCRD: CPT | Performed by: INTERNAL MEDICINE

## 2023-11-16 PROCEDURE — 1036F TOBACCO NON-USER: CPT | Performed by: INTERNAL MEDICINE

## 2023-11-16 PROCEDURE — 3078F DIAST BP <80 MM HG: CPT | Performed by: INTERNAL MEDICINE

## 2023-11-16 RX ORDER — TEMAZEPAM 15 MG/1
15 CAPSULE ORAL 2 TIMES DAILY
COMMUNITY

## 2023-11-16 RX ORDER — DOFETILIDE 0.25 MG/1
1 CAPSULE ORAL EVERY 12 HOURS
COMMUNITY
Start: 2023-09-19 | End: 2024-02-09

## 2023-11-16 RX ORDER — DILTIAZEM HYDROCHLORIDE 120 MG/1
120 CAPSULE, EXTENDED RELEASE ORAL NIGHTLY
COMMUNITY
End: 2024-01-19 | Stop reason: WASHOUT

## 2023-11-16 RX ORDER — ROSUVASTATIN CALCIUM 40 MG/1
1 TABLET, COATED ORAL DAILY
COMMUNITY
End: 2024-01-11 | Stop reason: SDUPTHER

## 2023-11-16 RX ORDER — METOPROLOL SUCCINATE 25 MG/1
TABLET, EXTENDED RELEASE ORAL
COMMUNITY
End: 2024-01-19 | Stop reason: SDUPTHER

## 2023-11-16 RX ORDER — ACETAMINOPHEN, DIPHENHYDRAMINE HCL, PHENYLEPHRINE HCL 325; 25; 5 MG/1; MG/1; MG/1
TABLET ORAL
COMMUNITY

## 2023-11-16 RX ORDER — PNV NO.95/FERROUS FUM/FOLIC AC 28MG-0.8MG
1 TABLET ORAL DAILY
COMMUNITY

## 2023-11-16 RX ORDER — CRANBERRY FRUIT EXTRACT 650 MG
CAPSULE ORAL
COMMUNITY
End: 2024-05-22 | Stop reason: WASHOUT

## 2023-11-16 RX ORDER — NAPROXEN SODIUM 220 MG/1
TABLET, FILM COATED ORAL
COMMUNITY

## 2023-11-16 RX ORDER — MAGNESIUM HYDROXIDE 400 MG/5ML
1 SUSPENSION, ORAL (FINAL DOSE FORM) ORAL DAILY
COMMUNITY
End: 2024-05-22 | Stop reason: WASHOUT

## 2023-11-16 RX ORDER — OXYBUTYNIN CHLORIDE 5 MG/1
5 TABLET, EXTENDED RELEASE ORAL DAILY
COMMUNITY

## 2023-11-16 RX ORDER — ASCORBIC ACID 500 MG
TABLET ORAL
COMMUNITY

## 2023-11-16 RX ORDER — PHENYLEPHRINE HCL 10 MG
TABLET ORAL
COMMUNITY

## 2023-11-16 RX ORDER — ZINC GLUCONATE 50 MG
1 TABLET ORAL DAILY
COMMUNITY

## 2023-11-16 RX ORDER — MINERAL OIL
180 ENEMA (ML) RECTAL NIGHTLY
COMMUNITY
Start: 2023-08-31

## 2023-11-16 RX ORDER — MAGNESIUM HYDROXIDE 400 MG/5ML
SUSPENSION, ORAL (FINAL DOSE FORM) ORAL
COMMUNITY

## 2023-11-16 RX ORDER — BUSPIRONE HYDROCHLORIDE 15 MG/1
1 TABLET ORAL DAILY
COMMUNITY

## 2023-11-16 RX ORDER — FOLIC ACID 0.8 MG
TABLET ORAL
COMMUNITY

## 2023-11-16 RX ORDER — SITAGLIPTIN 50 MG/1
1 TABLET, FILM COATED ORAL DAILY
COMMUNITY

## 2023-11-16 RX ORDER — TYROSINE 500 MG
CAPSULE ORAL
COMMUNITY

## 2023-11-16 RX ORDER — GINSENG 100 MG
CAPSULE ORAL
COMMUNITY

## 2023-11-16 RX ORDER — MAGNESIUM 250 MG
2 TABLET ORAL 2 TIMES DAILY
COMMUNITY

## 2023-11-16 RX ORDER — DILTIAZEM HYDROCHLORIDE 240 MG/1
240 CAPSULE, EXTENDED RELEASE ORAL
COMMUNITY
Start: 2022-12-13 | End: 2024-01-19 | Stop reason: WASHOUT

## 2023-11-16 RX ORDER — IPRATROPIUM BROMIDE AND ALBUTEROL SULFATE 2.5; .5 MG/3ML; MG/3ML
3 SOLUTION RESPIRATORY (INHALATION)
COMMUNITY

## 2023-11-16 RX ORDER — METOPROLOL SUCCINATE 50 MG/1
1 TABLET, EXTENDED RELEASE ORAL EVERY EVENING
COMMUNITY
Start: 2022-07-05 | End: 2024-01-04

## 2023-11-16 RX ORDER — LOSARTAN POTASSIUM AND HYDROCHLOROTHIAZIDE 12.5; 5 MG/1; MG/1
1 TABLET ORAL DAILY
COMMUNITY
Start: 2023-11-08 | End: 2024-01-11 | Stop reason: SDUPTHER

## 2023-11-16 RX ORDER — METOPROLOL TARTRATE 25 MG/1
25 TABLET, FILM COATED ORAL EVERY MORNING
COMMUNITY

## 2023-11-16 RX ORDER — AZELASTINE HYDROCHLORIDE 0.5 MG/ML
SOLUTION/ DROPS OPHTHALMIC
COMMUNITY
End: 2024-05-22 | Stop reason: WASHOUT

## 2023-11-16 RX ORDER — PYRIDOXINE HCL (VITAMIN B6) 250 MG
1 TABLET ORAL DAILY
COMMUNITY

## 2023-11-16 RX ORDER — DIGOXIN 250 MCG
250 TABLET ORAL DAILY
COMMUNITY
End: 2024-01-19 | Stop reason: WASHOUT

## 2023-11-16 RX ORDER — DOCUSATE SODIUM 100 MG/1
CAPSULE, LIQUID FILLED ORAL
COMMUNITY
Start: 2022-12-13

## 2023-11-16 RX ORDER — ALBUTEROL SULFATE 90 UG/1
1 AEROSOL, METERED RESPIRATORY (INHALATION) EVERY 4 HOURS PRN
COMMUNITY

## 2023-11-16 RX ORDER — NITROGLYCERIN 0.4 MG/1
0.4 TABLET SUBLINGUAL EVERY 5 MIN PRN
COMMUNITY
End: 2024-05-22 | Stop reason: SDUPTHER

## 2023-11-16 RX ORDER — THIAMINE HCL 50 MG
1 TABLET ORAL DAILY
COMMUNITY

## 2023-11-16 RX ORDER — ACETAMINOPHEN 500 MG
TABLET ORAL
COMMUNITY

## 2023-11-16 RX ORDER — ACETYLGLUCOSAMINE 700 MG
CAPSULE ORAL
COMMUNITY

## 2023-11-16 RX ORDER — KETOCONAZOLE 20 MG/ML
SHAMPOO, SUSPENSION TOPICAL 2 TIMES WEEKLY
COMMUNITY
Start: 2023-09-23

## 2023-11-16 RX ORDER — IPRATROPIUM BROMIDE AND ALBUTEROL 20; 100 UG/1; UG/1
1 SPRAY, METERED RESPIRATORY (INHALATION)
COMMUNITY

## 2023-11-16 RX ORDER — APIXABAN 5 MG/1
2.5 TABLET, FILM COATED ORAL 2 TIMES DAILY
COMMUNITY
Start: 2022-08-04 | End: 2024-01-19 | Stop reason: WASHOUT

## 2023-11-16 NOTE — PATIENT INSTRUCTIONS
Follow up office visit in 6 months    Continue same medications/treatment.  Patient educated on proper medication use.  Please bring all medicines, vitamins and herbal supplements with you when you come to the office.    I, Malinda Montiel LPN, am scribing for and in the presence of Dr. Mejia Dietrich MD, FACC

## 2023-11-16 NOTE — PROGRESS NOTES
CARDIOLOGY OFFICE VISIT      CHIEF COMPLAINT      HISTORY OF PRESENT ILLNESS  Since I saw him he said a lot happened to him.  He developed major problems with his bladder with significant distention secondary to significant prostatic hypertrophy.  He states that now he has to have a chronic indwelling Martines catheter.  He does have a lot of bleeding initially but now he is on a lower dose of Eliquis that seems to have resolved.  He has his usual shortness of breath.  Is on chronic oxygen.  Denies any worsening of this.  He denies palpitations and syncope.  He will occasionally have an episode of of angina.  He usually gets relief with 1 nitroglycerin.  On 1 occasion he had to take 2 nitroglycerin.    Impression:  1. Coronary artery disease, mild infrequent angina pectoralis  2. Percutaneous coronary intervention, multivessel, most recent drug-eluting stents, 2018.  3. Essential hypertension.  4. Mixed hyperlipidemia.  5. Persistent atrial fibrillation, being managed with diltiazem, metoprolol, digoxin, and Eliquis, high-risk medication.  6. Severe chronic obstructive pulmonary disease, on chronic oxygen therapy, and noninvasive ventilator at night.  7. Overweight  8. Former smoker.  9. Covid 19 pneumonia 2021  Chronic indwelling Martines catheter  Diabetes mellitus type 2        Please excuse any errors in grammar or translation related to this dictation. Voice recognition software was utilized to prepare this document.         Past Medical History  Past Medical History:   Diagnosis Date    Overweight 2022    Overweight with body mass index (BMI) of 27 to 27.9 in adult       Social History  Social History     Tobacco Use    Smoking status: Former     Packs/day: 1.00     Years: 40.00     Additional pack years: 0.00     Total pack years: 40.00     Types: Cigarettes     Quit date:      Years since quittin.8    Smokeless tobacco: Never   Substance Use Topics    Alcohol use: Not Currently     Drug use: Never       Family History     Family History   Problem Relation Name Age of Onset    Heart attack Mother      Coronary artery disease Mother      Heart attack Father          Allergies:  Allergies   Allergen Reactions    Ticagrelor Shortness of breath    Sildenafil Other and Unknown     Headache    headache    Fluticasone Other    Fluticasone Propion-Salmeterol Other        Outpatient Medications:  Current Outpatient Medications   Medication Instructions    albuterol (ProAir HFA) 90 mcg/actuation inhaler 1 puff, inhalation, Every 4 hours PRN    Alpha tocopherol (Vitamin E) 200 unit capsule oral, Daily as directed    ascorbic acid (Vitamin C) 500 mg tablet oral    aspirin 81 mg chewable tablet oral, Daily RT    azelastine (Optivar) 0.05 % ophthalmic solution ophthalmic (eye), As directed PRN    busPIRone (Buspar) 30 mg tablet oral, 0.5-1 tab daily as directed    CALCIUM CITRATE-VITAMIN D3 ORAL 1 tablet, oral, Daily    cholecalciferol (Vitamin D3) 50 mcg (2,000 unit) capsule oral    Cinnamon 500 mg capsule oral, As directed    Combivent Respimat  mcg/actuation inhaler inhalation, 4 times daily, As directed    cranberry extract 200 mg capsule oral, Daily as directed    cyanocobalamin (Vitamin B-12) 100 mcg tablet 1 tablet, oral, Daily    digoxin (LANOXIN) 250 mcg, oral, Daily    dilTIAZem ER (TIAZAC) 120 mg, oral, Nightly    dilTIAZem XR (DILACOR XR) 240 mg, oral, Daily before breakfast    docusate sodium (Colace) 100 mg capsule TAKE 1 CAPSULE BY MOUTH TWICE DAILY AS NEEDED FOR CONSTIPATION    dofetilide (Tikosyn) 250 mcg capsule 1 capsule, oral, Every 12 hours    Eliquis 2.5 mg, oral, 2 times daily    fexofenadine (ALLEGRA) 180 mg, oral, Nightly, PRN    folic acid (Folvite) 800 mcg tablet oral, Once daily as directed    glucosamine-chondroit-vit C-Mn (Glucosamine-Chondroitin Complx) capsule oral, As directed    glutathione 500 mg capsule oral, As directed    ipratropium-albuteroL (Duo-Neb) 0.5-2.5  mg/3 mL nebulizer solution 3 mL, nebulization, 4 times daily RT, Or PRN    Januvia 50 mg tablet 1 tablet, oral, Daily    ketoconazole (NIZOral) 2 % shampoo Topical, 2 times weekly, Or as directed PRN    losartan-hydrochlorothiazide (Hyzaar) 50-12.5 mg tablet 1 tablet, oral, Daily    magnesium 250 mg tablet 2 tablets, oral, 2 times daily    melatonin 10 mg tablet oral, At HS PRN    methylsulfonylmethane 1,000 mg capsule oral    metoprolol succinate XL (Toprol-XL) 25 mg 24 hr tablet Take one tablet daily in the morning in addition to 50 mg in the evening    metoprolol succinate XL (Toprol-XL) 50 mg 24 hr tablet 1 tablet, oral, Every evening    metoprolol tartrate (Lopressor) 25 mg tablet TAKE ONE TABLET AS NEEDED FOR PALPS IN ADDITION TO TOPROL    nitroglycerin (NITROSTAT) 0.4 mg, sublingual, Every 5 min PRN    oxybutynin XL (DITROPAN-XL) 5 mg, oral, Daily, Do not crush, chew, or split.    oxygen (O2) gas therapy As directed    potassium gluconate 595 mg (99 mg) tablet 1 tablet, oral, Daily    prasterone, dhea, (DHEA) 25 mg capsule oral, As directed    pyridoxine (Vitamin B-6) 250 mg tablet 1 tablet, oral, Daily    rosuvastatin (Crestor) 40 mg tablet 1 tablet, oral, Daily    temazepam (RESTORIL) 15 mg, oral, 2 times daily    thiamine (Vitamin B-1) 50 mg tablet 1 tablet, oral, Daily    ubidecarenone (COENZYME Q10, BULK, MISC) 1 capsule, oral, Daily    zinc gluconate 50 mg tablet 1 tablet, oral, Daily          REVIEW OF SYSTEMS  Review of Systems   All other systems reviewed and are negative.        VITALS  Vitals:    11/16/23 1353   BP: 140/68   Pulse: 72       PHYSICAL EXAM  Constitutional:       Appearance: Healthy appearance. Not in distress.   Eyes:      Conjunctiva/sclera: Conjunctivae normal.      Pupils: Pupils are equal, round, and reactive to light.   Neck:      Vascular: No JVR. JVD normal.   Pulmonary:      Effort: Pulmonary effort is normal.      Breath sounds: Decreased breath sounds present. No wheezing.  No rhonchi. No rales.   Chest:      Chest wall: Not tender to palpatation.   Cardiovascular:      PMI at left midclavicular line. Normal rate. Regular rhythm. Normal S1. Normal S2.       Murmurs: There is no murmur.      No gallop.  No click. No rub.   Pulses:     Intact distal pulses.   Edema:     Peripheral edema absent.   Abdominal:      Tenderness: There is no abdominal tenderness.   Musculoskeletal: Normal range of motion.         General: No tenderness.      Cervical back: Normal range of motion. Skin:     General: Skin is warm and dry.   Neurological:      General: No focal deficit present.      Mental Status: Alert and oriented to person, place and time.           ASSESSMENT AND PLAN  Diagnoses and all orders for this visit:  CAD S/P percutaneous coronary angioplasty  Essential hypertension  Mixed hyperlipidemia  Longstanding persistent atrial fibrillation (CMS/HCC)  Chronic obstructive pulmonary disease, unspecified COPD type (CMS/HCC)  Overweight with body mass index (BMI) of 28 to 28.9 in adult  Former smoker  History of COVID-19  Diabetes mellitus type II, non insulin dependent (CMS/HCC)      [unfilled]

## 2023-11-23 DIAGNOSIS — J44.9 CHRONIC OBSTRUCTIVE PULMONARY DISEASE, UNSPECIFIED COPD TYPE (HCC): ICD-10-CM

## 2023-11-24 NOTE — TELEPHONE ENCOUNTER
Rx requested:  Requested Prescriptions     Pending Prescriptions Disp Refills    ipratropium 0.5 mg-albuterol 2.5 mg (DUONEB) 0.5-2.5 (3) MG/3ML SOLN nebulizer solution [Pharmacy Med Name: Albuterol-Ipratropium 2.5 (3)-0.5 MG/3ML Inhalation Solution] 360 mL 11     Sig: USE 1 VIAL VIA NEBULIZER EVERY 6 HOURS AS NEEDED FOR SHORTNESS OF BREATH       Last Office Visit:   7/24/2023      Next Visit Date:  Future Appointments   Date Time Provider Department Center   12/6/2023 10:45 AM Werner Trevino MD [place] [facility]

## 2023-11-26 RX ORDER — IPRATROPIUM BROMIDE AND ALBUTEROL SULFATE 2.5; .5 MG/3ML; MG/3ML
SOLUTION RESPIRATORY (INHALATION)
Qty: 360 ML | Refills: 11 | OUTPATIENT
Start: 2023-11-26

## 2023-12-06 ENCOUNTER — OFFICE VISIT (OUTPATIENT)
Dept: PULMONOLOGY | Age: 83
End: 2023-12-06
Payer: MEDICARE

## 2023-12-06 VITALS
HEART RATE: 62 BPM | OXYGEN SATURATION: 95 % | WEIGHT: 204 LBS | BODY MASS INDEX: 27.67 KG/M2 | TEMPERATURE: 97.1 F | DIASTOLIC BLOOD PRESSURE: 80 MMHG | SYSTOLIC BLOOD PRESSURE: 146 MMHG

## 2023-12-06 DIAGNOSIS — U07.1 COVID-19: ICD-10-CM

## 2023-12-06 DIAGNOSIS — G47.33 OBSTRUCTIVE SLEEP APNEA: ICD-10-CM

## 2023-12-06 DIAGNOSIS — J96.11 CHRONIC RESPIRATORY FAILURE WITH HYPOXIA AND HYPERCAPNIA (HCC): ICD-10-CM

## 2023-12-06 DIAGNOSIS — J96.12 CHRONIC RESPIRATORY FAILURE WITH HYPOXIA AND HYPERCAPNIA (HCC): ICD-10-CM

## 2023-12-06 DIAGNOSIS — I27.20 PULMONARY HTN (HCC): ICD-10-CM

## 2023-12-06 DIAGNOSIS — J44.9 CHRONIC OBSTRUCTIVE PULMONARY DISEASE, UNSPECIFIED COPD TYPE (HCC): Primary | ICD-10-CM

## 2023-12-06 PROCEDURE — 3074F SYST BP LT 130 MM HG: CPT | Performed by: INTERNAL MEDICINE

## 2023-12-06 PROCEDURE — 3023F SPIROM DOC REV: CPT | Performed by: INTERNAL MEDICINE

## 2023-12-06 PROCEDURE — 3078F DIAST BP <80 MM HG: CPT | Performed by: INTERNAL MEDICINE

## 2023-12-06 PROCEDURE — 1123F ACP DISCUSS/DSCN MKR DOCD: CPT | Performed by: INTERNAL MEDICINE

## 2023-12-06 PROCEDURE — 1036F TOBACCO NON-USER: CPT | Performed by: INTERNAL MEDICINE

## 2023-12-06 PROCEDURE — 99214 OFFICE O/P EST MOD 30 MIN: CPT | Performed by: INTERNAL MEDICINE

## 2023-12-06 PROCEDURE — G8484 FLU IMMUNIZE NO ADMIN: HCPCS | Performed by: INTERNAL MEDICINE

## 2023-12-06 PROCEDURE — G8427 DOCREV CUR MEDS BY ELIG CLIN: HCPCS | Performed by: INTERNAL MEDICINE

## 2023-12-06 PROCEDURE — G8417 CALC BMI ABV UP PARAM F/U: HCPCS | Performed by: INTERNAL MEDICINE

## 2023-12-06 RX ORDER — DIGOXIN 125 MCG
125 TABLET ORAL DAILY
COMMUNITY
Start: 2023-11-28

## 2023-12-06 RX ORDER — DUTASTERIDE 0.5 MG/1
0.5 CAPSULE, LIQUID FILLED ORAL
COMMUNITY
Start: 2023-12-01

## 2023-12-06 RX ORDER — BUDESONIDE AND FORMOTEROL FUMARATE DIHYDRATE 80; 4.5 UG/1; UG/1
2 AEROSOL RESPIRATORY (INHALATION) 2 TIMES DAILY
Qty: 3 EACH | Refills: 1 | Status: SHIPPED | OUTPATIENT
Start: 2023-12-06

## 2023-12-06 RX ORDER — DILTIAZEM HYDROCHLORIDE 240 MG/1
CAPSULE, COATED, EXTENDED RELEASE ORAL
COMMUNITY
Start: 2023-10-17

## 2023-12-06 RX ORDER — APIXABAN 2.5 MG/1
TABLET, FILM COATED ORAL
COMMUNITY
Start: 2023-12-01

## 2023-12-06 ASSESSMENT — ENCOUNTER SYMPTOMS
RHINORRHEA: 0
ABDOMINAL PAIN: 0
VOICE CHANGE: 0
WHEEZING: 1
VOMITING: 0
DIARRHEA: 0
NAUSEA: 0
COUGH: 1
EYE ITCHING: 0
CHEST TIGHTNESS: 0
SORE THROAT: 0
SHORTNESS OF BREATH: 1

## 2023-12-06 NOTE — PROGRESS NOTES
VW)    Narrative  EXAMINATION: XR CHEST (2 VW). DATE AND TIME:10/22/2021 6:07 PM    CLINICAL HISTORY: Shortness of breath  R06.02 SOB (shortness of breath) ICD10    COMPARISONS: January 20, 2020    FINDINGS: The heart, mediastinum and pulmonary vasculature are within normal limits. Visualized lung fields are clear. Bones unremarkable. Impression  NO  ACTIVE LUNG DISEASE. Results for orders placed during the hospital encounter of 01/28/20    XR CHEST STANDARD (2 VW)    Narrative  EXAMINATION: XR CHEST (2 VW)    CLINICAL HISTORY: J44.1 COPD exacerbation ([address]) ICD10    COMPARISONS: 1/24/2020    FINDINGS: Cardiac size is borderline. Pulmonary vascularity is normal. Lungs are hyperinflated with increase in the AP diameter of the chest and flattening the diaphragms, concordant with clinical history of COPD. There is scarring adjacent to the  cardiac silhouette in the left lower lung. There is coronary artery stenting. There are no acute infiltrates. Prominent nipple shadows, left greater than right are noted. There are mild degenerative changes in the spine. Impression  COPD. NO ACUTE CARDIOPULMONARY DISEASE.  ]  Results for orders placed during the hospital encounter of 12/29/21    XR CHEST PORTABLE    Narrative  EXAMINATION: CHEST PORTABLE VIEW    CLINICAL HISTORY: Short of breath    COMPARISONS: October 22, 2021 1806 hours    FINDINGS:    Single  views of the chest is submitted. The cardiac silhouette is enlarged  Pulmonary vascular attenuated  Right sided trachea. There are bibasilar areas of atelectasis, patchy infiltrates in both bases left greater than right. .  No Pneumothoraces. Impression  BIBASILAR AREAS OF ATELECTASIS, PATCHY INFILTRATES IN BOTH BASES LEFT GREATER THAN RIGHT SUPERIMPOSED UPON RADIOGRAPHIC FINDINGS OF COPD. IMAGING FEATURES CAN BE SEEN WITH (COVID-19) PNEUMONIA, THOUGH ARE NONSPECIFIC AND CAN OCCUR WITH A VARIETY OF INFECTIOUS AND NONINFECTIOUS PROCESSES.       Results for

## 2023-12-11 DIAGNOSIS — I48.11 LONGSTANDING PERSISTENT ATRIAL FIBRILLATION (MULTI): ICD-10-CM

## 2023-12-12 RX ORDER — DILTIAZEM HYDROCHLORIDE 240 MG/1
240 CAPSULE, COATED, EXTENDED RELEASE ORAL EVERY MORNING
Qty: 90 CAPSULE | Refills: 3 | Status: SHIPPED | OUTPATIENT
Start: 2023-12-12 | End: 2024-01-19 | Stop reason: SDUPTHER

## 2023-12-12 RX ORDER — DILTIAZEM HYDROCHLORIDE 120 MG/1
120 CAPSULE, COATED, EXTENDED RELEASE ORAL
Qty: 90 CAPSULE | Refills: 3 | Status: SHIPPED | OUTPATIENT
Start: 2023-12-12 | End: 2024-01-19 | Stop reason: SDUPTHER

## 2024-01-04 DIAGNOSIS — I10 ESSENTIAL HYPERTENSION: ICD-10-CM

## 2024-01-04 RX ORDER — METOPROLOL SUCCINATE 50 MG/1
50 TABLET, EXTENDED RELEASE ORAL EVERY EVENING
Qty: 90 TABLET | Refills: 3 | Status: SHIPPED | OUTPATIENT
Start: 2024-01-04

## 2024-01-11 ENCOUNTER — HOSPITAL ENCOUNTER (OUTPATIENT)
Facility: HOSPITAL | Age: 84
Setting detail: OUTPATIENT SURGERY
End: 2024-01-11
Attending: OPHTHALMOLOGY | Admitting: OPHTHALMOLOGY
Payer: MEDICARE

## 2024-01-11 ENCOUNTER — PREP FOR PROCEDURE (OUTPATIENT)
Dept: OPERATING ROOM | Facility: HOSPITAL | Age: 84
End: 2024-01-11
Payer: MEDICARE

## 2024-01-11 DIAGNOSIS — I10 ESSENTIAL HYPERTENSION: ICD-10-CM

## 2024-01-11 DIAGNOSIS — H25.812 COMBINED FORMS OF AGE-RELATED CATARACT OF LEFT EYE: Primary | ICD-10-CM

## 2024-01-11 DIAGNOSIS — E78.2 MIXED HYPERLIPIDEMIA: ICD-10-CM

## 2024-01-11 RX ORDER — ROSUVASTATIN CALCIUM 40 MG/1
40 TABLET, COATED ORAL DAILY
Qty: 30 TABLET | Refills: 0 | Status: SHIPPED | OUTPATIENT
Start: 2024-01-11 | End: 2024-02-09

## 2024-01-11 RX ORDER — KETOROLAC TROMETHAMINE 5 MG/ML
1 SOLUTION OPHTHALMIC
Status: CANCELLED | OUTPATIENT
Start: 2024-01-25 | End: 2024-01-25

## 2024-01-11 RX ORDER — POVIDONE-IODINE 5 %
SOLUTION, NON-ORAL OPHTHALMIC (EYE) ONCE
Status: CANCELLED | OUTPATIENT
Start: 2024-01-25 | End: 2024-01-11

## 2024-01-11 RX ORDER — LOSARTAN POTASSIUM AND HYDROCHLOROTHIAZIDE 12.5; 5 MG/1; MG/1
1 TABLET ORAL DAILY
Qty: 30 TABLET | Refills: 0 | Status: SHIPPED | OUTPATIENT
Start: 2024-01-11 | End: 2024-01-12 | Stop reason: ALTCHOICE

## 2024-01-11 RX ORDER — TETRACAINE HYDROCHLORIDE 5 MG/ML
1 SOLUTION OPHTHALMIC ONCE
Status: CANCELLED | OUTPATIENT
Start: 2024-01-25 | End: 2024-01-11

## 2024-01-11 RX ORDER — PREDNISOLONE ACETATE 10 MG/ML
1 SUSPENSION/ DROPS OPHTHALMIC ONCE
Status: CANCELLED | OUTPATIENT
Start: 2024-01-25 | End: 2024-01-11

## 2024-01-11 NOTE — TELEPHONE ENCOUNTER
Per [facility] they have put in an override to have 30 days to be filled at local as pt only has a couple days left.    Losartan and Rosuvastatin 30 days only.

## 2024-01-12 ENCOUNTER — TELEPHONE (OUTPATIENT)
Dept: CARDIOLOGY | Facility: CLINIC | Age: 84
End: 2024-01-12
Payer: MEDICARE

## 2024-01-12 DIAGNOSIS — I10 ESSENTIAL HYPERTENSION: ICD-10-CM

## 2024-01-12 RX ORDER — LOSARTAN POTASSIUM 50 MG/1
50 TABLET ORAL DAILY
Qty: 90 TABLET | Refills: 3
Start: 2024-01-12 | End: 2024-04-22

## 2024-01-12 NOTE — TELEPHONE ENCOUNTER
Female (no name provided) left message that the wrong Losartan was sent in to the local pharmacy.  Per female caller pt only has 2-3 tablets of losartan left. Female states that pt is not taking Losartan hydrochlorothiazide he is taking plan Losartan.      This does not match current medication list.  Losartan Hydrochlorothiazide was listed on medication list at last OV 11/16/2023.      Routed to Michelle DHILLON LPN

## 2024-01-12 NOTE — TELEPHONE ENCOUNTER
1/12 Per Dr. Mejia Dietrich , call in Losartan 50 mg once a day for patient.  Patient's wife, Pricilla, called office back and was advised that Losartan will be called in to [facility].  Rx called in as advised.

## 2024-01-18 ENCOUNTER — PREP FOR PROCEDURE (OUTPATIENT)
Dept: OPERATING ROOM | Facility: HOSPITAL | Age: 84
End: 2024-01-18
Payer: MEDICARE

## 2024-01-19 ENCOUNTER — OFFICE VISIT (OUTPATIENT)
Dept: CARDIOLOGY | Facility: CLINIC | Age: 84
End: 2024-01-19
Payer: MEDICARE

## 2024-01-19 VITALS
DIASTOLIC BLOOD PRESSURE: 60 MMHG | BODY MASS INDEX: 29.4 KG/M2 | HEIGHT: 71 IN | SYSTOLIC BLOOD PRESSURE: 128 MMHG | HEART RATE: 61 BPM | WEIGHT: 210 LBS

## 2024-01-19 DIAGNOSIS — Z71.89 ENCOUNTER FOR MEDICATION REVIEW AND COUNSELING: ICD-10-CM

## 2024-01-19 DIAGNOSIS — J44.9 CHRONIC OBSTRUCTIVE PULMONARY DISEASE, UNSPECIFIED COPD TYPE (MULTI): ICD-10-CM

## 2024-01-19 DIAGNOSIS — Z87.891 FORMER SMOKER: ICD-10-CM

## 2024-01-19 DIAGNOSIS — Z79.899 HIGH RISK MEDICATION USE: ICD-10-CM

## 2024-01-19 DIAGNOSIS — I25.10 CAD S/P PERCUTANEOUS CORONARY ANGIOPLASTY: ICD-10-CM

## 2024-01-19 DIAGNOSIS — I48.3 TYPICAL ATRIAL FLUTTER (MULTI): ICD-10-CM

## 2024-01-19 DIAGNOSIS — I47.10 SVT (SUPRAVENTRICULAR TACHYCARDIA) (CMS-HCC): ICD-10-CM

## 2024-01-19 DIAGNOSIS — Z98.61 CAD S/P PERCUTANEOUS CORONARY ANGIOPLASTY: ICD-10-CM

## 2024-01-19 DIAGNOSIS — I10 ESSENTIAL HYPERTENSION: ICD-10-CM

## 2024-01-19 DIAGNOSIS — G47.30 SLEEP APNEA, UNSPECIFIED TYPE: ICD-10-CM

## 2024-01-19 DIAGNOSIS — E78.2 MIXED HYPERLIPIDEMIA: ICD-10-CM

## 2024-01-19 DIAGNOSIS — Z71.2 ENCOUNTER TO DISCUSS TEST RESULTS: ICD-10-CM

## 2024-01-19 DIAGNOSIS — R00.2 PALPITATIONS: ICD-10-CM

## 2024-01-19 DIAGNOSIS — I48.11 LONGSTANDING PERSISTENT ATRIAL FIBRILLATION (MULTI): Primary | ICD-10-CM

## 2024-01-19 DIAGNOSIS — I48.92 ATRIAL FLUTTER, UNSPECIFIED TYPE (MULTI): ICD-10-CM

## 2024-01-19 PROCEDURE — 93000 ELECTROCARDIOGRAM COMPLETE: CPT | Performed by: INTERNAL MEDICINE

## 2024-01-19 PROCEDURE — 3074F SYST BP LT 130 MM HG: CPT | Performed by: INTERNAL MEDICINE

## 2024-01-19 PROCEDURE — 1159F MED LIST DOCD IN RCRD: CPT | Performed by: INTERNAL MEDICINE

## 2024-01-19 PROCEDURE — 99215 OFFICE O/P EST HI 40 MIN: CPT | Performed by: INTERNAL MEDICINE

## 2024-01-19 PROCEDURE — 1036F TOBACCO NON-USER: CPT | Performed by: INTERNAL MEDICINE

## 2024-01-19 PROCEDURE — 3078F DIAST BP <80 MM HG: CPT | Performed by: INTERNAL MEDICINE

## 2024-01-19 RX ORDER — LATANOPROST 0.05 MG/ML
SOLUTION/ DROPS OPHTHALMIC; TOPICAL
COMMUNITY

## 2024-01-19 RX ORDER — METOPROLOL SUCCINATE 25 MG/1
TABLET, EXTENDED RELEASE ORAL
Qty: 90 TABLET | Refills: 3 | Status: SHIPPED | OUTPATIENT
Start: 2024-01-19

## 2024-01-19 RX ORDER — DILTIAZEM HYDROCHLORIDE 120 MG/1
120 CAPSULE, COATED, EXTENDED RELEASE ORAL
Qty: 90 CAPSULE | Refills: 3 | Status: SHIPPED | OUTPATIENT
Start: 2024-01-19 | End: 2025-01-18

## 2024-01-19 RX ORDER — INSULIN GLARGINE 100 [IU]/ML
16 INJECTION, SOLUTION SUBCUTANEOUS EVERY 24 HOURS
COMMUNITY

## 2024-01-19 RX ORDER — DILTIAZEM HYDROCHLORIDE 240 MG/1
240 CAPSULE, COATED, EXTENDED RELEASE ORAL EVERY MORNING
Qty: 90 CAPSULE | Refills: 3 | Status: SHIPPED | OUTPATIENT
Start: 2024-01-19 | End: 2025-01-18

## 2024-01-19 RX ORDER — DIGOXIN 125 MCG
125 TABLET ORAL DAILY
COMMUNITY

## 2024-01-19 RX ORDER — INSULIN LISPRO 100 [IU]/ML
INJECTION, SOLUTION INTRAVENOUS; SUBCUTANEOUS
COMMUNITY
Start: 2023-12-01

## 2024-01-19 ASSESSMENT — ENCOUNTER SYMPTOMS
ORTHOPNEA: 0
DYSPNEA ON EXERTION: 1
PALPITATIONS: 1
PND: 0
COUGH: 0
IRREGULAR HEARTBEAT: 0
NEAR-SYNCOPE: 0
SYNCOPE: 0
SHORTNESS OF BREATH: 1

## 2024-01-19 NOTE — PROGRESS NOTES
"Chief Complaint:   Follow-up (Patient presented today for a 1 year follow up./EKG done in office today./)     History Of Present Illness:    Markie Hughes is a 83 y.o. male presenting with followup.  He is accompanied by his family.    He doesn't feel \"a fib\" but doesn't feel right.  He denies any lightheadedness, near syncope, or syncope.     Last Recorded Vitals:  Vitals:    01/19/24 1350   BP: 128/60   BP Location: Left arm   Patient Position: Sitting   BP Cuff Size: Adult   Pulse: 61   Weight: 95.3 kg (210 lb)   Height: 1.803 m (5' 11\")       Past Medical History:  See list    Past Surgical History:  See list      Social History:  He reports that he quit smoking about 22 years ago. His smoking use included cigarettes. He has a 40.00 pack-year smoking history. He has never used smokeless tobacco. He reports that he does not currently use alcohol. He reports that he does not use drugs.    Family History:  Family History   Problem Relation Name Age of Onset    Heart attack Mother      Coronary artery disease Mother      Heart attack Father          Allergies:  Ticagrelor, Sildenafil, Fluticasone, and Fluticasone propion-salmeterol    Outpatient Medications:  Current Outpatient Medications   Medication Instructions    albuterol (ProAir HFA) 90 mcg/actuation inhaler 1 puff, inhalation, Every 4 hours PRN    Alpha tocopherol (Vitamin E) 200 unit capsule oral, Daily as directed    apixaban (ELIQUIS) 2.5 mg, oral, 2 times daily    ascorbic acid (Vitamin C) 500 mg tablet oral    aspirin 81 mg chewable tablet oral, Daily RT    azelastine (Optivar) 0.05 % ophthalmic solution ophthalmic (eye), As directed PRN    busPIRone (Buspar) 30 mg tablet oral, 0.5-1 tab daily as directed    CALCIUM CITRATE-VITAMIN D3 ORAL 1 tablet, oral, Daily    cholecalciferol (Vitamin D3) 50 mcg (2,000 unit) capsule oral    Cinnamon 500 mg capsule oral, As directed    Combivent Respimat  mcg/actuation inhaler inhalation, 4 times daily, As " directed    cranberry extract 200 mg capsule oral, Daily as directed    cyanocobalamin (Vitamin B-12) 100 mcg tablet 1 tablet, oral, Daily    digoxin (LANOXIN) 125 mcg, oral, Daily    dilTIAZem CD (CARDIZEM CD) 120 mg, oral, Daily before evening meal    dilTIAZem CD (CARDIZEM CD) 240 mg, oral, Every morning    docusate sodium (Colace) 100 mg capsule TAKE 1 CAPSULE BY MOUTH TWICE DAILY AS NEEDED FOR CONSTIPATION    dofetilide (Tikosyn) 250 mcg capsule 1 capsule, oral, Every 12 hours    fexofenadine (ALLEGRA) 180 mg, oral, Nightly, PRN    folic acid (Folvite) 800 mcg tablet oral, Once daily as directed    glucosamine-chondroit-vit C-Mn (Glucosamine-Chondroitin Complx) capsule oral, As directed    glutathione 500 mg capsule oral, As directed    insulin glargine (Lantus U-100 Insulin) 100 unit/mL injection subcutaneous, Every 24 hours, Take as directed per insulin instructions.    insulin lispro (HumaLOG U-100 Insulin) 100 unit/mL injection See Instructions, 2, 4 , 6 , 8 units per sliding scale., # 15 mL, Refills(s) 0, other reason (Rx)    ipratropium-albuteroL (Duo-Neb) 0.5-2.5 mg/3 mL nebulizer solution 3 mL, nebulization, 4 times daily RT, Or PRN    Januvia 50 mg tablet 1 tablet, oral, Daily    ketoconazole (NIZOral) 2 % shampoo Topical, 2 times weekly, Or as directed PRN    latanoprost (Xelpros) 0.005 % drops, emulsion ophthalmic (eye)    losartan (COZAAR) 50 mg, oral, Daily    magnesium 250 mg tablet 2 tablets, oral, 2 times daily    melatonin 10 mg tablet oral, At HS PRN    methylsulfonylmethane 1,000 mg capsule oral    metoprolol succinate XL (Toprol-XL) 25 mg 24 hr tablet Take one tablet daily in the morning in addition to 50 mg in the evening    metoprolol succinate XL (TOPROL-XL) 50 mg, oral, Every evening    metoprolol tartrate (Lopressor) 25 mg tablet TAKE ONE TABLET AS NEEDED FOR PALPS IN ADDITION TO TOPROL    nitroglycerin (NITROSTAT) 0.4 mg, sublingual, Every 5 min PRN    oxybutynin XL (DITROPAN-XL) 5 mg,  "oral, Daily, Do not crush, chew, or split.    oxygen (O2) gas therapy As directed    potassium gluconate 595 mg (99 mg) tablet 1 tablet, oral, Daily    prasterone, dhea, (DHEA) 25 mg capsule oral, As directed    pyridoxine (Vitamin B-6) 250 mg tablet 1 tablet, oral, Daily    rosuvastatin (CRESTOR) 40 mg, oral, Daily    temazepam (RESTORIL) 15 mg, oral, 2 times daily    thiamine (Vitamin B-1) 50 mg tablet 1 tablet, oral, Daily    ubidecarenone (COENZYME Q10, BULK, MISC) 1 capsule, oral, Daily    white petrolatum-mineral oiL (Tears Naturale PM) 94-3 % ophthalmic ointment 1 Application, Both Eyes    zinc gluconate 50 mg tablet 1 tablet, oral, Daily     ROS  Review of Systems   Constitutional: Positive for malaise/fatigue.   Cardiovascular:  Positive for dyspnea on exertion and palpitations. Negative for chest pain, irregular heartbeat, near-syncope, orthopnea, paroxysmal nocturnal dyspnea and syncope.   Respiratory:  Positive for shortness of breath. Negative for cough.    All other systems reviewed and are negative.        Physical Exam:  Constitutional: alert and in no acute distress.   Eyes: no erythema, swelling or discharge from the eye .   Neck: neck is supple, symmetric, trachea midline, no masses  and no thyromegaly .   Pulmonary: no increased work of breathing or signs of respiratory distress  and bilateral diffuse wheezing. Decrease breath sounds.  PT on oxygen 5 liters.   Cardiovascular: carotid pulses 2+ bilaterally with no bruit , JVP was normal, , regular rhythm, normal S1 and S2, no murmurs ,  and  1+ bilateral ankle pitting edema.   Abdomen:  and .   Skin: skin warm and dry, normal skin turgor .   Psychiatric judgment and insight is normal  and oriented to person, place and time .      Last Labs:  CBC -  No results found for: \"WBC\", \"HGB\", \"HCT\", \"MCV\", \"PLT\"    CMP -  No results found for: \"CALCIUM\", \"PHOS\", \"PROT\", \"ALBUMIN\", \"AST\", \"ALT\", \"ALKPHOS\", \"BILITOT\"    LIPID PANEL -   No results found for: " "\"CHOL\", \"TRIG\", \"HDL\", \"CHHDL\", \"LDLF\", \"VLDL\", \"NHDL\"    RENAL FUNCTION PANEL -   No results found for: \"GLUCOSE\", \"NA\", \"K\", \"CL\", \"CO2\", \"ANIONGAP\", \"BUN\", \"CREATININE\", \"GFRMALE\", \"CALCIUM\", \"PHOS\", \"ALBUMIN\"     Lab Results   Component Value Date    HGBA1C 10.0 (H) 11/27/2023       Last Cardiology Tests:  ECG:  ECG: Today.  Atrial flutter.  Normal axis.  Corrected QT interval 410 ms. Poor R wave progression.         Lab review: I have personally reviewed the laboratory result(s) see above    Assessment/Plan   Diagnoses and all orders for this visit:  Atrial flutter, unspecified type (CMS/HCC)  SVT (supraventricular tachycardia)  Longstanding persistent atrial fibrillation (CMS/HCC)  Palpitations  Chronic obstructive pulmonary disease, unspecified COPD type (CMS/HCC)  Sleep apnea, unspecified type  Mixed hyperlipidemia  Essential hypertension  CAD S/P percutaneous coronary angioplasty  High risk medication use  Encounter for medication review and counseling  Encounter to discuss test results  BMI 29.0-29.9,adult  Former smoker    Paroxysmal atrial flutter.  Anticoagulated.  Shared decision making performed.  Recommend DAVID given low dose Eliquis, and if no thrombus then cardioversion.  Treatment options, risk, benefits, and imponderables reviewed.  All questions answered.  E consented.    Persistent atrial fibrillation. Chronic. Stable. Reviewed meds. Rhythm control strategy with dofetilide. Anticoagulated with low dose Eliquis. Rate controlled metoprolol.  High risk medication Eliquis. Had recurrent  bleeding. Will refer to Dr. Alves for evaluation for Watchman given recurrent bleeding  High risk medication dofetilide. Monitor ECG twice yearly  PSVT. Stable. Chronic. Continue metoprolol and magnesium. No indication for invasive EP evaluation at this time  Coronary artery disease, chronic. Stable, status post PCI stent proximal LAD and mid RCA 2018. Reviewed last evaluations.  Hyperlipidemia. Chronic. Stable. " On statin.   Hypertension, chronic. Stable. Reviewed meds. Continue meds. Refills.   Chronic obstructive pulmonary disease on O2 at 5 L nasal cannula, tolerates beta-blocker.   Remote tobacco use. Reinforced continued tobacco cessation  Obstructive sleep apnea. Discussed associated arrhythmias with sleep apnea  Obesity.   AHA recommendations for exercise, diet, and behavioral modification reviewed with pt.     The patient and I and caretaker discussed the mechanism of arrhythmia, medications, atrial fibrillation,  ECG, referral to Dr. Alves for Watchman, DAVID CVN, adverse effects of medications, indications for and types of medications, discussion if and what medication refills needed, treatment options, risks, benefits, and imponderables. American Heart Association lifestyle changes and behavioral modification discussed. All questions answered in detail. Counseling over 50% visit regarding above. Patient appreciative of care.       Jadyn Ramirez MD

## 2024-01-22 ENCOUNTER — TELEPHONE (OUTPATIENT)
Dept: CARDIOLOGY | Facility: CLINIC | Age: 84
End: 2024-01-22
Payer: MEDICARE

## 2024-01-22 DIAGNOSIS — I48.11 LONGSTANDING PERSISTENT ATRIAL FIBRILLATION (MULTI): Primary | ICD-10-CM

## 2024-01-23 ENCOUNTER — TELEPHONE (OUTPATIENT)
Dept: CARDIOLOGY | Facility: CLINIC | Age: 84
End: 2024-01-23
Payer: MEDICARE

## 2024-01-23 NOTE — TELEPHONE ENCOUNTER
Per Dr. Ramirez, she did not cancel patient's cataract surgery. It may have been patient preference. Last office note faxed to Samina from Dr. Manzanares's office at [phone].

## 2024-01-23 NOTE — TELEPHONE ENCOUNTER
Samina from Dr. Manzanares office left message stating that Dr. Ramirez canceled pt's cataract surgery.  [facility] is requesting records that are related to Dr. Ramirez canceling surgery.  Per Samina records can be faxed to [phone]  [facility] can be reached at [phone].    Routed to Shweta TSE RN

## 2024-02-02 RX ORDER — SODIUM CHLORIDE 9 MG/ML
20 INJECTION, SOLUTION INTRAVENOUS CONTINUOUS
Status: CANCELLED | OUTPATIENT
Start: 2024-02-02

## 2024-02-08 DIAGNOSIS — E78.2 MIXED HYPERLIPIDEMIA: ICD-10-CM

## 2024-02-08 DIAGNOSIS — J44.9 CHRONIC OBSTRUCTIVE PULMONARY DISEASE, UNSPECIFIED COPD TYPE (HCC): ICD-10-CM

## 2024-02-08 DIAGNOSIS — I48.11 LONGSTANDING PERSISTENT ATRIAL FIBRILLATION (MULTI): ICD-10-CM

## 2024-02-09 RX ORDER — DOFETILIDE 0.25 MG/1
CAPSULE ORAL 2 TIMES DAILY
Qty: 180 CAPSULE | Refills: 1 | Status: SHIPPED | OUTPATIENT
Start: 2024-02-09

## 2024-02-09 RX ORDER — ROSUVASTATIN CALCIUM 40 MG/1
40 TABLET, COATED ORAL DAILY
Qty: 90 TABLET | Refills: 3 | Status: SHIPPED | OUTPATIENT
Start: 2024-02-09

## 2024-02-12 RX ORDER — IPRATROPIUM BROMIDE AND ALBUTEROL SULFATE 2.5; .5 MG/3ML; MG/3ML
SOLUTION RESPIRATORY (INHALATION)
Qty: 360 ML | Refills: 11 | Status: SHIPPED | OUTPATIENT
Start: 2024-02-12

## 2024-02-12 NOTE — TELEPHONE ENCOUNTER
Rx requested:  Requested Prescriptions     Pending Prescriptions Disp Refills    ipratropium 0.5 mg-albuterol 2.5 mg (DUONEB) 0.5-2.5 (3) MG/3ML SOLN nebulizer solution [Pharmacy Med Name: Albuterol-Ipratropium 2.5 (3)-0.5 MG/3ML Inhalation Solution] 360 mL 11     Sig: USE 1 VIAL VIA NEBULIZER EVERY 6 HOURS AS NEEDED FOR SHORTNESS OF BREATH       Last Office Visit:   12/6/2023      Next Visit Date:  Future Appointments   Date Time Provider Department Center   4/10/2024 11:45 AM Werner Trevino MD [facility]

## 2024-02-13 ENCOUNTER — TELEPHONE (OUTPATIENT)
Dept: CARDIOLOGY | Facility: HOSPITAL | Age: 84
End: 2024-02-13

## 2024-02-14 ENCOUNTER — HOSPITAL ENCOUNTER (OUTPATIENT)
Dept: CARDIOLOGY | Facility: HOSPITAL | Age: 84
Discharge: HOME | End: 2024-02-14
Payer: MEDICARE

## 2024-02-14 ENCOUNTER — ANESTHESIA EVENT (OUTPATIENT)
Dept: CARDIOLOGY | Facility: HOSPITAL | Age: 84
End: 2024-02-14

## 2024-02-14 ENCOUNTER — APPOINTMENT (OUTPATIENT)
Dept: CARDIOLOGY | Facility: HOSPITAL | Age: 84
End: 2024-02-14
Payer: MEDICARE

## 2024-02-14 ENCOUNTER — ANESTHESIA (OUTPATIENT)
Dept: CARDIOLOGY | Facility: HOSPITAL | Age: 84
End: 2024-02-14
Payer: MEDICARE

## 2024-02-14 PROCEDURE — 93005 ELECTROCARDIOGRAM TRACING: CPT

## 2024-03-21 ENCOUNTER — HOSPITAL ENCOUNTER (OUTPATIENT)
Dept: GENERAL RADIOLOGY | Age: 84
Discharge: HOME | End: 2024-03-23
Payer: MEDICARE

## 2024-03-21 ENCOUNTER — HOSPITAL ENCOUNTER (OUTPATIENT)
Age: 84
Discharge: HOME | End: 2024-03-23
Payer: MEDICARE

## 2024-03-21 DIAGNOSIS — J96.20 ACUTE ON CHRONIC RESPIRATORY FAILURE, UNSPECIFIED WHETHER WITH HYPOXIA OR HYPERCAPNIA (HCC): ICD-10-CM

## 2024-03-21 PROCEDURE — 71046 X-RAY EXAM CHEST 2 VIEWS: CPT

## 2024-03-26 ENCOUNTER — OFFICE VISIT (OUTPATIENT)
Dept: CARDIOLOGY | Facility: CLINIC | Age: 84
End: 2024-03-26
Payer: MEDICARE

## 2024-03-26 VITALS
HEART RATE: 61 BPM | SYSTOLIC BLOOD PRESSURE: 130 MMHG | DIASTOLIC BLOOD PRESSURE: 70 MMHG | HEIGHT: 72 IN | WEIGHT: 214 LBS | BODY MASS INDEX: 28.99 KG/M2

## 2024-03-26 DIAGNOSIS — I48.11 LONGSTANDING PERSISTENT ATRIAL FIBRILLATION (MULTI): ICD-10-CM

## 2024-03-26 DIAGNOSIS — Z98.61 CAD S/P PERCUTANEOUS CORONARY ANGIOPLASTY: ICD-10-CM

## 2024-03-26 DIAGNOSIS — Z71.2 ENCOUNTER TO DISCUSS TEST RESULTS: ICD-10-CM

## 2024-03-26 DIAGNOSIS — I48.92 ATRIAL FLUTTER, UNSPECIFIED TYPE (MULTI): ICD-10-CM

## 2024-03-26 DIAGNOSIS — Z79.899 HIGH RISK MEDICATION USE: ICD-10-CM

## 2024-03-26 DIAGNOSIS — Z87.891 FORMER SMOKER: ICD-10-CM

## 2024-03-26 DIAGNOSIS — J44.9 CHRONIC OBSTRUCTIVE PULMONARY DISEASE, UNSPECIFIED COPD TYPE (MULTI): ICD-10-CM

## 2024-03-26 DIAGNOSIS — G47.30 SLEEP APNEA, UNSPECIFIED TYPE: ICD-10-CM

## 2024-03-26 DIAGNOSIS — E78.2 MIXED HYPERLIPIDEMIA: ICD-10-CM

## 2024-03-26 DIAGNOSIS — I10 ESSENTIAL HYPERTENSION: ICD-10-CM

## 2024-03-26 DIAGNOSIS — I47.10 SVT (SUPRAVENTRICULAR TACHYCARDIA) (CMS-HCC): ICD-10-CM

## 2024-03-26 DIAGNOSIS — I25.10 CAD S/P PERCUTANEOUS CORONARY ANGIOPLASTY: ICD-10-CM

## 2024-03-26 DIAGNOSIS — R00.2 PALPITATIONS: ICD-10-CM

## 2024-03-26 DIAGNOSIS — Z71.89 ENCOUNTER FOR MEDICATION REVIEW AND COUNSELING: ICD-10-CM

## 2024-03-26 PROCEDURE — 1036F TOBACCO NON-USER: CPT | Performed by: INTERNAL MEDICINE

## 2024-03-26 PROCEDURE — 93000 ELECTROCARDIOGRAM COMPLETE: CPT | Performed by: INTERNAL MEDICINE

## 2024-03-26 PROCEDURE — 3078F DIAST BP <80 MM HG: CPT | Performed by: INTERNAL MEDICINE

## 2024-03-26 PROCEDURE — 1159F MED LIST DOCD IN RCRD: CPT | Performed by: INTERNAL MEDICINE

## 2024-03-26 PROCEDURE — 99215 OFFICE O/P EST HI 40 MIN: CPT | Performed by: INTERNAL MEDICINE

## 2024-03-26 PROCEDURE — 3075F SYST BP GE 130 - 139MM HG: CPT | Performed by: INTERNAL MEDICINE

## 2024-03-26 RX ORDER — FUROSEMIDE 20 MG/1
40 TABLET ORAL DAILY
COMMUNITY

## 2024-03-26 RX ORDER — DUTASTERIDE 0.5 MG/1
0.5 CAPSULE, LIQUID FILLED ORAL DAILY
COMMUNITY

## 2024-03-26 RX ORDER — CALCIPOTRIENE 50 UG/G
1 CREAM TOPICAL DAILY
COMMUNITY

## 2024-03-26 RX ORDER — NYSTATIN 100000 [USP'U]/G
1 POWDER TOPICAL 2 TIMES DAILY PRN
COMMUNITY

## 2024-03-26 ASSESSMENT — ENCOUNTER SYMPTOMS
PALPITATIONS: 0
DYSPNEA ON EXERTION: 1
SHORTNESS OF BREATH: 1

## 2024-03-26 NOTE — PROGRESS NOTES
Chief Complaint:   Follow-up (Patient is here in office today for 6 week follow up )     History Of Present Illness:    Markie Hughes is a 83 y.o. male presenting with follow-up.  He is accompanied by his caregiver.    He brings copies of reports from [facility].  He did not require DAVID cardioversion    Last visit because his rhythm converted back to sinus rhythm.    When he was in [facility] he had episodes of sinus rhythm, atrial flutter, and atrial fibrillation.    He uses 6 L of oxygen at all times.    They report that in the past he was informed that he is not a surgical candidate because he cannot go under anesthesia.    He is currently taking low-dose Eliquis.  We reviewed what are  the indications  for usual dose versus reduced dose of Eliquis, and he declines to increase the dose to standard dosing because of his recurrent hematuria.  He understands what anticoagulation versus no anticoagulation involves with respect to risks, benefits, and imponderables.  We also discussed referral to Dr. Alves for Watchman evaluation.  Patient declines to have referral to Dr. Alves.  Last Recorded Vitals:  Vitals:    03/26/24 1317   BP: 130/70   BP Location: Left arm   Patient Position: Sitting   BP Cuff Size: Adult   Pulse: 61   Weight: 97.1 kg (214 lb)   Height: 1.829 m (6')       Past Medical History:  See List     Past Surgical History:  See List       Social History:  He reports that he quit smoking about 22 years ago. His smoking use included cigarettes. He has a 40.00 pack-year smoking history. He has never used smokeless tobacco. He reports that he does not currently use alcohol. He reports that he does not use drugs.    Family History:  Family History   Problem Relation Name Age of Onset    Heart attack Mother      Coronary artery disease Mother      Heart attack Father          Allergies:  Ticagrelor, Sildenafil, Fluticasone, Fluticasone propion-salmeterol, and Zithromax z-constantine [azithromycin]    Outpatient  Medications:  Current Outpatient Medications   Medication Instructions    albuterol (ProAir HFA) 90 mcg/actuation inhaler 1 puff, inhalation, Every 4 hours PRN    Alpha tocopherol (Vitamin E) 200 unit capsule oral, Daily as directed    apixaban (ELIQUIS) 2.5 mg, oral, 2 times daily    ascorbic acid (Vitamin C) 500 mg tablet oral    aspirin 81 mg chewable tablet oral, Daily RT    azelastine (Optivar) 0.05 % ophthalmic solution ophthalmic (eye), As directed PRN    busPIRone (Buspar) 15 mg tablet 1 tablet, oral, Daily, 0.5-1 tab daily as directed    calcipotriene (Dovonex) 0.005 % cream 1 Application, Topical, Daily    CALCIUM CITRATE-VITAMIN D3 ORAL 1 tablet, oral, Daily    cholecalciferol (Vitamin D3) 50 mcg (2,000 unit) capsule oral    Cinnamon 500 mg capsule oral, As directed    Combivent Respimat  mcg/actuation inhaler 1 puff, inhalation, 6 times daily, As directed    cranberry extract 200 mg capsule oral, Daily as directed    cyanocobalamin (Vitamin B-12) 100 mcg tablet 1 tablet, oral, Daily, 2500 mcg daily    digoxin (LANOXIN) 125 mcg, oral, Daily    dilTIAZem CD (CARDIZEM CD) 120 mg, oral, Daily before evening meal    dilTIAZem CD (CARDIZEM CD) 240 mg, oral, Every morning    docusate sodium (Colace) 100 mg capsule TAKE 1 CAPSULE BY MOUTH TWICE DAILY AS NEEDED FOR CONSTIPATION    dofetilide (Tikosyn) 250 mcg capsule oral, 2 times daily    dutasteride (AVODART) 0.5 mg, oral, Daily    fexofenadine (ALLEGRA) 180 mg, oral, Nightly, PRN    folic acid (Folvite) 800 mcg tablet oral, Once daily as directed    furosemide (LASIX) 20 mg, oral, Daily    glucosamine-chondroit-vit C-Mn (Glucosamine-Chondroitin Complx) capsule oral, As directed    glutathione 500 mg capsule oral, As directed    insulin lispro (HumaLOG U-100 Insulin) 100 unit/mL injection See Instructions, 2, 4 , 6 , 8 units per sliding scale., # 15 mL, Refills(s) 0, other reason (Rx)    ipratropium-albuteroL (Duo-Neb) 0.5-2.5 mg/3 mL nebulizer solution 3  mL, nebulization, 4 times daily RT, Or PRN    Januvia 50 mg tablet 1 tablet, oral, Daily    ketoconazole (NIZOral) 2 % shampoo Topical, 2 times weekly, Or as directed PRN    Lantus U-100 Insulin 16 Units, subcutaneous, Every 24 hours, Take as directed per insulin instructions.    latanoprost (Xelpros) 0.005 % drops, emulsion ophthalmic (eye)    levofloxacin (LEVAQUIN ORAL) 500 mg, oral, Daily PRN    losartan (COZAAR) 50 mg, oral, Daily    magnesium 250 mg tablet 2 tablets, oral, 2 times daily    melatonin 10 mg tablet oral, At HS PRN    methylsulfonylmethane 1,000 mg capsule oral    metoprolol succinate XL (Toprol-XL) 25 mg 24 hr tablet Take one tablet daily in the morning in addition to 50 mg in the evening    metoprolol succinate XL (TOPROL-XL) 50 mg, oral, Every evening    metoprolol tartrate (Lopressor) 25 mg tablet Take 1 tablet (25 mg) by mouth once daily in the morning.    nitroglycerin (NITROSTAT) 0.4 mg, sublingual, Every 5 min PRN    nystatin (Mycostatin) 100,000 unit/gram powder 1 Application, Topical, 2 times daily PRN    oxybutynin XL (DITROPAN-XL) 5 mg, oral, Daily, Do not crush, chew, or split.    oxygen (O2) gas therapy As directed    potassium gluconate 595 mg (99 mg) tablet 1 tablet, oral, Daily    prasterone, dhea, (DHEA) 25 mg capsule oral, As directed    pyridoxine (Vitamin B-6) 250 mg tablet 1 tablet, oral, Daily    rosuvastatin (CRESTOR) 40 mg, oral, Daily    temazepam (RESTORIL) 15 mg, oral, 2 times daily    thiamine (Vitamin B-1) 50 mg tablet 1 tablet, oral, Daily, 250mg daily    ubidecarenone (COENZYME Q10, BULK, MISC) 1 capsule, oral, Daily    white petrolatum-mineral oiL (Tears Naturale PM) 94-3 % ophthalmic ointment 1 Application, Both Eyes    zinc gluconate 50 mg tablet 1 tablet, oral, Daily   Review of Systems   Cardiovascular:  Positive for dyspnea on exertion. Negative for chest pain and palpitations.   Respiratory:  Positive for shortness of breath.         On 6L home oxygen    All  "other systems reviewed and are negative.        Physical Exam:  Constitutional:       General: Awake.      Appearance: Normal and healthy appearance. Well-developed and not in distress.      Interventions: Nasal cannula in place.      Comments: Home oxygen   Neck:      Vascular: No JVR. JVD normal.   Pulmonary:      Effort: Pulmonary effort is normal.      Breath sounds: Normal breath sounds. No wheezing. No rhonchi. No rales.   Chest:      Chest wall: Not tender to palpatation.   Cardiovascular:      PMI at left midclavicular line. Normal rate. Regular rhythm. Normal S1. Normal S2.       Murmurs: There is no murmur.      No gallop.  No click. No rub.   Pulses:     Intact distal pulses.   Edema:     Peripheral edema absent.   Abdominal:      Tenderness: There is no abdominal tenderness.   Musculoskeletal: Normal range of motion.         General: No tenderness. Skin:     General: Skin is warm and dry.   Neurological:      General: No focal deficit present.      Mental Status: Alert and oriented to person, place and time.            Last Labs:  CBC -  No results found for: \"WBC\", \"HGB\", \"HCT\", \"MCV\", \"PLT\"    CMP -  No results found for: \"CALCIUM\", \"PHOS\", \"PROT\", \"ALBUMIN\", \"AST\", \"ALT\", \"ALKPHOS\", \"BILITOT\"    LIPID PANEL -   No results found for: \"CHOL\", \"TRIG\", \"HDL\", \"CHHDL\", \"LDLF\", \"VLDL\", \"NHDL\"    RENAL FUNCTION PANEL -   No results found for: \"GLUCOSE\", \"NA\", \"K\", \"CL\", \"CO2\", \"ANIONGAP\", \"BUN\", \"CREATININE\", \"GFRMALE\", \"CALCIUM\", \"PHOS\", \"ALBUMIN\"     Lab Results   Component Value Date    HGBA1C 10.0 (H) 11/27/2023       Last Cardiology Tests:  ECG:  ECG 12 lead 02/14/2024  Today.  Sinus rhythm.  Normal axis.  Corrected QT interval 410 ms.  First-degree AV block.  Septal infarct.  Echocardiogram from [facility].  February 2024.  LVEF 60 to 65%.  Mild to moderate LVH.  Mild to moderate dilation left atrium.  Mild dilation right atrium.  Mild MR.  Mild to moderate aortic valve thickening.  Moderate " elevation of RV systolic pressure at 53.2 mmHg.  Several telemetry strips were saved from [facility] February 2024.  Sinus rhythm.  Atrial flutter with variable AV conduction.  Atrial fibrillation with acceptable heart rate control.      Lab review: I have personally reviewed the laboratory result(s) see above    Assessment/Plan   Diagnoses and all orders for this visit:  SVT (supraventricular tachycardia)  Longstanding persistent atrial fibrillation (CMS/HCC)  Atrial flutter, unspecified type (CMS/HCC)  Palpitations  Chronic obstructive pulmonary disease, unspecified COPD type (CMS/HCC)  Sleep apnea, unspecified type  Mixed hyperlipidemia  Essential hypertension  CAD S/P percutaneous coronary angioplasty  High risk medication use  Former smoker  BMI 29.0-29.9,adult  Encounter for medication review and counseling  Encounter to discuss test results  Shweta Chappell RN    Persistent atrial fibrillation. Chronic. Stable. Reviewed meds. Rhythm control strategy with dofetilide.  Rate controlled with metoprolol.  High risk medication Eliquis. Had recurrent  bleeding.  On suboptimal dose of Eliquis given criteria.  I recommended referral to Dr. Alves for evaluation for Watchman given recurrent bleeding, and the patient declines.  He understands risks of anticoagulation versus no anticoagulation.  He also understands that his current Eliquis dose is suboptimal.  He declines increase in Eliquis to 5 mg twice daily.  High risk medication dofetilide. Monitor ECG twice yearly  PSVT. Stable. Chronic. Continue metoprolol and magnesium. No indication for invasive EP evaluation at this time  Coronary artery disease, chronic. Stable, status post PCI stent proximal LAD and mid RCA 2018. Reviewed last evaluations.  Hyperlipidemia. Chronic. Stable. On statin.   Hypertension, chronic. Stable. Reviewed meds. Continue meds. Refills.   Chronic obstructive pulmonary disease on O2 at 6 L nasal cannula, tolerates beta-blocker.   He is very concerned about any sedation in the future and declines any invasive procedures such as watchman as noted above  Remote tobacco use. Reinforced continued tobacco cessation  Obstructive sleep apnea. Discussed associated arrhythmias with sleep apnea  Obesity.   AHA recommendations for exercise, diet, and behavioral modification reviewed with pt.     The patient and I and caretaker discussed the mechanism of arrhythmia, medications, atrial fibrillation,  ECG, declined referral to Dr. Alves for Watchman, converted to sinus rhythm and no DAVID cardioversion needed in the past, last echocardiogram from outside hospital, potential adverse effects of medications, indications for and types of medications, discussion if and what medication refills needed, treatment options, risks, benefits, and imponderables. American Heart Association lifestyle changes and behavioral modification discussed. All questions answered in detail. Counseling over 50% visit regarding above. Patient appreciative of care.

## 2024-03-26 NOTE — PATIENT INSTRUCTIONS
Continue same medications/treatment.  Patient educated on proper medication use.  Patient educated on risk factor modification.  Please bring any lab results from other providers/physicians to your next appointment.    Please bring all medicines, vitamins, and herbal supplements with you when you come to the office.    Prescriptions will not be filled unless you are compliant with your follow up appointments or have a follow up appointment scheduled as per instruction of your physician. Refills should be requested at the time of your visit.    Follow up with Kaitlin in 6 months    FRIEDA KILGORE RN, AM SCRIBING FOR AND IN THE PRESENCE OF DR. ANY DON MD, FACC, FACP, FHRS

## 2024-04-06 NOTE — TELEPHONE ENCOUNTER
Toneshadia White,  1940,  Called because you left him a message concerning his Combivent Respermat order. He tried the number you left him and it was turned off he said. He is at home right now. His phone number is [phone].
No

## 2024-04-07 DIAGNOSIS — J44.9 CHRONIC OBSTRUCTIVE PULMONARY DISEASE, UNSPECIFIED COPD TYPE (HCC): ICD-10-CM

## 2024-04-09 NOTE — TELEPHONE ENCOUNTER
Please approve or deny this request.    Rx requested:  Requested Prescriptions     Pending Prescriptions Disp Refills    budesonide-formoterol (SYMBICORT) 80-4.5 MCG/ACT AERO [Pharmacy Med Name: Budesonide-Formoterol Fumarate 80-4.5 MCG/ACT Inhalation Aerosol] 30.6 g 3     Sig: USE 2 INHALATIONS BY MOUTH TWICE DAILY         Last Office Visit:   12/6/2023      Next Visit Date:  Future Appointments   Date Time Provider Department Center   4/10/2024 11:45 AM Werner Trevino MD [place] [facility]

## 2024-04-10 ENCOUNTER — OFFICE VISIT (OUTPATIENT)
Dept: PULMONOLOGY | Age: 84
End: 2024-04-10
Payer: MEDICARE

## 2024-04-10 VITALS
OXYGEN SATURATION: 94 % | WEIGHT: 218 LBS | TEMPERATURE: 97.2 F | SYSTOLIC BLOOD PRESSURE: 126 MMHG | HEART RATE: 71 BPM | BODY MASS INDEX: 29.57 KG/M2 | DIASTOLIC BLOOD PRESSURE: 64 MMHG

## 2024-04-10 DIAGNOSIS — G47.33 OBSTRUCTIVE SLEEP APNEA: ICD-10-CM

## 2024-04-10 DIAGNOSIS — J96.11 CHRONIC RESPIRATORY FAILURE WITH HYPOXIA AND HYPERCAPNIA (HCC): Primary | ICD-10-CM

## 2024-04-10 DIAGNOSIS — I27.20 PULMONARY HTN (HCC): ICD-10-CM

## 2024-04-10 DIAGNOSIS — J96.12 CHRONIC RESPIRATORY FAILURE WITH HYPOXIA AND HYPERCAPNIA (HCC): Primary | ICD-10-CM

## 2024-04-10 DIAGNOSIS — J44.9 CHRONIC OBSTRUCTIVE PULMONARY DISEASE, UNSPECIFIED COPD TYPE (HCC): ICD-10-CM

## 2024-04-10 PROCEDURE — G8417 CALC BMI ABV UP PARAM F/U: HCPCS | Performed by: INTERNAL MEDICINE

## 2024-04-10 PROCEDURE — 1036F TOBACCO NON-USER: CPT | Performed by: INTERNAL MEDICINE

## 2024-04-10 PROCEDURE — 3023F SPIROM DOC REV: CPT | Performed by: INTERNAL MEDICINE

## 2024-04-10 PROCEDURE — 99214 OFFICE O/P EST MOD 30 MIN: CPT | Performed by: INTERNAL MEDICINE

## 2024-04-10 PROCEDURE — 3078F DIAST BP <80 MM HG: CPT | Performed by: INTERNAL MEDICINE

## 2024-04-10 PROCEDURE — 1123F ACP DISCUSS/DSCN MKR DOCD: CPT | Performed by: INTERNAL MEDICINE

## 2024-04-10 PROCEDURE — 3074F SYST BP LT 130 MM HG: CPT | Performed by: INTERNAL MEDICINE

## 2024-04-10 PROCEDURE — G8427 DOCREV CUR MEDS BY ELIG CLIN: HCPCS | Performed by: INTERNAL MEDICINE

## 2024-04-10 RX ORDER — BUDESONIDE AND FORMOTEROL FUMARATE DIHYDRATE 80; 4.5 UG/1; UG/1
AEROSOL RESPIRATORY (INHALATION)
Qty: 30.6 G | Refills: 3 | Status: SHIPPED | OUTPATIENT
Start: 2024-04-10

## 2024-04-10 RX ORDER — BUDESONIDE AND FORMOTEROL FUMARATE DIHYDRATE 80; 4.5 UG/1; UG/1
2 AEROSOL RESPIRATORY (INHALATION) 2 TIMES DAILY
Qty: 3 EACH | Refills: 1 | Status: SHIPPED | OUTPATIENT
Start: 2024-04-10

## 2024-04-10 RX ORDER — FUROSEMIDE 20 MG/1
20 TABLET ORAL DAILY
COMMUNITY

## 2024-04-10 NOTE — PROGRESS NOTES
Subjective:             Guanako Nolasco is a 83 y.o. male who complains today of:     Chief Complaint   Patient presents with    Follow-up     4m f/u on COPD, chronic respiratory failure with hypoxia and hypercapnia        HPI  He is using 5-6 lit O2  24 hour a day.He is using trilogy, NIV at night 7 hour at night  and prn during daytime . [place] was [place] 2[place]  to 3/5/24  and he was treated for pneumonia   He has urinary retention and he has morrison cath   due BPH with retention     3/21/24 CXTR done for f/u No acute process.  Suspect mild COPD.    He is using bronchodilator with symbicort 2 puff BID , combivent respimat 1 puff Q 4 hourly , nebulizer with duoneb neb prn , proair hfa prn , prednisone prn.   C/o shortness of breath with exertion.  No Wheezing,  Occasional cough .   No Chest tightness.   No Chest pain with radiation  or pleuritic pain.  No  leg edema. No orthopnea.  No Fever or chills.   No Rhinorrhea and postnasal drip.    Allergies:  Brilinta [ticagrelor], Sildenafil citrate, Advair [fluticasone-salmeterol], and Fluticasone furoate  Past Medical History:   Diagnosis Date    Adrenal adenoma     CT 6/2011 stable    Anxiety     Asbestosis(501)     CAD (coronary artery disease)     status post stent 2001    Cancer (HCC)     basil cell carnoma    COPD (chronic obstructive pulmonary disease) (HCC)     Elbow injury     left    Erectile dysfunction     Granulomatous lung disease (HCC)     Herpes zoster     Hyperlipidemia     Hypertension     Insomnia     Interstitial fibrosis     Obstructive sleep apnea on CPAP 11/13/2017    Osteoarthritis of right AC (acromioclavicular) joint 3/25/2022    Prostate nodule     Scarlet fever     Urine frequency      Past Surgical History:   Procedure Laterality Date    CARDIAC CATHETERIZATION      CARDIOVASCULAR STRESS TEST      1/2010 normal per patient    COLONOSCOPY      5/2009 tubular adenomas    COLONOSCOPY  08/13/15    LUZMARIA HOWE MD    EYE SURGERY  02/10/14

## 2024-04-18 ASSESSMENT — ENCOUNTER SYMPTOMS
SORE THROAT: 0
WHEEZING: 0
VOICE CHANGE: 0
DIARRHEA: 0
RHINORRHEA: 0
ABDOMINAL PAIN: 0
CHEST TIGHTNESS: 0
SHORTNESS OF BREATH: 1
EYE ITCHING: 0
COUGH: 0
VOMITING: 0
NAUSEA: 0

## 2024-04-19 DIAGNOSIS — I10 ESSENTIAL HYPERTENSION: ICD-10-CM

## 2024-04-19 RX ORDER — LOSARTAN POTASSIUM AND HYDROCHLOROTHIAZIDE 12.5; 5 MG/1; MG/1
1 TABLET ORAL DAILY
Qty: 1 TABLET | Refills: 0 | Status: SHIPPED | OUTPATIENT
Start: 2024-04-19 | End: 2025-04-19

## 2024-04-19 NOTE — TELEPHONE ENCOUNTER
Received request for prescription refills for patient.   Patient follows with Dr. Mejia Barlow MD      Request is for Losartan-hydrochlorothiazide   Is patient currently on medication, NO, this was discontinued. Patient is on Losartan 50mg Daily now. Will pend for refusal.     Last OV 11/16/2023  Next OV 5/22/2024    Pended for signing and sent to provider

## 2024-04-21 DIAGNOSIS — I10 ESSENTIAL HYPERTENSION: ICD-10-CM

## 2024-04-21 DIAGNOSIS — J44.9 CHRONIC OBSTRUCTIVE PULMONARY DISEASE, UNSPECIFIED COPD TYPE (HCC): ICD-10-CM

## 2024-04-22 RX ORDER — LOSARTAN POTASSIUM 50 MG/1
50 TABLET ORAL EVERY EVENING
Qty: 90 TABLET | Refills: 3 | Status: SHIPPED | OUTPATIENT
Start: 2024-04-22

## 2024-04-22 NOTE — TELEPHONE ENCOUNTER
Received request for prescription refills for patient.   Patient follows with Dr. Dietrich    Last OV 11/16/23  Next OV 5/22/24    Pended for signing and sent to provider

## 2024-04-26 RX ORDER — IPRATROPIUM BROMIDE AND ALBUTEROL 20; 100 UG/1; UG/1
SPRAY, METERED RESPIRATORY (INHALATION)
Qty: 20 G | Refills: 3 | Status: SHIPPED | OUTPATIENT
Start: 2024-04-26

## 2024-04-26 NOTE — TELEPHONE ENCOUNTER
Please approve or deny this request.    Rx requested:  Requested Prescriptions     Pending Prescriptions Disp Refills    COMBIVENT RESPIMAT  MCG/ACT AERS inhaler [Pharmacy Med Name: Combivent Respimat  MCG/ACT Inhalation Aerosol Solution] 20 g 3     Sig: USE 1 INHALATION BY MOUTH EVERY  4 HOURS         Last Office Visit:   4/10/2024      Next Visit Date:  Future Appointments   Date Time Provider Department Center   8/28/2024 10:00 AM Werner Trevino MD [place] [facility]

## 2024-05-22 ENCOUNTER — OFFICE VISIT (OUTPATIENT)
Dept: CARDIOLOGY | Facility: CLINIC | Age: 84
End: 2024-05-22
Payer: MEDICARE

## 2024-05-22 VITALS
HEART RATE: 58 BPM | DIASTOLIC BLOOD PRESSURE: 78 MMHG | HEIGHT: 73 IN | BODY MASS INDEX: 28.07 KG/M2 | WEIGHT: 211.8 LBS | SYSTOLIC BLOOD PRESSURE: 100 MMHG

## 2024-05-22 DIAGNOSIS — J44.9 CHRONIC OBSTRUCTIVE PULMONARY DISEASE, UNSPECIFIED COPD TYPE (MULTI): ICD-10-CM

## 2024-05-22 DIAGNOSIS — I25.10 CAD S/P PERCUTANEOUS CORONARY ANGIOPLASTY: ICD-10-CM

## 2024-05-22 DIAGNOSIS — Z87.891 FORMER SMOKER: ICD-10-CM

## 2024-05-22 DIAGNOSIS — Z97.8 CHRONIC INDWELLING FOLEY CATHETER: ICD-10-CM

## 2024-05-22 DIAGNOSIS — I10 ESSENTIAL HYPERTENSION: ICD-10-CM

## 2024-05-22 DIAGNOSIS — Z86.16 HISTORY OF COVID-19: ICD-10-CM

## 2024-05-22 DIAGNOSIS — Z98.61 CAD S/P PERCUTANEOUS CORONARY ANGIOPLASTY: ICD-10-CM

## 2024-05-22 DIAGNOSIS — G47.30 SLEEP APNEA, UNSPECIFIED TYPE: ICD-10-CM

## 2024-05-22 DIAGNOSIS — Z99.81 ON HOME OXYGEN THERAPY: ICD-10-CM

## 2024-05-22 DIAGNOSIS — E11.9 DIABETES MELLITUS TYPE II, NON INSULIN DEPENDENT (MULTI): ICD-10-CM

## 2024-05-22 DIAGNOSIS — E78.2 MIXED HYPERLIPIDEMIA: ICD-10-CM

## 2024-05-22 DIAGNOSIS — I48.11 LONGSTANDING PERSISTENT ATRIAL FIBRILLATION (MULTI): ICD-10-CM

## 2024-05-22 PROCEDURE — 3074F SYST BP LT 130 MM HG: CPT | Performed by: INTERNAL MEDICINE

## 2024-05-22 PROCEDURE — 3078F DIAST BP <80 MM HG: CPT | Performed by: INTERNAL MEDICINE

## 2024-05-22 PROCEDURE — 1159F MED LIST DOCD IN RCRD: CPT | Performed by: INTERNAL MEDICINE

## 2024-05-22 PROCEDURE — 1036F TOBACCO NON-USER: CPT | Performed by: INTERNAL MEDICINE

## 2024-05-22 PROCEDURE — 99214 OFFICE O/P EST MOD 30 MIN: CPT | Performed by: INTERNAL MEDICINE

## 2024-05-22 RX ORDER — NITROGLYCERIN 0.4 MG/1
0.4 TABLET SUBLINGUAL EVERY 5 MIN PRN
Qty: 25 TABLET | Refills: 5 | Status: SHIPPED | OUTPATIENT
Start: 2024-05-22

## 2024-05-22 RX ORDER — FLUTICASONE FUROATE 27.5 UG/1
2 SPRAY, METERED NASAL
COMMUNITY

## 2024-05-22 NOTE — PROGRESS NOTES
CARDIOLOGY OFFICE VISIT      CHIEF COMPLAINT      HISTORY OF PRESENT ILLNESS  The patient states that since I saw him in November he has been hospitalized twice for exacerbation of his COPD and congestive heart failure.  His echocardiogram demonstrates normal left ventricular systolic function.  He will was not found to have any significant valvular abnormalities on the echocardiogram.  He was initially started on Lasix 20 mg a day.  After his second hospitalization that has been increased to 40 mg a day.  He is on potassium chloride replacement.  He is going to have lab work in the near future to reevaluate his renal function and potassium on the higher dose of Lasix.  He denies chest discomfort.  He is not having any significant pretibial edema.  His breathing is back to his baseline.    Impression:  1. Coronary artery disease, mild infrequent angina pectoralis  2. Percutaneous coronary intervention, multivessel, most recent drug-eluting stents, January 18, 2018.  3. Essential hypertension.  4. Mixed hyperlipidemia.  5. Persistent atrial fibrillation, being managed with diltiazem, metoprolol, digoxin, and Eliquis, high-risk medication.  6. Severe chronic obstructive pulmonary disease, on chronic oxygen therapy, and noninvasive ventilator at night.  7. Overweight  8. Former smoker.  9. Covid 19 pneumonia December 2021  10. Chronic indwelling Martines catheter  11. Diabetes mellitus type 2  12.  Chronic diastolic congestive heart failure        Please excuse any errors in grammar or translation related to this dictation. Voice recognition software was utilized to prepare this document.  Past Medical History  Past Medical History:   Diagnosis Date    A-fib (Multi)     Arrhythmia     COPD (chronic obstructive pulmonary disease) (Multi)     Coronary artery disease     Diabetes mellitus (Multi)     Former smoker     H/O atrial flutter     Hyperlipidemia     Hypertension     OSWALDO (obstructive sleep apnea)     Overweight  2022    Overweight with body mass index (BMI) of 27 to 27.9 in adult       Social History  Social History     Tobacco Use    Smoking status: Former     Current packs/day: 0.00     Average packs/day: 1 pack/day for 40.0 years (40.0 ttl pk-yrs)     Types: Cigarettes     Start date:      Quit date:      Years since quittin.4    Smokeless tobacco: Never   Substance Use Topics    Alcohol use: Not Currently    Drug use: Never       Family History     Family History   Problem Relation Name Age of Onset    Heart attack Mother      Coronary artery disease Mother      Heart attack Father          Allergies:  Allergies   Allergen Reactions    Ticagrelor Shortness of breath    Sildenafil Other and Unknown     Headache    headache    Fluticasone Other    Fluticasone Propion-Salmeterol Other    Zithromax Z-Jorje [Azithromycin] Unknown     Interaction with digoxin        Outpatient Medications:  Current Outpatient Medications   Medication Instructions    albuterol (ProAir HFA) 90 mcg/actuation inhaler 1 puff, inhalation, Every 4 hours PRN    Alpha tocopherol (Vitamin E) 200 unit capsule oral, Daily as directed    apixaban (ELIQUIS) 2.5 mg, oral, 2 times daily    ascorbic acid (Vitamin C) 500 mg tablet oral    aspirin 81 mg chewable tablet oral, Daily RT    busPIRone (Buspar) 15 mg tablet 1 tablet, oral, Daily, 0.5-1 tab daily as directed    calcipotriene (Dovonex) 0.005 % cream 1 Application, Topical, Daily    CALCIUM CITRATE-VITAMIN D3 ORAL 1 tablet, oral, Daily    cholecalciferol (Vitamin D3) 50 mcg (2,000 unit) capsule oral    Cinnamon 500 mg capsule oral, As directed    Combivent Respimat  mcg/actuation inhaler 1 puff, inhalation, 6 times daily, As directed    cranberry extract 200 mg capsule oral, Daily as directed    cyanocobalamin (Vitamin B-12) 100 mcg tablet 1 tablet, oral, Daily, 2500 mcg daily    digoxin (LANOXIN) 125 mcg, oral, Daily    dilTIAZem CD (CARDIZEM CD) 120 mg, oral, Daily before  evening meal    dilTIAZem CD (CARDIZEM CD) 240 mg, oral, Every morning    docusate sodium (Colace) 100 mg capsule TAKE 1 CAPSULE BY MOUTH TWICE DAILY AS NEEDED FOR CONSTIPATION    dofetilide (Tikosyn) 250 mcg capsule oral, 2 times daily    dutasteride (AVODART) 0.5 mg, oral, Daily    fexofenadine (ALLEGRA) 180 mg, oral, Nightly, PRN    fluticasone (Flonase Sensimist) 27.5 mcg/actuation nasal spray 2 sprays, Each Nostril, Daily RT    folic acid (Folvite) 800 mcg tablet oral, Once daily as directed    furosemide (LASIX) 40 mg, oral, Daily    glucosamine-chondroit-vit C-Mn (Glucosamine-Chondroitin Complx) capsule oral, As directed    glutathione 500 mg capsule oral, As directed    insulin lispro (HumaLOG U-100 Insulin) 100 unit/mL injection See Instructions, 2, 4 , 6 , 8 units per sliding scale., # 15 mL, Refills(s) 0, other reason (Rx)    ipratropium-albuteroL (Duo-Neb) 0.5-2.5 mg/3 mL nebulizer solution 3 mL, nebulization, 4 times daily RT, Or PRN    Januvia 50 mg tablet 1 tablet, oral, Daily    ketoconazole (NIZOral) 2 % shampoo Topical, 2 times weekly, Or as directed PRN    Lantus U-100 Insulin 16 Units, subcutaneous, Every 24 hours, Take as directed per insulin instructions.    latanoprost (Xelpros) 0.005 % drops, emulsion ophthalmic (eye)    levofloxacin (LEVAQUIN ORAL) 500 mg, oral, Daily PRN    losartan (COZAAR) 50 mg, oral, Every evening    losartan-hydrochlorothiazide (Hyzaar) 50-12.5 mg tablet 1 tablet, oral, Daily    magnesium 250 mg tablet 2 tablets, oral, 2 times daily    melatonin 10 mg tablet oral, At HS PRN    methylsulfonylmethane 1,000 mg capsule oral    metoprolol succinate XL (Toprol-XL) 25 mg 24 hr tablet Take one tablet daily in the morning in addition to 50 mg in the evening    metoprolol succinate XL (TOPROL-XL) 50 mg, oral, Every evening    metoprolol tartrate (Lopressor) 25 mg tablet Take 1 tablet (25 mg) by mouth once daily in the morning.    nitroglycerin (NITROSTAT) 0.4 mg, sublingual,  Every 5 min PRN    nystatin (Mycostatin) 100,000 unit/gram powder 1 Application, Topical, 2 times daily PRN    oxybutynin XL (DITROPAN-XL) 5 mg, oral, Daily, Do not crush, chew, or split.    oxygen (O2) gas therapy As directed    POTASSIUM ORAL 198 mg, oral    pyridoxine (Vitamin B-6) 250 mg tablet 1 tablet, oral, Daily    rosuvastatin (CRESTOR) 40 mg, oral, Daily    temazepam (RESTORIL) 15 mg, oral, 2 times daily    thiamine (Vitamin B-1) 50 mg tablet 1 tablet, oral, Daily, 250mg daily    ubidecarenone (COENZYME Q10, BULK, MISC) 1 capsule, oral, Daily    zinc gluconate 50 mg tablet 1 tablet, oral, Daily          REVIEW OF SYSTEMS  Review of Systems   Respiratory:          Portable oxygen via NC    All other systems reviewed and are negative.        VITALS  Vitals:    05/22/24 1432   BP: 100/78   Pulse: 58       PHYSICAL EXAM  Vitals reviewed.   Constitutional:       Appearance: Normal and healthy appearance. Well-developed and not in distress.   Eyes:      Conjunctiva/sclera: Conjunctivae normal.      Pupils: Pupils are equal, round, and reactive to light.   Neck:      Vascular: No JVR. JVD normal.   Pulmonary:      Effort: Pulmonary effort is normal.      Breath sounds: Normal breath sounds. Decreased air movement present. No wheezing. No rhonchi. No rales.      Comments: Portable oxygen via NC   Chest:      Chest wall: Not tender to palpatation.   Cardiovascular:      PMI at left midclavicular line. Normal rate. Regular rhythm. Normal S1. Normal S2.       Murmurs: There is no murmur.      No gallop.  No click. No rub.   Pulses:     Intact distal pulses.   Edema:     Pretibial: bilateral trace edema of the pretibial area.     Ankle: bilateral trace edema of the ankle.     Feet: bilateral trace edema of the feet.  Abdominal:      Tenderness: There is no abdominal tenderness.   Musculoskeletal: Normal range of motion.         General: No tenderness.      Cervical back: Normal range of motion. Skin:     General: Skin  is warm and dry.   Neurological:      General: No focal deficit present.      Mental Status: Alert and oriented to person, place and time.   Psychiatric:         Behavior: Behavior is cooperative.           ASSESSMENT AND PLAN  Diagnoses and all orders for this visit:  CAD S/P percutaneous coronary angioplasty  Essential hypertension  Longstanding persistent atrial fibrillation (Multi)  Mixed hyperlipidemia  Diabetes mellitus type II, non insulin dependent (Multi)  BMI 28.0-28.9,adult  Chronic obstructive pulmonary disease, unspecified COPD type (Multi)  History of COVID-19  On home oxygen therapy  Chronic indwelling Martines catheter  Former smoker  Sleep apnea, unspecified type      [unfilled]

## 2024-05-22 NOTE — PATIENT INSTRUCTIONS
Patient to follow up in 6 months with Dr. Mejia Barlow MD      No changes today.   Continue same medications and treatments.   Patient educated on proper medication use.   Patient educated on risk factor modification.   Please bring any lab results from other providers / physicians to your next appointment.     Please bring all medicines, vitamins, and herbal supplements with you when you come to the office.     Prescriptions will not be filled unless you are compliant with your follow up appointments or have a follow up appointment scheduled as per instruction of your physician. Refills should be requested at the time of your visit.    I, Federico Costa RN am scribing for and in the presence of Dr. Mejia Dietrich MD

## 2024-08-19 DIAGNOSIS — I48.11 LONGSTANDING PERSISTENT ATRIAL FIBRILLATION (MULTI): ICD-10-CM

## 2024-08-19 RX ORDER — DILTIAZEM HYDROCHLORIDE 240 MG/1
240 CAPSULE, COATED, EXTENDED RELEASE ORAL EVERY MORNING
Qty: 90 CAPSULE | Refills: 3 | Status: SHIPPED | OUTPATIENT
Start: 2024-08-19 | End: 2025-08-19

## 2024-08-19 RX ORDER — DILTIAZEM HYDROCHLORIDE 120 MG/1
120 CAPSULE, COATED, EXTENDED RELEASE ORAL
Qty: 90 CAPSULE | Refills: 3 | Status: SHIPPED | OUTPATIENT
Start: 2024-08-19 | End: 2025-08-19

## 2024-08-19 NOTE — TELEPHONE ENCOUNTER
Received refill request from [facility] for diltiazem. Orders placed and sent to physician for signature.

## 2024-08-26 ENCOUNTER — APPOINTMENT (OUTPATIENT)
Dept: CARDIOLOGY | Facility: CLINIC | Age: 84
End: 2024-08-26
Payer: MEDICARE

## 2024-08-26 VITALS
HEART RATE: 68 BPM | WEIGHT: 213 LBS | SYSTOLIC BLOOD PRESSURE: 126 MMHG | BODY MASS INDEX: 28.85 KG/M2 | HEIGHT: 72 IN | DIASTOLIC BLOOD PRESSURE: 64 MMHG

## 2024-08-26 DIAGNOSIS — I47.10 SVT (SUPRAVENTRICULAR TACHYCARDIA) (CMS-HCC): ICD-10-CM

## 2024-08-26 DIAGNOSIS — I10 ESSENTIAL HYPERTENSION: ICD-10-CM

## 2024-08-26 DIAGNOSIS — I48.11 LONGSTANDING PERSISTENT ATRIAL FIBRILLATION (MULTI): Primary | ICD-10-CM

## 2024-08-26 PROCEDURE — 99214 OFFICE O/P EST MOD 30 MIN: CPT | Performed by: NURSE PRACTITIONER

## 2024-08-26 PROCEDURE — 3074F SYST BP LT 130 MM HG: CPT | Performed by: NURSE PRACTITIONER

## 2024-08-26 PROCEDURE — 1159F MED LIST DOCD IN RCRD: CPT | Performed by: NURSE PRACTITIONER

## 2024-08-26 PROCEDURE — 93000 ELECTROCARDIOGRAM COMPLETE: CPT | Performed by: INTERNAL MEDICINE

## 2024-08-26 PROCEDURE — 1160F RVW MEDS BY RX/DR IN RCRD: CPT | Performed by: NURSE PRACTITIONER

## 2024-08-26 PROCEDURE — 3078F DIAST BP <80 MM HG: CPT | Performed by: NURSE PRACTITIONER

## 2024-08-26 RX ORDER — BUMETANIDE 2 MG/1
TABLET ORAL
COMMUNITY
Start: 2024-08-13

## 2024-08-26 RX ORDER — METOPROLOL TARTRATE 25 MG/1
25 TABLET, FILM COATED ORAL 2 TIMES DAILY PRN
Qty: 60 TABLET | Refills: 11 | Status: SHIPPED | OUTPATIENT
Start: 2024-08-26 | End: 2025-08-26

## 2024-08-26 RX ORDER — LOSARTAN POTASSIUM 50 MG/1
TABLET ORAL
Qty: 90 TABLET | Refills: 3 | Status: SHIPPED | OUTPATIENT
Start: 2024-08-26

## 2024-08-26 NOTE — PROGRESS NOTES
"CARDIOLOGY OFFICE VISIT      CHIEF COMPLAINT  Chief Complaint   Patient presents with    Follow-up     Pt is here today following up after 6 months    Chief complaint: \"I do not have much energy but I am trying to make the best of it.\"    HISTORY OF PRESENT ILLNESS  HPI  History: The patient is a 83-year-old  male who is followed for persistent atrial fibrillation on dofetilide, diltiazem, digoxin, and metoprolol, hypertension, coronary artery disease status post angioplasty with stent deployment, chronic obstructive pulmonary disease on 6 L nasal cannula, obstructive sleep apnea, remote tobacco use, dyslipidemia on statin and insulin-dependent diabetes mellitus.  He presents to the office today accompanied by his wife.  He states that he remains short of breath with activity.  He denies productive cough, chest pain, abdominal distention, or lower extremity edema.  Past Medical History  Past Medical History:   Diagnosis Date    A-fib (Multi)     Arrhythmia     COPD (chronic obstructive pulmonary disease) (Multi)     Coronary artery disease     Diabetes mellitus (Multi)     Former smoker     H/O atrial flutter     Hyperlipidemia     Hypertension     OSWALDO (obstructive sleep apnea)     Overweight 2022    Overweight with body mass index (BMI) of 27 to 27.9 in adult       Social History  Social History     Tobacco Use    Smoking status: Former     Current packs/day: 0.00     Average packs/day: 1 pack/day for 40.0 years (40.0 ttl pk-yrs)     Types: Cigarettes     Start date:      Quit date:      Years since quittin.6    Smokeless tobacco: Never   Substance Use Topics    Alcohol use: Not Currently    Drug use: Never       Family History     Family History   Problem Relation Name Age of Onset    Heart attack Mother      Coronary artery disease Mother      Heart attack Father          Allergies:  Allergies   Allergen Reactions    Ticagrelor Shortness of breath    Sildenafil Other and Unknown     " Headache    headache    Fluticasone Other    Fluticasone Propion-Salmeterol Other    Zithromax Z-Jorje [Azithromycin] Unknown     Interaction with digoxin        Outpatient Medications:  Current Outpatient Medications   Medication Instructions    albuterol (ProAir HFA) 90 mcg/actuation inhaler 1 puff, inhalation, Every 4 hours PRN    Alpha tocopherol (Vitamin E) 200 unit capsule oral, Daily as directed    apixaban (ELIQUIS) 2.5 mg, oral, 2 times daily    ascorbic acid (Vitamin C) 500 mg tablet oral    aspirin 81 mg chewable tablet oral, Daily RT    bumetanide (Bumex) 2 mg tablet See Instructions, 1 tab(s) Oral Daily in addition to lasix if weight gain > 3 lbs over night, # 10 EA, Refills(s) 0, Pharmacy: [id number], 183, cm, 08/06/24 11:07:00 EDT, Height/Length Dosing, 97.7, kg, 08/06/24 11:07:00 EDT, Weight Dosing    busPIRone (Buspar) 15 mg tablet 1 tablet, oral, Daily, 0.5-1 tab daily as directed    calcipotriene (Dovonex) 0.005 % cream 1 Application, Topical, Daily    CALCIUM CITRATE-VITAMIN D3 ORAL 1 tablet, oral, Daily    cholecalciferol (Vitamin D3) 50 mcg (2,000 unit) capsule oral    Cinnamon 500 mg capsule oral, As directed    Combivent Respimat  mcg/actuation inhaler 1 puff, inhalation, 6 times daily, As directed    cranberry extract 200 mg capsule oral, Daily as directed    cyanocobalamin (Vitamin B-12) 100 mcg tablet 1 tablet, oral, Daily, 2500 mcg daily    digoxin (LANOXIN) 125 mcg, oral, Daily    dilTIAZem CD (CARDIZEM CD) 120 mg, oral, Daily before evening meal    dilTIAZem CD (CARDIZEM CD) 240 mg, oral, Every morning    docusate sodium (Colace) 100 mg capsule TAKE 1 CAPSULE BY MOUTH TWICE DAILY AS NEEDED FOR CONSTIPATION    dofetilide (Tikosyn) 250 mcg capsule oral, 2 times daily    dutasteride (AVODART) 0.5 mg, oral, Daily    fexofenadine (ALLEGRA) 180 mg, oral, Nightly, PRN    fluticasone (Flonase Sensimist) 27.5 mcg/actuation nasal spray 2 sprays, Each Nostril, Daily RT    folic  acid (Folvite) 800 mcg tablet oral, Once daily as directed    furosemide (LASIX) 40 mg, oral, Daily    glucosamine-chondroit-vit C-Mn (Glucosamine-Chondroitin Complx) capsule oral, As directed    glutathione 500 mg capsule oral, As directed    insulin lispro (HumaLOG U-100 Insulin) 100 unit/mL injection See Instructions, 2, 4 , 6 , 8 units per sliding scale., # 15 mL, Refills(s) 0, other reason (Rx)    ipratropium-albuteroL (Duo-Neb) 0.5-2.5 mg/3 mL nebulizer solution 3 mL, nebulization, 4 times daily RT, Or PRN    Januvia 50 mg tablet 1 tablet, oral, Daily    ketoconazole (NIZOral) 2 % shampoo Topical, 2 times weekly, Or as directed PRN    Lactobacillus acidophilus (PROBIOTIC ORAL) oral, 2 times daily, Acidophilus    Lantus U-100 Insulin 16 Units, subcutaneous, Every 24 hours, Take as directed per insulin instructions.    latanoprost (Xelpros) 0.005 % drops, emulsion ophthalmic (eye)    levofloxacin (LEVAQUIN ORAL) 500 mg, oral, Daily PRN    losartan (COZAAR) 50 mg, oral, Every evening    losartan-hydrochlorothiazide (Hyzaar) 50-12.5 mg tablet 1 tablet, oral, Daily    magnesium 250 mg tablet 2 tablets, oral, 2 times daily    melatonin 10 mg tablet oral, At HS PRN    methylsulfonylmethane 1,000 mg capsule oral    metoprolol succinate XL (Toprol-XL) 25 mg 24 hr tablet Take one tablet daily in the morning in addition to 50 mg in the evening    metoprolol succinate XL (TOPROL-XL) 50 mg, oral, Every evening    metoprolol tartrate (Lopressor) 25 mg tablet Take 1 tablet (25 mg) by mouth once daily in the morning.    nitroglycerin (NITROSTAT) 0.4 mg, sublingual, Every 5 min PRN    nystatin (Mycostatin) 100,000 unit/gram powder 1 Application, Topical, 2 times daily PRN    oxybutynin XL (DITROPAN-XL) 5 mg, oral, Daily, Do not crush, chew, or split.    oxygen (O2) gas therapy As directed    POTASSIUM ORAL 198 mg, oral    pyridoxine (Vitamin B-6) 250 mg tablet 1 tablet, oral, Daily    rosuvastatin (CRESTOR) 40 mg, oral, Daily     temazepam (RESTORIL) 15 mg, oral, 2 times daily    thiamine (Vitamin B-1) 50 mg tablet 1 tablet, oral, Daily, 250mg daily    ubidecarenone (COENZYME Q10, BULK, MISC) 1 capsule, oral, Daily    zinc gluconate 50 mg tablet 1 tablet, oral, Daily          REVIEW OF SYSTEMS  Review of Systems   All other systems reviewed and are negative.        VITALS  Vitals:    08/26/24 1433   BP: 126/64   Pulse: 68       PHYSICAL EXAM  Vitals and nursing note reviewed.   Constitutional:       Appearance: Normal appearance.   HENT:      Head: Normocephalic.   Neck:      Vascular: No JVD. Carotid upstrokes II/IV.  Cardiovascular:      Rate and Rhythm: Bradycardic rate and irregular rhythm.     Pulses: Normal pulses.      Heart sounds: Normal S1 S2, no S3 S4.  Grade 2/6 systolic murmur noted across precordium.  Pulmonary:      Effort: Pulmonary effort is normal. Respirations regular and nonlabored.     Breath sounds: Clear to auscultation posterior laterally.  Abdominal:      General: Bowel sounds are normal.      Palpations: Abdomen is soft.   Musculoskeletal:         General: Normal range of motion.      Cervical back: Normal range of motion.   Skin:     General: Skin is warm and dry.   Neurological:      General: No focal deficit present.      Mental Status: Alert and oriented to person, place, and time.      Motor: Motor function is intact.   Psychiatric:         Attention and Perception: Attention and perception normal.         Mood and Affect: Mood and affect normal.         Speech: Speech normal.         Behavior: Behavior normal. Behavior is cooperative.         Thought Content: Thought content normal.         Cognition and Memory: Cognition and memory normal.     Labs and testing: Twelve-lead EKG reveals atrial fibrillation with slow ventricular response, ventricular rate 47 bpm.  QRS durations 98 ms,  ms, QTc 355 ms.  No acute ischemic changes are noted.  2D echocardiogram dated March 27, 2024 revealed an ejection  fraction of 60 to 65%, mild to moderate left ventricular hypertrophy, mild to moderate left atrial enlargement, mild right ventricular enlargement, mild to moderate aortic valve thickening, mild mitral valve leaflet thickening, mild to moderate mitral annular calcification, mild MR and TR.      ASSESSMENT AND PLAN    Clinical impressions:  1. Paroxysmal atrial fibrillation with periods of rapid ventricular response controlled on diltiazem, digoxin, magnesium oxide, and metoprolol and anticoagulated with Eliquis.  2. Coronary artery disease status post angioplasty with drug-eluting stent to the proximal LAD and mid RCA with patent stent in the mid LAD and 10% mid to distal left main, 10 to 30% mid LAD and 60% distal circumflex stenosis with recommendation for medical management.  3. Dyslipidemia on statin.  4. Hypertension, controlled with a blood pressure today of 126/64.  5. Chronic obstructive pulmonary disease on supplemental oxygen at 6 L nasal cannula.  6. Remote tobacco use.  7. Obstructive sleep apnea.  8. Overweight with a BMI of 28.89.    Recommendations:  1.  The medication list was corrected.  The losartan hydrochlorothiazide was removed from the list.  Patient is on dofetilide and hydrochlorothiazide is contraindicated.  He will continue all current medications as prescribed.  2.  Follow-up in office with Dr. Dietrich on November 21, 2024 at 1:30 PM schedule or sooner if needed.  3.  Follow-up in office with Dr. Ramirez in 6 months or sooner if needed.  4.  Patient is in atrial fibrillation at the time of today's office visit.  Heart rate is controlled and slow based on today's twelve-lead EKG.  Patient is not experiencing dizziness or lightheadedness.  Will continue rate control at this time.  Consider discontinuation of dofetilide.  5.  Continue lifestyle modifications.    Evaluation and note by Kaitlin Henning CNP  **Please excuse any errors in grammar or translation related to this dictation.   Voice recognition software was utilized to prepare this document.**

## 2024-08-31 DIAGNOSIS — Z79.899 HIGH RISK MEDICATION USE: ICD-10-CM

## 2024-08-31 DIAGNOSIS — I48.11 LONGSTANDING PERSISTENT ATRIAL FIBRILLATION (MULTI): ICD-10-CM

## 2024-09-03 RX ORDER — DOFETILIDE 0.25 MG/1
250 CAPSULE ORAL 2 TIMES DAILY
Qty: 180 CAPSULE | Refills: 1 | Status: SHIPPED | OUTPATIENT
Start: 2024-09-03

## 2024-09-03 NOTE — TELEPHONE ENCOUNTER
Received request for prescription refills for patient.   Patient follows with Dr. Ramirez     Request is for Tikosyn 250mcg BID  Is patient currently on medication yes    Last OV 8/26/24  Next OV 3/4/25    Pended for signing and sent to provider

## 2024-09-21 DIAGNOSIS — I48.11 LONGSTANDING PERSISTENT ATRIAL FIBRILLATION (MULTI): ICD-10-CM

## 2024-09-23 RX ORDER — DILTIAZEM HYDROCHLORIDE 240 MG/1
240 CAPSULE, COATED, EXTENDED RELEASE ORAL EVERY MORNING
Qty: 90 CAPSULE | Refills: 3 | Status: SHIPPED | OUTPATIENT
Start: 2024-09-23 | End: 2025-09-23

## 2024-09-23 NOTE — TELEPHONE ENCOUNTER
Received request for prescription refills for patient.   Patient follows with Dr. Jadyn Ramirez     Request is for Diltiazem  Is patient currently on medication yes    Last OV 8/26/24  Next OV 3/4/25    Pended for signing and sent to provider

## 2024-11-21 ENCOUNTER — APPOINTMENT (OUTPATIENT)
Dept: CARDIOLOGY | Facility: CLINIC | Age: 84
End: 2024-11-21
Payer: MEDICARE

## 2024-11-26 DIAGNOSIS — I10 ESSENTIAL HYPERTENSION: ICD-10-CM

## 2024-11-27 DIAGNOSIS — I47.10 SVT (SUPRAVENTRICULAR TACHYCARDIA) (CMS-HCC): ICD-10-CM

## 2024-11-27 DIAGNOSIS — I48.11 LONGSTANDING PERSISTENT ATRIAL FIBRILLATION (MULTI): ICD-10-CM

## 2024-11-27 RX ORDER — METOPROLOL SUCCINATE 50 MG/1
50 TABLET, EXTENDED RELEASE ORAL EVERY EVENING
Qty: 90 TABLET | Refills: 3 | Status: SHIPPED | OUTPATIENT
Start: 2024-11-27 | End: 2025-11-27

## 2024-11-27 NOTE — TELEPHONE ENCOUNTER
Received request for prescription refill for patient.  Patient follows with Dr. Jadyn Ramirez MD, FACC, FACP, RS     Request is for metoprolol  Is patient currently on medication- yes    Last OV- 8/26/24  Next OV- 3/4/25    Pended for signing and sent to provider.

## 2024-11-29 RX ORDER — METOPROLOL SUCCINATE 25 MG/1
TABLET, EXTENDED RELEASE ORAL
Qty: 90 TABLET | Refills: 3 | Status: SHIPPED | OUTPATIENT
Start: 2024-11-29

## 2024-12-13 DIAGNOSIS — J44.9 CHRONIC OBSTRUCTIVE PULMONARY DISEASE, UNSPECIFIED COPD TYPE (HCC): ICD-10-CM

## 2024-12-18 NOTE — RESULT ENCOUNTER NOTE
Please notify Esther Aguilera that lab results are able overall. PSA level is still elevated but decreased from last check. Hemoglobin A1c is still elevated but better than last check as well. Is currently reading at next 6.4. Please let him know that the COVID antibody test is still pending and we will call once that result is in. Abdominal Pain, N/V/D

## 2024-12-20 RX ORDER — IPRATROPIUM BROMIDE AND ALBUTEROL SULFATE 2.5; .5 MG/3ML; MG/3ML
SOLUTION RESPIRATORY (INHALATION)
Refills: 3 | OUTPATIENT
Start: 2024-12-20

## 2024-12-29 ENCOUNTER — HOSPITAL ENCOUNTER (EMERGENCY)
Age: 84
Discharge: HOME | End: 2024-12-29
Attending: EMERGENCY MEDICINE
Payer: MEDICARE

## 2024-12-29 VITALS
WEIGHT: 204 LBS | HEIGHT: 72 IN | OXYGEN SATURATION: 95 % | TEMPERATURE: 97.5 F | HEART RATE: 68 BPM | BODY MASS INDEX: 27.63 KG/M2 | DIASTOLIC BLOOD PRESSURE: 82 MMHG | RESPIRATION RATE: 20 BRPM | SYSTOLIC BLOOD PRESSURE: 164 MMHG

## 2024-12-29 DIAGNOSIS — R33.9 URINARY RETENTION: Primary | ICD-10-CM

## 2024-12-29 DIAGNOSIS — T83.9XXA FOLEY CATHETER PROBLEM, INITIAL ENCOUNTER (HCC): ICD-10-CM

## 2024-12-29 LAB
BACTERIA URNS QL MICRO: NEGATIVE /HPF
BILIRUB UR QL STRIP: NEGATIVE
CLARITY UR: CLEAR
COLOR UR: YELLOW
EPI CELLS #/AREA URNS HPF: ABNORMAL /HPF
GLUCOSE UR STRIP-MCNC: NEGATIVE MG/DL
HGB UR QL STRIP: NORMAL
KETONES UR STRIP-MCNC: NEGATIVE MG/DL
LEUKOCYTE ESTERASE UR QL STRIP: NEGATIVE
NITRITE UR QL STRIP: NEGATIVE
PH UR STRIP: 6.5 [PH] (ref 5–9)
PROT UR STRIP-MCNC: NEGATIVE MG/DL
RBC #/AREA URNS HPF: ABNORMAL /HPF (ref 0–2)
SP GR UR STRIP: 1.02 (ref 1–1.03)
URINE REFLEX TO CULTURE: NORMAL
UROBILINOGEN UR STRIP-ACNC: 0.2 E.U./DL
WBC #/AREA URNS HPF: ABNORMAL /HPF (ref 0–5)

## 2024-12-29 PROCEDURE — 51702 INSERT TEMP BLADDER CATH: CPT

## 2024-12-29 PROCEDURE — 99284 EMERGENCY DEPT VISIT MOD MDM: CPT

## 2024-12-29 PROCEDURE — 81001 URINALYSIS AUTO W/SCOPE: CPT

## 2024-12-29 ASSESSMENT — PAIN DESCRIPTION - DESCRIPTORS: DESCRIPTORS: PRESSURE

## 2024-12-29 ASSESSMENT — PAIN - FUNCTIONAL ASSESSMENT: PAIN_FUNCTIONAL_ASSESSMENT: 0-10

## 2024-12-29 ASSESSMENT — LIFESTYLE VARIABLES
HOW OFTEN DO YOU HAVE A DRINK CONTAINING ALCOHOL: NEVER
HOW MANY STANDARD DRINKS CONTAINING ALCOHOL DO YOU HAVE ON A TYPICAL DAY: PATIENT DOES NOT DRINK

## 2024-12-29 ASSESSMENT — PAIN SCALES - GENERAL: PAINLEVEL_OUTOF10: 6

## 2024-12-30 NOTE — ED PROVIDER NOTES
[facility] ED  EMERGENCY DEPARTMENT ENCOUNTER      Pt Name: Guanako Nolasco  MRN: [id number]  Birthdate 1940  Date of evaluation: 12/29/2024  Provider: Brook Petit DO  8:02 PM    CHIEF COMPLAINT       Chief Complaint   Patient presents with    Urinary Catheter Complication     Chief complaint: Cotton catheter not draining  History of chief complaint: This 84-year-old gentleman presents to the emergency department complaining of his Cotton catheter not draining over the past 2 hours, patient states has been leaking around the Cotton catheter patient reports associated abdominal/bladder pressure.  No associated back pain no fevers chills nausea vomiting weak or dizzy feeling.  Patient states he does have chronic indwelling Cotton catheter changed once a month.  Patient states he did have a problem with it getting obstructed about a month ago but was able to flush it at that time.  Patient states they tried irrigating at home with no good result.  Patient states he is otherwise been well denies other complaint no chest pain palpitation chronic shortness of breath with COPD on home O2 not new or different, no weakness or dizziness    Nursing Notes were reviewed.    REVIEW OF SYSTEMS       Review of Systems  Pertinent findings documented in the history of present illness  Except as noted above the remainder of the review of systems was reviewed and negative.       PAST MEDICAL HISTORY     Past Medical History:   Diagnosis Date    Adrenal adenoma     CT 6/2011 stable    Anxiety     Asbestosis(501)     CAD (coronary artery disease)     status post stent 2001    Cancer (HCC)     basil cell carnoma    COPD (chronic obstructive pulmonary disease) (HCC)     Elbow injury     left    Erectile dysfunction     Granulomatous lung disease (HCC)     Herpes zoster     Hyperlipidemia     Hypertension     Insomnia     Interstitial fibrosis     Obstructive sleep apnea on CPAP 11/13/2017    Osteoarthritis of right AC

## 2024-12-30 NOTE — ED TRIAGE NOTES
Pt presents to ER with c/o his morrison being clogged. Pt and family reports trying to irrigate the morrison at home without success. Pt states he is having a lot of pressure, states he is also leaking around his catheter.,

## 2025-01-19 DIAGNOSIS — I48.11 LONGSTANDING PERSISTENT ATRIAL FIBRILLATION (MULTI): ICD-10-CM

## 2025-01-19 DIAGNOSIS — Z79.899 HIGH RISK MEDICATION USE: ICD-10-CM

## 2025-01-19 DIAGNOSIS — J44.9 CHRONIC OBSTRUCTIVE PULMONARY DISEASE, UNSPECIFIED COPD TYPE (HCC): ICD-10-CM

## 2025-01-19 DIAGNOSIS — E78.2 MIXED HYPERLIPIDEMIA: ICD-10-CM

## 2025-01-20 RX ORDER — ROSUVASTATIN CALCIUM 40 MG/1
40 TABLET, COATED ORAL DAILY
Qty: 30 TABLET | Refills: 0 | Status: SHIPPED | OUTPATIENT
Start: 2025-01-20

## 2025-01-20 NOTE — TELEPHONE ENCOUNTER
Received request for prescription refills for patient.   Patient follows with Dr. Ramirez    Request is for Dofetilide 250  Is patient currently on medication yes    Last OV 08/26/2024 Kaitlin Martinez  Next OV 03/24/2025    Pended for signing and sent to provider

## 2025-01-20 NOTE — TELEPHONE ENCOUNTER
Received request for prescription refills for patient.   Patient follows with Dr. Dietrich    Request is for Crestor  Is patient currently on medication yes    Last OV 5/22/2024  Next OV none scheduled, cancelled CDO apt 11/21/2024,needs apt, sent to scheduling  Next OV 2/05/2025    Pended to physician for approval

## 2025-01-21 RX ORDER — DOFETILIDE 0.25 MG/1
CAPSULE ORAL 2 TIMES DAILY
Qty: 180 CAPSULE | Refills: 3 | Status: SHIPPED | OUTPATIENT
Start: 2025-01-21

## 2025-01-27 RX ORDER — BUDESONIDE AND FORMOTEROL FUMARATE DIHYDRATE 80; 4.5 UG/1; UG/1
AEROSOL RESPIRATORY (INHALATION)
Qty: 30.6 G | Refills: 3 | OUTPATIENT
Start: 2025-01-27

## 2025-02-01 ENCOUNTER — HOSPITAL ENCOUNTER (EMERGENCY)
Age: 85
Discharge: HOME | End: 2025-02-01
Attending: EMERGENCY MEDICINE
Payer: MEDICARE

## 2025-02-01 VITALS
SYSTOLIC BLOOD PRESSURE: 171 MMHG | WEIGHT: 207 LBS | HEART RATE: 66 BPM | BODY MASS INDEX: 28.07 KG/M2 | RESPIRATION RATE: 20 BRPM | TEMPERATURE: 97.6 F | OXYGEN SATURATION: 96 % | DIASTOLIC BLOOD PRESSURE: 80 MMHG

## 2025-02-01 DIAGNOSIS — R33.9 RETENTION OF URINE: Primary | ICD-10-CM

## 2025-02-01 DIAGNOSIS — T83.9XXA DIFFICULT FOLEY CATHETER PLACEMENT (HCC): ICD-10-CM

## 2025-02-01 LAB
BACTERIA URNS QL MICRO: ABNORMAL /HPF
BILIRUB UR QL STRIP: NEGATIVE
CLARITY UR: ABNORMAL
COLOR UR: YELLOW
EPI CELLS #/AREA URNS HPF: ABNORMAL /HPF
GLUCOSE UR STRIP-MCNC: NEGATIVE MG/DL
HGB UR QL STRIP: ABNORMAL
KETONES UR STRIP-MCNC: NEGATIVE MG/DL
LEUKOCYTE ESTERASE UR QL STRIP: ABNORMAL
NITRITE UR QL STRIP: NEGATIVE
PH UR STRIP: 6.5 [PH] (ref 5–9)
PROT UR STRIP-MCNC: NEGATIVE MG/DL
RBC #/AREA URNS HPF: ABNORMAL /HPF (ref 0–2)
SP GR UR STRIP: 1.01 (ref 1–1.03)
URINE REFLEX TO CULTURE: YES
UROBILINOGEN UR STRIP-ACNC: 0.2 E.U./DL
WBC #/AREA URNS HPF: ABNORMAL /HPF (ref 0–5)

## 2025-02-01 PROCEDURE — 87186 SC STD MICRODIL/AGAR DIL: CPT

## 2025-02-01 PROCEDURE — 51703 INSERT BLADDER CATH COMPLEX: CPT

## 2025-02-01 PROCEDURE — 87086 URINE CULTURE/COLONY COUNT: CPT

## 2025-02-01 PROCEDURE — 99283 EMERGENCY DEPT VISIT LOW MDM: CPT

## 2025-02-01 PROCEDURE — 81001 URINALYSIS AUTO W/SCOPE: CPT

## 2025-02-01 PROCEDURE — 6370000000 HC RX 637 (ALT 250 FOR IP): Performed by: EMERGENCY MEDICINE

## 2025-02-01 PROCEDURE — 87077 CULTURE AEROBIC IDENTIFY: CPT

## 2025-02-01 RX ORDER — CIPROFLOXACIN 500 MG/1
500 TABLET, FILM COATED ORAL 2 TIMES DAILY
Qty: 20 TABLET | Refills: 0 | Status: SHIPPED | OUTPATIENT
Start: 2025-02-01 | End: 2025-02-11

## 2025-02-01 RX ORDER — LIDOCAINE HYDROCHLORIDE 20 MG/ML
JELLY TOPICAL PRN
Status: DISCONTINUED | OUTPATIENT
Start: 2025-02-01 | End: 2025-02-01 | Stop reason: HOSPADM

## 2025-02-01 RX ORDER — CIPROFLOXACIN 500 MG/1
500 TABLET, FILM COATED ORAL 2 TIMES DAILY
Qty: 20 TABLET | Refills: 0 | Status: SHIPPED | OUTPATIENT
Start: 2025-02-01 | End: 2025-02-01

## 2025-02-01 RX ORDER — CIPROFLOXACIN 500 MG/1
500 TABLET, FILM COATED ORAL ONCE
Status: COMPLETED | OUTPATIENT
Start: 2025-02-01 | End: 2025-02-01

## 2025-02-01 RX ADMIN — CIPROFLOXACIN HYDROCHLORIDE 500 MG: 500 TABLET, FILM COATED ORAL at 14:54

## 2025-02-01 RX ADMIN — LIDOCAINE HYDROCHLORIDE: 20 JELLY TOPICAL at 13:50

## 2025-02-01 ASSESSMENT — ENCOUNTER SYMPTOMS
STRIDOR: 0
ABDOMINAL PAIN: 0
FACIAL SWELLING: 0
CHOKING: 0
CHEST TIGHTNESS: 0
WHEEZING: 0
SINUS PRESSURE: 0
VOMITING: 0
EYE DISCHARGE: 0
SHORTNESS OF BREATH: 0
EYE REDNESS: 0
EYE PAIN: 0
COUGH: 0
BLOOD IN STOOL: 0
SORE THROAT: 0
VOICE CHANGE: 0
DIARRHEA: 0
CONSTIPATION: 0
BACK PAIN: 0
TROUBLE SWALLOWING: 0

## 2025-02-01 ASSESSMENT — LIFESTYLE VARIABLES
HOW MANY STANDARD DRINKS CONTAINING ALCOHOL DO YOU HAVE ON A TYPICAL DAY: PATIENT DOES NOT DRINK
HOW OFTEN DO YOU HAVE A DRINK CONTAINING ALCOHOL: NEVER

## 2025-02-01 ASSESSMENT — PAIN - FUNCTIONAL ASSESSMENT: PAIN_FUNCTIONAL_ASSESSMENT: NONE - DENIES PAIN

## 2025-02-01 NOTE — ED PROVIDER NOTES
were within normal range or not returned as of this dictation.    EMERGENCY DEPARTMENT COURSE and DIFFERENTIAL DIAGNOSIS/MDM:   Vitals:    Vitals:    02/01/25 1314   BP: (!) 171/80   Pulse: 66   Resp: 20   Temp: 97.6 °F (36.4 °C)   TempSrc: Oral   SpO2: 96%   Weight: 93.9 kg (207 lb)           MDM  Number of Diagnoses or Management Options  Difficult Cotton catheter placement (HCC)  Retention of urine  Diagnosis management comments: Patient with indwelling catheter for the past 2 years patient seen and replacement of Cotton catheter about 3 weeks ago patient unable to empty the bladder because of the blocked catheter as per family members who tried to pull and push fluid in the catheter but unable to do so so came here further evaluation patient examined patient catheter was pulled out and patient has a new catheter difficulty to put it in nurse show and I tried but it had difficult time going into the bladder requiring back due to the prostate enlargement procedure was stopped after that patient did urinate about 150 cc urination bladder scan performed shows only 100 cc left in the bladder patient advised to see urologist in the next couple of days to retry the putting Cotton catheter back patient and family agree with the plan rather than causing more pain and trauma to the urethra patient be put on Cipro in the meantime till he sees a urologist at [facility] as per family        CRITICAL CARE TIME   Total Critical Care time was  minutes, excluding separately reportableprocedures.  There was a high probability of clinicallysignificant/life threatening deterioration in the patient's condition which required my urgent intervention.    CONSULTS:  None    PROCEDURES:  Unless otherwise noted below, none     Procedures    FINAL IMPRESSION      1. Retention of urine    2. Difficult Cotton catheter placement (HCC)          DISPOSITION/PLAN   DISPOSITION                 PATIENT REFERRED TO:  Vivian Medel APRN -

## 2025-02-01 NOTE — ED NOTES
Remomved old 18 fr morrison pt has had in x 3+ weeks and cath had blocked area for approx 5-6 inches.attempted to insert new 18 fr coude and unable to pass past prostate area even after urojet infusion.. Then attempted to use 16 Mauritian morrison w/o success pt bleeding.noni ALFRED attempted to insert 18 fr coude w/o success. Pt was able to void after morrison removal.

## 2025-02-01 NOTE — ED TRIAGE NOTES
Resp non labored. Pt reports morrison cath not draining since approx 1100 today. Pt is able to urinate around cath and urine is light yellow.

## 2025-02-05 ENCOUNTER — APPOINTMENT (OUTPATIENT)
Dept: CARDIOLOGY | Facility: CLINIC | Age: 85
End: 2025-02-05
Payer: MEDICARE

## 2025-02-05 VITALS
HEART RATE: 60 BPM | SYSTOLIC BLOOD PRESSURE: 122 MMHG | HEIGHT: 72 IN | DIASTOLIC BLOOD PRESSURE: 62 MMHG | BODY MASS INDEX: 29.12 KG/M2 | WEIGHT: 215 LBS

## 2025-02-05 DIAGNOSIS — E11.9 DIABETES MELLITUS TYPE II, NON INSULIN DEPENDENT (MULTI): ICD-10-CM

## 2025-02-05 DIAGNOSIS — Z97.8 CHRONIC INDWELLING FOLEY CATHETER: ICD-10-CM

## 2025-02-05 DIAGNOSIS — G47.30 SLEEP APNEA, UNSPECIFIED TYPE: ICD-10-CM

## 2025-02-05 DIAGNOSIS — J44.9 CHRONIC OBSTRUCTIVE PULMONARY DISEASE, UNSPECIFIED COPD TYPE (MULTI): ICD-10-CM

## 2025-02-05 DIAGNOSIS — Z99.81 ON HOME OXYGEN THERAPY: ICD-10-CM

## 2025-02-05 DIAGNOSIS — I10 ESSENTIAL HYPERTENSION: ICD-10-CM

## 2025-02-05 DIAGNOSIS — I50.32 CHRONIC DIASTOLIC HEART FAILURE: ICD-10-CM

## 2025-02-05 DIAGNOSIS — Z86.16 HISTORY OF COVID-19: ICD-10-CM

## 2025-02-05 DIAGNOSIS — Z87.891 FORMER SMOKER: ICD-10-CM

## 2025-02-05 DIAGNOSIS — I25.10 CAD S/P PERCUTANEOUS CORONARY ANGIOPLASTY: ICD-10-CM

## 2025-02-05 DIAGNOSIS — E78.2 MIXED HYPERLIPIDEMIA: ICD-10-CM

## 2025-02-05 DIAGNOSIS — Z98.61 CAD S/P PERCUTANEOUS CORONARY ANGIOPLASTY: ICD-10-CM

## 2025-02-05 DIAGNOSIS — I48.11 LONGSTANDING PERSISTENT ATRIAL FIBRILLATION (MULTI): ICD-10-CM

## 2025-02-05 LAB
BACTERIA UR CULT: ABNORMAL
ORGANISM: ABNORMAL
ORGANISM: ABNORMAL

## 2025-02-05 PROCEDURE — 1036F TOBACCO NON-USER: CPT | Performed by: INTERNAL MEDICINE

## 2025-02-05 PROCEDURE — 3078F DIAST BP <80 MM HG: CPT | Performed by: INTERNAL MEDICINE

## 2025-02-05 PROCEDURE — 1159F MED LIST DOCD IN RCRD: CPT | Performed by: INTERNAL MEDICINE

## 2025-02-05 PROCEDURE — 3074F SYST BP LT 130 MM HG: CPT | Performed by: INTERNAL MEDICINE

## 2025-02-05 PROCEDURE — 99214 OFFICE O/P EST MOD 30 MIN: CPT | Performed by: INTERNAL MEDICINE

## 2025-02-05 RX ORDER — BLOOD-GLUCOSE METER
EACH MISCELLANEOUS
COMMUNITY
Start: 2025-02-03

## 2025-02-05 RX ORDER — LOSARTAN POTASSIUM 50 MG/1
TABLET ORAL
Qty: 90 TABLET | Refills: 3 | Status: SHIPPED | OUTPATIENT
Start: 2025-02-05

## 2025-02-05 RX ORDER — TRIAMCINOLONE ACETONIDE 1 MG/G
CREAM TOPICAL
COMMUNITY
Start: 2025-01-09

## 2025-02-05 RX ORDER — LEVOFLOXACIN 500 MG/1
500 TABLET, FILM COATED ORAL DAILY
COMMUNITY
Start: 2024-11-25

## 2025-02-05 RX ORDER — LANCETS 33 GAUGE
EACH MISCELLANEOUS
COMMUNITY
Start: 2025-01-30

## 2025-02-05 RX ORDER — PEN NEEDLE, DIABETIC 32GX 5/32"
NEEDLE, DISPOSABLE MISCELLANEOUS
COMMUNITY
Start: 2024-06-14

## 2025-02-05 RX ORDER — CIPROFLOXACIN 500 MG/1
1 TABLET ORAL 2 TIMES DAILY
COMMUNITY
Start: 2025-02-01 | End: 2025-02-11

## 2025-02-05 RX ORDER — LANCETS 30 GAUGE
EACH MISCELLANEOUS
COMMUNITY
Start: 2025-01-11

## 2025-02-05 RX ORDER — PREDNISONE 20 MG/1
TABLET ORAL
COMMUNITY
Start: 2024-11-25

## 2025-02-05 ASSESSMENT — ENCOUNTER SYMPTOMS
SHORTNESS OF BREATH: 1
DYSPNEA ON EXERTION: 1

## 2025-02-05 NOTE — PROGRESS NOTES
CARDIOLOGY OFFICE VISIT      CHIEF COMPLAINT      HISTORY OF PRESENT ILLNESS  The patient states that he has had more problems recently with fluid retention and having to take some extra Bumex.  He is not having much in the way of chest discomfort.  He states his COPD seems to be a little worse but not dramatically from before.  He states he has been having a lot of problems with his Martines catheter becoming obstructed.  He denies any problem with his current medication.      Impression:  1. Coronary artery disease, mild infrequent angina pectoralis  2. Percutaneous coronary intervention, multivessel, most recent drug-eluting stents, January 18, 2018.  3. Essential hypertension.  4. Mixed hyperlipidemia.  5. Persistent atrial fibrillation, being managed with diltiazem, metoprolol, digoxin, and Eliquis, high-risk medication.  6. Severe chronic obstructive pulmonary disease, on chronic oxygen therapy, and noninvasive ventilator at night.  7. Overweight  8. Former smoker.  9. Covid 19 pneumonia December 2021  10. Chronic indwelling Martines catheter for benign prostatic hypertrophy  11. Diabetes mellitus type 2  12.  Chronic diastolic congestive heart failure        Please excuse any errors in grammar or translation related to this dictation. Voice recognition software was utilized to prepare this document.    Past Medical History  Past Medical History:   Diagnosis Date    A-fib (Multi)     Arrhythmia     COPD (chronic obstructive pulmonary disease) (Multi)     Coronary artery disease     Diabetes mellitus (Multi)     Former smoker     H/O atrial flutter     Hyperlipidemia     Hypertension     OSWALDO (obstructive sleep apnea)     Overweight 07/21/2022    Overweight with body mass index (BMI) of 27 to 27.9 in adult       Social History  Social History     Tobacco Use    Smoking status: Former     Current packs/day: 0.00     Average packs/day: 1 pack/day for 40.0 years (40.0 ttl pk-yrs)     Types: Cigarettes     Start date:       Quit date:      Years since quittin.1    Smokeless tobacco: Never   Substance Use Topics    Alcohol use: Not Currently    Drug use: Never       Family History     Family History   Problem Relation Name Age of Onset    Heart attack Mother      Coronary artery disease Mother      Heart attack Father          Allergies:  Allergies   Allergen Reactions    Ticagrelor Shortness of breath    Sildenafil Other and Unknown     Headache    headache    Fluticasone Other    Fluticasone Propion-Salmeterol Other    Zithromax Z-Jorje [Azithromycin] Unknown     Interaction with digoxin        Outpatient Medications:  Current Outpatient Medications   Medication Instructions    albuterol (ProAir HFA) 90 mcg/actuation inhaler 1 puff, Every 4 hours PRN    Alpha tocopherol (Vitamin E) 200 unit capsule Take by mouth. Daily as directed    apixaban (ELIQUIS) 2.5 mg, 2 times daily    ascorbic acid (Vitamin C) 500 mg tablet Take by mouth.    aspirin 81 mg chewable tablet Daily RT    bumetanide (Bumex) 2 mg tablet See Instructions, 1 tab(s) Oral Daily in addition to lasix if weight gain > 3 lbs over night, # 10 EA, Refills(s) 0, Pharmacy: [id number], 183, cm, 24 11:07:00 EDT, Height/Length Dosing, 97.7, kg, 24 11:07:00 EDT, Weight Dosing    busPIRone (Buspar) 15 mg tablet 1 tablet, Daily    calcipotriene (Dovonex) 0.005 % cream 1 Application, Daily    CALCIUM CITRATE-VITAMIN D3 ORAL 1 tablet, Daily    cholecalciferol (Vitamin D3) 50 mcg (2,000 unit) capsule Take by mouth.    Cinnamon 500 mg capsule Take by mouth. As directed    ciprofloxacin (Cipro) 500 mg tablet 1 tablet, 2 times daily    Combivent Respimat  mcg/actuation inhaler 1 puff, 6 times daily    cranberry extract 200 mg capsule Take by mouth. Daily as directed    cyanocobalamin (Vitamin B-12) 100 mcg tablet 1 tablet, Daily    digoxin (LANOXIN) 125 mcg, Daily    dilTIAZem CD (CARDIZEM CD) 120 mg, oral, Daily before evening meal     dilTIAZem CD (CARDIZEM CD) 240 mg, oral, Every morning    docusate sodium (Colace) 100 mg capsule TAKE 1 CAPSULE BY MOUTH TWICE DAILY AS NEEDED FOR CONSTIPATION    dofetilide (Tikosyn) 250 mcg capsule oral, 2 times daily    dutasteride (AVODART) 0.5 mg, Daily    fexofenadine (ALLEGRA) 180 mg, Nightly    fluticasone (Flonase Sensimist) 27.5 mcg/actuation nasal spray 2 sprays, Daily RT    folic acid (Folvite) 800 mcg tablet Take by mouth. Once daily as directed    furosemide (LASIX) 40 mg, Daily    glucosamine-chondroit-vit C-Mn (Glucosamine-Chondroitin Complx) capsule Take by mouth. As directed    glutathione 500 mg capsule Take by mouth. As directed    insulin lispro (HumaLOG U-100 Insulin) 100 unit/mL injection See Instructions, 2, 4 , 6 , 8 units per sliding scale., # 15 mL, Refills(s) 0, other reason (Rx)    ipratropium-albuteroL (Duo-Neb) 0.5-2.5 mg/3 mL nebulizer solution 3 mL, 4 times daily RT    Januvia 50 mg tablet 1 tablet, Daily    ketoconazole (NIZOral) 2 % shampoo 2 times weekly    Lactobacillus acidophilus (PROBIOTIC ORAL) 2 times daily    Lantus U-100 Insulin 16 Units, Every 24 hours    latanoprost (Xelpros) 0.005 % drops, emulsion Administer into affected eye(s).    levoFLOXacin (LEVAQUIN) 500 mg, Daily    losartan (Cozaar) 50 mg tablet Take one tablet daily in the morning    magnesium 250 mg tablet 2 tablets, 2 times daily    melatonin 10 mg tablet Take by mouth. At HS PRN    methylsulfonylmethane 1,000 mg capsule Take by mouth.    metoprolol succinate XL (Toprol-XL) 25 mg 24 hr tablet TAKE 1 TABLET BY MOUTH DAILY IN  THE MORNING IN ADDITION TO 50 MG TABLET IN THE EVENING    metoprolol succinate XL (TOPROL-XL) 50 mg, oral, Every evening    metoprolol tartrate (LOPRESSOR) 25 mg, oral, 2 times daily PRN    nitroglycerin (NITROSTAT) 0.4 mg, sublingual, Every 5 min PRN    nystatin (Mycostatin) 100,000 unit/gram powder 1 Application, 2 times daily PRN    oxybutynin XL (DITROPAN-XL) 5 mg, Daily    oxygen  "(O2) gas therapy As directed    pen needle, diabetic 31 gauge x 15/64\" needle USE 1 PEN NEEDLE EACH ONCE DAILY    POTASSIUM ORAL 198 mg    predniSONE (Deltasone) 20 mg tablet prn    Premier Test Strip strip use 1 strip to check glucose three times daily    pyridoxine (Vitamin B-6) 250 mg tablet 1 tablet, Daily    rosuvastatin (CRESTOR) 40 mg, oral, Daily    temazepam (RESTORIL) 15 mg, 2 times daily    thiamine (Vitamin B-1) 50 mg tablet 1 tablet, Daily    triamcinolone (Kenalog) 0.1 % cream Apply to the affected areas of the elbows twice daily.    TRUEplus Lancets 33 gauge misc USE 1 TO CHECK GLUCOSE THREE TIMES DAILY    ubidecarenone (COENZYME Q10, BULK, MISC) 1 capsule, Daily    Unifine Pentips 31 gauge x 1/4\" needle use FOUR TIMES DAILY with insulin injections    zinc gluconate 50 mg tablet 1 tablet, Daily          REVIEW OF SYSTEMS  Review of Systems   Cardiovascular:  Positive for dyspnea on exertion.   Respiratory:  Positive for shortness of breath.    All other systems reviewed and are negative.        VITALS  Vitals:    02/05/25 1125   BP: 122/62   Pulse: 60       PHYSICAL EXAM  Vitals reviewed.   Constitutional:       Appearance: Normal and healthy appearance. Well-developed and not in distress.   Eyes:      Conjunctiva/sclera: Conjunctivae normal.      Pupils: Pupils are equal, round, and reactive to light.   Neck:      Vascular: No JVR. JVD normal.   Pulmonary:      Effort: Pulmonary effort is normal.      Breath sounds: Normal breath sounds. Decreased air movement present. No wheezing. No rhonchi. No rales.      Comments: Continuous oxygen via NC  Chest:      Chest wall: Not tender to palpatation.   Cardiovascular:      PMI at left midclavicular line. Normal rate. Regular rhythm. Normal S1. Normal S2.       Murmurs: There is no murmur.      No gallop.  No click. No rub.   Pulses:     Intact distal pulses.   Edema:     Pretibial: bilateral 1+ edema of the pretibial area.     Ankle: bilateral 1+ edema of " the ankle.     Feet: bilateral 1+ edema of the feet.  Abdominal:      Tenderness: There is no abdominal tenderness.   Musculoskeletal: Normal range of motion.         General: No tenderness.      Cervical back: Normal range of motion. Skin:     General: Skin is warm and dry.   Neurological:      General: No focal deficit present.      Mental Status: Alert and oriented to person, place and time.   Psychiatric:         Behavior: Behavior is cooperative.           ASSESSMENT AND PLAN  Diagnoses and all orders for this visit:  CAD S/P percutaneous coronary angioplasty  Essential hypertension  Mixed hyperlipidemia  Longstanding persistent atrial fibrillation (Multi)  Chronic diastolic heart failure  Diabetes mellitus type II, non insulin dependent (Multi)  BMI 29.0-29.9,adult  Chronic indwelling Martines catheter  On home oxygen therapy  Chronic obstructive pulmonary disease, unspecified COPD type (Multi)  Sleep apnea, unspecified type  Former smoker  History of COVID-19      [unfilled]

## 2025-02-05 NOTE — PATIENT INSTRUCTIONS
Patient to follow up in 6 months with Dr. Mejia Barlow MD      No other changes today.   Continue same medications and treatments.   Patient educated on proper medication use.   Patient educated on risk factor modification.   Please bring any lab results from other providers / physicians to your next appointment.     Please bring all medicines, vitamins, and herbal supplements with you when you come to the office.     Prescriptions will not be filled unless you are compliant with your follow up appointments or have a follow up appointment scheduled as per instruction of your physician. Refills should be requested at the time of your visit.    IFederico RN am scribing for and in the presence of Dr. Mejia Dietrich MD

## 2025-02-20 DIAGNOSIS — J44.9 CHRONIC OBSTRUCTIVE PULMONARY DISEASE, UNSPECIFIED COPD TYPE (HCC): ICD-10-CM

## 2025-02-21 RX ORDER — BUDESONIDE AND FORMOTEROL FUMARATE DIHYDRATE 80; 4.5 UG/1; UG/1
AEROSOL RESPIRATORY (INHALATION)
Qty: 30.6 G | Refills: 3 | OUTPATIENT
Start: 2025-02-21

## 2025-03-04 ENCOUNTER — APPOINTMENT (OUTPATIENT)
Dept: CARDIOLOGY | Facility: CLINIC | Age: 85
End: 2025-03-04
Payer: MEDICARE

## 2025-03-10 DIAGNOSIS — J44.9 CHRONIC OBSTRUCTIVE PULMONARY DISEASE, UNSPECIFIED COPD TYPE (HCC): ICD-10-CM

## 2025-03-10 DIAGNOSIS — E78.2 MIXED HYPERLIPIDEMIA: ICD-10-CM

## 2025-03-11 RX ORDER — ROSUVASTATIN CALCIUM 40 MG/1
40 TABLET, COATED ORAL DAILY
Qty: 90 TABLET | Refills: 3 | Status: SHIPPED | OUTPATIENT
Start: 2025-03-11 | End: 2026-03-11

## 2025-03-11 RX ORDER — BUDESONIDE AND FORMOTEROL FUMARATE DIHYDRATE 80; 4.5 UG/1; UG/1
AEROSOL RESPIRATORY (INHALATION)
Qty: 30.6 G | Refills: 3 | OUTPATIENT
Start: 2025-03-11

## 2025-03-11 NOTE — TELEPHONE ENCOUNTER
Received request for prescription refills for patient.   Patient follows with Dr. Dietrich    Request is for Crestor  Is patient currently on medication yes    Last OV 2/5/2025  Next OV 8/6/2025    Pended for signing and sent to provider

## 2025-03-28 ENCOUNTER — APPOINTMENT (OUTPATIENT)
Dept: CARDIOLOGY | Facility: CLINIC | Age: 85
End: 2025-03-28
Payer: MEDICARE

## 2025-07-12 DIAGNOSIS — I48.11 LONGSTANDING PERSISTENT ATRIAL FIBRILLATION (MULTI): ICD-10-CM

## 2025-07-14 RX ORDER — DILTIAZEM HYDROCHLORIDE 240 MG/1
CAPSULE, COATED, EXTENDED RELEASE ORAL
Qty: 90 CAPSULE | Refills: 3 | Status: SHIPPED | OUTPATIENT
Start: 2025-07-14

## 2025-07-14 NOTE — TELEPHONE ENCOUNTER
Received request for prescription refills for patient.   Patient follows with Dr. Ramirez      Request is for Diltiazem 240 mg   Is patient currently on medication YES    Last OV 3/26/24  Next OV NEEDS APPT SCHEDULE     Pended for signing and sent to provider

## 2025-08-06 ENCOUNTER — APPOINTMENT (OUTPATIENT)
Dept: CARDIOLOGY | Facility: CLINIC | Age: 85
End: 2025-08-06
Payer: MEDICARE